# Patient Record
Sex: FEMALE | Race: WHITE | Employment: OTHER | URBAN - METROPOLITAN AREA
[De-identification: names, ages, dates, MRNs, and addresses within clinical notes are randomized per-mention and may not be internally consistent; named-entity substitution may affect disease eponyms.]

---

## 2019-07-02 NOTE — TELEPHONE ENCOUNTER
RECORDS STATUS - ALL OTHER DIAGNOSIS      RECORDS RECEIVED FROM: [facility]    DATE RECEIVED: 07/02/2019   NOTES STATUS DETAILS   OFFICE NOTE from referring provider YES CE   OFFICE NOTE from medical oncologist NA    DISCHARGE SUMMARY from hospital YES CE   DISCHARGE REPORT from the ER YES CE   OPERATIVE REPORT NA    MEDICATION LIST YES CE   CLINICAL TRIAL TREATMENTS TO DATE NA    LABS YES CE   PATHOLOGY REPORTS NA    ANYTHING RELATED TO DIAGNOSIS NA    GENONOMIC TESTING NA    TYPE:     IMAGING (NEED IMAGES & REPORT) PENDING REQUESTED FROM    CT SCANS PENDING    MRI NA    MAMMO NA    ULTRASOUND NA    PET NA      Action LULI   Action Taken REQUEST SENT TO  FOR IMAGING.. CDK 07/02/2019

## 2019-07-02 NOTE — TELEPHONE ENCOUNTER
ONCOLOGY INTAKE: Records Information      APPT INFORMATION: 7/11/19 - [facility]  Referring provider:  Dr Micheline Soler   Referring provider s clinic:  [facility]  Reason for visit/diagnosis:  Left ovarian mass  Has patient been notified of appointment date and time?: Yes - per Ngoc    RECORDS INFORMATION:  Were the records received with the referral (via [employer])? No    Has patient been seen for any external appt for this diagnosis? Yes    If yes, where[facility]     Has patient had any imaging or procedures outside of [place] for this condition? Yes      If Yes, where? [facility] ([facility])     ADDITIONAL INFORMATION:  Scheduled via call from Ngoc at [facility]    Records in Care Everywhere & Referral to be faxed by Ngoc

## 2019-07-11 ENCOUNTER — RESEARCH ENCOUNTER (OUTPATIENT)
Dept: ONCOLOGY | Facility: CLINIC | Age: 74
End: 2019-07-11

## 2019-07-11 ENCOUNTER — ONCOLOGY VISIT (OUTPATIENT)
Dept: ONCOLOGY | Facility: CLINIC | Age: 74
End: 2019-07-11
Attending: OBSTETRICS & GYNECOLOGY
Payer: MEDICARE

## 2019-07-11 ENCOUNTER — PRE VISIT (OUTPATIENT)
Dept: ONCOLOGY | Facility: CLINIC | Age: 74
End: 2019-07-11

## 2019-07-11 VITALS
BODY MASS INDEX: 20.15 KG/M2 | TEMPERATURE: 97.6 F | OXYGEN SATURATION: 99 % | SYSTOLIC BLOOD PRESSURE: 138 MMHG | HEART RATE: 80 BPM | HEIGHT: 58 IN | WEIGHT: 96 LBS | DIASTOLIC BLOOD PRESSURE: 84 MMHG | RESPIRATION RATE: 14 BRPM

## 2019-07-11 DIAGNOSIS — K35.201 ACUTE APPENDICITIS WITH PERFORATION AND GENERALIZED PERITONITIS, WITHOUT GANGRENE, UNSPECIFIED WHETHER ABSCESS PRESENT: Primary | ICD-10-CM

## 2019-07-11 PROBLEM — E06.3 HASHIMOTO'S DISEASE: Status: ACTIVE | Noted: 2017-10-02

## 2019-07-11 PROBLEM — R19.00 PELVIC MASS: Status: ACTIVE | Noted: 2019-07-02

## 2019-07-11 PROBLEM — R33.9 RETENTION OF URINE: Status: ACTIVE | Noted: 2019-07-05

## 2019-07-11 PROBLEM — Z01.818 PREOP EXAMINATION: Status: ACTIVE | Noted: 2019-06-30

## 2019-07-11 PROBLEM — K52.831 COLLAGENOUS COLITIS: Status: ACTIVE | Noted: 2017-08-23

## 2019-07-11 PROBLEM — M79.7 FIBROMYALGIA: Status: ACTIVE | Noted: 2017-10-02

## 2019-07-11 PROBLEM — K35.30 ACUTE APPENDICITIS WITH LOCALIZED PERITONITIS WITHOUT ABSCESS: Status: ACTIVE | Noted: 2019-06-29

## 2019-07-11 PROBLEM — K35.32 PERFORATED APPENDICITIS: Status: ACTIVE | Noted: 2019-07-02

## 2019-07-11 PROBLEM — L80 VITILIGO: Status: ACTIVE | Noted: 2017-10-02

## 2019-07-11 PROBLEM — F51.04 PSYCHOPHYSIOLOGICAL INSOMNIA: Status: ACTIVE | Noted: 2017-10-04

## 2019-07-11 PROCEDURE — G0463 HOSPITAL OUTPT CLINIC VISIT: HCPCS | Mod: ZF

## 2019-07-11 PROCEDURE — 99205 OFFICE O/P NEW HI 60 MIN: CPT | Mod: ZP | Performed by: OBSTETRICS & GYNECOLOGY

## 2019-07-11 RX ORDER — METRONIDAZOLE 500 MG/1
500 TABLET ORAL 3 TIMES DAILY
Qty: 30 TABLET | Refills: 0 | Status: ON HOLD | OUTPATIENT
Start: 2019-07-11 | End: 2019-08-13

## 2019-07-11 RX ORDER — LEVOTHYROXINE SODIUM 50 UG/1
50 TABLET ORAL EVERY MORNING
COMMUNITY
Start: 2017-01-10

## 2019-07-11 RX ORDER — CEFUROXIME AXETIL 250 MG/1
250 TABLET ORAL 2 TIMES DAILY
Qty: 20 TABLET | Refills: 0 | Status: ON HOLD | OUTPATIENT
Start: 2019-07-11 | End: 2019-08-13

## 2019-07-11 RX ORDER — LIOTHYRONINE SODIUM 5 UG/1
5 TABLET ORAL EVERY MORNING
COMMUNITY
Start: 2017-01-10

## 2019-07-11 RX ORDER — LOPERAMIDE HYDROCHLORIDE 1 MG/5ML
1 SOLUTION ORAL
COMMUNITY
End: 2022-01-17

## 2019-07-11 RX ORDER — DULOXETIN HYDROCHLORIDE 20 MG/1
20 CAPSULE, DELAYED RELEASE ORAL 2 TIMES DAILY
Status: ON HOLD | COMMUNITY
End: 2019-08-13

## 2019-07-11 ASSESSMENT — PAIN SCALES - GENERAL: PAINLEVEL: NO PAIN (0)

## 2019-07-11 ASSESSMENT — MIFFLIN-ST. JEOR: SCORE: 807.26

## 2019-07-11 NOTE — NURSING NOTE
"Oncology Rooming Note    July 11, 2019 12:18 PM   Maximino Simeon is a 74 year old female who presents for:    Chief Complaint   Patient presents with     Oncology Clinic Visit     New; Left Ovarian Mass     Initial Vitals: Pulse 80   Temp 97.6  F (36.4  C) (Oral)   Resp 14   Ht 1.461 m (4' 9.5\")   Wt 43.5 kg (96 lb)   SpO2 99%   Breastfeeding? No   BMI 20.41 kg/m   Estimated body mass index is 20.41 kg/m  as calculated from the following:    Height as of this encounter: 1.461 m (4' 9.5\").    Weight as of this encounter: 43.5 kg (96 lb). Body surface area is 1.33 meters squared.  No Pain (0) Comment: Data Unavailable   No LMP recorded. Patient is postmenopausal.  Allergies reviewed: Yes  Medications reviewed: Yes  Blood pressure 138/84  Medications: Medication refills not needed today.  Pharmacy name entered into [employer]: [id number] - [place] [id number]    Clinical concerns: ovarian mass       Rachel Gomez CMA              "

## 2019-07-11 NOTE — LETTER
2019       RE: Maximino Simeon  [address] [facility] [address]     Dear Colleague,    Thank you for referring your patient, Maximino Simeon, to the [facility]. Please see a copy of my visit note below.                            Consult Notes on Referred Patient    Date: 2019       Dr. [facility]  [address]  [place] [address]       RE: Maximino Simeon  : 1945  LORNA: 2019    Dear Dr. [facility] Jennifer *:    I had the pleasure of seeing your patient Maximino Simeon here at the Gynecologic Cancer Clinic at the [facility] on 2019.  As you know she is a very pleasant 74 year old woman with a recent diagnosis of  Pelvic mass.  Given these findings she was subsequently sent to the Gynecologic Cancer Clinic for new patient consultation.       HPI:    Saw patient at [facility] originally during my call week was to follow up with outpatient GYN ONC and general surgery due to appendicits and enlarged ovary.      54  CA 19-9 15  CEA 2.8    2019: CT abd pelvis: IMPRESSION:      1. Dilated, fluid-filled appendix with mild increased enhancement and slight stranding at its tip is worrisome for acute appendicitis.  2. Large complex cystic pelvic mass with soft tissue nodularity with adjacent prominent left adnexal vessels is worrisome for neoplasm of ovarian origin. No mesenteric nodularity, lymph node enlargement, or other findings that are suspicious for metastatic disease in the abdomen or pelvis.    2019: CT abd pelvis REPRODUCTIVE ORGANS with IV and oral contrast: Again noted is a large complex cystic mass in the pelvis measuring approximately 13.3 x 8.1 x 9.7 cm in size which has areas of mural nodularity and soft tissue density.    1. Again noted is a thick-walled dilated appendix compatible with appendicitis. There is a questionable new small area of appendiceal wall discontinuity and  adjacent 1.2 cm fluid pocket on axial image 57 and sagittal image 23. Findings raise question of a small area of appendiceal wall perforation.    2. Large complex pelvic mass again concerning for ovarian neoplasm.    3. Marked bladder distention. Mild prominence of the urinary collecting systems likely secondary to this.    7/2/2019-7/7/2019: General surgery consulted for perforated appendicitis.  Not septic as no fever.Treated with Cefuroxime and metronidazole (PCN allergy). Surgery recommending postponing appendectomy for about 6 weeks or so. Presenting leukocytosis and fever resolved by discharge.  Will finish oral course of ceftin and flagyl as outpatient.     Maximino Simeon is a 74 y.o. female with history of colitis with recent admission for acute appendicitis and incidental pelvic mass who was treated conservatively with antibiotics who returns with worsening pain and CT with evidence of perforation and abscess.     Per general surgery note:  - No indication for surgery at this time  - Recommend admission to medicine service  - Start IV antibiotics  - Will again need to discuss the timing of appendectomy with Gyn/Onc to allow for evaluation of her pelvic mass simultaneously, this will likely have to be in 4-6 weeks following possible appendix perforation  - Surgery will continue to follow  Samuel Eller MD       Sent home on antibiotics will finish Sunday, was told to wait 6 weeks until surgery. No pain now, was having pain with urination. On second admit, had a catheter placed in bladder, now self-catheterization. Had cystoscopy yesterday and was told due to ovarian mass compression on bladder.She developed gross hematuria with a small clot which clogged the catheter. Cystoscopy showed irritation from catheter as expected but no evidence of mass invasion. past 2 months she has dealt with daily multiple episodes of liquid urgent diarrhea with some accidents          Review of Systems:    Systemic            no weight changes; no fever; no chills; no night sweats; no appetite changes  Skin           no rashes, or lesions  Eye           no irritation; no changes in vision  Jarred-Laryngeal           no dysphagia; no hoarseness   Pulmonary    no cough; no shortness of breath  Cardiovascular    no chest pain; no palpitations  Gastrointestinal    no diarrhea; no constipation; no abdominal pain; no changes in bowel  habits; no blood in stool  Genitourinary   no urinary frequency; no urinary urgency; no dysuria; no pain; no abnormal vaginal discharge; no abnormal vaginal bleeding  Breast   no breast discharge; no breast changes; no breast pain  Musculoskeletal    no myalgias; no arthralgias; no back pain  Psychiatric           no depressed mood; no anxiety    Hematologic           no tender lymph nodes; no noticeable swellings or lumps   Endocrine    no hot flashes; no heat/cold intolerance         Neurological   no tremor; no numbness and tingling; no headaches; no difficulty  sleeping      Past Medical History:    Asymptomatic CAD  Collagenous colitis   Major depressive disorder, recurrent episode, moderate (HC)    Anxiety state, unspecified      Past Surgical History:    One ovary removed for endometriosis, still has uterus and other ovary.    Health Maintenance:  Health Maintenance Due   Topic Date Due     DEXA  1945     ADVANCE CARE PLANNING  1945     DEPRESSION ACTION PLAN  1945     PHQ-9  1945     LIPID  02/01/1988     MEDICARE ANNUAL WELLNESS VISIT  02/01/2008     FALL RISK ASSESSMENT  02/01/2008     ZOSTER IMMUNIZATION (2 of 3) 09/21/2015       Last Pap Smear: 10 years ago            Result: normal  She has not had a history of abnormal Pap smears.    Last Mammogram: Due 9/2019             Result: normal         Last Colonoscopy: 2013           Result: microscopic colitis                           Current Medications:     has a current medication list which includes the following  "prescription(s): ascorbic acid, b complex vitamins, calcium citrate-vitamin d, coenzyme q10, hyaluronic acid-vitamin c, levothyroxine, liothyronine, loperamide, lysine, multiple vitamins-minerals, probiotic product, and duloxetine.       Allergies:     Allergies   Allergen Reactions     Liquid Adhesive Rash     Penicillins Rash             Social History:     Social History     Tobacco Use     Smoking status: Never Smoker     Smokeless tobacco: Never Used   Substance Use Topics     Alcohol use: Not Currently     Comment: socially       History   Drug Use Unknown           Family History:     The patient's family history is notable for the following.    Breast cancer sister in her 50's, maternal aunt breast cancer >50 years, no ovarian cancer    Physical Exam:     /84   Pulse 80   Temp 97.6  F (36.4  C) (Oral)   Resp 14   Ht 1.461 m (4' 9.5\")   Wt 43.5 kg (96 lb)   SpO2 99%   Breastfeeding? No   BMI 20.41 kg/m     Body mass index is 20.41 kg/m .    General Appearance: healthy and alert, no distress but patient somewhat forgetful during exam, has to be told multiple times over about dates, did not remember that RN from [facility] called her to set up appt with [facility] GYN ONC and Gen surgery.     HEENT:  no thyromegaly, no palpable nodules or masses        Cardiovascular: regular rate and rhythm, no gallops, rubs or murmurs     Respiratory: lungs clear, no rales, rhonchi or wheezes, normal diaphragmatic excursion    Musculoskeletal: extremities non tender and without edema    Skin: no lesions or rashes     Neurological: normal gait, no gross defects     Psychiatric: appropriate mood and affect                               Hematological: normal cervical, supraclavicular and inguinal lymph nodes     Gastrointestinal:       abdomen soft, mildly tender, non-distended, no organomegaly or masses    Genitourinary: External genitalia and urethral meatus appears normal.  Vagina is smooth without nodularity or " masses seen.  Cervix appears normal and without lesions.  Bimanual exam reveals large pelvic mass filling cul-de-sac immobile, firm.    Assessment:    Maximino Simeon is a 74 year old woman with a new diagnosis of pelvic mass and probable ruptured appendix, was to be seen by GYN ONC and general surgery at [facility] for surgery but was sent here in consultation. On Ceftin and Flagyl to complete on Sunday.      Plan:     1.)    Large pelvic mass, elevated  and appendicitis that now appears to be ruptured on imaging. Discussed findings with Dr. Tang, colorectal. Recommended general surgery and plan for joint case, will need XL, ENRIKE, removal of ovary and appendix, possible cancer staging.     2.) Genetic risk factors were assessed and the patient does not meet the qualifications for a referral.      3.) Labs and/or tests ordered include:  PAC, general surgery consult    4.) Pre-op teaching was completed today.  Risks of surgery were discussed to include: bleeding, transfusion, infection, unintentional injury to surrounding organs/structures.      Thank you for allowing us to participate in the care of your patient.         Sincerely,    Naz Ascencio MD    Department of Ob/Gyn and Women's Health  Division of Gynecologic Oncology  [facility]  [phone]      CC  Patient Care Team:  Cecelia Manzo MD as PCP - General Ascencio, Naz Khan MD as MD (Oncology)  CLINIC, [facility]

## 2019-07-11 NOTE — PROGRESS NOTES
[id number]: Informed Consent Note     The consent form, including purpose, risks and benefits, was reviewed with Maximino Simeon, and all questions were answered before she signed the consent form. The patient understands that the study involves an active treatment phase as well as a post-treatment follow up phase.     Present during the discussion were the patient's daughter and son. A copy of the signed form was provided to the patient. No procedures specific to this study were performed prior to the patient signing the consent form.    Consent Version Date: 07 DEC 2018  Consent obtained by: Raffi He    Date: July 11, 2019  HIPAA authorization signed?: yes  HIPAA authorization version date: 21 NOV 2018    Raffi Grad    Form 503.03.01 (Version 2)     Effective date: 01AUG2018     Next Review Date: 01AUG2020  :  Varsha French  Phone: [phone]  Primary Clinical Research Coordinator:  Susan Lowe  Phone: [phone]  Pager: [phone]

## 2019-07-11 NOTE — PROGRESS NOTES
Consult Notes on Referred Patient    Date: 2019       Dr. [facility]  [address]  [place] [address]       RE: Maximino Simeon  : 1945  LORNA: 2019    Dear Dr. [facility] [place] *:    I had the pleasure of seeing your patient Maximino Simeon here at the Gynecologic Cancer Clinic at the [facility] on 2019.  As you know she is a very pleasant 74 year old woman with a recent diagnosis of  Pelvic mass.  Given these findings she was subsequently sent to the Gynecologic Cancer Clinic for new patient consultation.       HPI:    Saw patient at [facility] originally during my call week was to follow up with outpatient GYN ONC and general surgery due to appendicits and enlarged ovary.      54  CA 19-9 15  CEA 2.8    2019: CT abd pelvis: IMPRESSION:      1. Dilated, fluid-filled appendix with mild increased enhancement and slight stranding at its tip is worrisome for acute appendicitis.  2. Large complex cystic pelvic mass with soft tissue nodularity with adjacent prominent left adnexal vessels is worrisome for neoplasm of ovarian origin. No mesenteric nodularity, lymph node enlargement, or other findings that are suspicious for metastatic disease in the abdomen or pelvis.    2019: CT abd pelvis REPRODUCTIVE ORGANS with IV and oral contrast: Again noted is a large complex cystic mass in the pelvis measuring approximately 13.3 x 8.1 x 9.7 cm in size which has areas of mural nodularity and soft tissue density.    1. Again noted is a thick-walled dilated appendix compatible with appendicitis. There is a questionable new small area of appendiceal wall discontinuity and adjacent 1.2 cm fluid pocket on axial image 57 and sagittal image 23. Findings raise question of a small area of appendiceal wall perforation.    2. Large complex pelvic mass again concerning for ovarian neoplasm.    3. Marked bladder  distention. Mild prominence of the urinary collecting systems likely secondary to this.    7/2/2019-7/7/2019: General surgery consulted for perforated appendicitis.  Not septic as no fever.Treated with Cefuroxime and metronidazole (PCN allergy). Surgery recommending postponing appendectomy for about 6 weeks or so. Presenting leukocytosis and fever resolved by discharge.  Will finish oral course of ceftin and flagyl as outpatient.     Maximino Simeon is a 74 y.o. female with history of colitis with recent admission for acute appendicitis and incidental pelvic mass who was treated conservatively with antibiotics who returns with worsening pain and CT with evidence of perforation and abscess.     Per general surgery note:  - No indication for surgery at this time  - Recommend admission to medicine service  - Start IV antibiotics  - Will again need to discuss the timing of appendectomy with Gyn/Onc to allow for evaluation of her pelvic mass simultaneously, this will likely have to be in 4-6 weeks following possible appendix perforation  - Surgery will continue to follow  Samuel Eller MD       Sent home on antibiotics will finish Sunday, was told to wait 6 weeks until surgery. No pain now, was having pain with urination. On second admit, had a catheter placed in bladder, now self-catheterization. Had cystoscopy yesterday and was told due to ovarian mass compression on bladder.She developed gross hematuria with a small clot which clogged the catheter. Cystoscopy showed irritation from catheter as expected but no evidence of mass invasion. past 2 months she has dealt with daily multiple episodes of liquid urgent diarrhea with some accidents          Review of Systems:    Systemic           no weight changes; no fever; no chills; no night sweats; no appetite changes  Skin           no rashes, or lesions  Eye           no irritation; no changes in vision  Jarred-Laryngeal           no dysphagia; no hoarseness   Pulmonary     no cough; no shortness of breath  Cardiovascular    no chest pain; no palpitations  Gastrointestinal    no diarrhea; no constipation; no abdominal pain; no changes in bowel  habits; no blood in stool  Genitourinary   no urinary frequency; no urinary urgency; no dysuria; no pain; no abnormal vaginal discharge; no abnormal vaginal bleeding  Breast   no breast discharge; no breast changes; no breast pain  Musculoskeletal    no myalgias; no arthralgias; no back pain  Psychiatric           no depressed mood; no anxiety    Hematologic           no tender lymph nodes; no noticeable swellings or lumps   Endocrine    no hot flashes; no heat/cold intolerance         Neurological   no tremor; no numbness and tingling; no headaches; no difficulty  sleeping      Past Medical History:    Asymptomatic CAD  Collagenous colitis   Major depressive disorder, recurrent episode, moderate (HC)    Anxiety state, unspecified      Past Surgical History:    One ovary removed for endometriosis, still has uterus and other ovary.    Health Maintenance:  Health Maintenance Due   Topic Date Due     DEXA  1945     ADVANCE CARE PLANNING  1945     DEPRESSION ACTION PLAN  1945     PHQ-9  1945     LIPID  02/01/1988     MEDICARE ANNUAL WELLNESS VISIT  02/01/2008     FALL RISK ASSESSMENT  02/01/2008     ZOSTER IMMUNIZATION (2 of 3) 09/21/2015       Last Pap Smear: 10 years ago            Result: normal  She has not had a history of abnormal Pap smears.    Last Mammogram: Due 9/2019             Result: normal         Last Colonoscopy: 2013           Result: microscopic colitis                           Current Medications:     has a current medication list which includes the following prescription(s): ascorbic acid, b complex vitamins, calcium citrate-vitamin d, coenzyme q10, hyaluronic acid-vitamin c, levothyroxine, liothyronine, loperamide, lysine, multiple vitamins-minerals, probiotic product, and duloxetine.  "      Allergies:     Allergies   Allergen Reactions     Liquid Adhesive Rash     Penicillins Rash             Social History:     Social History     Tobacco Use     Smoking status: Never Smoker     Smokeless tobacco: Never Used   Substance Use Topics     Alcohol use: Not Currently     Comment: socially       History   Drug Use Unknown           Family History:     The patient's family history is notable for the following.    Breast cancer sister in her 50's, maternal aunt breast cancer >50 years, no ovarian cancer    Physical Exam:     /84   Pulse 80   Temp 97.6  F (36.4  C) (Oral)   Resp 14   Ht 1.461 m (4' 9.5\")   Wt 43.5 kg (96 lb)   SpO2 99%   Breastfeeding? No   BMI 20.41 kg/m    Body mass index is 20.41 kg/m .    General Appearance: healthy and alert, no distress but patient somewhat forgetful during exam, has to be told multiple times over about dates, did not remember that RN from [facility] called her to set up appt with [facility] ON[facility] and Gen surgery.     HEENT:  no thyromegaly, no palpable nodules or masses        Cardiovascular: regular rate and rhythm, no gallops, rubs or murmurs     Respiratory: lungs clear, no rales, rhonchi or wheezes, normal diaphragmatic excursion    Musculoskeletal: extremities non tender and without edema    Skin: no lesions or rashes     Neurological: normal gait, no gross defects     Psychiatric: appropriate mood and affect                               Hematological: normal cervical, supraclavicular and inguinal lymph nodes     Gastrointestinal:       abdomen soft, mildly tender, non-distended, no organomegaly or masses    Genitourinary: External genitalia and urethral meatus appears normal.  Vagina is smooth without nodularity or masses seen.  Cervix appears normal and without lesions.  Bimanual exam reveals large pelvic mass filling cul-de-sac immobile, firm.    Assessment:    Maximino Simeon is a 74 year old woman with a new diagnosis of pelvic mass and " probable ruptured appendix, was to be seen by GYN ONC and general surgery at [facility] for surgery but was sent here in consultation. On Ceftin and Flagyl to complete on Sunday.      Plan:     1.)    Large pelvic mass, elevated  and appendicitis that now appears to be ruptured on imaging. Discussed findings with karla Woody. Recommended general surgery and plan for joint case, will need XL, ENRIKE, removal of ovary and appendix, possible cancer staging.     2.) Genetic risk factors were assessed and the patient does not meet the qualifications for a referral.      3.) Labs and/or tests ordered include:  PAC, general surgery consult    4.) Pre-op teaching was completed today.  Risks of surgery were discussed to include: bleeding, transfusion, infection, unintentional injury to surrounding organs/structures.      Thank you for allowing us to participate in the care of your patient.         Sincerely,    Naz Ascencio MD    Department of Ob/Gyn and Women's Health  Division of Gynecologic Oncology  [facility]  [phone]      CC  Patient Care Team:  Cecelia Manzo MD as PCP - Naz Peterson MD as MD (Oncology)  [facility]

## 2019-07-12 ENCOUNTER — ANESTHESIA EVENT (OUTPATIENT)
Dept: SURGERY | Facility: CLINIC | Age: 74
End: 2019-07-12

## 2019-07-12 ENCOUNTER — OFFICE VISIT (OUTPATIENT)
Dept: SURGERY | Facility: CLINIC | Age: 74
End: 2019-07-12
Payer: MEDICARE

## 2019-07-12 ENCOUNTER — PRE VISIT (OUTPATIENT)
Dept: SURGERY | Facility: CLINIC | Age: 74
End: 2019-07-12

## 2019-07-12 VITALS
BODY MASS INDEX: 19.65 KG/M2 | HEIGHT: 58 IN | TEMPERATURE: 97.6 F | SYSTOLIC BLOOD PRESSURE: 130 MMHG | WEIGHT: 93.6 LBS | RESPIRATION RATE: 15 BRPM | OXYGEN SATURATION: 98 % | HEART RATE: 86 BPM | DIASTOLIC BLOOD PRESSURE: 86 MMHG

## 2019-07-12 DIAGNOSIS — R97.1 ELEVATED CA-125: ICD-10-CM

## 2019-07-12 DIAGNOSIS — K37 APPENDICITIS, UNSPECIFIED APPENDICITIS TYPE: ICD-10-CM

## 2019-07-12 DIAGNOSIS — R19.00 PELVIC MASS: ICD-10-CM

## 2019-07-12 DIAGNOSIS — Z01.818 PREOP EXAMINATION: Primary | ICD-10-CM

## 2019-07-12 ASSESSMENT — PAIN SCALES - GENERAL: PAINLEVEL: NO PAIN (0)

## 2019-07-12 ASSESSMENT — LIFESTYLE VARIABLES: TOBACCO_USE: 0

## 2019-07-12 ASSESSMENT — PATIENT HEALTH QUESTIONNAIRE - PHQ9: SUM OF ALL RESPONSES TO PHQ QUESTIONS 1-9: 8

## 2019-07-12 ASSESSMENT — MIFFLIN-ST. JEOR: SCORE: 804.32

## 2019-07-12 NOTE — TELEPHONE ENCOUNTER
FUTURE VISIT INFORMATION      SURGERY INFORMATION:    No date for surgery yet, scheduled per [place] @Charis Ascencio patient. Appendicitis with perforation and enlarged ovary.    RECORDS REQUESTED FROM:       Primary Care Provider: Cecelia Manzo MD- [facility]    Pertinent Medical History: Coronary Atherosclerosis    Most recent EKG+ Tracin13- [facility]- requested tracing

## 2019-07-12 NOTE — ANESTHESIA PREPROCEDURE EVALUATION
Anesthesia Pre-Procedure Evaluation    Patient: Maximino Simeon   MRN:     [id number] Gender:   female   Age:    74 year old :      1945        Preoperative Diagnosis: * No surgery found *        Past Medical History:   Diagnosis Date     Anxiety      CAD (coronary artery disease)      Hypothyroidism      Insomnia 2017     Mixed hyperlipidemia      Recurrent major depressive disorder (H)       Past Surgical History:   Procedure Laterality Date     OOPHORECTOMY            Anesthesia Evaluation     . Pt has had prior anesthetic. Type: General    History of anesthetic complications   - PONV        ROS/MED HX    ENT/Pulmonary: Comment: Previous cataract surgery - neg pulmonary ROS    (-) tobacco use   Neurologic:  - neg neurologic ROS     Cardiovascular: Comment: Patient reports history of asymptomatic CAD from >10 years ago.    (+) hypertension--CAD, --. : . . . :. . Previous cardiac testing date:results:date: results:ECG reviewed date:19 results:NSR. Possible L atrial enlargement. date: results:         (-) taking anticoagulants/antiplatelets   METS/Exercise Tolerance:  >4 METS   Hematologic:        (-) history of blood clots and History of Transfusion   Musculoskeletal: Comment: fibromyalgia  (+) arthritis,  -       GI/Hepatic: Comment: Hx of colitis. Acid reflux about 1-2 per month.    (+) appendicitis,       Renal/Genitourinary: Comment: Urine retention secondary to pelvic mass. Self catheterization         Endo: Comment: hashimoto    (+) thyroid problem hypothyroidism, .      Psychiatric: Comment: Insomnia      (+) psychiatric history anxiety and depression      Infectious Disease:  - neg infectious disease ROS       Malignancy:   (+)   Current large pelvic mass and elevated - this surgery will include possible cancer staging.        Other:                         PHYSICAL EXAM:   Mental Status/Neuro:    Airway: Facies: Feasible  Mallampati: II  Mouth/Opening: Full  TM  "distance: > 6 cm  Neck ROM: Full   Respiratory: Auscultation: CTAB     Resp. Rate: Normal     Resp. Effort: Normal      CV: Rhythm: Regular  Heart: Normal Sounds  Pulses: Normal   Comments:      Dental:  Dental Comments: Upper right missing 1 tooth                No results found for: WBC, HGB, HCT, PLT, CRP, SED, NA, POTASSIUM, CHLORIDE, CO2, BUN, CR, GLC, LUCRECIA, PHOS, MAG, ALBUMIN, PROTTOTAL, ALT, AST, GGT, ALKPHOS, BILITOTAL, BILIDIRECT, LIPASE, AMYLASE, RADHA, PTT, INR, FIBR, TSH, T4, T3, HCG, HCGS, CKTOTAL, CKMB, TROPN    Preop Vitals  BP Readings from Last 3 Encounters:   07/11/19 138/84   07/13/12 138/72    Pulse Readings from Last 3 Encounters:   07/11/19 80   07/13/12 89      Resp Readings from Last 3 Encounters:   07/11/19 14    SpO2 Readings from Last 3 Encounters:   07/11/19 99%   07/13/12 97%      Temp Readings from Last 1 Encounters:   07/11/19 97.6  F (36.4  C) (Oral)    Ht Readings from Last 1 Encounters:   07/11/19 1.461 m (4' 9.5\")      Wt Readings from Last 1 Encounters:   07/11/19 43.5 kg (96 lb)    Estimated body mass index is 20.41 kg/m  as calculated from the following:    Height as of 7/11/19: 1.461 m (4' 9.5\").    Weight as of 7/11/19: 43.5 kg (96 lb).     LDA:            DEB FV AN PLAN NO PONV RULE         PAC Discussion and Assessment    ASA Classification: 3  Case is suitable for: [place]  Anesthetic techniques and relevant risks discussed: GA  Invasive monitoring and risk discussed:   Types:   Possibility and Risk of blood transfusion discussed:   NPO instructions given:   Additional anesthetic preparation and risks discussed:   Needs early admission to pre-op area:   Other:     PAC Resident/NP Anesthesia Assessment:  Maximino is a 74 year old female not yet scheduled for a possible exploratory laparotomy, total abdominal hysterectomy, removal of ovary, appendectomy, possible cancer staging by Dr. Moncada in treatment of large pelvic mass, elevated  and appendicitis.  " PAC referral for risk assessment and optimization for anesthesia with comorbid conditions of hypothyroidism, colitis, urine retention, anxiety, depression, fibromyalgia, cataracts:    Pre-operative considerations:  1.  Cardiac:  Functional status- METS >4. Up until hospitalization 7/2, she reports she would go to the gym 5 day/week for cardiovascular exercise. She is still able to go up and down the 35 stairs in her home multiple times/day without problem.  She has a history of asymptomatic CAD diagnosed at [place]. She states at that time she was advised to take a blood pressure medication, but she refused because she prefers not to use medications unless completely necessary.  She has been using Coenzyme Q10 instead.  She had never had a cardiac cath and no coronary procedures/interventions were recommended.  She reports BPs at home typically run around 127/66, but since her hospitalization she has been having readings in the 180/90 range at home. In clinic today BP was 169/93, repeat /86. EKG in clinic NSR with possible left atrial enlargement.  Intermediate risk surgery with 0.9% risk of major adverse cardiac event.  2.  Pulm:  Airway feasible.  RAMÓN risk: low  3.  GI:  Risk of PONV score = 3.  If > 2, anti-emetic intervention recommended. Patient reports history of PONV. Scopolamine patch will be ordered for DOS.  Any further PONV prevention measures as per anesthesia DOS.  She reports intermittent acid reflux 1-2x/month.  She has not needed to use medications for this in the past. History of colitis. She was started on Budenoside for colitis, but this was stopped after about 2 weeks when she was hospitalized for appendicitis. Now managing with imodium.   4.  Endo: patient has a diagnosis of hypothyroidism- hashimoto's. Currently stable on levothyroxine and liothyronine.   5. Psych: Anxiety and depression stable on cymbalta and with therapy.  Insomnia - Patient states she has been sleeping better since her  urinary catheter was removed and she is self catheterizing.  6. Renal: urinary retention secondary to pelvic mass. Currently self catheterizing- patient reports she is having no problem with this.  She has a couple questions about bathing now that the permanent catheter has been removed and extra lubrication in the even she runs out before surgery. The family will call her urologist for recommendations.       VTE risk: 0.5% if the pelvic mass proves not to be cancer; 3% risk if mass proves to be cancer.    Patient is optimized and is acceptable candidate for the proposed procedure.  No further diagnostic evaluation is needed.     Patient discussed with Dr. Taveras.     **For further details of assessment, testing, and physical exam please see H and P completed on same date.      Sigrid Valdez PA-C        Mid-Level Provider/Resident:   Date:   Time:     Attending Anesthesiologist Anesthesia Assessment:        Anesthesiologist:   Date:   Time:   Pass/Fail:   Disposition:     PAC Pharmacist Assessment:        Pharmacist:   Date:   Time:        Sigrid Valdez PA-C

## 2019-07-12 NOTE — PATIENT INSTRUCTIONS
Preparing for Your Surgery      Name:  Maximino Simeon   MRN:  [id number]   :  1945   Today's Date:  2019     Arriving for surgery:  Surgery date:  To be determined.  You will receive a phone call from the pre-admissions office with a surgery date, arrival time and location.  The pre-admissions office phone number is: [phone]; they are open Monday through Friday, 8:00 am to 5:30 pm.     What can I eat or drink?  -  You may have solid food or milk products until 8 hours prior to your surgery.  -  You may have water, apple juice or 7up/Sprite until 2 hours prior to your surgery.    Which medicines can I take?  Stop Aspirin, vitamins and supplements one week prior to surgery.  Hold Ibuprofen for 24 hours and/or Naproxen for 48 hours prior to surgery.       - Please do not take these medications the day of surgery:       Bismuth Subsalicylate (Pepto-Bismol)      Loperamide (Imodium)  -  Please take these medications the day of surgery:  Cefuroxime (Ceftin)   Liothyronine (Cytomel)  Duloxetine (Cymbalta)   Metronidazole (Flagyl)  Levothyroxine (Synthroid/Levothroid)  How do I prepare myself?  -  Take two showers: one the night before surgery; and one the morning of surgery.         Use Scrubcare or Hibiclens to wash from neck down.  You may use your own shampoo and conditioner. No other hair products.   -  Do NOT use lotion, powder, deodorant, or antiperspirant the day of your surgery.  -  Do NOT wear any makeup, fingernail polish or jewelry.  -  Do not bring your own medications to the hospital.  -  Bring your ID and insurance card.    -If you are scheduled to go home the Same Day as surgery you must have a responsible adult as a  and to stay with you overnight the first 24 hours after surgery.     Questions or Concerns:  -If you have questions or concerns regarding the day of surgery, please call [phone].     -For questions after surgery please call your surgeons office.     AFTER YOUR  SURGERY  Breathing exercises   Breathing exercises help you recover faster. Take deep breaths and let the air out slowly. This will:     Help you wake up after surgery.    Help prevent complications like pneumonia.  Preventing complications will help you go home sooner.   We may give you a breathing device (incentive spirometer) to encourage you to breathe deeply.   Nausea and vomiting   You may feel sick to your stomach after surgery; if so, let your nurse know.    Pain control:  After surgery, you may have pain. Our goal is to help you manage your pain. Pain medicine will help you feel comfortable enough to do activities that will help you heal.  These activities may include breathing exercises, walking and physical therapy.   To help your health care team treat your pain we will ask: 1) If you have pain  2) where it is located 3) describe your pain in your words  Methods of pain control include medications given by mouth, vein or by nerve block for some surgeries.  Sequential Compression Device (SCD) or Pneumo Boots:  You may need to wear SCD S on your legs or feet. These are wraps connected to a machine that pumps in air and releases it. The repeated pumping helps prevent blood clots from forming.

## 2019-07-12 NOTE — H&P
Pre-Operative H & P     CC:  Preoperative exam to assess for increased cardiopulmonary risk while undergoing surgery and anesthesia.    Date of Encounter: 7/12/2019  Primary Care Physician:  Cecelia Manzo  Associated diagnosis: large pelvic mass, elevated , appendicitis    HPI  Maximino Simeon is a 74 year old female who presents for pre-operative H & P for a possible exploratory laparotomy, total abdominal hysterectomy, removal of ovary, appendectomy, possible cancer staging by Dr. Moncada that is not yet schedule for the evaluation and treatment of large pelvic mass, elevated  and appendicitis.    History of a pelvic mass with associated urinary retention. Subsequently, she developed abdominal pain and was found to have perforated appendicitis and admitted to OSH 7/2/19-7/7/19. She was treated conservatively with antibiotics as she was not septic.  General surgery was consulted and recommended surgery in 4-6 weeks- date to be coordinated with oncology. She was seen yesterday by Dr. Ascencio from gyn/onc who discussed patient with Dr. Tang. Per note, they plan for general surgery joint case with exploratory laparotomy, total abdominal hysterectomy, removal of ovary and appendectomy with possible cancer staging. She will see general surgery Tuesday, 7/16/19.  Additionally, patient has seen urology to manage her urinary retention and self-catheterization.      History is obtained from the patient and chart review    Past Medical History  Past Medical History:   Diagnosis Date     Anxiety 2014     CAD (coronary artery disease) 2011     Hypothyroidism 2011     Insomnia 2017     Mixed hyperlipidemia 2011     Recurrent major depressive disorder (H) 2010       Past Surgical History  Past Surgical History:   Procedure Laterality Date     OOPHORECTOMY  1992       Hx of Blood transfusions/reactions: denies    Hx of abnormal bleeding or anti-platelet use: denies    Menstrual history: No LMP recorded. Patient  is postmenopausal.:      Steroid use in the last year: no chronic use.  Started budesonide for acute colitis flare, but stopped after about 2 weeks when hospitalized for appendicitis.     Personal or FH with difficulty with Anesthesia:  Patient has a history of PONV, but has no family history of anesthesia complications.    Prior to Admission Medications  Current Outpatient Medications   Medication Sig Dispense Refill     B Complex Vitamins (B COMPLEX PO) Take 1 tablet by mouth daily (with lunch)        Bismuth Subsalicylate (PEPTO-BISMOL PO) Take 3 tablets by mouth daily       Calcium Carbonate-Vitamin D (CALCIUM + D PO) Take 6 tablets by mouth daily.       cefuroxime (CEFTIN) 250 MG tablet Take 1 tablet (250 mg) by mouth 2 times daily 20 tablet 0     Coenzyme Q10 (COQ10 PO) Take 200 mg by mouth 2 times daily       DULoxetine (CYMBALTA) 20 MG capsule Take 20 mg by mouth 2 times daily Pt. Last took July 02, 2019       levothyroxine (SYNTHROID/LEVOTHROID) 50 MCG tablet Take 50 mcg by mouth every morning        liothyronine (CYTOMEL) 5 MCG tablet Take 5 mcg by mouth every morning        loperamide (IMODIUM) 1 MG/5ML liquid Take 1 mg by mouth 6 times daily        metroNIDAZOLE (FLAGYL) 500 MG tablet Take 1 tablet (500 mg) by mouth 3 times daily 30 tablet 0     Multiple Vitamins-Minerals (MULTIVITAL PO) Take 1 tablet by mouth daily (with lunch)        Probiotic Product (PROBIOTIC DAILY PO) probiotic, taken at lunch time         Allergies  Allergies   Allergen Reactions     Liquid Adhesive Rash     Penicillins Rash       Social History  Social History     Socioeconomic History     Marital status: Unknown     Spouse name: Not on file     Number of children: Not on file     Years of education: Not on file     Highest education level: Not on file   Occupational History     Not on file   Social Needs     Financial resource strain: Not on file     Food insecurity:     Worry: Not on file     Inability: Not on file      Transportation needs:     Medical: Not on file     Non-medical: Not on file   Tobacco Use     Smoking status: Never Smoker     Smokeless tobacco: Never Used   Substance and Sexual Activity     Alcohol use: Not Currently     Comment: socially     Drug use: Not Currently     Sexual activity: Not Currently   Lifestyle     Physical activity:     Days per week: Not on file     Minutes per session: Not on file     Stress: Not on file   Relationships     Social connections:     Talks on phone: Not on file     Gets together: Not on file     Attends Yarsani service: Not on file     Active member of club or organization: Not on file     Attends meetings of clubs or organizations: Not on file     Relationship status: Not on file     Intimate partner violence:     Fear of current or ex partner: Not on file     Emotionally abused: Not on file     Physically abused: Not on file     Forced sexual activity: Not on file   Other Topics Concern     Not on file   Social History Narrative     Not on file       Family History  Family History   Problem Relation Age of Onset     Heart Disease Father      Cerebrovascular Disease Father      Cerebrovascular Disease Sister      Thyroid Disease Sister      Breast Cancer Sister      ROS/MED HX  The complete review of systems is negative other than noted in the HPI or here.   ENT/Pulmonary: Comment: Previous cataract surgery - neg pulmonary ROS    (-) tobacco use   Neurologic:  - neg neurologic ROS     Cardiovascular: Comment: Patient reports history of asymptomatic CAD from >10 years ago.    (+) hypertension--CAD, --. : . . . :. . Previous cardiac testing date:results:date: results:ECG reviewed date:7/12/19 results:NSR. Possible L atrial enlargement. date: results:         (-) taking anticoagulants/antiplatelets   METS/Exercise Tolerance:  >4 METS   Hematologic:        (-) history of blood clots and History of Transfusion   Musculoskeletal: Comment: fibromyalgia  (+) arthritis,  -      "  GI/Hepatic: Comment: Hx of colitis. Acid reflux about 1-2 per month.    (+) appendicitis,       Renal/Genitourinary: Comment: Urine retention secondary to pelvic mass. Self catheterization         Endo: Comment: hashimoto    (+) thyroid problem hypothyroidism, .      Psychiatric: Comment: Insomnia      (+) psychiatric history anxiety and depression      Infectious Disease:  - neg infectious disease ROS       Malignancy:   (+)   Current large pelvic mass and elevated - this surgery will include possible cancer staging.        Other:                 PHYSICAL EXAM:   Mental Status/Neuro:    Airway: Facies: Feasible  Mallampati: II  Mouth/Opening: Full  TM distance: > 6 cm  Neck ROM: Full   Respiratory: Auscultation: CTAB     Resp. Rate: Normal     Resp. Effort: Normal      CV: Rhythm: Regular  Heart: Normal Sounds  Pulses: Normal   Comments:      Dental:  Dental Comments: Upper right missing 1 tooth                  Temp: 97.6  F (36.4  C) Temp src: Oral BP: (!) 169/93 Pulse: 86   Resp: 15 SpO2: 98 %      BP recheck 130/86   93 lbs 9.6 oz  4' 10\"   Body mass index is 19.56 kg/m .       Physical Exam  Constitutional: Awake, alert, cooperative, no apparent distress, and appears stated age. Accompanied by daughter and son.  Eyes: Pupils equal, round and reactive to light, extra ocular muscles intact, sclera clear, conjunctiva normal.  HENT: Normocephalic, oral pharynx with moist mucus membranes, good dentition. No goiter appreciated.   Respiratory: Clear to auscultation bilaterally, no crackles or wheezing.  Cardiovascular: Regular rate and rhythm, normal S1 and S2, and no murmur noted.   no carotid bruits. No edema. Palpable pulses to radial and DP arteries.   GI: Normal bowel sounds, soft, non-distended, non-tender, no masses palpated, no hepatosplenomegaly  Lymph/Hematologic: No cervical lymphadenopathy and no supraclavicular lymphadenopathy.  Genitourinary:  deferred  Skin: Warm and dry.  No rashes at " anticipated surgical site.   Musculoskeletal: Full ROM of neck. There is no redness, warmth, or swelling of the exposed joints. Gross motor strength is normal.    Neurologic: Awake, alert, oriented to name, place and time. Cranial nerves II-XII are grossly intact. Gait is normal.   Neuropsychiatric: Calm, cooperative. Normal affect.     Labs: (personally reviewed)  Basic Metabolic Panel   Order: [id number]   (suggestion)  Information displayed in this report will not trend or trigger automated decision support.    Ref Range & Units Value   Sodium 136 - 145 mmol/L 139    Potassium 3.5 - 5.1 mmol/L 3.9    Chloride 98 - 109 mmol/L 103    CO2 20 - 29 mmol/L 27    Anion Gap 7 - 16 mmol/L 9    Calcium 8.4 - 10.4 mg/dL 9.5    BUN 7 - 26 mg/dL 17    Creatinine 0.55 - 1.02 mg/dL 0.66    GFR, Estimated >60 mL/min/1.73m2 >60    GFR, Est If African American >60 mL/min/1.73m2 >60    Glucose 70 - 100 mg/dL 92    Comment: The given reference range is for the fasting state. Non-fasting reference range for glucose is 70 - 180 mg/dL.     Contains abnormal data Complete Blood Count-W/Diff   Order: [id number]   (suggestion)  Information displayed in this report will not trend or trigger automated decision support.    Ref Range & Units Value   WBC 3.5 - 10.5 x10(9)/L 7.3    RBC 3.90 - 5.03 x10(12)/L 4.11    Hemoglobin 12.0 - 15.5 g/dL 12.4    HCT 34.9 - 44.5 % 39.5    MCV 80.0 - 100.0 fL 96.1    MCH 27.6 - 33.3 pg 30.2    MCHC 31.5 - 35.2 g/dL 31.4Low     RDW 11.9 - 15.5 % 13.7    Platelets 150 - 450 x10(9)/L 341    Automated NRBC <=0 /100 WBC 0    Neutrophil Absolute 1.7 - 7.0 10(9)/L 4.4    Lymphocyte Absolute 1.0 - 4.8 10(9)/L 2.1    Monocytes Absolute 0.2 - 0.9 10(9)/L 0.5    Eosinophil Absolute 0.0 - 0.5 10(9)/L 0.3    Basophil Absolute 0.0 - 0.3 10(9)/L 0.0    Immature Gran % 0.0 - 0.5 % 0.4            EK19 NSR with possible left atrial enlargement    CT AP W/ IV Contrast 19  1. Again noted is a thick-walled dilated  appendix compatible with appendicitis. There is a questionable new small area of appendiceal wall discontinuity and adjacent 1.2 cm fluid pocket on axial image 57 and sagittal image 23. Findings raise question of a small area of appendiceal wall perforation.  2. Large complex pelvic mass again concerning for ovarian neoplasm.  3. Marked bladder distention. Mild prominence of the urinary collecting systems likely secondary to this.    CT AP W Contrast 6/29/19  1. Dilated, fluid-filled appendix with mild increased enhancement and slight stranding at its tip is worrisome for acute appendicitis.  2. Large complex cystic pelvic mass with soft tissue nodularity with adjacent prominent left adnexal vessels is worrisome for neoplasm of ovarian origin. No mesenteric nodularity, lymph node enlargement, or other findings that are suspicious for metastatic disease in the abdomen or pelvis.    Outside records reviewed from: care everywhere    ASSESSMENT and PLAN  Maximino is a 74 year old female not yet scheduled for a possible exploratory laparotomy, total abdominal hysterectomy, removal of ovary, appendectomy, possible cancer staging by Dr. Moncada in treatment of large pelvic mass, elevated  and appendicitis.  PAC referral for risk assessment and optimization for anesthesia with comorbid conditions of hypothyroidism, colitis, urine retention, anxiety, depression, fibromyalgia, cataracts:    Pre-operative considerations:  1.  Cardiac:  Functional status- METS >4. Up until hospitalization 7/2, she reports she would go to the gym 5 day/week for cardiovascular exercise. She is still able to go up and down the 35 stairs in her home multiple times/day without problem.  She has a history of asymptomatic CAD diagnosed at [place]. She states at that time she was advised to take a blood pressure medication, but she refused because she prefers not to use medications unless completely necessary.  She has been using Coenzyme Q10  instead.  She had never had a cardiac cath and no coronary procedures/interventions were recommended.  She reports BPs at home typically run around 127/66, but since her hospitalization she has been having readings in the 180/90 range at home. In clinic today BP was 169/93, repeat /86. EKG in clinic NSR with possible left atrial enlargement.  Intermediate risk surgery with 0.9% risk of major adverse cardiac event.  2.  Pulm:  Airway feasible.  RAMÓN risk: low  3.  GI:  Risk of PONV score = 3.  If > 2, anti-emetic intervention recommended. Patient reports history of PONV. Scopolamine patch will be ordered for DOS.  Any further PONV prevention measures as per anesthesia DOS.  She reports intermittent acid reflux 1-2x/month.  She has not needed to use medications for this in the past. History of colitis. She was started on Budenoside for colitis, but this was stopped after about 2 weeks when she was hospitalized for appendicitis. Now managing with imodium.   4.  Endo: patient has a diagnosis of hypothyroidism- hashimoto's. Currently stable on levothyroxine and liothyronine.   5. Psych: Anxiety and depression stable on cymbalta and with therapy.  Insomnia - Patient states she has been sleeping better since her urinary catheter was removed and she is self catheterizing.  6. Renal: urinary retention secondary to pelvic mass. Currently self catheterizing- patient reports she is having no problem with this.  She has a couple questions about bathing now that the permanent catheter has been removed and extra lubrication in the even she runs out before surgery. The family will call her urologist for recommendations.       VTE risk: 0.5% if the pelvic mass proves not to be cancer; 3% risk if mass proves to be cancer.    Patient is optimized and is acceptable candidate for the proposed procedure.  No further diagnostic evaluation is needed.     Patient discussed with Dr. Taveras.       Sigrid Valdez PA-C  Preoperative  Assessment Center  [facility]  Clinic and Surgery Center  Phone: [phone]  Fax: [phone]

## 2019-07-15 ENCOUNTER — TELEPHONE (OUTPATIENT)
Dept: SURGERY | Facility: CLINIC | Age: 74
End: 2019-07-15

## 2019-07-15 LAB — INTERPRETATION ECG - MUSE: NORMAL

## 2019-07-15 NOTE — TELEPHONE ENCOUNTER
Pre Visit Call and Assessment    Date of call:  07/15/2019    Phone numbers:  Home number on file [phone] (home)    Reached patient/confirmed appointment:  Yes  Patient care team/Primary provider:  Cecelia Manzo    Referred to:  Dr. Parvez Abreu    Reason for visit:  Appendicitis consult

## 2019-07-16 ENCOUNTER — OFFICE VISIT (OUTPATIENT)
Dept: SURGERY | Facility: CLINIC | Age: 74
End: 2019-07-16
Attending: OBSTETRICS & GYNECOLOGY
Payer: MEDICARE

## 2019-07-16 VITALS
TEMPERATURE: 97.7 F | HEIGHT: 58 IN | DIASTOLIC BLOOD PRESSURE: 82 MMHG | WEIGHT: 90.9 LBS | OXYGEN SATURATION: 97 % | HEART RATE: 96 BPM | BODY MASS INDEX: 19.08 KG/M2 | SYSTOLIC BLOOD PRESSURE: 148 MMHG

## 2019-07-16 DIAGNOSIS — K36 OTHER APPENDICITIS: Primary | ICD-10-CM

## 2019-07-16 ASSESSMENT — ENCOUNTER SYMPTOMS
NERVOUS/ANXIOUS: 1
BLOATING: 0
CHILLS: 0
DIARRHEA: 1
NAUSEA: 0
VOMITING: 0
WEIGHT GAIN: 0
HYPERTENSION: 1
HALLUCINATIONS: 0
EXERCISE INTOLERANCE: 0
PANIC: 0
LEG PAIN: 0
ALTERED TEMPERATURE REGULATION: 0
ABDOMINAL PAIN: 0
INCREASED ENERGY: 0
WEIGHT LOSS: 0
SYNCOPE: 0
LIGHT-HEADEDNESS: 0
FEVER: 0
PALPITATIONS: 0
BLOOD IN STOOL: 0
DECREASED APPETITE: 0
POLYDIPSIA: 0
HEARTBURN: 0
SLEEP DISTURBANCES DUE TO BREATHING: 0
NIGHT SWEATS: 0
FATIGUE: 1
CONSTIPATION: 0
ORTHOPNEA: 0
HYPOTENSION: 0
DEPRESSION: 0
INSOMNIA: 1
DECREASED CONCENTRATION: 1

## 2019-07-16 ASSESSMENT — MIFFLIN-ST. JEOR: SCORE: 792.07

## 2019-07-16 ASSESSMENT — PAIN SCALES - GENERAL: PAINLEVEL: NO PAIN (0)

## 2019-07-16 NOTE — PATIENT INSTRUCTIONS
You met with Dr. Parvez Abreu.      Today's visit instructions:    Surgery scheduling will be in contact with you regarding arranging surgery with Dr. Abreu and Dr. Ascencio.  All teaching/restrictions per Dr. Ascencio's team- no changes.     If you have questions please contact Jovi RN or Jyothi RN during regular clinic hours, Monday through Friday 7:30 AM - 4:00 PM, or you can contact us via [employer] at anytime.       If you have urgent needs after-hours, weekends, or holidays please call the hospital at [phone] and ask to speak with our on-call General Surgery Team.    Appointment [phone], option #1   Nurse Advice (Jovi or Jyothi): [phone]   Surgery Scheduler (Fatoumata): [phone]  Fax: [phone]

## 2019-07-16 NOTE — LETTER
7/16/2019       RE: Maximino Simeon  [id number] [facility] [id number]     Dear Colleague,    Thank you for referring your patient, Maximino Simeon, to the [facility] GENERAL SURGERY at [facility]. Please see a copy of my visit note below.    Patient seen at [facility] 2 weeks ago for acute appendicitis treated conservatively because of large pelvis mass found on CT.  Has since seen Dr Ascencio who is planning surgery. Asked to see re: appendectomy to be done at time of that surgery. Please refer to her note.   Presently patient is home tolerating full diet with some pain, generally improving.  Past Medical History:   Diagnosis Date     Anxiety 2014     CAD (coronary artery disease) 2011     Hypothyroidism 2011     Insomnia 2017     Mixed hyperlipidemia 2011     Recurrent major depressive disorder (H) 2010     Past Surgical History:   Procedure Laterality Date     OOPHORECTOMY  1992     Current Outpatient Medications   Medication     B Complex Vitamins (B COMPLEX PO)     Bismuth Subsalicylate (PEPTO-BISMOL PO)     Calcium Carbonate-Vitamin D (CALCIUM + D PO)     cefuroxime (CEFTIN) 250 MG tablet     Coenzyme Q10 (COQ10 PO)     levothyroxine (SYNTHROID/LEVOTHROID) 50 MCG tablet     liothyronine (CYTOMEL) 5 MCG tablet     loperamide (IMODIUM) 1 MG/5ML liquid     metroNIDAZOLE (FLAGYL) 500 MG tablet     Multiple Vitamins-Minerals (MULTIVITAL PO)     Probiotic Product (PROBIOTIC DAILY PO)     DULoxetine (CYMBALTA) 20 MG capsule     No current facility-administered medications for this visit.      PHYSICAL EXAM  General appearance- appears frail, alert, and in no distress.  Skin- Skin color and turgor decreased  Neck- Neck is supple without obvious adenopathy.  Lungs- Respiratory effort unlabored.  Abdomen - soft mildly tender RLQ without peritoneal signs.    CT findings noted.    Impression: Appendicitis treated conservatively and presently on po antibiotics. Agree  could proceed with open appendectomy at time of exploration. Today discussed the alternatives, risks, goals and potential complications for proposed surgery. Opportunity for questions given and informed consent obtained.  Will defer to Dr Ascencio's care team to schedule. Patient understands that one of my partners may preform surgery if we are not able to coordinate schedules. Patient and daughter comfortable with this plan.  The total time spent with this patient was 30 minutes.  Of this time, greater than 50% was spent counseling and coordinating care.          Again, thank you for allowing me to participate in the care of your patient.      Sincerely,    Parvez Abreu MD

## 2019-07-16 NOTE — PROGRESS NOTES
Patient seen at [facility] 2 weeks ago for acute appendicitis treated conservatively because of large pelvis mass found on CT.  Has since seen Dr Ascencio who is planning surgery. Asked to see re: appendectomy to be done at time of that surgery. Please refer to her note.   Presently patient is home tolerating full diet with some pain, generally improving.  Past Medical History:   Diagnosis Date     Anxiety 2014     CAD (coronary artery disease) 2011     Hypothyroidism 2011     Insomnia 2017     Mixed hyperlipidemia 2011     Recurrent major depressive disorder (H) 2010     Past Surgical History:   Procedure Laterality Date     OOPHORECTOMY  1992     Current Outpatient Medications   Medication     B Complex Vitamins (B COMPLEX PO)     Bismuth Subsalicylate (PEPTO-BISMOL PO)     Calcium Carbonate-Vitamin D (CALCIUM + D PO)     cefuroxime (CEFTIN) 250 MG tablet     Coenzyme Q10 (COQ10 PO)     levothyroxine (SYNTHROID/LEVOTHROID) 50 MCG tablet     liothyronine (CYTOMEL) 5 MCG tablet     loperamide (IMODIUM) 1 MG/5ML liquid     metroNIDAZOLE (FLAGYL) 500 MG tablet     Multiple Vitamins-Minerals (MULTIVITAL PO)     Probiotic Product (PROBIOTIC DAILY PO)     DULoxetine (CYMBALTA) 20 MG capsule     No current facility-administered medications for this visit.      PHYSICAL EXAM  General appearance- appears frail, alert, and in no distress.  Skin- Skin color and turgor decreased  Neck- Neck is supple without obvious adenopathy.  Lungs- Respiratory effort unlabored.  Abdomen - soft mildly tender RLQ without peritoneal signs.    CT findings noted.    Impression: Appendicitis treated conservatively and presently on po antibiotics. Agree could proceed with open appendectomy at time of exploration. Today discussed the alternatives, risks, goals and potential complications for proposed surgery. Opportunity for questions given and informed consent obtained.  Will defer to Dr Ascencio's care team to schedule. Patient understands  that one of my partners may preform surgery if we are not able to coordinate schedules. Patient and daughter comfortable with this plan.  The total time spent with this patient was 30 minutes.  Of this time, greater than 50% was spent counseling and coordinating care.

## 2019-07-17 DIAGNOSIS — R19.00 PELVIC MASS: ICD-10-CM

## 2019-07-17 DIAGNOSIS — K35.201 ACUTE APPENDICITIS WITH PERFORATION AND GENERALIZED PERITONITIS, WITHOUT GANGRENE, UNSPECIFIED WHETHER ABSCESS PRESENT: Primary | ICD-10-CM

## 2019-07-17 RX ORDER — CIPROFLOXACIN 2 MG/ML
400 INJECTION, SOLUTION INTRAVENOUS
Status: CANCELLED | OUTPATIENT
Start: 2019-07-17

## 2019-07-17 RX ORDER — HEPARIN SODIUM 5000 [USP'U]/.5ML
5000 INJECTION, SOLUTION INTRAVENOUS; SUBCUTANEOUS
Status: CANCELLED | OUTPATIENT
Start: 2019-07-17

## 2019-07-19 ENCOUNTER — TELEPHONE (OUTPATIENT)
Dept: ONCOLOGY | Facility: CLINIC | Age: 74
End: 2019-07-19

## 2019-07-22 ENCOUNTER — DOCUMENTATION ONLY (OUTPATIENT)
Dept: ONCOLOGY | Facility: CLINIC | Age: 74
End: 2019-07-22

## 2019-07-22 RX ORDER — SCOLOPAMINE TRANSDERMAL SYSTEM 1 MG/1
1 PATCH, EXTENDED RELEASE TRANSDERMAL ONCE
Status: CANCELLED | OUTPATIENT
Start: 2019-07-22 | End: 2019-07-22

## 2019-07-22 NOTE — PROGRESS NOTES
This patient is scheduled surgery with Dr. Ascencio/Lois on 8/9 at [facility]. Scheduled per inbox.     The RN has provided them with education and a packet regarding their procedure.     They are aware that they will receive a call  ~2 days prior to the scheduled procedure date and will be given an exact arrival/start time.

## 2019-07-22 NOTE — ADDENDUM NOTE
Addendum  created 07/22/19 1705 by Sigrid Valdez PA-C    Order list changed, Order sets accessed, Sign clinical note

## 2019-07-24 NOTE — TELEPHONE ENCOUNTER
7/19  850 and 7/22 817  Pt left message on voice mail   Looking for surgery date  7/22  1229   Spoke with pt explained I needed to wait for MD to put in orders and Dr. Abreu and Dr. Ascencio to talk regarding plan after her appt with Dr. Abreu  Reviewed Dr Abreu's appt explained to I will check with surgery scheduler to see if she has heard from Dr. Abreu's staff and I will get back to her  Per pt she got a call earlier that surgery was being scheduled on 8/9  I will need to verify this with Dr. Ascencio as I don't see anything on the schedule yet    Discussed that pt does not need to see PCP as she has already seen PAC and been cleared,   Discussed surgery, post op limitations and what to expect    Will call pt when plan is solidified

## 2019-08-02 ENCOUNTER — TELEPHONE (OUTPATIENT)
Dept: ONCOLOGY | Facility: CLINIC | Age: 74
End: 2019-08-02

## 2019-08-02 NOTE — TELEPHONE ENCOUNTER
Patient called Dr Ascencio's team with questions and concerns about upcoming surgery.     RN answered all her questions and concerns to the best of her ability.     Patient was very appreciative of the RN time.     Bethany Johnson RN

## 2019-08-06 ENCOUNTER — TELEPHONE (OUTPATIENT)
Dept: ONCOLOGY | Facility: CLINIC | Age: 74
End: 2019-08-06

## 2019-08-06 NOTE — TELEPHONE ENCOUNTER
Spoke with pt   She states she spoke with her GI MD who prescribed the colitis medication  Has cleared understanding of how to use medication and was encouraged to use only if absolutely needed    Pt is confortable with that decision and things are better

## 2019-08-06 NOTE — TELEPHONE ENCOUNTER
Discussed with PAC pt concerns on her colitis medication  PAC could not give definitive answer and said surgeon needed to answer that    MD paged,  Will await MD call to discuss pt concerns and update pt with plan

## 2019-08-06 NOTE — TELEPHONE ENCOUNTER
Pt left message on voice mail  Getting conflicting information on what meds to start and stop prior to surgery

## 2019-08-06 NOTE — TELEPHONE ENCOUNTER
8/5  Spoke with discussed med list and what to take before surgery  Pt wonders where incision will be and if she can sleep on side after surgery   discussed this with pt  Pt has questions on consent and what she signed, encouraged pt to let nursing staff know the AM of surgery so they can get MD to come in and talk to pt  Pt also states she is a very hard IV stick and encouraged pt also to let preadmission know this so they can make adjustments or get help  Pt reports understanding

## 2019-08-06 NOTE — TELEPHONE ENCOUNTER
I received a voicemail from Maximino this morning regarding her colitis. She had an episode last night and she is worried about her medication before the surgery. She is looking for advice.     I spoke to a coworker and was informed that she would need to speak to the MD who prescribed that medication. When I told Maximino, she seemed very stressed.     She stated that she lost 3lbs from the diarrhea last night and so she knows it is not from the pelvic mass but from her colitis. She is worried because the steroid works for her and she cannot take that due to the surgery. She stated that it seemed like Dr. Abreu didn't notice in her chart that she has had colitis since 2013 as if it were of no concern.     She went on to say that this episode last night really made her scared and depressed this morning and sounded teary-voiced. She says that this is very serious to her and she does not feel like talking to her prescribing MD will answer her questions and concerns fully.    I listened and let her know that I will forward this to Girma's team because we do want to address her concerns and make her feel comfortable and safe. I did tell her to call her prescribing MD first, and that our team will follow up later.

## 2019-08-07 RX ORDER — ACETAMINOPHEN 325 MG/1
325-650 TABLET ORAL EVERY 6 HOURS PRN
COMMUNITY
End: 2019-09-13

## 2019-08-08 ENCOUNTER — ANESTHESIA EVENT (OUTPATIENT)
Dept: SURGERY | Facility: CLINIC | Age: 74
DRG: 737 | End: 2019-08-08
Payer: MEDICARE

## 2019-08-09 ENCOUNTER — SURGERY (OUTPATIENT)
Age: 74
End: 2019-08-09
Payer: MEDICARE

## 2019-08-09 ENCOUNTER — HOSPITAL ENCOUNTER (INPATIENT)
Facility: CLINIC | Age: 74
LOS: 5 days | Discharge: HOME HEALTH | DRG: 737 | End: 2019-08-14
Attending: OBSTETRICS & GYNECOLOGY | Admitting: OBSTETRICS & GYNECOLOGY
Payer: MEDICARE

## 2019-08-09 ENCOUNTER — ANESTHESIA (OUTPATIENT)
Dept: SURGERY | Facility: CLINIC | Age: 74
DRG: 737 | End: 2019-08-09
Payer: MEDICARE

## 2019-08-09 ENCOUNTER — RESEARCH ENCOUNTER (OUTPATIENT)
Dept: ONCOLOGY | Facility: CLINIC | Age: 74
End: 2019-08-09

## 2019-08-09 DIAGNOSIS — K35.201 ACUTE APPENDICITIS WITH PERFORATION AND GENERALIZED PERITONITIS, WITHOUT GANGRENE, UNSPECIFIED WHETHER ABSCESS PRESENT: ICD-10-CM

## 2019-08-09 DIAGNOSIS — R19.00 PELVIC MASS: ICD-10-CM

## 2019-08-09 DIAGNOSIS — Z90.710 S/P HYSTERECTOMY: Primary | ICD-10-CM

## 2019-08-09 LAB
ABO + RH BLD: NORMAL
ABO + RH BLD: NORMAL
BLD GP AB SCN SERPL QL: NORMAL
BLOOD BANK CMNT PATIENT-IMP: NORMAL
GLUCOSE BLDC GLUCOMTR-MCNC: 84 MG/DL (ref 70–99)
SPECIMEN EXP DATE BLD: NORMAL

## 2019-08-09 PROCEDURE — 71000014 ZZH RECOVERY PHASE 1 LEVEL 2 FIRST HR: Performed by: OBSTETRICS & GYNECOLOGY

## 2019-08-09 PROCEDURE — 25000128 H RX IP 250 OP 636: Performed by: ANESTHESIOLOGY

## 2019-08-09 PROCEDURE — 27210794 ZZH OR GENERAL SUPPLY STERILE: Performed by: OBSTETRICS & GYNECOLOGY

## 2019-08-09 PROCEDURE — 00000146 ZZHCL STATISTIC GLUCOSE BY METER IP

## 2019-08-09 PROCEDURE — 36000062 ZZH SURGERY LEVEL 4 1ST 30 MIN - UMMC: Performed by: OBSTETRICS & GYNECOLOGY

## 2019-08-09 PROCEDURE — 25800030 ZZH RX IP 258 OP 636: Performed by: NURSE ANESTHETIST, CERTIFIED REGISTERED

## 2019-08-09 PROCEDURE — 0UT10ZZ RESECTION OF LEFT OVARY, OPEN APPROACH: ICD-10-PCS | Performed by: OBSTETRICS & GYNECOLOGY

## 2019-08-09 PROCEDURE — 88305 TISSUE EXAM BY PATHOLOGIST: CPT | Performed by: OBSTETRICS & GYNECOLOGY

## 2019-08-09 PROCEDURE — 86900 BLOOD TYPING SEROLOGIC ABO: CPT | Performed by: NURSE PRACTITIONER

## 2019-08-09 PROCEDURE — 88307 TISSUE EXAM BY PATHOLOGIST: CPT | Performed by: OBSTETRICS & GYNECOLOGY

## 2019-08-09 PROCEDURE — 37000008 ZZH ANESTHESIA TECHNICAL FEE, 1ST 30 MIN: Performed by: OBSTETRICS & GYNECOLOGY

## 2019-08-09 PROCEDURE — 07BD0ZX EXCISION OF AORTIC LYMPHATIC, OPEN APPROACH, DIAGNOSTIC: ICD-10-PCS | Performed by: OBSTETRICS & GYNECOLOGY

## 2019-08-09 PROCEDURE — 0DBW0ZX EXCISION OF PERITONEUM, OPEN APPROACH, DIAGNOSTIC: ICD-10-PCS | Performed by: OBSTETRICS & GYNECOLOGY

## 2019-08-09 PROCEDURE — 88341 IMHCHEM/IMCYTCHM EA ADD ANTB: CPT | Performed by: OBSTETRICS & GYNECOLOGY

## 2019-08-09 PROCEDURE — 25000125 ZZHC RX 250: Performed by: NURSE PRACTITIONER

## 2019-08-09 PROCEDURE — 12000001 ZZH R&B MED SURG/OB UMMC

## 2019-08-09 PROCEDURE — 25000128 H RX IP 250 OP 636: Performed by: OBSTETRICS & GYNECOLOGY

## 2019-08-09 PROCEDURE — 0DBU0ZZ EXCISION OF OMENTUM, OPEN APPROACH: ICD-10-PCS | Performed by: OBSTETRICS & GYNECOLOGY

## 2019-08-09 PROCEDURE — 25000128 H RX IP 250 OP 636: Performed by: STUDENT IN AN ORGANIZED HEALTH CARE EDUCATION/TRAINING PROGRAM

## 2019-08-09 PROCEDURE — 36000064 ZZH SURGERY LEVEL 4 EA 15 ADDTL MIN - UMMC: Performed by: OBSTETRICS & GYNECOLOGY

## 2019-08-09 PROCEDURE — 58951 RESECT OVARIAN MALIGNANCY: CPT | Mod: GC | Performed by: OBSTETRICS & GYNECOLOGY

## 2019-08-09 PROCEDURE — 88112 CYTOPATH CELL ENHANCE TECH: CPT | Performed by: OBSTETRICS & GYNECOLOGY

## 2019-08-09 PROCEDURE — 88160 CYTOPATH SMEAR OTHER SOURCE: CPT | Performed by: OBSTETRICS & GYNECOLOGY

## 2019-08-09 PROCEDURE — 25800030 ZZH RX IP 258 OP 636: Performed by: STUDENT IN AN ORGANIZED HEALTH CARE EDUCATION/TRAINING PROGRAM

## 2019-08-09 PROCEDURE — 86850 RBC ANTIBODY SCREEN: CPT | Performed by: NURSE PRACTITIONER

## 2019-08-09 PROCEDURE — 88342 IMHCHEM/IMCYTCHM 1ST ANTB: CPT | Performed by: OBSTETRICS & GYNECOLOGY

## 2019-08-09 PROCEDURE — 88332 PATH CONSLTJ SURG EA ADD BLK: CPT | Performed by: OBSTETRICS & GYNECOLOGY

## 2019-08-09 PROCEDURE — 07BC0ZX EXCISION OF PELVIS LYMPHATIC, OPEN APPROACH, DIAGNOSTIC: ICD-10-PCS | Performed by: OBSTETRICS & GYNECOLOGY

## 2019-08-09 PROCEDURE — 0UT60ZZ RESECTION OF LEFT FALLOPIAN TUBE, OPEN APPROACH: ICD-10-PCS | Performed by: OBSTETRICS & GYNECOLOGY

## 2019-08-09 PROCEDURE — 88309 TISSUE EXAM BY PATHOLOGIST: CPT | Performed by: OBSTETRICS & GYNECOLOGY

## 2019-08-09 PROCEDURE — 36415 COLL VENOUS BLD VENIPUNCTURE: CPT | Performed by: NURSE PRACTITIONER

## 2019-08-09 PROCEDURE — 00000155 ZZHCL STATISTIC H-CELL BLOCK W/STAIN: Performed by: OBSTETRICS & GYNECOLOGY

## 2019-08-09 PROCEDURE — 25000566 ZZH SEVOFLURANE, EA 15 MIN: Performed by: OBSTETRICS & GYNECOLOGY

## 2019-08-09 PROCEDURE — 25000128 H RX IP 250 OP 636: Performed by: NURSE ANESTHETIST, CERTIFIED REGISTERED

## 2019-08-09 PROCEDURE — 0DTJ0ZZ RESECTION OF APPENDIX, OPEN APPROACH: ICD-10-PCS | Performed by: OBSTETRICS & GYNECOLOGY

## 2019-08-09 PROCEDURE — 71000015 ZZH RECOVERY PHASE 1 LEVEL 2 EA ADDTL HR: Performed by: OBSTETRICS & GYNECOLOGY

## 2019-08-09 PROCEDURE — 25000132 ZZH RX MED GY IP 250 OP 250 PS 637: Mod: GY | Performed by: STUDENT IN AN ORGANIZED HEALTH CARE EDUCATION/TRAINING PROGRAM

## 2019-08-09 PROCEDURE — 88304 TISSUE EXAM BY PATHOLOGIST: CPT | Performed by: OBSTETRICS & GYNECOLOGY

## 2019-08-09 PROCEDURE — 25000125 ZZHC RX 250: Performed by: NURSE ANESTHETIST, CERTIFIED REGISTERED

## 2019-08-09 PROCEDURE — 37000009 ZZH ANESTHESIA TECHNICAL FEE, EACH ADDTL 15 MIN: Performed by: OBSTETRICS & GYNECOLOGY

## 2019-08-09 PROCEDURE — 0UT90ZZ RESECTION OF UTERUS, OPEN APPROACH: ICD-10-PCS | Performed by: OBSTETRICS & GYNECOLOGY

## 2019-08-09 PROCEDURE — 25000131 ZZH RX MED GY IP 250 OP 636 PS 637: Mod: GY | Performed by: STUDENT IN AN ORGANIZED HEALTH CARE EDUCATION/TRAINING PROGRAM

## 2019-08-09 PROCEDURE — 40000171 ZZH STATISTIC PRE-PROCEDURE ASSESSMENT III: Performed by: OBSTETRICS & GYNECOLOGY

## 2019-08-09 PROCEDURE — 86901 BLOOD TYPING SEROLOGIC RH(D): CPT | Performed by: NURSE PRACTITIONER

## 2019-08-09 PROCEDURE — 88331 PATH CONSLTJ SURG 1 BLK 1SPC: CPT | Performed by: OBSTETRICS & GYNECOLOGY

## 2019-08-09 RX ORDER — LIDOCAINE 40 MG/G
CREAM TOPICAL
Status: DISCONTINUED | OUTPATIENT
Start: 2019-08-09 | End: 2019-08-14 | Stop reason: HOSPADM

## 2019-08-09 RX ORDER — SODIUM CHLORIDE, SODIUM LACTATE, POTASSIUM CHLORIDE, CALCIUM CHLORIDE 600; 310; 30; 20 MG/100ML; MG/100ML; MG/100ML; MG/100ML
INJECTION, SOLUTION INTRAVENOUS CONTINUOUS PRN
Status: DISCONTINUED | OUTPATIENT
Start: 2019-08-09 | End: 2019-08-09

## 2019-08-09 RX ORDER — SIMETHICONE 80 MG
80 TABLET,CHEWABLE ORAL 4 TIMES DAILY PRN
Status: DISCONTINUED | OUTPATIENT
Start: 2019-08-09 | End: 2019-08-14 | Stop reason: HOSPADM

## 2019-08-09 RX ORDER — NALOXONE HYDROCHLORIDE 0.4 MG/ML
.1-.4 INJECTION, SOLUTION INTRAMUSCULAR; INTRAVENOUS; SUBCUTANEOUS
Status: DISCONTINUED | OUTPATIENT
Start: 2019-08-09 | End: 2019-08-09 | Stop reason: HOSPADM

## 2019-08-09 RX ORDER — BISACODYL 10 MG
10 SUPPOSITORY, RECTAL RECTAL DAILY
Status: DISCONTINUED | OUTPATIENT
Start: 2019-08-09 | End: 2019-08-14 | Stop reason: HOSPADM

## 2019-08-09 RX ORDER — NALOXONE HYDROCHLORIDE 0.4 MG/ML
.1-.4 INJECTION, SOLUTION INTRAMUSCULAR; INTRAVENOUS; SUBCUTANEOUS
Status: ACTIVE | OUTPATIENT
Start: 2019-08-09 | End: 2019-08-10

## 2019-08-09 RX ORDER — FLUMAZENIL 0.1 MG/ML
0.2 INJECTION, SOLUTION INTRAVENOUS
Status: DISCONTINUED | OUTPATIENT
Start: 2019-08-09 | End: 2019-08-09 | Stop reason: HOSPADM

## 2019-08-09 RX ORDER — AMOXICILLIN 250 MG
1 CAPSULE ORAL 2 TIMES DAILY PRN
Status: DISCONTINUED | OUTPATIENT
Start: 2019-08-09 | End: 2019-08-11

## 2019-08-09 RX ORDER — LEVOTHYROXINE SODIUM 50 UG/1
50 TABLET ORAL EVERY MORNING
Status: DISCONTINUED | OUTPATIENT
Start: 2019-08-10 | End: 2019-08-14 | Stop reason: HOSPADM

## 2019-08-09 RX ORDER — SCOLOPAMINE TRANSDERMAL SYSTEM 1 MG/1
1 PATCH, EXTENDED RELEASE TRANSDERMAL ONCE
Status: COMPLETED | OUTPATIENT
Start: 2019-08-09 | End: 2019-08-09

## 2019-08-09 RX ORDER — ONDANSETRON 2 MG/ML
4 INJECTION INTRAMUSCULAR; INTRAVENOUS EVERY 30 MIN PRN
Status: DISCONTINUED | OUTPATIENT
Start: 2019-08-09 | End: 2019-08-09 | Stop reason: HOSPADM

## 2019-08-09 RX ORDER — FENTANYL CITRATE 50 UG/ML
25-50 INJECTION, SOLUTION INTRAMUSCULAR; INTRAVENOUS
Status: DISCONTINUED | OUTPATIENT
Start: 2019-08-09 | End: 2019-08-09 | Stop reason: HOSPADM

## 2019-08-09 RX ORDER — NALOXONE HYDROCHLORIDE 0.4 MG/ML
.1-.4 INJECTION, SOLUTION INTRAMUSCULAR; INTRAVENOUS; SUBCUTANEOUS
Status: DISCONTINUED | OUTPATIENT
Start: 2019-08-09 | End: 2019-08-09

## 2019-08-09 RX ORDER — PROPOFOL 10 MG/ML
INJECTION, EMULSION INTRAVENOUS PRN
Status: DISCONTINUED | OUTPATIENT
Start: 2019-08-09 | End: 2019-08-09

## 2019-08-09 RX ORDER — ONDANSETRON 4 MG/1
4 TABLET, ORALLY DISINTEGRATING ORAL EVERY 8 HOURS
Status: DISPENSED | OUTPATIENT
Start: 2019-08-09 | End: 2019-08-10

## 2019-08-09 RX ORDER — ONDANSETRON 4 MG/1
4 TABLET, ORALLY DISINTEGRATING ORAL EVERY 30 MIN PRN
Status: DISCONTINUED | OUTPATIENT
Start: 2019-08-09 | End: 2019-08-09 | Stop reason: HOSPADM

## 2019-08-09 RX ORDER — DIPHENHYDRAMINE HYDROCHLORIDE 50 MG/ML
12.5 INJECTION INTRAMUSCULAR; INTRAVENOUS EVERY 6 HOURS PRN
Status: DISCONTINUED | OUTPATIENT
Start: 2019-08-09 | End: 2019-08-14 | Stop reason: HOSPADM

## 2019-08-09 RX ORDER — CIPROFLOXACIN 2 MG/ML
400 INJECTION, SOLUTION INTRAVENOUS
Status: COMPLETED | OUTPATIENT
Start: 2019-08-09 | End: 2019-08-09

## 2019-08-09 RX ORDER — SODIUM CHLORIDE, SODIUM LACTATE, POTASSIUM CHLORIDE, CALCIUM CHLORIDE 600; 310; 30; 20 MG/100ML; MG/100ML; MG/100ML; MG/100ML
INJECTION, SOLUTION INTRAVENOUS CONTINUOUS
Status: DISCONTINUED | OUTPATIENT
Start: 2019-08-09 | End: 2019-08-09 | Stop reason: HOSPADM

## 2019-08-09 RX ORDER — HYDROMORPHONE HYDROCHLORIDE 1 MG/ML
.3-.5 INJECTION, SOLUTION INTRAMUSCULAR; INTRAVENOUS; SUBCUTANEOUS EVERY 5 MIN PRN
Status: DISCONTINUED | OUTPATIENT
Start: 2019-08-09 | End: 2019-08-09 | Stop reason: HOSPADM

## 2019-08-09 RX ORDER — ONDANSETRON 4 MG/1
4 TABLET, ORALLY DISINTEGRATING ORAL EVERY 8 HOURS PRN
Status: DISCONTINUED | OUTPATIENT
Start: 2019-08-10 | End: 2019-08-14 | Stop reason: HOSPADM

## 2019-08-09 RX ORDER — SODIUM CHLORIDE, SODIUM LACTATE, POTASSIUM CHLORIDE, CALCIUM CHLORIDE 600; 310; 30; 20 MG/100ML; MG/100ML; MG/100ML; MG/100ML
INJECTION, SOLUTION INTRAVENOUS CONTINUOUS
Status: DISCONTINUED | OUTPATIENT
Start: 2019-08-09 | End: 2019-08-09

## 2019-08-09 RX ORDER — LIDOCAINE HYDROCHLORIDE 20 MG/ML
INJECTION, SOLUTION INFILTRATION; PERINEURAL PRN
Status: DISCONTINUED | OUTPATIENT
Start: 2019-08-09 | End: 2019-08-09

## 2019-08-09 RX ORDER — HEPARIN SODIUM 5000 [USP'U]/.5ML
5000 INJECTION, SOLUTION INTRAVENOUS; SUBCUTANEOUS
Status: DISCONTINUED | OUTPATIENT
Start: 2019-08-09 | End: 2019-08-09 | Stop reason: HOSPADM

## 2019-08-09 RX ORDER — DEXAMETHASONE SODIUM PHOSPHATE 4 MG/ML
INJECTION, SOLUTION INTRA-ARTICULAR; INTRALESIONAL; INTRAMUSCULAR; INTRAVENOUS; SOFT TISSUE PRN
Status: DISCONTINUED | OUTPATIENT
Start: 2019-08-09 | End: 2019-08-09

## 2019-08-09 RX ORDER — AMOXICILLIN 250 MG
2 CAPSULE ORAL 2 TIMES DAILY PRN
Status: DISCONTINUED | OUTPATIENT
Start: 2019-08-09 | End: 2019-08-11

## 2019-08-09 RX ORDER — NALBUPHINE HYDROCHLORIDE 10 MG/ML
2.5-5 INJECTION, SOLUTION INTRAMUSCULAR; INTRAVENOUS; SUBCUTANEOUS EVERY 6 HOURS PRN
Status: DISCONTINUED | OUTPATIENT
Start: 2019-08-09 | End: 2019-08-12

## 2019-08-09 RX ORDER — NALOXONE HYDROCHLORIDE 0.4 MG/ML
.1-.4 INJECTION, SOLUTION INTRAMUSCULAR; INTRAVENOUS; SUBCUTANEOUS
Status: DISCONTINUED | OUTPATIENT
Start: 2019-08-09 | End: 2019-08-12

## 2019-08-09 RX ORDER — ACETAMINOPHEN 325 MG/1
650 TABLET ORAL EVERY 6 HOURS
Status: DISCONTINUED | OUTPATIENT
Start: 2019-08-09 | End: 2019-08-14 | Stop reason: HOSPADM

## 2019-08-09 RX ORDER — DIPHENHYDRAMINE HCL 12.5MG/5ML
12.5 LIQUID (ML) ORAL EVERY 6 HOURS PRN
Status: DISCONTINUED | OUTPATIENT
Start: 2019-08-09 | End: 2019-08-14 | Stop reason: HOSPADM

## 2019-08-09 RX ORDER — OXYCODONE HYDROCHLORIDE 5 MG/1
5-10 TABLET ORAL EVERY 4 HOURS PRN
Status: DISCONTINUED | OUTPATIENT
Start: 2019-08-09 | End: 2019-08-10

## 2019-08-09 RX ORDER — FENTANYL CITRATE 50 UG/ML
INJECTION, SOLUTION INTRAMUSCULAR; INTRAVENOUS PRN
Status: DISCONTINUED | OUTPATIENT
Start: 2019-08-09 | End: 2019-08-09

## 2019-08-09 RX ORDER — LIOTHYRONINE SODIUM 5 UG/1
5 TABLET ORAL EVERY MORNING
Status: DISCONTINUED | OUTPATIENT
Start: 2019-08-10 | End: 2019-08-14 | Stop reason: HOSPADM

## 2019-08-09 RX ADMIN — ROCURONIUM BROMIDE 40 MG: 10 INJECTION INTRAVENOUS at 07:49

## 2019-08-09 RX ADMIN — PHENYLEPHRINE HYDROCHLORIDE 100 MCG: 10 INJECTION INTRAVENOUS at 08:04

## 2019-08-09 RX ADMIN — FENTANYL CITRATE 100 MCG: 50 INJECTION, SOLUTION INTRAMUSCULAR; INTRAVENOUS at 07:49

## 2019-08-09 RX ADMIN — ACETAMINOPHEN 650 MG: 325 TABLET, FILM COATED ORAL at 15:14

## 2019-08-09 RX ADMIN — FENTANYL CITRATE 25 MCG: 50 INJECTION, SOLUTION INTRAMUSCULAR; INTRAVENOUS at 11:52

## 2019-08-09 RX ADMIN — FENTANYL CITRATE 25 MCG: 50 INJECTION INTRAMUSCULAR; INTRAVENOUS at 07:09

## 2019-08-09 RX ADMIN — FENTANYL CITRATE 50 MCG: 50 INJECTION, SOLUTION INTRAMUSCULAR; INTRAVENOUS at 09:11

## 2019-08-09 RX ADMIN — FENTANYL CITRATE 50 MCG: 50 INJECTION, SOLUTION INTRAMUSCULAR; INTRAVENOUS at 09:35

## 2019-08-09 RX ADMIN — HYDROMORPHONE HYDROCHLORIDE 0.5 MG: 1 INJECTION, SOLUTION INTRAMUSCULAR; INTRAVENOUS; SUBCUTANEOUS at 13:03

## 2019-08-09 RX ADMIN — MAGNESIUM HYDROXIDE 15 ML: 400 SUSPENSION ORAL at 19:54

## 2019-08-09 RX ADMIN — ROCURONIUM BROMIDE 10 MG: 10 INJECTION INTRAVENOUS at 08:13

## 2019-08-09 RX ADMIN — PHENYLEPHRINE HYDROCHLORIDE 200 MCG: 10 INJECTION INTRAVENOUS at 08:16

## 2019-08-09 RX ADMIN — FENTANYL CITRATE 25 MCG: 50 INJECTION INTRAMUSCULAR; INTRAVENOUS at 07:22

## 2019-08-09 RX ADMIN — ROCURONIUM BROMIDE 10 MG: 10 INJECTION INTRAVENOUS at 10:20

## 2019-08-09 RX ADMIN — FENTANYL CITRATE 50 MCG: 50 INJECTION, SOLUTION INTRAMUSCULAR; INTRAVENOUS at 08:29

## 2019-08-09 RX ADMIN — PHENYLEPHRINE HYDROCHLORIDE 200 MCG: 10 INJECTION INTRAVENOUS at 07:52

## 2019-08-09 RX ADMIN — METRONIDAZOLE 500 MG: 500 INJECTION, SOLUTION INTRAVENOUS at 07:45

## 2019-08-09 RX ADMIN — ONDANSETRON 4 MG: 2 INJECTION INTRAMUSCULAR; INTRAVENOUS at 13:29

## 2019-08-09 RX ADMIN — FENTANYL CITRATE 25 MCG: 50 INJECTION INTRAMUSCULAR; INTRAVENOUS at 12:23

## 2019-08-09 RX ADMIN — ACETAMINOPHEN 650 MG: 325 TABLET, FILM COATED ORAL at 19:54

## 2019-08-09 RX ADMIN — HYDROMORPHONE HYDROCHLORIDE 0.2 MG: 1 INJECTION, SOLUTION INTRAMUSCULAR; INTRAVENOUS; SUBCUTANEOUS at 12:55

## 2019-08-09 RX ADMIN — PHENYLEPHRINE HYDROCHLORIDE 200 MCG: 10 INJECTION INTRAVENOUS at 08:00

## 2019-08-09 RX ADMIN — FENTANYL CITRATE 25 MCG: 50 INJECTION, SOLUTION INTRAMUSCULAR; INTRAVENOUS at 11:50

## 2019-08-09 RX ADMIN — SUGAMMADEX 100 MG: 100 INJECTION, SOLUTION INTRAVENOUS at 11:48

## 2019-08-09 RX ADMIN — OXYCODONE HYDROCHLORIDE 10 MG: 5 TABLET ORAL at 15:14

## 2019-08-09 RX ADMIN — ONDANSETRON 4 MG: 4 TABLET, ORALLY DISINTEGRATING ORAL at 22:03

## 2019-08-09 RX ADMIN — ONDANSETRON 4 MG: 4 TABLET, ORALLY DISINTEGRATING ORAL at 15:15

## 2019-08-09 RX ADMIN — DEXAMETHASONE SODIUM PHOSPHATE 4 MG: 4 INJECTION, SOLUTION INTRA-ARTICULAR; INTRALESIONAL; INTRAMUSCULAR; INTRAVENOUS; SOFT TISSUE at 08:17

## 2019-08-09 RX ADMIN — BUPIVACAINE HYDROCHLORIDE 6 ML/HR: 7.5 INJECTION, SOLUTION EPIDURAL; RETROBULBAR at 08:55

## 2019-08-09 RX ADMIN — SODIUM CHLORIDE, POTASSIUM CHLORIDE, SODIUM LACTATE AND CALCIUM CHLORIDE: 600; 310; 30; 20 INJECTION, SOLUTION INTRAVENOUS at 07:36

## 2019-08-09 RX ADMIN — CIPROFLOXACIN 400 MG: 2 INJECTION INTRAVENOUS at 05:50

## 2019-08-09 RX ADMIN — HYDROMORPHONE HYDROCHLORIDE 0.3 MG: 1 INJECTION, SOLUTION INTRAMUSCULAR; INTRAVENOUS; SUBCUTANEOUS at 12:31

## 2019-08-09 RX ADMIN — OXYCODONE HYDROCHLORIDE 5 MG: 5 TABLET ORAL at 20:05

## 2019-08-09 RX ADMIN — PHENYLEPHRINE HYDROCHLORIDE 0.5 MCG/KG/MIN: 10 INJECTION INTRAVENOUS at 08:26

## 2019-08-09 RX ADMIN — LIDOCAINE HYDROCHLORIDE 100 MG: 20 INJECTION, SOLUTION INFILTRATION; PERINEURAL at 07:49

## 2019-08-09 RX ADMIN — PHENYLEPHRINE HYDROCHLORIDE 200 MCG: 10 INJECTION INTRAVENOUS at 07:55

## 2019-08-09 RX ADMIN — ROCURONIUM BROMIDE 10 MG: 10 INJECTION INTRAVENOUS at 09:39

## 2019-08-09 RX ADMIN — SCOPALAMINE 1 PATCH: 1 PATCH, EXTENDED RELEASE TRANSDERMAL at 05:51

## 2019-08-09 RX ADMIN — ROCURONIUM BROMIDE 10 MG: 10 INJECTION INTRAVENOUS at 11:17

## 2019-08-09 RX ADMIN — FENTANYL CITRATE 25 MCG: 50 INJECTION INTRAMUSCULAR; INTRAVENOUS at 12:27

## 2019-08-09 RX ADMIN — PROPOFOL 120 MG: 10 INJECTION, EMULSION INTRAVENOUS at 07:49

## 2019-08-09 ASSESSMENT — ACTIVITIES OF DAILY LIVING (ADL)
TRANSFERRING: 0-->INDEPENDENT
ADLS_ACUITY_SCORE: 19
PRIOR_FUNCTIONAL_LEVEL_COMMENT: INDEPENDENT
BATHING: 0-->INDEPENDENT
RETIRED_EATING: 0-->INDEPENDENT
BATHING: 0-->INDEPENDENT
SWALLOWING: 0-->SWALLOWS FOODS/LIQUIDS WITHOUT DIFFICULTY
AMBULATION: 0-->INDEPENDENT
DRESS: 0-->INDEPENDENT
TOILETING: 0-->INDEPENDENT
DRESS: 0-->INDEPENDENT
RETIRED_EATING: 0-->INDEPENDENT
RETIRED_COMMUNICATION: 0-->UNDERSTANDS/COMMUNICATES WITHOUT DIFFICULTY
ADLS_ACUITY_SCORE: 19
PRIOR_FUNCTIONAL_LEVEL_COMMENT: INDEPENDENT
TOILETING: 0-->INDEPENDENT
TRANSFERRING: 0-->INDEPENDENT
FALL_HISTORY_WITHIN_LAST_SIX_MONTHS: NO
AMBULATION: 0-->INDEPENDENT
SWALLOWING: 0-->SWALLOWS FOODS/LIQUIDS WITHOUT DIFFICULTY
FALL_HISTORY_WITHIN_LAST_SIX_MONTHS: NO
RETIRED_COMMUNICATION: 0-->UNDERSTANDS/COMMUNICATES WITHOUT DIFFICULTY
COGNITION: 0 - NO COGNITION ISSUES REPORTED

## 2019-08-09 ASSESSMENT — LIFESTYLE VARIABLES: TOBACCO_USE: 0

## 2019-08-09 ASSESSMENT — MIFFLIN-ST. JEOR: SCORE: 789.62

## 2019-08-09 ASSESSMENT — PAIN DESCRIPTION - DESCRIPTORS
DESCRIPTORS: ACHING;SORE
DESCRIPTORS: TENDER
DESCRIPTORS: ACHING;SHARP;TENDER

## 2019-08-09 NOTE — BRIEF OP NOTE
[facility], [place]    Brief Operative Note    Pre-operative diagnosis: Pelvic Mass, Appendicitis  Post-operative diagnosis Clear cell ovarian carcinoma  Procedure: Procedure(s):  Exploratory Laparotomy, Total Abdominal Hysterectomy, Left Salpingo-Oopherectomy, Bilateral Pelvic and Para-Aortic Lymph Node Dissection, Peritoneal Biopsy, Diaphraghm Pap Smears.  Open Appendectomy, omentectomy.  Surgeon: Surgeon(s) and Role:  Panel 1:     * Naz Ascencio MD - Primary     * Lidya Polanco MD - Resident - Assisting       Akiko Pierce MS4 - Assisting  Anesthesia: Combined General with Block   Estimated blood loss: 200 ml  IVF: 1600 ml  UOP: 250 ml  Drains: Crews catheter  Specimens: Left ovary and fallopain tube, uterus and cervix, appendix, left pelvic lymph nodes, left paraaortic lymph nodes, right pelvic lymph nodes, right paraaortic lymph nodes, left paracolic gutter peritoneum, right paracolic gutter peritoneum, left pelvic peritoneal biopsy, right pelvic peritoneum, bladder peritoneum, cul de sac peritoneum, omentum, right diaphragm pap, left diaphragm pap  Findings: Normal external genitalia, abdominal survey notable for smooth liver edge and diaphragm, left ovarian mass filling posterior cul de sac with filmy adhesions to posterior uterus and cul de sac. Appendix with adjacent lesion, contained, possible mucocele vs metastasis. Normal appearing uterus and cervix. Surgically absent right ovary and tube. Filmy adhesions of bowel to sidewall. Filmy omental adhesions. Bowel and omentum without nodularity.   Complications: None  Implants:  * No implants in log *       Naz Ascencio MD    Department of Ob/Gyn and Women's Health  Division of Gynecologic Oncology  [facility]  [phone]    I was scrubbed and present for the entire procedure.

## 2019-08-09 NOTE — ANESTHESIA CARE TRANSFER NOTE
Patient: Maximino Simeon    Procedure(s):  Exploratory Laparotomy, Total Abdominal Hysterectomy, Left Salpingo-Oopherectomy, Bilateral Pelvic and Para-Aortic Lymph Node Dissection, Peritoneal Biopsy, Diaphraghm Pap Smears.  Open Appendectomy    Diagnosis: Pelvic Mass, Appendicitis  Diagnosis Additional Information: No value filed.    Anesthesia Type:   General, Epidural     Note:  Airway :Face Mask  Patient transferred to:PACU  Comments: Pt transferred to PACU, VSS breathing spontaneously. Report given to PACU RNHandoff Report: Identifed the Patient, Identified the Reponsible Provider, Reviewed the pertinent medical history, Discussed the surgical course, Reviewed Intra-OP anesthesia mangement and issues during anesthesia, Set expectations for post-procedure period and Allowed opportunity for questions and acknowledgement of understanding      Vitals: (Last set prior to Anesthesia Care Transfer)    CRNA VITALS  8/9/2019 1139 - 8/9/2019 1215      8/9/2019             Resp Rate (observed):  12                Electronically Signed By: Annabelle Owen  August 9, 2019  12:15 PM

## 2019-08-09 NOTE — PROGRESS NOTES
Discussed case again with patient. She is uncomfortable with studies we had proposed to her so will not sign consent for those. Understands risk for need for possible bowel resection, possible colostomy and cancer staging. Consent signed. Dr. Abreu seeing patient prior to going to the OR.    Naz Ascencio MD    Department of Ob/Gyn and Women's Health  Division of Gynecologic Oncology  [facility]  [phone]

## 2019-08-09 NOTE — ANESTHESIA POSTPROCEDURE EVALUATION
Anesthesia POST Procedure Evaluation    Patient: Maximino Simeon   MRN:     [id number] Gender:   female   Age:    74 year old :      1945        Preoperative Diagnosis: Pelvic Mass, Appendicitis   Procedure(s):  Exploratory Laparotomy, Total Abdominal Hysterectomy, Left Salpingo-Oopherectomy, Bilateral Pelvic and Para-Aortic Lymph Node Dissection, Peritoneal Biopsy, Diaphraghm Pap Smears.  Open Appendectomy   Postop Comments: No value filed.       Anesthesia Type:  Not documented  General, Epidural    Reportable Event: NO     PAIN: Uncomplicated   Sign Out status: Comfortable, Well controlled pain     PONV: No PONV   Sign Out status:  No Nausea or Vomiting     Neuro/Psych: Uneventful perioperative course   Sign Out Status: Preoperative baseline; Age appropriate mentation     Airway/Resp.: Uneventful perioperative course   Sign Out Status: Non labored breathing, age appropriate RR; Resp. Status within EXPECTED Parameters     CV: Uneventful perioperative course   Sign Out status: Appropriate BP and perfusion indices; Appropriate HR/Rhythm     Disposition:   Sign Out in:  PACU  Disposition:  Floor  Recovery Course: Uneventful  Follow-Up: Not required           Last Anesthesia Record Vitals:  CRNA VITALS  2019 1139 - 2019 1239      2019             Resp Rate (observed):  12          Last PACU Vitals:  Vitals Value Taken Time   /88 2019  1:50 PM   Temp 36.2  C (97.1  F) 2019  1:45 PM   Pulse 93 2019  1:50 PM   Resp 16 2019  1:45 PM   SpO2 98 % 2019  1:45 PM   Temp src     NIBP     Pulse     SpO2     Resp     Temp     Ht Rate     Temp 2     Vitals shown include unvalidated device data.      Electronically Signed By: Ailyn Owens MD, 2019, 2:01 PM

## 2019-08-09 NOTE — OR NURSING
"Patient reports pain to mid abd, described as pressure and \"gas pain\" no change in sensation with dermatone check.  Regional pain team at bedside. Epidural bolused by Dr. Moss   "

## 2019-08-09 NOTE — OR NURSING
Pre-op epidural block complete with test dose.  No complications.  Pt placed back in bed with call light within reach.

## 2019-08-09 NOTE — ANESTHESIA PROCEDURE NOTES
Epidural Procedure Note    Staff:     Anesthesiologist:  Ra Reyes MD    Resident/CRNA:  Dante Alvarenga MD    Procedure performed by resident/CRNA in the presence of a teaching physician    Location: Pre-op     Procedure start time:  8/9/2019 7:05 AM     Procedure end time:  8/9/2019 7:28 AM   Pre-procedure checklist:   patient identified, IV checked, site marked, risks and benefits discussed, informed consent, monitors and equipment checked, pre-op evaluation, at physician/surgeon's request and post-op pain management      Correct Patient: Yes      Correct Position: Yes      Correct Site: Yes      Correct Procedure: Yes      Correct Laterality:  Yes    Site Marked:  Yes  Procedure:     Procedure:  Epidural catheter    ASA:  2    Diagnosis:  Post op pain    Position:  Sitting    Sterile Prep: chloraprep, mask, sterile gloves and patient draped      Insertion site:  T9-10    Approach:  Midline    Needle gauge (G):  17    Needle Length (in):  3.5    Block Needle Type:  Touhy    Injection Technique:  LORT saline    RADHIKA at (cm):  4    Attempts:  2    Redirects:  3    Catheter gauge (G):  19    Catheter threaded easily: Yes      Threaded to cm at skin:  9    Paresthesias:  No    Aspiration negative for Heme or CSF: Yes       Local anesthetic:  Lidocaine 1.5% w/ 1:200,000 epinephrine    Test dose time:  07:27    Test dose negative for signs of intravascular, subdural or intrathecal injection: Yes

## 2019-08-09 NOTE — PLAN OF CARE
Pt arrived to unit 7C from PACU at 1345. AVSS, on capno. A&Ox4. PIV SL. Pain moderately managed with tylenol and PRN oxycodone. Tolerating CL diet. Midline incision with primapore, small amount serosanguinous drainage, unchanged. Crews intact, good UOP. Pt due to dangle. Continue POC.

## 2019-08-09 NOTE — PROGRESS NOTES
Gynecologic Oncology Postoperative Check Note  8/9/2019    S: Patient is doing well, just feels tired. She is in some pain but she just received pain medications. Discussed with patient her new diagnosis of clear cell ovarian adenocarcinoma per frozen section.     O:  Vitals:    08/09/19 1325 08/09/19 1330 08/09/19 1345 08/09/19 1412   BP: 111/54 110/55 118/61 126/59   BP Location:    Right arm   Pulse:  89     Resp:  12 16 9   Temp:   97.1  F (36.2  C)    TempSrc:   Oral    SpO2:  98% 98% 95%   Weight:       Height:           Gen: alert and oriented, resting in bed  Cardio: rrr, nl s1 and s2, no m/r/g  Resp: lungs CTAB, no wheezes or crackles  Abdomen: soft, appropriately tender to palpation  Incision: bandage clean, dry, and in place  Extremities: BLEs non-tender with SCDs in place    I/O last 3 completed shifts:  In: 2060 [P.O.:60; I.V.:2000]  Out: 510 [Urine:310; Blood:200]    A: 74 year old POD#0 s/p XL ENRIKE USO and appendectomy by general surgery for pelvic mass and recent ruptured appendicitis. Doing well postoperatively. No signs of active bleeding.    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  FEN: CLD, LR at 100 ml/hr.  Pain: Epidural, Tylenol. Oxycodone prn.  Heme: 12.4>> AM Hgb  CV:  CAD, no meds  Pulm: NI  GI: Collagenous colitis, imodium held  : Crews, remove POD#1  ID: NI, s/p perioperative cipro/flagyl. Recently treated w/abx x 6 weeks for ruptured appendicitis.   Endo: Hypothyroidism, Levothyroxine, Liothyronine  Psych/Neuro/MSK/Other: MDD, no meds. Delirium precatuons.  PPX: SCDs, Lovenox 30mg BID (due to low BMI) POD#1 at 2 pm after epidural removal  Dispo: Inpatient until meeting postoperative goals  Drains/Lines: Crews catheter, PIV, epidural  Consults: general surgery    Troy Vergara MD  OB/GYN PGY-1  08/09/19 3:46 PM

## 2019-08-09 NOTE — OR NURSING
B/P: 81/50 (60), T: 97.1, P: 79, R: 12, Sa02 99% 2L NC    Pt with lower BP after epidural bolus. HOB lowered, IVF wide open, Discussed with Dr. Owens ([facility]) who came to bedside and admin Phenylephrine. Will continue to monitor.       Addendum 1330: patient doing well. BP's improved and maintained. Pt remains drowsy but reports improved pain level

## 2019-08-09 NOTE — ANESTHESIA PREPROCEDURE EVALUATION
Anesthesia Pre-Procedure Evaluation    Patient: Maximino Simeon   MRN:     [id number] Gender:   female   Age:    74 year old :      1945        Preoperative Diagnosis: Pelvic Mass, Appendicitis   Procedure(s):  Open Laparotomy, Total Abdominal Hysterectomy, Unilateral Salpingo-Oopherectomy, Possible Lymph Node Dissection  Open Appendectomy     Past Medical History:   Diagnosis Date     Anxiety      CAD (coronary artery disease)      Colitis      Fibromyalgia      Gastroesophageal reflux disease      Hypothyroidism      Insomnia      Mixed hyperlipidemia      PONV (postoperative nausea and vomiting)      Recurrent major depressive disorder (H) 2010     Urinary retention       Past Surgical History:   Procedure Laterality Date     BREAST SURGERY      biopsy     GYN SURGERY       OOPHORECTOMY            Anesthesia Evaluation     . Pt has had prior anesthetic. Type: General    History of anesthetic complications   - PONV        ROS/MED HX    ENT/Pulmonary: Comment: Previous cataract surgery - neg pulmonary ROS    (-) tobacco use   Neurologic:  - neg neurologic ROS     Cardiovascular: Comment: Patient reports history of asymptomatic CAD from >10 years ago.    (+) hypertension--CAD, --. : . . . :. . Previous cardiac testing date:results:date: results:ECG reviewed date:19 results:NSR. Possible L atrial enlargement. date: results:         (-) taking anticoagulants/antiplatelets   METS/Exercise Tolerance:  >4 METS   Hematologic:        (-) history of blood clots and History of Transfusion   Musculoskeletal: Comment: fibromyalgia  (+) arthritis,  -       GI/Hepatic: Comment: Hx of colitis. Acid reflux about 1-2 per month.    (+) appendicitis,       Renal/Genitourinary: Comment: Urine retention secondary to pelvic mass. Self catheterization         Endo: Comment: hashimoto    (+) thyroid problem hypothyroidism, .      Psychiatric: Comment: Insomnia      (+) psychiatric history anxiety and  "depression      Infectious Disease:  - neg infectious disease ROS       Malignancy:   (+)   Current large pelvic mass and elevated - this surgery will include possible cancer staging.        Other:                         PHYSICAL EXAM:   Mental Status/Neuro: A/A/O   Airway: Facies: Feasible  Mallampati: II  Mouth/Opening: Full  TM distance: > 6 cm  Neck ROM: Full   Respiratory: Auscultation: CTAB      CV: Rhythm: Regular   Comments:                      LABS:  CBC: No results found for: WBC, HGB, HCT, PLT  BMP: No results found for: NA, POTASSIUM, CHLORIDE, CO2, BUN, CR, GLC  COAGS: No results found for: PTT, INR, FIBR  POC:   Lab Results   Component Value Date    BGM 84 08/09/2019     OTHER: No results found for: PH, LACT, A1C, LUCRECIA, PHOS, MAG, ALBUMIN, PROTTOTAL, ALT, AST, GGT, ALKPHOS, BILITOTAL, BILIDIRECT, LIPASE, AMYLASE, RADHA, TSH, T4, T3, CRP, SED     Preop Vitals    BP Readings from Last 3 Encounters:   08/09/19 133/79   07/16/19 148/82   07/12/19 130/86    Pulse Readings from Last 3 Encounters:   08/09/19 85   07/16/19 96   07/12/19 86      Resp Readings from Last 3 Encounters:   08/09/19 29   07/12/19 15   07/11/19 14    SpO2 Readings from Last 3 Encounters:   08/09/19 99%   07/16/19 97%   07/12/19 98%      Temp Readings from Last 1 Encounters:   08/09/19 36.4  C (97.6  F) (Oral)    Ht Readings from Last 1 Encounters:   08/09/19 1.473 m (4' 9.99\")      Wt Readings from Last 1 Encounters:   08/09/19 41 kg (90 lb 6.2 oz)    Estimated body mass index is 18.9 kg/m  as calculated from the following:    Height as of this encounter: 1.473 m (4' 9.99\").    Weight as of this encounter: 41 kg (90 lb 6.2 oz).     LDA:  Peripheral IV 08/09/19 Left Lower forearm (Active)   Site Assessment WDL 8/9/2019  6:03 AM   Line Status Saline locked 8/9/2019  6:03 AM   Phlebitis Scale 0-->no symptoms 8/9/2019  6:03 AM   Infiltration Scale 0 8/9/2019  6:03 AM   Extravasation? No 8/9/2019  6:03 AM   Number of days: 0    "     Assessment:   ASA SCORE: 2    H&P: History and physical reviewed and following examination; no interval change.   Smoking Status:  Non-Smoker/Unknown   NPO Status: NPO Appropriate     Plan:   Anes. Type:  General; Epidural     Epidural Details:  Catheter   Pre-Medication: None   Induction:  IV (Standard)   Airway: ETT; Oral   Access/Monitoring: PIV   Maintenance: Balanced     Postop Plan:   Postop Pain: Opioids  Postop Sedation/Airway: Not planned  Disposition: Inpatient/Admit     PONV Management:   Adult Risk Factors: Female, H/o PONV or Motion Sickness, Non-Smoker, Postop Opioids   Prevention: Ondansetron, Dexamethasone     CONSENT: Direct conversation   Plan and risks discussed with: Patient   Blood Products: Consent Deferred (Minimal Blood Loss)                   Ailyn Owens MD

## 2019-08-09 NOTE — DISCHARGE SUMMARY
Gynecologic Oncology Discharge Summary    Maximino Simeon  [id number]    Admit Date: 8/9/2019  Discharge Date: 8/14/19  Admitting Provider: Naz Ascencio MD  Discharge Provider: Dr. 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  - Hx Ruptured appendicitis  - Hypothyroidism  - Major depressive disorder  - Collagenous Colitis  - Hypertension    Patient Active Problem List   Diagnosis     Vitiligo     Retention of urine     Psychophysiological insomnia     Preop examination     Perforated appendicitis     Mixed hyperlipidemia     Major depressive disorder, recurrent episode, moderate (H)     Hypothyroidism     Healthcare maintenance     Hashimoto's disease     Fibromyalgia     Coronary atherosclerosis     Collagenous colitis     Anxiety state     Pelvic mass     Acute appendicitis with localized peritonitis without abscess       Procedures: Exploratory laparotomy, total abdominal hysterectomy, L salpingo-oophorectomy, appendectomy, bilateral pelvic and para-aortic LN dissection, peritoneal biopsys, diaphragm pap smears, and pelvic washings.   Epidural placement and removal.     Prior to Admission Medications:  Medications Prior to Admission   Medication Sig Dispense Refill Last Dose     acetaminophen (TYLENOL) 325 MG tablet Take 325-650 mg by mouth every 6 hours as needed for mild pain   Past Week at Unknown time     B Complex Vitamins (B COMPLEX PO) Take 1 tablet by mouth daily (with lunch)    Past Week at Unknown time     Calcium Carbonate-Vitamin D (CALCIUM + D PO) Take 6 tablets by mouth daily.   Past Week at Unknown time     cefuroxime (CEFTIN) 250 MG tablet Take 1 tablet (250 mg) by mouth 2 times daily 20 tablet 0 Past Month at Unknown time     Coenzyme Q10 (COQ10 PO) Take 200 mg by mouth 2 times daily   Past Week at Unknown time      levothyroxine (SYNTHROID/LEVOTHROID) 50 MCG tablet Take 50 mcg by mouth every morning    8/9/2019 at 0230     liothyronine (CYTOMEL) 5 MCG tablet Take 5 mcg by mouth every morning    8/9/2019 at 0230     loperamide (IMODIUM) 1 MG/5ML liquid Take 1 mg by mouth 6 times daily    Past Month at Unknown time     metroNIDAZOLE (FLAGYL) 500 MG tablet Take 1 tablet (500 mg) by mouth 3 times daily 30 tablet 0 Past Month at Unknown time     Multiple Vitamins-Minerals (MULTIVITAL PO) Take 1 tablet by mouth daily (with lunch)    Past Week at Unknown time     Probiotic Product (PROBIOTIC DAILY PO) 1 tablet daily at lunchtime by mouth   Past Week at Unknown time     DULoxetine (CYMBALTA) 20 MG capsule Take 20 mg by mouth 2 times daily Pt. Last took July 02, 2019   More than a month at Unknown time       Discharge Medications:     Review of your medicines      START taking      Dose / Directions   amLODIPine 5 MG tablet  Commonly known as:  NORVASC      Dose:  5 mg  Take 1 tablet (5 mg) by mouth daily  Quantity:  30 tablet  Refills:  0     enoxaparin 30 MG/0.3ML syringe  Commonly known as:  LOVENOX      Dose:  30 mg  Inject 0.3 mLs (30 mg) Subcutaneous every 24 hours  Quantity:  24 Syringe  Refills:  0     oxyCODONE 5 MG tablet  Commonly known as:  ROXICODONE      Dose:  5 mg  Take 1 tablet (5 mg) by mouth every 6 hours as needed for moderate to severe pain  Quantity:  12 tablet  Refills:  0        CONTINUE these medicines which may have CHANGED, or have new prescriptions. If we are uncertain of the size of tablets/capsules you have at home, strength may be listed as something that might have changed.      Dose / Directions   DULoxetine 20 MG capsule  Commonly known as:  CYMBALTA  This may have changed:      when to take this    additional instructions      Dose:  20 mg  Take 1 capsule (20 mg) by mouth daily Follow up with your PCP for refills  Quantity:  30 capsule  Refills:  0        CONTINUE these medicines which have NOT  CHANGED      Dose / Directions   acetaminophen 325 MG tablet  Commonly known as:  TYLENOL      Dose:  325-650 mg  Take 325-650 mg by mouth every 6 hours as needed for mild pain  Refills:  0     B COMPLEX PO      Dose:  1 tablet  Take 1 tablet by mouth daily (with lunch)  Refills:  0     CALCIUM + D PO      Dose:  6 tablet  Take 6 tablets by mouth daily.  Refills:  0     COQ10 PO      Dose:  200 mg  Take 200 mg by mouth 2 times daily  Refills:  0     CYTOMEL 5 MCG tablet  Generic drug:  liothyronine      Dose:  5 mcg  Take 5 mcg by mouth every morning  Refills:  0     levothyroxine 50 MCG tablet  Commonly known as:  SYNTHROID/LEVOTHROID      Dose:  50 mcg  Take 50 mcg by mouth every morning  Refills:  0     loperamide 1 MG/5ML liquid  Commonly known as:  IMODIUM      Dose:  1 mg  Take 1 mg by mouth 6 times daily  Refills:  0     MULTIVITAL PO      Dose:  1 tablet  Take 1 tablet by mouth daily (with lunch)  Refills:  0     PROBIOTIC DAILY PO      1 tablet daily at lunchtime by mouth  Refills:  0        STOP taking    cefuroxime 250 MG tablet  Commonly known as:  CEFTIN        metroNIDAZOLE 500 MG tablet  Commonly known as:  FLAGYL              Where to get your medicines      These medications were sent to [facility] [place], [place] - [address]  [address] [address]    Phone:  [phone]     amLODIPine 5 MG tablet    DULoxetine 20 MG capsule    enoxaparin 30 MG/0.3ML syringe     Some of these will need a paper prescription and others can be bought over the counter. Ask your nurse if you have questions.    Bring a paper prescription for each of these medications    oxyCODONE 5 MG tablet         Consultations:  [facility], PT,     Brief History of Illness:  Maximino is a 74 year old female who initially presented to the ED on 6/29/2019 with abdominal pain. A CT scan performed showed a thick-walled dilated appendix compatible with  appendicitis will possible perforation, as well as a large complex cystic pelvic mass with soft tissue nodularity concerning for ovarian neoplasm.  54, CA 19-9 15, and CEA 2.8. She presented for further surgical diagnosis and management.     Hospital Course:  Dz:   - Preoperative diagnosis was pelvic mass, and appendicitis.  Frozen section at the time of the surgery showed high grade clear cell ovarian adenocaricnoma.  Final pathology is pending at the time of discharge.  She will follow-up postoperatively for a care plan.  FEN:   - She was maintained on IVF until POD#1, when her diet was slowly advanced.  By discharge, she was tolerating a regular diet without nausea and vomiting and able to maintain her hydration without IVF supplementation. She had hypophosphatemia, and her phosphorous was replaced.  Pain:   - Her pain was initially controlled with an epidural and oral pain medications. On POD#3, her epidural was removed. Her pain was then well controlled on PO medications only, and she was discharged home with these medications.  CV:   - She has a history of hyperlipidemia and coronary artery disease. She had newly diagnosed hypertension in the post-operative period up to the 180-200s/100s. She was initially started on Metoprolol with only minimal improvement in control. She was switched to Amlodipine 2.5mg on POD#4 and this was increased to 5mg on POD#5 with improvement in blood pressure control. She was discharged home with this medication and will follow up with her PCP for ongoing management of her hypertension.   PULM:   - She has no history of pulmonary issues.  She was initially given O2 supplementation in to maintain her O2 sats in the immediate postop period and was transitioned off of this without difficulty.  By discharge, her O2 sats were greater than 94% on RA.  She was encouraged to use her bedside IS while in house.  She had no acute pulmonary issues while in house.  HEME:   - Her  preoperative Hgb was 12.4.  Her hgb was stable at 10.6 at the time of discharge.  She had no other acute heme issues while in house.  GI:   - She was made NPO prior to the procedure.  On POD#0, her diet was advanced to clear liquids and then advanced slowly to regular diet.  At the time of discharge, she was tolerating a regular diet without nausea and vomiting.  She will be discharged with a bowel regimen to prevent constipation in the postoperative period. She has a history of GERD and colitis. She had no acute GI issues while in house.  :    - Has a history of urinary retention requiring catheterization related to her pelvic mass and was recently taught how to self straight cath. A franco catheter was placed at the time of the surgery.  Once ambulating unassisted, the franco catheter was removed.  Prior to discharge, the patient was voiding spontaneously without difficulty.  She had no acute  issues while in house.  ID:   - The patient was AF during her hospitalization.  She received standard preoperative antibiotics without incident.    ENDO:   - She has a history of hypothyroidism on levothyroxine and liothyronine. These medications were continued in house.  PSYCH/NEURO:   - She has a history of depression, anxiety, and insomnia, was last on duloxetine in July 2019. This was restarted while inpatient due to acute worsening in depression symptoms after cancer diagnosis.   PPX:    - She was given SCDs, IS, PPI and lovenox during her hospital course.  She tolerated these prophylactic interventions without incident. Lovenox will be continued for 28 days. She received teaching for this prior to discharge.       Discharge Instructions and Follow up:  Ms. Maximino Simeon was discharged from the hospital with follow up for post-op check with Dr. Ascencio on 8/22/19 at 0900 and her PCP on 8/21 at 1400.    Discharge Diet: Regular   Discharge Activity: Discharge activity: No lifting >15 pounds for 6 weeks  No intercourse  and nothing in the vagina for 8 weeks. Continue walking and moving around frequently, increase activity as tolerated. No strenuous exercise for 6 weeks. No driving for 2 weeks or until you can slam on the brakes with minimal discomfort. No driving while on narcotic pain medication.  Continue stool softeners while taking narcotic pain medications.  Discharge Follow up: Dr. Ascecnio 8/22/19 and with PCP on 8/21    Discharge Disposition:  Discharged to home    Discharge Staff: Gordo Polanco MD  PGY-3 Gyn Onc

## 2019-08-10 LAB
ANION GAP SERPL CALCULATED.3IONS-SCNC: 7 MMOL/L (ref 3–14)
BASOPHILS # BLD AUTO: 0 10E9/L (ref 0–0.2)
BASOPHILS NFR BLD AUTO: 0 %
BUN SERPL-MCNC: 8 MG/DL (ref 7–30)
CALCIUM SERPL-MCNC: 7.8 MG/DL (ref 8.5–10.1)
CHLORIDE SERPL-SCNC: 103 MMOL/L (ref 94–109)
CO2 SERPL-SCNC: 25 MMOL/L (ref 20–32)
CREAT SERPL-MCNC: 0.45 MG/DL (ref 0.52–1.04)
DIFFERENTIAL METHOD BLD: ABNORMAL
EOSINOPHIL # BLD AUTO: 0 10E9/L (ref 0–0.7)
EOSINOPHIL NFR BLD AUTO: 0 %
ERYTHROCYTE [DISTWIDTH] IN BLOOD BY AUTOMATED COUNT: 13.7 % (ref 10–15)
GFR SERPL CREATININE-BSD FRML MDRD: >90 ML/MIN/{1.73_M2}
GLUCOSE BLDC GLUCOMTR-MCNC: 129 MG/DL (ref 70–99)
GLUCOSE SERPL-MCNC: 122 MG/DL (ref 70–99)
HCT VFR BLD AUTO: 35.3 % (ref 35–47)
HGB BLD-MCNC: 11.4 G/DL (ref 11.7–15.7)
LYMPHOCYTES # BLD AUTO: 0.8 10E9/L (ref 0.8–5.3)
LYMPHOCYTES NFR BLD AUTO: 8.5 %
MAGNESIUM SERPL-MCNC: 1.9 MG/DL (ref 1.6–2.3)
MCH RBC QN AUTO: 30.6 PG (ref 26.5–33)
MCHC RBC AUTO-ENTMCNC: 32.3 G/DL (ref 31.5–36.5)
MCV RBC AUTO: 95 FL (ref 78–100)
MONOCYTES # BLD AUTO: 0.4 10E9/L (ref 0–1.3)
MONOCYTES NFR BLD AUTO: 4.3 %
NEUTROPHILS # BLD AUTO: 8 10E9/L (ref 1.6–8.3)
NEUTROPHILS NFR BLD AUTO: 87.2 %
PHOSPHATE SERPL-MCNC: 2.1 MG/DL (ref 2.5–4.5)
PLATELET # BLD AUTO: 256 10E9/L (ref 150–450)
PLATELET # BLD EST: ABNORMAL 10*3/UL
POTASSIUM SERPL-SCNC: 3.6 MMOL/L (ref 3.4–5.3)
RBC # BLD AUTO: 3.72 10E12/L (ref 3.8–5.2)
RBC MORPH BLD: NORMAL
SODIUM SERPL-SCNC: 135 MMOL/L (ref 133–144)
TROPONIN I SERPL-MCNC: 0.02 UG/L (ref 0–0.04)
WBC # BLD AUTO: 9.2 10E9/L (ref 4–11)

## 2019-08-10 PROCEDURE — 25800030 ZZH RX IP 258 OP 636: Performed by: STUDENT IN AN ORGANIZED HEALTH CARE EDUCATION/TRAINING PROGRAM

## 2019-08-10 PROCEDURE — 80048 BASIC METABOLIC PNL TOTAL CA: CPT | Performed by: OBSTETRICS & GYNECOLOGY

## 2019-08-10 PROCEDURE — 93005 ELECTROCARDIOGRAM TRACING: CPT

## 2019-08-10 PROCEDURE — 25000128 H RX IP 250 OP 636: Performed by: STUDENT IN AN ORGANIZED HEALTH CARE EDUCATION/TRAINING PROGRAM

## 2019-08-10 PROCEDURE — 93010 ELECTROCARDIOGRAM REPORT: CPT | Mod: 76 | Performed by: INTERNAL MEDICINE

## 2019-08-10 PROCEDURE — 12000001 ZZH R&B MED SURG/OB UMMC

## 2019-08-10 PROCEDURE — 83735 ASSAY OF MAGNESIUM: CPT | Performed by: OBSTETRICS & GYNECOLOGY

## 2019-08-10 PROCEDURE — 36415 COLL VENOUS BLD VENIPUNCTURE: CPT | Performed by: STUDENT IN AN ORGANIZED HEALTH CARE EDUCATION/TRAINING PROGRAM

## 2019-08-10 PROCEDURE — 40000141 ZZH STATISTIC PERIPHERAL IV START W/O US GUIDANCE

## 2019-08-10 PROCEDURE — 00000146 ZZHCL STATISTIC GLUCOSE BY METER IP

## 2019-08-10 PROCEDURE — 25000132 ZZH RX MED GY IP 250 OP 250 PS 637: Mod: GY | Performed by: STUDENT IN AN ORGANIZED HEALTH CARE EDUCATION/TRAINING PROGRAM

## 2019-08-10 PROCEDURE — 36415 COLL VENOUS BLD VENIPUNCTURE: CPT | Performed by: OBSTETRICS & GYNECOLOGY

## 2019-08-10 PROCEDURE — 84484 ASSAY OF TROPONIN QUANT: CPT | Performed by: STUDENT IN AN ORGANIZED HEALTH CARE EDUCATION/TRAINING PROGRAM

## 2019-08-10 PROCEDURE — 84100 ASSAY OF PHOSPHORUS: CPT | Performed by: OBSTETRICS & GYNECOLOGY

## 2019-08-10 PROCEDURE — 25000125 ZZHC RX 250: Performed by: STUDENT IN AN ORGANIZED HEALTH CARE EDUCATION/TRAINING PROGRAM

## 2019-08-10 PROCEDURE — 99024 POSTOP FOLLOW-UP VISIT: CPT | Performed by: OBSTETRICS & GYNECOLOGY

## 2019-08-10 PROCEDURE — 85025 COMPLETE CBC W/AUTO DIFF WBC: CPT | Performed by: OBSTETRICS & GYNECOLOGY

## 2019-08-10 RX ORDER — OXYCODONE HYDROCHLORIDE 5 MG/1
5-15 TABLET ORAL EVERY 4 HOURS PRN
Status: DISCONTINUED | OUTPATIENT
Start: 2019-08-10 | End: 2019-08-11

## 2019-08-10 RX ORDER — METOPROLOL SUCCINATE 25 MG/1
25 TABLET, EXTENDED RELEASE ORAL DAILY
Status: DISCONTINUED | OUTPATIENT
Start: 2019-08-11 | End: 2019-08-13

## 2019-08-10 RX ORDER — HYDRALAZINE HYDROCHLORIDE 20 MG/ML
10 INJECTION INTRAMUSCULAR; INTRAVENOUS EVERY 6 HOURS PRN
Status: DISCONTINUED | OUTPATIENT
Start: 2019-08-10 | End: 2019-08-14 | Stop reason: HOSPADM

## 2019-08-10 RX ADMIN — OXYCODONE HYDROCHLORIDE 10 MG: 5 TABLET ORAL at 05:55

## 2019-08-10 RX ADMIN — ACETAMINOPHEN 650 MG: 325 TABLET, FILM COATED ORAL at 14:18

## 2019-08-10 RX ADMIN — OXYCODONE HYDROCHLORIDE 5 MG: 5 TABLET ORAL at 02:45

## 2019-08-10 RX ADMIN — OXYCODONE HYDROCHLORIDE 5 MG: 5 TABLET ORAL at 23:03

## 2019-08-10 RX ADMIN — POTASSIUM PHOSPHATE, MONOBASIC AND POTASSIUM PHOSPHATE, DIBASIC 15 MMOL: 224; 236 INJECTION, SOLUTION INTRAVENOUS at 08:17

## 2019-08-10 RX ADMIN — ENOXAPARIN SODIUM 30 MG: 30 INJECTION SUBCUTANEOUS at 14:17

## 2019-08-10 RX ADMIN — ACETAMINOPHEN 650 MG: 325 TABLET, FILM COATED ORAL at 08:17

## 2019-08-10 RX ADMIN — ACETAMINOPHEN 650 MG: 325 TABLET, FILM COATED ORAL at 20:02

## 2019-08-10 RX ADMIN — LIOTHYRONINE SODIUM 5 MCG: 5 TABLET ORAL at 08:17

## 2019-08-10 RX ADMIN — LEVOTHYROXINE SODIUM 50 MCG: 50 TABLET ORAL at 08:17

## 2019-08-10 RX ADMIN — HYDRALAZINE HYDROCHLORIDE 10 MG: 20 INJECTION INTRAMUSCULAR; INTRAVENOUS at 21:31

## 2019-08-10 RX ADMIN — OXYCODONE HYDROCHLORIDE 10 MG: 5 TABLET ORAL at 10:31

## 2019-08-10 RX ADMIN — OXYCODONE HYDROCHLORIDE 10 MG: 5 TABLET ORAL at 20:02

## 2019-08-10 RX ADMIN — ACETAMINOPHEN 650 MG: 325 TABLET, FILM COATED ORAL at 02:45

## 2019-08-10 RX ADMIN — SIMETHICONE CHEW TAB 80 MG 80 MG: 80 TABLET ORAL at 21:46

## 2019-08-10 RX ADMIN — BUPIVACAINE HYDROCHLORIDE: 7.5 INJECTION, SOLUTION EPIDURAL; RETROBULBAR at 20:11

## 2019-08-10 ASSESSMENT — MIFFLIN-ST. JEOR: SCORE: 796.48

## 2019-08-10 ASSESSMENT — ACTIVITIES OF DAILY LIVING (ADL)
ADLS_ACUITY_SCORE: 15
ADLS_ACUITY_SCORE: 14
ADLS_ACUITY_SCORE: 14
ADLS_ACUITY_SCORE: 15
ADLS_ACUITY_SCORE: 15
ADLS_ACUITY_SCORE: 14

## 2019-08-10 ASSESSMENT — PAIN DESCRIPTION - DESCRIPTORS
DESCRIPTORS: ACHING;CONSTANT
DESCRIPTORS: CONSTANT;SHARP
DESCRIPTORS: SORE;ACHING

## 2019-08-10 NOTE — PROGRESS NOTES
[facility] PA[facility] SERVICE CONTINUOUS NERVE INFUSION NOTE    74 year old female, POD#1 s/p exploratory laparotomy + ENRIKE,  USO and appendectomy for pelvic mass now found to be clear cell ovarian adenocarcinoma. On 08/09/2019 T9-10 epidural catheter placed for postoperative pain control, with continuous infusion Bupivacaine 0.125% at 6ml/h.     SUBJECTIVE:  Interval History: Pt reports moderate pain control via continuous epidural catheter infusion.  Denies any weakness, paresthesias, circumoral numbness, metallic taste or tinnitus.  Pt is not ambulating with assistance yet.  Patient is currently not reporting any nausea or vomiting. Patient is tolerating oral intake or clear liquids. Denies BM or bowel gas.      Clinically Aligned Pain Assessment (CAPA):   Comfort (How is your pain?): Tolerable with discomfort  Change in Pain (Since your last medication/intervention?): About the same  Pain Control (How are your pain treatments working?):  Partially effective pain control  Functioning (Are you able to do activities to get better?) : Can do most things, but pain gets in the way of some   Sleep (Does your pain management allow you to sleep or rest?): Awake with occasional pain    Numerical Rating Scale: 4/10 at rest and 6/10 with movement.        Anticoagulation:  Lovenox ordered by primary team, to be started today at 1400hrs      OBJECTIVE:    Diagnostic:  Lab Results   Component Value Date    WBC 9.2 08/10/2019     Lab Results   Component Value Date    RBC 3.72 08/10/2019     Lab Results   Component Value Date    HGB 11.4 08/10/2019     Lab Results   Component Value Date    HCT 35.3 08/10/2019     Lab Results   Component Value Date     08/10/2019         Vitals:    Temp:  [35.2  C (95.3  F)-36.3  C (97.3  F)] 36.2  C (97.2  F)  Pulse:  [79-96] 96  Heart Rate:  [] 108  Resp:  [9-19] 18  BP: ()/(50-94) 158/81  SpO2:  [92 %-100 %] 93 %    Exam:    Strength 5/5 and symmetric grossly in bilateral  LE   T9-10 epidural catheter with dressing c/d/i, no tenderness, erythema, heme, edema    ASSESSMENT/PLAN:    Maximino Simeon is a 74 year old female POD #1 s/p HYSTERECTOMY, TOTAL ABDOMINAL, WITH BILATERAL SALPINGO-OOPHORECTOMY, WITH LYMPHADENECTOMY, APPENDECTOMY, OPEN and placement of T9-10 epidural catheter for analgesia.  Pt is receiving moderate analgesia with Bupivacaine 0.125%, that was started at 6mL/hour due to concerns for hypotension. Overnight patient has been hypertensive with SBP well into upper 140's to 150's.  Pt has not started ambulating.  Denies any weakness or paresthesias. Adequate sensory block.  No evidence of adverse side effects associated with local anesthetic.     - Increased epidural  infusion of Bupivacaine 0.125% to 8mL/hour  - will continue to follow and adjust as needed  - Agree qith scheduled acetaminophen, and prn oxycodone  - discussed plan with attending anesthesiologist    Tessy Wetzel MD  [facility]  8/10/2019 12:18 PM

## 2019-08-10 NOTE — PROGRESS NOTES
Gynecologic Oncology Progress Note    Maximino Simeon  MRN: [id number]  : 1945    POD#1 s/p ENRIKE USO and appendectomy by general surgery for pelvic mass and recent ruptured appendicitis, high grade ovarian adenocarcinoma, favor clear cell on frozen.     24 hour events  - Procedure as above       S: Patient is feeling ok. Pain fairly well controlled on current meds. No nausea or vomiting. Tolerating PO intake of CLD. Would like to advance diet as she is hungry. No flatus or BM. Has not ambulated. Crews in place.  No chest pain or dyspnea.     O:  Vitals:    19 1830 19 1938 19 2229 08/10/19 0611   BP: (!) 157/73 (!) 165/94 (!) 149/81 (!) 158/81   BP Location: Right arm Right arm Left arm Right arm   Pulse:    96   Resp: 16 16 18 18   Temp:  97.3  F (36.3  C) 95.3  F (35.2  C) 97.2  F (36.2  C)   TempSrc:  Oral Oral Oral   SpO2: 95% 95% 94% 93%   Weight:       Height:           Gen: alert and oriented, resting in bed  Cardio: rrr, nl s1 and s2, no m/r/g  Resp: lungs CTAB, no wheezes or crackles  Abdomen: soft, appropriately tender to palpation  Incision: bandage clean, dry, and in place  Extremities: BLEs non-tender with SCDs in place    I/O last 3 completed shifts:  In: 2380 [P.O.:380; I.V.:]  Out: 1335 [Urine:1135; Blood:200]     I/O (last 24h // since midnight)  I: PO: 260mL//NR; IV: 2200 mL//NR  O: UOP: 560//575; Stool: 0//0    Labs  Results for orders placed or performed during the hospital encounter of 19 (from the past 24 hour(s))   Basic metabolic panel   Result Value Ref Range    Sodium 135 133 - 144 mmol/L    Potassium 3.6 3.4 - 5.3 mmol/L    Chloride 103 94 - 109 mmol/L    Carbon Dioxide 25 20 - 32 mmol/L    Anion Gap 7 3 - 14 mmol/L    Glucose 122 (H) 70 - 99 mg/dL    Urea Nitrogen 8 7 - 30 mg/dL    Creatinine 0.45 (L) 0.52 - 1.04 mg/dL    GFR Estimate >90 >60 mL/min/[1.73_m2]    GFR Estimate If Black >90 >60 mL/min/[1.73_m2]    Calcium 7.8 (L) 8.5 - 10.1 mg/dL   CBC with  platelets differential   Result Value Ref Range    WBC 9.2 4.0 - 11.0 10e9/L    RBC Count 3.72 (L) 3.8 - 5.2 10e12/L    Hemoglobin 11.4 (L) 11.7 - 15.7 g/dL    Hematocrit 35.3 35.0 - 47.0 %    MCV 95 78 - 100 fl    MCH 30.6 26.5 - 33.0 pg    MCHC 32.3 31.5 - 36.5 g/dL    RDW 13.7 10.0 - 15.0 %    Platelet Count 256 150 - 450 10e9/L    Diff Method Manual Differential     % Neutrophils 87.2 %    % Lymphocytes 8.5 %    % Monocytes 4.3 %    % Eosinophils 0.0 %    % Basophils 0.0 %    Absolute Neutrophil 8.0 1.6 - 8.3 10e9/L    Absolute Lymphocytes 0.8 0.8 - 5.3 10e9/L    Absolute Monocytes 0.4 0.0 - 1.3 10e9/L    Absolute Eosinophils 0.0 0.0 - 0.7 10e9/L    Absolute Basophils 0.0 0.0 - 0.2 10e9/L    RBC Morphology Normal     Platelet Estimate Confirming automated cell count    Magnesium   Result Value Ref Range    Magnesium 1.9 1.6 - 2.3 mg/dL   Phosphorus   Result Value Ref Range    Phosphorus 2.1 (L) 2.5 - 4.5 mg/dL   Glucose by meter   Result Value Ref Range    Glucose 129 (H) 70 - 99 mg/dL       A: 74 year old POD#1 s/p XL ENRIKE USO and appendectomy by general surgery for pelvic mass and recent ruptured appendicitis. Doing well postoperatively. No signs of active bleeding.    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  FEN: CLD, LR at 100 ml/hr. ADAT today. Hypophos, ERP.   Pain: Epidural, Tylenol. Oxycodone prn.  Heme: 12.4>> 11.4, asymptomatic   CV:  CAD, no meds. Mildly hypertensive in postop period. Obtain EKG and consider initiation of antihypertensive oral maintenance medication if persistent.   Pulm: NI  GI: Collagenous colitis, imodium held  : Crews, remove POD#1  ID: NI, s/p perioperative cipro/flagyl. Recently treated w/abx x 6 weeks for ruptured appendicitis.   Endo: Hypothyroidism, Levothyroxine, Liothyronine  Psych/Neuro/MSK/Other: MDD, no meds. Delirium precautions   PPX: SCDs, Lovenox 30mg BID (due to low BMI) POD#1 at 2 pm after epidural removal  Dispo:  Inpatient until meeting postoperative goals  Drains/Lines: Franco catheter, PIV, epidural  Consults: general surgery    Rae Gee MD  OB/GYN PGY-2  08/10/19 6:36 AM     Naz Ascencio MD    Department of Ob/Gyn and Women's Health  Division of Gynecologic Oncology  [facility]  [phone]    I saw and evaluated the patient with the resident.  I edited and reviewed the above note.   POD #1, HTN overnight-no hx of meds at home. Tolerating clears, + BM, no flatus yet. Discontinue franco and IVF when tolerating po, VSS.  Plan metoprolol due to persistently elevated BP.  Lab Results   Component Value Date    WBC 9.2 08/10/2019     Lab Results   Component Value Date    RBC 3.72 08/10/2019     Lab Results   Component Value Date    HGB 11.4 08/10/2019     Lab Results   Component Value Date    HCT 35.3 08/10/2019     Lab Results   Component Value Date    MCV 95 08/10/2019     Lab Results   Component Value Date    MCH 30.6 08/10/2019     Lab Results   Component Value Date    MCHC 32.3 08/10/2019     Lab Results   Component Value Date    RDW 13.7 08/10/2019     Lab Results   Component Value Date     08/10/2019     Last Comprehensive Metabolic Panel:  Sodium   Date Value Ref Range Status   08/10/2019 135 133 - 144 mmol/L Final     Potassium   Date Value Ref Range Status   08/10/2019 3.6 3.4 - 5.3 mmol/L Final     Chloride   Date Value Ref Range Status   08/10/2019 103 94 - 109 mmol/L Final     Carbon Dioxide   Date Value Ref Range Status   08/10/2019 25 20 - 32 mmol/L Final     Anion Gap   Date Value Ref Range Status   08/10/2019 7 3 - 14 mmol/L Final     Glucose   Date Value Ref Range Status   08/10/2019 122 (H) 70 - 99 mg/dL Final     Urea Nitrogen   Date Value Ref Range Status   08/10/2019 8 7 - 30 mg/dL Final     Creatinine   Date Value Ref Range Status   08/10/2019 0.45 (L) 0.52 - 1.04 mg/dL Final     GFR Estimate   Date Value Ref Range Status   08/10/2019 >90 >60  mL/min/[1.73_m2] Final     Comment:     Non  GFR Calc  Starting 12/18/2018, serum creatinine based estimated GFR (eGFR) will be   calculated using the Chronic Kidney Disease Epidemiology Collaboration   (CKD-EPI) equation.       Calcium   Date Value Ref Range Status   08/10/2019 7.8 (L) 8.5 - 10.1 mg/dL Final

## 2019-08-10 NOTE — PLAN OF CARE
Assumed care of patient from [id number]. POD#1 s/p, XL, ENRIKE, USO and appendectomy. AVSS on room air. CAPNO WNL. Scheduled tylenol and PO oxycodone for pain. Epidural at 6ml/hr, site CDI. Tolerating clear liquid diet, denies nausea. - Gas, No BM overnight. Crews with adequate urine output. PIV SL. Midline incision with primapore. Patient up with assist x1. Patient dangled and stood at side of bed overnight.

## 2019-08-11 LAB
ERYTHROCYTE [DISTWIDTH] IN BLOOD BY AUTOMATED COUNT: 13.5 % (ref 10–15)
HCT VFR BLD AUTO: 33.7 % (ref 35–47)
HGB BLD-MCNC: 10.6 G/DL (ref 11.7–15.7)
MCH RBC QN AUTO: 30 PG (ref 26.5–33)
MCHC RBC AUTO-ENTMCNC: 31.5 G/DL (ref 31.5–36.5)
MCV RBC AUTO: 96 FL (ref 78–100)
PHOSPHATE SERPL-MCNC: 1.7 MG/DL (ref 2.5–4.5)
PHOSPHATE SERPL-MCNC: 2.3 MG/DL (ref 2.5–4.5)
PLATELET # BLD AUTO: 272 10E9/L (ref 150–450)
RBC # BLD AUTO: 3.53 10E12/L (ref 3.8–5.2)
WBC # BLD AUTO: 9.5 10E9/L (ref 4–11)

## 2019-08-11 PROCEDURE — 25000128 H RX IP 250 OP 636: Performed by: STUDENT IN AN ORGANIZED HEALTH CARE EDUCATION/TRAINING PROGRAM

## 2019-08-11 PROCEDURE — 25000132 ZZH RX MED GY IP 250 OP 250 PS 637: Mod: GY | Performed by: STUDENT IN AN ORGANIZED HEALTH CARE EDUCATION/TRAINING PROGRAM

## 2019-08-11 PROCEDURE — 40000141 ZZH STATISTIC PERIPHERAL IV START W/O US GUIDANCE

## 2019-08-11 PROCEDURE — 25000125 ZZHC RX 250: Performed by: STUDENT IN AN ORGANIZED HEALTH CARE EDUCATION/TRAINING PROGRAM

## 2019-08-11 PROCEDURE — 36415 COLL VENOUS BLD VENIPUNCTURE: CPT | Performed by: OBSTETRICS & GYNECOLOGY

## 2019-08-11 PROCEDURE — 85027 COMPLETE CBC AUTOMATED: CPT | Performed by: OBSTETRICS & GYNECOLOGY

## 2019-08-11 PROCEDURE — 12000001 ZZH R&B MED SURG/OB UMMC

## 2019-08-11 PROCEDURE — 25800030 ZZH RX IP 258 OP 636: Performed by: STUDENT IN AN ORGANIZED HEALTH CARE EDUCATION/TRAINING PROGRAM

## 2019-08-11 PROCEDURE — 84100 ASSAY OF PHOSPHORUS: CPT | Performed by: OBSTETRICS & GYNECOLOGY

## 2019-08-11 RX ORDER — IBUPROFEN 600 MG/1
600 TABLET, FILM COATED ORAL EVERY 6 HOURS
Status: DISCONTINUED | OUTPATIENT
Start: 2019-08-11 | End: 2019-08-12

## 2019-08-11 RX ORDER — OXYCODONE HYDROCHLORIDE 5 MG/1
5-10 TABLET ORAL EVERY 4 HOURS PRN
Status: DISCONTINUED | OUTPATIENT
Start: 2019-08-11 | End: 2019-08-11

## 2019-08-11 RX ORDER — AMOXICILLIN 250 MG
2 CAPSULE ORAL 2 TIMES DAILY
Status: DISCONTINUED | OUTPATIENT
Start: 2019-08-12 | End: 2019-08-13

## 2019-08-11 RX ORDER — FAMOTIDINE 10 MG
10 TABLET ORAL 2 TIMES DAILY
Status: DISCONTINUED | OUTPATIENT
Start: 2019-08-11 | End: 2019-08-14 | Stop reason: HOSPADM

## 2019-08-11 RX ORDER — POLYETHYLENE GLYCOL 3350 17 G/17G
17 POWDER, FOR SOLUTION ORAL DAILY
Status: DISCONTINUED | OUTPATIENT
Start: 2019-08-12 | End: 2019-08-13

## 2019-08-11 RX ORDER — OXYCODONE HYDROCHLORIDE 5 MG/1
5-10 TABLET ORAL
Status: DISCONTINUED | OUTPATIENT
Start: 2019-08-11 | End: 2019-08-14 | Stop reason: HOSPADM

## 2019-08-11 RX ORDER — AMOXICILLIN 250 MG
1 CAPSULE ORAL 2 TIMES DAILY
Status: DISCONTINUED | OUTPATIENT
Start: 2019-08-12 | End: 2019-08-12

## 2019-08-11 RX ADMIN — OXYCODONE HYDROCHLORIDE 5 MG: 5 TABLET ORAL at 14:31

## 2019-08-11 RX ADMIN — IBUPROFEN 600 MG: 600 TABLET ORAL at 21:36

## 2019-08-11 RX ADMIN — METOPROLOL SUCCINATE 25 MG: 25 TABLET, EXTENDED RELEASE ORAL at 08:28

## 2019-08-11 RX ADMIN — SENNOSIDES AND DOCUSATE SODIUM 2 TABLET: 8.6; 5 TABLET ORAL at 15:02

## 2019-08-11 RX ADMIN — OXYCODONE HYDROCHLORIDE 10 MG: 5 TABLET ORAL at 02:34

## 2019-08-11 RX ADMIN — ACETAMINOPHEN 650 MG: 325 TABLET, FILM COATED ORAL at 08:28

## 2019-08-11 RX ADMIN — FAMOTIDINE 10 MG: 10 TABLET, FILM COATED ORAL at 09:02

## 2019-08-11 RX ADMIN — POTASSIUM PHOSPHATE, MONOBASIC AND POTASSIUM PHOSPHATE, DIBASIC 20 MMOL: 224; 236 INJECTION, SOLUTION INTRAVENOUS at 10:18

## 2019-08-11 RX ADMIN — LIOTHYRONINE SODIUM 5 MCG: 5 TABLET ORAL at 08:28

## 2019-08-11 RX ADMIN — OXYCODONE HYDROCHLORIDE 5 MG: 5 TABLET ORAL at 15:30

## 2019-08-11 RX ADMIN — OXYCODONE HYDROCHLORIDE 10 MG: 5 TABLET ORAL at 18:45

## 2019-08-11 RX ADMIN — BUPIVACAINE HYDROCHLORIDE: 7.5 INJECTION, SOLUTION EPIDURAL; RETROBULBAR at 21:48

## 2019-08-11 RX ADMIN — LEVOTHYROXINE SODIUM 50 MCG: 50 TABLET ORAL at 08:27

## 2019-08-11 RX ADMIN — ENOXAPARIN SODIUM 30 MG: 30 INJECTION SUBCUTANEOUS at 20:21

## 2019-08-11 RX ADMIN — FAMOTIDINE 10 MG: 10 TABLET, FILM COATED ORAL at 20:19

## 2019-08-11 RX ADMIN — ACETAMINOPHEN 650 MG: 325 TABLET, FILM COATED ORAL at 02:33

## 2019-08-11 RX ADMIN — ACETAMINOPHEN 650 MG: 325 TABLET, FILM COATED ORAL at 20:19

## 2019-08-11 RX ADMIN — OXYCODONE HYDROCHLORIDE 5 MG: 5 TABLET ORAL at 08:39

## 2019-08-11 RX ADMIN — ACETAMINOPHEN 650 MG: 325 TABLET, FILM COATED ORAL at 14:31

## 2019-08-11 RX ADMIN — POTASSIUM PHOSPHATE, MONOBASIC AND POTASSIUM PHOSPHATE, DIBASIC 15 MMOL: 224; 236 INJECTION, SOLUTION INTRAVENOUS at 21:56

## 2019-08-11 ASSESSMENT — ACTIVITIES OF DAILY LIVING (ADL)
ADLS_ACUITY_SCORE: 13
ADLS_ACUITY_SCORE: 14
ADLS_ACUITY_SCORE: 13
ADLS_ACUITY_SCORE: 14

## 2019-08-11 ASSESSMENT — PAIN DESCRIPTION - DESCRIPTORS
DESCRIPTORS: SORE
DESCRIPTORS: ACHING
DESCRIPTORS: ACHING
DESCRIPTORS: ACHING;SORE
DESCRIPTORS: ACHING;SORE

## 2019-08-11 NOTE — PROGRESS NOTES
REGIONAL ANESTHESIA PAIN SERVICE NOTE    Interval History: Pt reports adequate pain control via epidural infusion.  Denies any weakness, paresthesias, circumoral numbness, metallic taste or tinnitus.  Pt is ambulating with assistance.  Patient is currently without nausea or vomiting. Patient is tolerating a regular diet.      Clinically Aligned Pain Assessment (CAPA):   Comfort (How is your pain?): Comfortably manageable  Change in Pain (Since your last medication/intervention?): Getting better  Pain Control (How are your pain treatments working?):  Partially effective pain control  Functioning (Are you able to do activities to get better?) : Can do most things, but pain gets in the way of some   Sleep (Does your pain management allow you to sleep or rest?): Awake with occasional pain    Numerical Rating Scale: 2/10 at rest and 3/10 with movement.      Anticoagulation:  lovenox 30mg Qday      OBJECTIVE:    Diagnostic:  Lab Results   Component Value Date    WBC 9.5 08/11/2019     Lab Results   Component Value Date    RBC 3.53 08/11/2019     Lab Results   Component Value Date    HGB 10.6 08/11/2019     Lab Results   Component Value Date    HCT 33.7 08/11/2019     Lab Results   Component Value Date     08/11/2019       Vitals:    Temp:  [36.6  C (97.8  F)-37.4  C (99.3  F)] 36.8  C (98.3  F)  Pulse:  [] 94  Heart Rate:  [] 95  Resp:  [15-20] 15  BP: (119-196)/() 122/70  SpO2:  [95 %-98 %] 95 %    Exam:   Strength 5/5 and symmetric grossly in bilateral UE and LE   Catheter site with dressing c/d/i, no tenderness, erythema, heme, edema      ASSESSMENT/PLAN:    Maximino Simeon is a 74 year old female s/p HYSTERECTOMY, TOTAL ABDOMINAL, WITH BILATERAL SALPINGO-OOPHORECTOMY, WITH LYMPHADENECTOMY  APPENDECTOMY, OPEN and placement of T9-10 epidural catheter for analgesia.  Pt is receiving adequate analgesia with bupivacaine 0.125% infusing at 8mL/hour.  Pt is ambulating without difficulty.  No weakness  or paresthesias, adequate sensory block.  No evidence of adverse side effects associated with local anesthetic.     - Will stop epidural infusion now  - Will evaluate pt's pain this evening; if pain is controlled off of infusion, will pull catheter  - Will continue to follow and adjust as needed  - Discussed plan with attending anesthesiologist    Ginger Escobedo MD  Regional Anesthesia Pain Service  8/11/2019 3:18 PM    24 hour Job Code Pager.  For in-house use only.     [place]:  * * [phone]  [place]: * * [phone]  Peds: * * [phone]  Enter call-back number and #

## 2019-08-11 NOTE — PROGRESS NOTES
REGIONAL ANESTHESIA PAIN SERVICE (RAPS) EVALUATION:  - Time: 15:30.  Called to evaluate patient for worsening pain.    - Evaluation: Patient reports pain intensity with current therapy 9/10 at rest and NA with activity  General: healthy, alert and mild distress  Catheter system integrity: intact  Skin: dressing clean, dry and intact .  Current vitals: /92, P 85    - Assessment/Plan    PAIN: poorly controlled     INTERVENTION: restarted epidural infusion (bupivacaine 0.125% at 8mL/hr)     Recommend increasing oxycodone dose to 10-15mg Q3hrs prn for better pain control and to facilitate weaning epidural catheter tomorrow. Patient can be evaluated to receive local anesthetic bolus Q 12 hr PRN pain not controlled with continuous infusion.  Bedside nurse must page RAPS to request bolus.    Ginger Escobedo MD      RAPS Contact Info (24 hour job code pager is the last 4 digits) For in-house use only:  [employer] phone: Dial * * * [phone], wait for prompt, then enter [phone].  Text: Use [employer] on the Intranet <Paging/Directory> tab and enter Jobcode ID.   If no call back at any time, contact the hospital  and ask for RAPS attending or backup

## 2019-08-11 NOTE — PLAN OF CARE
AVSS, no hypertension. A&Ox4, sometimes forgetful. Lower abdominal/incisional pain managed with tylenol and 5mg oxycodone PRN. Denies nausea, tolerating regular diet. Epidural stopped at 1230 by pain team, catheter may come out this afternoon, hold lovenox. Pt voiding adequately, some post-voiding dribbling/incontinence reported by pt. No BM, no flatus. Pt up with Aof1, keep reinforcing abdominal precautions with pt. Phos currently running in PIV. Continue POC.

## 2019-08-11 NOTE — PLAN OF CARE
"BP (!) 145/64 (BP Location: Right arm)   Pulse 115   Temp 98.9  F (37.2  C) (Oral)   Resp 18   Ht 1.473 m (4' 9.99\")   Wt 41.7 kg (91 lb 14.4 oz)   SpO2 96%   BMI 19.21 kg/m      VSS with tachycardia. Up with assist of 1. Regular diet, decreased appetite. Denies nausea. Pain noted in lower abdomen and headache. Tylenol and oxycodone for pain management. No bowel movement or flatus. Patient reports history of urinary retention but appears to be voiding adequately. Resting comfortably between cares. Continue with POC.  "

## 2019-08-11 NOTE — PLAN OF CARE
Hypertensive and tachycardic -110s, see previous note. A&Ox4, forgetful at times. Up with 1 assist. Pt c/o back and abdominal pain, gave tylenol, PRN oxycodone and PRN simethicone, epidural infusing at 8mL/hr. Denies chest pain/tightness. LS clear, denies SOB, on room air. Hypoactive BS, unable to pass flatus. No BM during shift. Midline abdominal incision, covered with primapore marked with dried drainage- no change. L PIV SL. Will continue to monitor and follow POC.

## 2019-08-11 NOTE — PLAN OF CARE
"Responsible for patient [id number]. Hypertensive (below hydralazine parameters), OVSS on RA. A+OX4 though forgetful and occasional impulsivity, bed alarm placed for safety. Pain initially not well controlled after epidural turned off this afternoon, anesthesia MD came to assess and restarted bupivacaine at 8mL/hr. Oxycodone given as well with some pain relief. Denies nausea, regular diet with no appetite this evening. Epidural c/d/I. ML incision staples c/d/I, given abdominal binder. Was up in chair at the beginning of shift but since declining activity due to pain and \"feeling really tired\". Not passing gas, no BM. Voiding adequate amounts. Up with assist of 1. Phos replacement finished, recheck scheduled for 1815.     Continue POC.   "

## 2019-08-11 NOTE — PROVIDER NOTIFICATION
Time: 2015  Notified:Gyn Onc cross cover  Reason: /99, re-check 190/104. Pt having increased pain, just gave oxycodone and tylenol.   MD ordered: Re-check BP in 15 min.  Re-check BP was 196/98 - notified provider. MD ordered EKG, one time troponin level, PRN IV hydralazine 10 mg, and scheduled metoprolol 25 mg (starting AM 8/11).      Addendum: EKG sinus tachycardia. IV hydralazine given. Re-check /74. Pt still having back/abdomen pain. Notified MD. MD ordered oxycodone 5-15 mg. Troponin negative.

## 2019-08-11 NOTE — PROGRESS NOTES
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     24 hour events  - Hypertensive to the 190s systolic, given IV hydral x1. EKG sinus tachycardia, patient asymptomatic. Start on metoprolol today.   - Crews removed, voiding spontaneously  - Started on Lovenox POD#1     S: Patient is feeling ok. States she was able to sleep after increasing the pain medication. Still having quite a bit of incisional discomfort when getting in and out of bed. No nausea or vomiting. Did not eat a lot yesterday because \"nothing appeals to me.\" No flatus or BM. Voiding spontaneously. Ambulating without dizziness. No chest pain or dyspnea.     O:  Vitals:    08/10/19 2200 08/10/19 2300 19 0000 19 0427   BP: (!) 152/74 (!) 155/80 (!) 145/64 119/68   BP Location: Right arm Right arm Right arm Right arm   Pulse:  126 115    Resp:   18   Temp:  99.3  F (37.4  C) 98.9  F (37.2  C) 97.8  F (36.6  C)   TempSrc:  Oral Oral Oral   SpO2:  96%  96%   Weight:       Height:           Gen: alert and oriented, resting in bed  Cardio: rrr, nl s1 and s2, no m/r/g  Resp: lungs CTAB, no wheezes or crackles  Abdomen: soft, appropriately tender to palpation, mildly distended and tympanic  Incision: bandage clean, dry, and in place  Extremities: BLEs non-tender with SCDs in place      Intake/Output Summary (Last 24 hours) at 2019 0513  Last data filed at 2019 0239  Gross per 24 hour   Intake 315 ml   Output 1775 ml   Net -1460 ml       Labs  Results for orders placed or performed during the hospital encounter of 19 (from the past 24 hour(s))   Glucose by meter   Result Value Ref Range    Glucose 129 (H) 70 - 99 mg/dL   EKG 12-lead, complete   Result Value Ref Range    Interpretation ECG Click View Image link to view " waveform and result    EKG 12-lead, tracing only   Result Value Ref Range    Interpretation ECG Click View Image link to view waveform and result    Troponin I   Result Value Ref Range    Troponin I ES 0.022 0.000 - 0.045 ug/L     EKG sinus tachycardia, non-specific t-wave abnormality. Reviewed with ED physician, no concern for acute ischemic event.     A: 74 year old POD#2 s/p XL ENRIKE USO and appendectomy by general surgery for pelvic mass and recent ruptured appendicitis. Doing well postoperatively. No signs of active bleeding.    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  FEN: Regular diet. Hypophos, ERP.   Pain: Epidural, Tylenol. Oxycodone prn.  Heme: 12.4>> 11.4, asymptomatic   CV:  CAD, no meds. Hypertensive in postop period. EKG showed no acute ischemic changes, patient asymptomatic. IV hydral for BP >180/120. Will start PO metoprolol today.    Pulm: NI  GI: Collagenous colitis, imodium held  : Voiding spontaneously   ID: NI, s/p perioperative cipro/flagyl. Recently treated w/abx x 6 weeks for ruptured appendicitis.   Endo: Hypothyroidism, Levothyroxine, Liothyronine  Psych/Neuro/MSK/Other: MDD, no meds. Delirium precautions   PPX: SCDs, Lovenox 30mg BID (due to low BMI)  Dispo: Inpatient until meeting postoperative goals  Drains/Lines: PIV, epidural  Consults: general surgery, regional pain    Madison Mehta MD  Obstetrics and Gynecology, PGY2  8/11/2019 5:12 AM

## 2019-08-12 LAB
COPATH REPORT: NORMAL
INTERPRETATION ECG - MUSE: NORMAL
INTERPRETATION ECG - MUSE: NORMAL
LACTATE BLD-SCNC: 1.5 MMOL/L (ref 0.7–2)
PHOSPHATE SERPL-MCNC: 2.3 MG/DL (ref 2.5–4.5)

## 2019-08-12 PROCEDURE — 25000132 ZZH RX MED GY IP 250 OP 250 PS 637: Mod: GY | Performed by: STUDENT IN AN ORGANIZED HEALTH CARE EDUCATION/TRAINING PROGRAM

## 2019-08-12 PROCEDURE — 83605 ASSAY OF LACTIC ACID: CPT | Performed by: OBSTETRICS & GYNECOLOGY

## 2019-08-12 PROCEDURE — 99024 POSTOP FOLLOW-UP VISIT: CPT | Performed by: OBSTETRICS & GYNECOLOGY

## 2019-08-12 PROCEDURE — 36415 COLL VENOUS BLD VENIPUNCTURE: CPT | Performed by: OBSTETRICS & GYNECOLOGY

## 2019-08-12 PROCEDURE — 25800030 ZZH RX IP 258 OP 636: Performed by: STUDENT IN AN ORGANIZED HEALTH CARE EDUCATION/TRAINING PROGRAM

## 2019-08-12 PROCEDURE — 25000132 ZZH RX MED GY IP 250 OP 250 PS 637: Mod: GY | Performed by: NURSE PRACTITIONER

## 2019-08-12 PROCEDURE — 12000001 ZZH R&B MED SURG/OB UMMC

## 2019-08-12 PROCEDURE — 84100 ASSAY OF PHOSPHORUS: CPT | Performed by: OBSTETRICS & GYNECOLOGY

## 2019-08-12 PROCEDURE — 40000141 ZZH STATISTIC PERIPHERAL IV START W/O US GUIDANCE

## 2019-08-12 PROCEDURE — 25000128 H RX IP 250 OP 636: Performed by: STUDENT IN AN ORGANIZED HEALTH CARE EDUCATION/TRAINING PROGRAM

## 2019-08-12 PROCEDURE — 25000125 ZZHC RX 250: Performed by: STUDENT IN AN ORGANIZED HEALTH CARE EDUCATION/TRAINING PROGRAM

## 2019-08-12 RX ORDER — IBUPROFEN 600 MG/1
600 TABLET, FILM COATED ORAL EVERY 6 HOURS PRN
Status: DISCONTINUED | OUTPATIENT
Start: 2019-08-12 | End: 2019-08-14 | Stop reason: HOSPADM

## 2019-08-12 RX ORDER — CALCIUM CARBONATE 500 MG/1
500 TABLET, CHEWABLE ORAL 3 TIMES DAILY PRN
Status: DISCONTINUED | OUTPATIENT
Start: 2019-08-12 | End: 2019-08-14 | Stop reason: HOSPADM

## 2019-08-12 RX ADMIN — OXYCODONE HYDROCHLORIDE 10 MG: 5 TABLET ORAL at 17:57

## 2019-08-12 RX ADMIN — IBUPROFEN 600 MG: 600 TABLET ORAL at 09:18

## 2019-08-12 RX ADMIN — CALCIUM CARBONATE (ANTACID) CHEW TAB 500 MG 500 MG: 500 CHEW TAB at 20:10

## 2019-08-12 RX ADMIN — FAMOTIDINE 10 MG: 10 TABLET, FILM COATED ORAL at 07:41

## 2019-08-12 RX ADMIN — METOPROLOL SUCCINATE 25 MG: 25 TABLET, EXTENDED RELEASE ORAL at 07:41

## 2019-08-12 RX ADMIN — ACETAMINOPHEN 650 MG: 325 TABLET, FILM COATED ORAL at 03:26

## 2019-08-12 RX ADMIN — ACETAMINOPHEN 650 MG: 325 TABLET, FILM COATED ORAL at 07:41

## 2019-08-12 RX ADMIN — SENNOSIDES AND DOCUSATE SODIUM 2 TABLET: 8.6; 5 TABLET ORAL at 07:41

## 2019-08-12 RX ADMIN — FAMOTIDINE 10 MG: 10 TABLET, FILM COATED ORAL at 20:10

## 2019-08-12 RX ADMIN — ACETAMINOPHEN 650 MG: 325 TABLET, FILM COATED ORAL at 20:10

## 2019-08-12 RX ADMIN — LEVOTHYROXINE SODIUM 50 MCG: 50 TABLET ORAL at 07:40

## 2019-08-12 RX ADMIN — OXYCODONE HYDROCHLORIDE 5 MG: 5 TABLET ORAL at 09:47

## 2019-08-12 RX ADMIN — IBUPROFEN 600 MG: 600 TABLET ORAL at 16:52

## 2019-08-12 RX ADMIN — LIOTHYRONINE SODIUM 5 MCG: 5 TABLET ORAL at 07:41

## 2019-08-12 RX ADMIN — IBUPROFEN 600 MG: 600 TABLET ORAL at 03:26

## 2019-08-12 RX ADMIN — ENOXAPARIN SODIUM 30 MG: 30 INJECTION SUBCUTANEOUS at 20:10

## 2019-08-12 RX ADMIN — OXYCODONE HYDROCHLORIDE 10 MG: 5 TABLET ORAL at 13:27

## 2019-08-12 RX ADMIN — ACETAMINOPHEN 650 MG: 325 TABLET, FILM COATED ORAL at 15:07

## 2019-08-12 RX ADMIN — OXYCODONE HYDROCHLORIDE 10 MG: 5 TABLET ORAL at 22:18

## 2019-08-12 RX ADMIN — POLYETHYLENE GLYCOL 3350 17 G: 17 POWDER, FOR SOLUTION ORAL at 07:41

## 2019-08-12 RX ADMIN — POTASSIUM PHOSPHATE, MONOBASIC AND POTASSIUM PHOSPHATE, DIBASIC 15 MMOL: 224; 236 INJECTION, SOLUTION INTRAVENOUS at 14:18

## 2019-08-12 RX ADMIN — SENNOSIDES AND DOCUSATE SODIUM 2 TABLET: 8.6; 5 TABLET ORAL at 20:10

## 2019-08-12 ASSESSMENT — ACTIVITIES OF DAILY LIVING (ADL)
ADLS_ACUITY_SCORE: 14

## 2019-08-12 ASSESSMENT — PAIN DESCRIPTION - DESCRIPTORS
DESCRIPTORS: ACHING;SORE
DESCRIPTORS: SORE;SHARP

## 2019-08-12 ASSESSMENT — MIFFLIN-ST. JEOR: SCORE: 804.19

## 2019-08-12 NOTE — PLAN OF CARE
"/58 (BP Location: Left arm)   Pulse 94   Temp 97.1  F (36.2  C) (Oral)   Resp 16   Ht 1.473 m (4' 9.99\")   Wt 41.7 kg (91 lb 14.4 oz)   SpO2 94%   BMI 19.21 kg/m      VSS on room air. A&Ox4 with forgetfulness but able to reorient. Up with assist of 1. Regular diet, decreased appetite. Denies nausea. Pain noted in lower abdomen and headache. Tylenol and ibuprofen for pain management. No bowel movement or flatus. Patient reports history of urinary retention but appears to be voiding adequately. Resting comfortably between cares. Continue with POC.        "

## 2019-08-12 NOTE — PLAN OF CARE
ASSUMED CARE FROM 1900-2300H    Up with SBA of 1, ambulated in the hallway with a staff. OVSS, afebrile. BP improves now to 116/62. Good appetite. Pain managed with prn oxycodone, epidural at 8 ml/hr and scheduled tylenol. Voiding adequately. Bed alarm on for safety. Pt is a bit impulsive in getting out of bed, high risk for fall and accidentally pulling off epidural. Alert, oriented x 4 but a bit off tonight. Forgetful and confused. When asking about her pain at about 2125H, she said that her pain is manageable. Then she thought that I was asking her to walk, where she just walk few minutes ago. She then thought that I am giving her medication even though I explained to her that her oxycodone is prn. Phos recheck at 1848H 2.3. Will replaced per protocol, recheck in am. PLAN: Continue with the care plan, pain management, mental status, VS and lytes per protocol.

## 2019-08-12 NOTE — PROGRESS NOTES
REGIONAL ANESTHESIA PAIN SERVICE EPIDURAL NOTE  Maximino Simeon is a 74 year old female POD #3 s/p HYSTERECTOMY, TOTAL ABDOMINAL, WITH BILATERAL SALPINGO-OOPHORECTOMY, WITH LYMPHADENECTOMY  APPENDECTOMY, OPEN and placement of T9-10 epidural catheter for pain management.      SUBJECTIVE  Interval History: Overnight events: confused at times. Patient reports adequate pain control with epidural infusion and current analgesic medications (see below).  Denies weakness, paresthesias, circumoral numbness, metallic taste or tinnitus.  Patient ambulating with assistance.  Currently tolerating a regular diet, denies nausea or vomiting.     Clinically Aligned Pain Assessment (CAPA):  Comfort (How is your pain?): Tolerable with discomfort  Change in Pain (Since your last medication/intervention?): Getting better since epidural was restarted  Pain Control (How are your pain treatments working?):  Partially effective pain control  Functioning (Are you able to do activities to get better?) : Can do most things, but pain gets in the way of some   Sleep (Does your pain management allow you to sleep or rest?): Awake with occasional pain     Pain Intensity using Numerical Rating Scale (NRS):    4/10 with epidural back on and 8/10 with epidural off      Antithrombotic/Thrombolytic Therapy ordered:  lovenox 30mg subcutaneous q 24hrs with last dose 2021 8/11/19    Analgesic Medications:  Medications related to Pain Management (From now, onward)    Start     Dose/Rate Route Frequency Ordered Stop    08/12/19 0800  senna-docusate (SENOKOT-S/PERICOLACE) 8.6-50 MG per tablet 1 tablet      1 tablet Oral 2 TIMES DAILY 08/11/19 2118 08/12/19 0800  senna-docusate (SENOKOT-S/PERICOLACE) 8.6-50 MG per tablet 2 tablet      2 tablet Oral 2 TIMES DAILY 08/11/19 2118 08/12/19 0800  polyethylene glycol (MIRALAX/GLYCOLAX) Packet 17 g      17 g Oral DAILY 08/11/19 2118 08/11/19 2130  ibuprofen (ADVIL/MOTRIN) tablet 600 mg      600 mg Oral  "EVERY 6 HOURS 08/11/19 2116      08/11/19 1615  oxyCODONE (ROXICODONE) tablet 5-10 mg      5-10 mg Oral EVERY 3 HOURS PRN 08/11/19 1610      08/11/19 1600  bupivacaine (MARCAINE) 0.125 % in sodium chloride 0.9 % 250 mL EPIDURAL Infusion      8 mL/hr  EPIDURAL CONTINUOUS 08/11/19 1559      08/09/19 1430  acetaminophen (TYLENOL) tablet 650 mg      650 mg Oral EVERY 6 HOURS 08/09/19 1426      08/09/19 1430  bisacodyl (DULCOLAX) Suppository 10 mg      10 mg Rectal DAILY 08/09/19 1426      08/09/19 1426  lidocaine 1 % 0.1-1 mL      0.1-1 mL Other EVERY 1 HOUR PRN 08/09/19 1426      08/09/19 1426  diphenhydrAMINE (BENADRYL) solution 12.5 mg      12.5 mg Oral EVERY 6 HOURS PRN 08/09/19 1426      08/09/19 1426  diphenhydrAMINE (BENADRYL) injection 12.5 mg      12.5 mg Intravenous EVERY 6 HOURS PRN 08/09/19 1426      08/09/19 1426  lidocaine (LMX4) cream       Topical EVERY 1 HOUR PRN 08/09/19 1426      08/09/19 1426  simethicone (MYLICON) chewable tablet 80 mg      80 mg Oral 4 TIMES DAILY PRN 08/09/19 1426      08/09/19 0743  nalbuphine (NUBAIN) injection 2.5-5 mg      2.5-5 mg Intravenous EVERY 6 HOURS PRN 08/09/19 0743             OBJECTIVE  Lab Results:   Recent Labs   Lab Test 08/11/19  0741   WBC 9.5   RBC 3.53*   HGB 10.6*   HCT 33.7*   MCV 96   MCH 30.0   MCHC 31.5   RDW 13.5          No results found for: INR    Vitals:    Temp:  [96.1  F (35.6  C)-98  F (36.7  C)] 97.4  F (36.3  C)  Pulse:  [94] 94  Heart Rate:  [78-92] 88  Resp:  [16-20] 16  BP: (105-181)/(58-93) 162/80  SpO2:  [94 %-97 %] 96 %  BP (!) 162/80 (BP Location: Right arm)   Pulse 94   Temp 97.4  F (36.3  C) (Oral)   Resp 16   Ht 1.473 m (4' 9.99\")   Wt 41.7 kg (91 lb 14.4 oz)   SpO2 96%   BMI 19.21 kg/m         Exam:   GEN: alert and no distress sitting in the chair  NEURO/MSK: Strength B/L LE 5/5  and overall symmetric  SKIN: Epidural catheter site with dressing c/d/i, no tenderness, erythema, heme, edema     ASSESSMENT/PLAN:    Patient " is receiving adequate analgesia with current multimodal therapy including T9-10 epidural catheter infusion of Bupivacaine 0.125% at 8mL/hr.  Pt is ambulating with assistance. No evidence of adverse side effects related to local anesthetic.       - 0930 decreased epidural infusion Bupivacaine 0.125% to 4mL/hour, POD #3 - RN giving oxycodone 5mg 0947 - will reassess at 1300  - 1327 RN gave 10mg oxycodone  - 1410 discontinue epidural catheter - pain 3-4 on a 0/10 VAS - catheter discontinue dark tip intact without complication  - antithrombotic/thrombolytic therapy: ok to continue lovenox as ordered. Please contact RAPS (#[phone]) prior to any medication changes  - will sign off/call for questions or concerns    - discussed plan with attending anesthesiologist    LEX Pandey CNP  Regional Anesthesia Pain Service  8/12/2019 6:52 AM    RAPS Contact Info (24 hour job code pager is the last 4 digits) For in-house use only:   [employer] phone: [place] [phone], [email or web address] [phone], [facility] [phone], then enter call-back number.    Text: Use [employer] on the Intranet <Paging/Directory> tab and enter Jobcode ID.   If no call back at any time, contact the hospital  and ask for RAPS attending or backup

## 2019-08-12 NOTE — PROGRESS NOTES
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     24 hour events  - Started on metoprolol XL for hypertension  - Epidural turned off; difficulty with pain control and turned back on       S: Patient feeling ok. Having some pain, but it is improved with pain meds. Worse when getting out of bed. Eating and drinking some, though nothing seems appealing. She denies nausea/vomiting. She has been out of bed and ambulating more. Denies dizziness. Voiding spontaneously. No flatus or BM. Denies headache, fevers/chills, chest pain, shortness of breath.       O:  Vitals:    19 2025 19 2337 19 0629 19 0645   BP: 116/62 105/58 (!) 181/93 (!) 162/80   BP Location: Right arm Left arm Left arm Right arm   Pulse:       Resp: 18 16 16    Temp: 96.1  F (35.6  C) 97.1  F (36.2  C) 97.4  F (36.3  C)    TempSrc: Axillary Oral Oral    SpO2: 97% 94% 96%    Weight:       Height:         Gen: alert and oriented, resting in bed  Cardio: rrr, nl s1 and s2, no m/r/g  Resp: lungs CTAB, no wheezes or crackles  Abdomen: soft, appropriately tender to palpation, mildly distended, hypoactive bowel sounds  Incision: VML w/staples, c/d/i  Extremities: BLEs non-tender      Intake/Output  24 hr: IN: 750 ml  ml PO // OUT: 3085 ml UOP  Since midnight: IN NR ml IVF NR ml PO // OUT: 600 ml UOP    A: 74 year old POD#3 s/p XL ENRIKE USO and appendectomy by general surgery for pelvic mass and recent ruptured appendicitis. Doing well postoperatively.     1. Post-operative state  Pain: Scheduled tylenol and ibuprofen, oxycodone prn. Epidural turned off POD#2 but turned back on due to poor pain control. Will try again today.   Diet: Regular diet, tolerating but appetite low.  GI: Awaiting return of bowel function. Scheduled  senna-docusate, miralax ordered. Offered suppository, patient would like to wait on this for now.  Prn antiemetics.  : s/p Crews. Voiding spontaneously   Perioperative prophylaxis: IS, SCDs, Lovenox 30mg daily (dosing based on BMI)    2. Pelvic mass, high grade ovarian carcinoma  -Favor clear cell on frozen section. No evidence of metastatic disease at time of surgery  -Final pathology pending    3. Acute blood loss anemia  - Expected from surgery with EBL 200cc. Asymptomatic, vital signs not concerning for bleeding. UOP robust.    4. Hypertension  -Diagnosed inpatient, started on Toprol XL 25mg daily 8/11 with improvement, BP elevated again this morning, but she has not received Metoprolol yet this morning.   -IV hydralazine ordered prn for BP >180/120, given once on POD#1  -EKG obtained post-op with sinus tachycardia and nonspecific t-wave abnormality. Reviewed with ED physician and no concern for acute ischemic event    5. Hypophosphatemia, improving  -Repletion per protocol    6. Collagenous Colitis  -Home Imodium held    7. Ruptured Appendix  -Now s/p appendectomy. Treated medically with antibiotics for 6 weeks prior to surgery    7. Hypothyroidism  -Continue home Levothyroxine and Liothyronine    8. Depression  -Mood stable, not on medications    9. Coronary Artery Disease  -Not on medications    Lidya Polanco MD  PGY-3 Gyn Onc    I saw and evaluated the patient. I agree with the findings and the plan of care as documented in Dr. Polanco 's note.     Ayana Caro MD  Gynecologic Oncology  Pager [phone]

## 2019-08-12 NOTE — PLAN OF CARE
Cared for pt from [id number]. SBP lowest 81, not within notifying parameters, asymptomatic, AOVSS. BP recheck SBP 100s. LA 1.5. Pain managed with tylenol, ibuprofen and oxycodone. Pt given oxycodone for pain management prior to epidural titration and removal. States pain is well managed. Denies nausea, tolerating regular diet, poor appetite. Midline incision CDI with staples. Up with SBA. Pt impulsive and a little unsteady, bed/chair alarm on at all times. Continue POC.    Addendum: At 1830 pt c/o increased lower back pain, no pain meds available, GynOnc paged and is seeing pt.

## 2019-08-13 ENCOUNTER — APPOINTMENT (OUTPATIENT)
Dept: PHYSICAL THERAPY | Facility: CLINIC | Age: 74
DRG: 737 | End: 2019-08-13
Attending: NURSE PRACTITIONER
Payer: MEDICARE

## 2019-08-13 LAB — PHOSPHATE SERPL-MCNC: 4.2 MG/DL (ref 2.5–4.5)

## 2019-08-13 PROCEDURE — 25000132 ZZH RX MED GY IP 250 OP 250 PS 637: Mod: GY | Performed by: STUDENT IN AN ORGANIZED HEALTH CARE EDUCATION/TRAINING PROGRAM

## 2019-08-13 PROCEDURE — 99024 POSTOP FOLLOW-UP VISIT: CPT | Performed by: OBSTETRICS & GYNECOLOGY

## 2019-08-13 PROCEDURE — 36415 COLL VENOUS BLD VENIPUNCTURE: CPT | Performed by: OBSTETRICS & GYNECOLOGY

## 2019-08-13 PROCEDURE — 12000001 ZZH R&B MED SURG/OB UMMC

## 2019-08-13 PROCEDURE — 25000128 H RX IP 250 OP 636: Performed by: STUDENT IN AN ORGANIZED HEALTH CARE EDUCATION/TRAINING PROGRAM

## 2019-08-13 PROCEDURE — 97161 PT EVAL LOW COMPLEX 20 MIN: CPT | Mod: GP

## 2019-08-13 PROCEDURE — 25000132 ZZH RX MED GY IP 250 OP 250 PS 637: Mod: GY | Performed by: NURSE PRACTITIONER

## 2019-08-13 PROCEDURE — 97116 GAIT TRAINING THERAPY: CPT | Mod: GP

## 2019-08-13 PROCEDURE — 97530 THERAPEUTIC ACTIVITIES: CPT | Mod: GP

## 2019-08-13 PROCEDURE — 84100 ASSAY OF PHOSPHORUS: CPT | Performed by: OBSTETRICS & GYNECOLOGY

## 2019-08-13 RX ORDER — NALOXONE HYDROCHLORIDE 0.4 MG/ML
.1-.4 INJECTION, SOLUTION INTRAMUSCULAR; INTRAVENOUS; SUBCUTANEOUS
Status: ACTIVE | OUTPATIENT
Start: 2019-08-13 | End: 2019-08-14

## 2019-08-13 RX ORDER — DULOXETIN HYDROCHLORIDE 20 MG/1
20 CAPSULE, DELAYED RELEASE ORAL DAILY
Qty: 30 CAPSULE | Refills: 0 | Status: SHIPPED | OUTPATIENT
Start: 2019-08-13 | End: 2022-01-17

## 2019-08-13 RX ORDER — AMLODIPINE BESYLATE 5 MG/1
5 TABLET ORAL DAILY
Qty: 30 TABLET | Refills: 0 | Status: SHIPPED | OUTPATIENT
Start: 2019-08-14 | End: 2019-09-13

## 2019-08-13 RX ORDER — DULOXETIN HYDROCHLORIDE 20 MG/1
20 CAPSULE, DELAYED RELEASE ORAL DAILY
Status: DISCONTINUED | OUTPATIENT
Start: 2019-08-13 | End: 2019-08-14 | Stop reason: HOSPADM

## 2019-08-13 RX ORDER — AMLODIPINE BESYLATE 2.5 MG/1
2.5 TABLET ORAL DAILY
Status: DISCONTINUED | OUTPATIENT
Start: 2019-08-13 | End: 2019-08-13

## 2019-08-13 RX ORDER — OXYCODONE HYDROCHLORIDE 5 MG/1
5 TABLET ORAL EVERY 6 HOURS PRN
Qty: 12 TABLET | Refills: 0 | Status: ON HOLD | OUTPATIENT
Start: 2019-08-13 | End: 2019-08-30

## 2019-08-13 RX ORDER — AMLODIPINE BESYLATE 5 MG/1
5 TABLET ORAL DAILY
Status: DISCONTINUED | OUTPATIENT
Start: 2019-08-14 | End: 2019-08-14 | Stop reason: HOSPADM

## 2019-08-13 RX ORDER — AMLODIPINE BESYLATE 2.5 MG/1
5 TABLET ORAL DAILY
Qty: 30 TABLET | Refills: 0 | Status: SHIPPED | OUTPATIENT
Start: 2019-08-14 | End: 2019-08-13

## 2019-08-13 RX ORDER — AMLODIPINE BESYLATE 2.5 MG/1
2.5 TABLET ORAL DAILY
Qty: 30 TABLET | Refills: 0 | Status: SHIPPED | OUTPATIENT
Start: 2019-08-14 | End: 2019-08-13

## 2019-08-13 RX ORDER — AMLODIPINE BESYLATE 2.5 MG/1
2.5 TABLET ORAL ONCE
Status: COMPLETED | OUTPATIENT
Start: 2019-08-13 | End: 2019-08-13

## 2019-08-13 RX ADMIN — IBUPROFEN 600 MG: 600 TABLET ORAL at 16:41

## 2019-08-13 RX ADMIN — OXYCODONE HYDROCHLORIDE 10 MG: 5 TABLET ORAL at 02:36

## 2019-08-13 RX ADMIN — CALCIUM CARBONATE (ANTACID) CHEW TAB 500 MG 500 MG: 500 CHEW TAB at 21:58

## 2019-08-13 RX ADMIN — LEVOTHYROXINE SODIUM 50 MCG: 50 TABLET ORAL at 08:21

## 2019-08-13 RX ADMIN — FAMOTIDINE 10 MG: 10 TABLET, FILM COATED ORAL at 19:11

## 2019-08-13 RX ADMIN — POLYETHYLENE GLYCOL 3350 17 G: 17 POWDER, FOR SOLUTION ORAL at 08:22

## 2019-08-13 RX ADMIN — ACETAMINOPHEN 650 MG: 325 TABLET, FILM COATED ORAL at 08:21

## 2019-08-13 RX ADMIN — ACETAMINOPHEN 650 MG: 325 TABLET, FILM COATED ORAL at 21:53

## 2019-08-13 RX ADMIN — ACETAMINOPHEN 650 MG: 325 TABLET, FILM COATED ORAL at 02:36

## 2019-08-13 RX ADMIN — FAMOTIDINE 10 MG: 10 TABLET, FILM COATED ORAL at 08:22

## 2019-08-13 RX ADMIN — LIOTHYRONINE SODIUM 5 MCG: 5 TABLET ORAL at 08:22

## 2019-08-13 RX ADMIN — DULOXETINE HYDROCHLORIDE 20 MG: 20 CAPSULE, DELAYED RELEASE ORAL at 11:17

## 2019-08-13 RX ADMIN — AMLODIPINE BESYLATE 2.5 MG: 2.5 TABLET ORAL at 08:30

## 2019-08-13 RX ADMIN — AMLODIPINE BESYLATE 2.5 MG: 2.5 TABLET ORAL at 19:10

## 2019-08-13 RX ADMIN — SIMETHICONE CHEW TAB 80 MG 80 MG: 80 TABLET ORAL at 16:41

## 2019-08-13 RX ADMIN — ACETAMINOPHEN 650 MG: 325 TABLET, FILM COATED ORAL at 15:16

## 2019-08-13 RX ADMIN — CALCIUM CARBONATE (ANTACID) CHEW TAB 500 MG 500 MG: 500 CHEW TAB at 04:39

## 2019-08-13 RX ADMIN — ENOXAPARIN SODIUM 30 MG: 30 INJECTION SUBCUTANEOUS at 19:10

## 2019-08-13 RX ADMIN — SENNOSIDES AND DOCUSATE SODIUM 2 TABLET: 8.6; 5 TABLET ORAL at 08:22

## 2019-08-13 RX ADMIN — OXYCODONE HYDROCHLORIDE 10 MG: 5 TABLET ORAL at 18:46

## 2019-08-13 ASSESSMENT — ACTIVITIES OF DAILY LIVING (ADL)
ADLS_ACUITY_SCORE: 14
ADLS_ACUITY_SCORE: 13
ADLS_ACUITY_SCORE: 14
ADLS_ACUITY_SCORE: 13

## 2019-08-13 ASSESSMENT — PAIN DESCRIPTION - DESCRIPTORS
DESCRIPTORS: ACHING;SORE

## 2019-08-13 NOTE — PROGRESS NOTES
Care Coordinator - Discharge Planning    Admission Date/Time:  8/9/2019  Attending MD:  Naz Ascencio MD     Data  Date of initial CC assessment:  Patient has been followed since admission for surgery by the [facility] Team.  Chart reviewed, discussed with interdisciplinary team.   Patient was admitted for:   1. S/P hysterectomy    2. Acute appendicitis with perforation and generalized peritonitis, without gangrene, unspecified whether abscess present    3. Pelvic mass         Assessment     Request from MD team the afternoon of discharge for home care services per request of the MD team for patient who will discharge today with her son Pardeep Simeon who will be her caregiver in home setting:    Please fax discharge orders to [facility]     Ph:  [phone]     Fax: [phone]     Skilled home care RN for initial home safety evaluation and 1-3 times a week to evaluate medication management, nutrition and hydration evaluation, endurance evaluation, and general status evaluation after discharge from the acute care hospital setting.     Skilled home care RN to assist with education reinforcement with home lovenox injections as prescribed.     Skilled home care PT to evaluate and treat in home setting.     Skilled home care Medical Social Worker to assist with community resources and assistance with transportation to and from doctors appointments.    Coordination of Care and Referrals: Provided patient/family with options for home care agency of choice in home area.  The [facility] met with patient at bedside to discuss home care plans.    Plan  Anticipated Discharge Date:  Today per MD team.  Anticipated Discharge Plan:  As above and per MD orders.    CTS Handoff completed: (Clinic letter)  To be sent.    Sulma Murray, ALIZA.S.N., R.N., P.H.N.        Pager

## 2019-08-13 NOTE — PROGRESS NOTES
"Brief Gyn/Onc Progress Note    In to see patient to discuss discharge planning with her and her son. She reports feeling very depressed with her diagnosis and everything that she will have to go through. She thinks she will be happier in her own environment but is nervous to discharge. She is still having some lower abdominal pain. I answered her questions to the best of my ability regarding diagnosis, prognosis, and treatment plan, though I let her know that a lot of this will be determined by her final pathology, and Dr. Ascencio will go over this with her at her post-op visit next week. We restarted her Cymbalta this morning. She had not been taking it due to an \"interaction,\" but she is very open to taking it now. She had been seeing a therapist regularly, but the therapist moved, and she has not yet found another one. She declined  services. I talked to her about the importance of close PCP f/u for blood pressure and depression. Appt scheduled for her next week. Reviewed incisional cares, post-op instructions, staple removal, etc. After discussing, patient and son prefer to wait until tomorrow for discharge. Will plan early morning discharge. Home health and home PT has been set up.    Discussed with Dr. Mays, Gyn Onc Fellow    Lidya Polanco MD  PGY-3 Gyn Onc  "

## 2019-08-13 NOTE — PLAN OF CARE
[id number]:  Vital signs:  Temp: 98.2  F (36.8  C) Temp src: Oral BP: (!) 164/88 Pulse: 83 Heart Rate: 88 Resp: 15 SpO2: 97 % O2 Device: None (Room air)   Epidural was removed this afternoon. Now on oxy q3hrs and Tylenol. Aqua K pad on lower back for pts lower back pain. Oxy is helping back pain but is not getting rid of the discomfort. Abdominal binder on. 100cc light yellow urine out. Oral fluids encouraged. Assist x 1 to bathroom. Continue to monitor.     Problem: Adult Inpatient Plan of Care  Goal: Plan of Care Review  8/13/2019 0034 by Jovi Whiteside, RN  Outcome: No Change     Problem: Pain (Surgery Nonspecified)  Goal: Acceptable Pain Control  8/13/2019 0034 by Jovi Whiteside, RN  Outcome: Declining

## 2019-08-13 NOTE — PLAN OF CARE
A&OX4. VSS ex max BP of 176/88, recheck was 137/67. Patient  Can be forgetful at time. Pain controlled with oxy and schedule tylenol. Patient passing gas and had a smear BM. On regular diet, denies N/V. Midline incision clean dry and intact. Voiding spontaneously. SBA. Continue with plan of care.

## 2019-08-13 NOTE — PROGRESS NOTES
[facility]  Met with pt to discuss plans for HC.  Pt to be discharged home 8/14  and has agreed to have [facility] follow with services of SN/PT/SW. Patient care support center processing referral.  Pt verbalized understanding that initial visit is scheduled for  1 to 2 day after discharge.   Pt has 24 hour phone number for [facility] for any questions or concerns.    Thank you  Zeenat Flores RN, BSN  [facility]  [facility]  [phone]

## 2019-08-13 NOTE — PROGRESS NOTES
"SPIRITUAL HEALTH SERVICES  SPIRITUAL ASSESSMENT Progress Note  [facility]C     REFERRAL SOURCE: Staff request    I shared a reflective conversation with Maximino \"Brittni\" Simeon which incorporated elements of her illness narrative as well as her spiritual history. Brittni shared how \"overwhelmed\" and \"sad\" she feels. She has \"no idea how to deal with this news.\"  Brittni has one good friend who would help support her but that friend is recovering from back surgery. Brittni's son is here at the hospital supporting her. Her daughter does not live locally.     Brittni has gotten through many other difficult things in her life by \"keeping busy\" and \"pushing through.\" Brittni considers herself to be a spiritual person but not a Holiness person. Brittni does better when she has a plan, which makes things even harder because \"right now there is no plan.\" I offered validation of emotions as well as words of peace and reflective listening.    PLAN: Brittni plans to be discharged so no other follow up is expected.    Bethany Garcia  Oncology   Pager [phone]    [facility] remains available 24/7 for emergent requests/referrals, either by having the switchboard page the on-call  or by entering an ASAP/STAT consult in Epic (this will also page the on-call ).        "

## 2019-08-13 NOTE — PROGRESS NOTES
"   08/13/19 1053   Quick Adds   Type of Visit Initial PT Evaluation   Living Environment   Lives With alone   Living Arrangements house   Home Accessibility stairs within home   Number of Stairs, Within Home, Primary other (see comments)  (total of 35 (most at 1 time though is 10))   Stair Railings, Within Home, Primary railing on left side (ascending)   Transportation Anticipated car, drives self   Living Environment Comment Pt lives in multi-story home with ~35 stairs total inside per pt report as she states \"there are stairs to get everywhere\" though most she would have to do at one time is 10 stairs. Pt's son plans to stay with her for ~1 week following discharge.   Self-Care   Usual Activity Tolerance excellent   Current Activity Tolerance moderate   Regular Exercise Yes   Activity/Exercise Type strength training;other (see comments)  (exercise clases, dancing)   Exercise Amount/Frequency 3-5 times/wk   Equipment Currently Used at Home none   Activity/Exercise/Self-Care Comment Pt very active at baseline, goes to the gym 4x/week and participates in group classes for strengthening, yoga, and dancing. Pt reports progressive decline in energy levels since June though has still been very active.   Functional Level Prior   Ambulation 0-->independent   Transferring 0-->independent   Toileting 0-->independent   Bathing 0-->independent   Communication 0-->understands/communicates without difficulty   Swallowing 0-->swallows foods/liquids without difficulty   Cognition 0 - no cognition issues reported   Fall history within last six months no   Which of the above functional risks had a recent onset or change? none   Prior Functional Level Comment Prior independent with functional mobility and ADLs   General Information   Onset of Illness/Injury or Date of Surgery - Date 08/09/19   Referring Physician Ame Olsen APRN CNP   Patient/Family Goals Statement To feel better and regain strength   Pertinent History of " Current Problem (include personal factors and/or comorbidities that impact the POC) Per chart, 74 year old POD#4 s/p XL ENRIKE USO and appendectomy by general surgery for pelvic mass and recent ruptured appendicitis. Doing well postoperatively.    Precautions/Limitations abdominal precautions   General Info Comments Activity orders: ambulate with assist   Cognitive Status Examination   Orientation orientation to person, place and time   Level of Consciousness alert   Follows Commands and Answers Questions 100% of the time   Personal Safety and Judgment intact   Memory intact   Pain Assessment   Patient Currently in Pain Yes, see Vital Sign flowsheet   Integumentary/Edema   Integumentary/Edema no deficits were identifed   Posture    Posture Forward head position   Range of Motion (ROM)   ROM Comment B LE ROM WFL   Strength   Strength Comments B LE strength not formally assessed though pt demonstrates at least 3/5 B LE strength per functional status and ability to sit<>stand independently   Bed Mobility   Bed Mobility Comments Not assessed, pt up in chair at beginning and end of session   Transfer Skills   Transfer Comments Independent sit<>stand   Gait   Gait Comments Independent, decreased gait speed and occasional hand hold support 2/2 pain   Balance   Balance Comments Good seated and standing balance   Sensory Examination   Sensory Perception no deficits were identified   General Therapy Interventions   Planned Therapy Interventions balance training;bed mobility training;gait training;strengthening;home program guidelines;progressive activity/exercise   Clinical Impression   Criteria for Skilled Therapeutic Intervention yes, treatment indicated   PT Diagnosis Impaired functional mobility   Influenced by the following impairments Pain, abdominal precautions, decreased activity tolerance, fatigue   Functional limitations due to impairments Pt requires assist for safe mobility within precautions   Clinical Presentation  "Stable/Uncomplicated   Clinical Presentation Rationale PMH and clinical judgment   Clinical Decision Making (Complexity) Low complexity   Therapy Frequency 4x/week   Predicted Duration of Therapy Intervention (days/wks) 1 week   Anticipated Equipment Needs at Discharge   (Likely none)   Anticipated Discharge Disposition Home with Outpatient Therapy;Home   Risk & Benefits of therapy have been explained Yes   Patient, Family & other staff in agreement with plan of care Yes   [facility] TM \"6 Clicks\"   2016, Trustees of [facility], under license to [employer].  All rights reserved.   6 Clicks Short Forms Basic Mobility Inpatient Short Form   [facility] AM-PAC  \"6 Clicks\" V.2 Basic Mobility Inpatient Short Form   1. Turning from your back to your side while in a flat bed without using bedrails? 4 - None   2. Moving from lying on your back to sitting on the side of a flat bed without using bedrails? 3 - A Little   3. Moving to and from a bed to a chair (including a wheelchair)? 4 - None   4. Standing up from a chair using your arms (e.g., wheelchair, or bedside chair)? 4 - None   5. To walk in hospital room? 4 - None   6. Climbing 3-5 steps with a railing? 4 - None   Basic Mobility Raw Score (Score out of 24.Lower scores equate to lower levels of function) 23   Total Evaluation Time   Total Evaluation Time (Minutes) 10     "

## 2019-08-13 NOTE — PROVIDER NOTIFICATION
Notified GYN/ONC around 0330 regarding patient BP of 176/88    Spoke with: Overnight Gyn/Onc resident     Orders: Recheck BP in half an hour.     Comments: BP rechecked at 0400 was 137/67. Patient comfortably relaxing. Continue with plan of care.

## 2019-08-13 NOTE — PLAN OF CARE
Discharge Planner PT  7C  Patient plan for discharge: Home with son, open to continued PT  Current status: PT evaluation complete, treatment initiated. Educated pt on abdominal precautions, provided handout. Pt overall independent with transfers and gait, though feeling more fatigued and deconditioned than baseline. Pt ascends/descends 12 stairs Shahbaz with 1 hand rail. Pt intermittently requires hand hold assist during gait 2/2 pain, though overall steady with no LOB throughout.  Barriers to return to prior living situation: Medical status, abdominal precautions, decreased activity tolerance  Recommendations for discharge: Home with assist + OP PT  Rationale for recommendations: Pt has support from son who will be staying with pt for ~1 week upon discharge. Pt would benefit from OP PT to progress strength and endurance.        Entered by: Lori Mares 08/13/2019 10:42 AM

## 2019-08-13 NOTE — PROGRESS NOTES
Brief GYN Oncology Note:    In to see patient for poor pain control. Epidural was removed this afternoon. Currently complaining of low back pain and pain in abdomen. Rates it as 8/10 in severity. No nausea or emesis. Appetite low. Did have small stool output this afternoon.    Patient Vitals for the past 12 hrs:   BP Temp Temp src Pulse Resp SpO2 Weight   08/12/19 1227 102/57 98.5  F (36.9  C) Oral 83 18 97 % --   08/12/19 1153 104/57 96.9  F (36.1  C) Oral 82 16 96 % --   Gen: Alert, sitting up in chair, no distress  CV: Well perfused  Resp: Unlabored breathing on room air  Abd: Soft, mildly distended, appropriately tender to palpation, distractable  Back: Tenderness in low back at level of sacrum, improved with massage  Incision: Well approximated with staples, no drainage    A/P: 74 year old POD#3 s/p XL ENRIKE USO and appendectomy by general surgery for pelvic mass and recent ruptured appendicitis. Doing well postoperatively, working on pain control after epidural removed today.    -Continue tylenol, ibuprofen, oxycodone 5-10mg q3hr prn. Patient due for next oxycodone in 1 hour, will give early if needed  -Will get abdominal binder now  -Ice packs/heat for back pain, consistent with MSK pain    Bethany Fan MD  Ob/Gyn PGY-2  August 12, 2019 7:50 PM

## 2019-08-13 NOTE — PROGRESS NOTES
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     24 hour events  - Metoprolol discontinued, amlodipine initiated, hypertension still persistent  - Cymbalta restarted for symptoms of depression    S: Patient reports feeling tired. Pain is improved. Had a few episodes of diarrhea overnight. Food is still making her stomach turn. She is drinking water without nausea or vomiting, about the same amount as usual. She is ambulating without dizziness. Reports mood is \"the same\" as yesterday. She is worried about getting to appointments and \"just doesn't have any energy.\" She is voiding spontaneously. Denies headaches, chest pain, shortness of breath.       O:  Vitals:    19 2036 19 2211 19 0439 19 0510   BP: (!) 167/85 (!) 157/81 (!) 172/93 (!) 160/84   BP Location: Left arm Left arm Left arm Left arm   Pulse:       Resp:  16 18    Temp:  97.6  F (36.4  C) 98.5  F (36.9  C)    TempSrc:  Oral Oral    SpO2:  96% 96%    Weight:       Height:         Gen: alert and oriented, resting in bed  Cardio: rrr, nl s1 and s2, no m/r/g  Resp: lungs CTAB, no wheezes or crackles  Abdomen: soft, appropriately tender to palpation, non-distended  Incision: VML w/staples, c/d/i  Extremities: BLEs non-tender    24 hr: IN: 240 ml PO // OUT: 1100 ml +2x unmeasured UOP  Since midnight: IN NR // OUT: 2x unmeasured UOP    A: 74 year old POD#5 s/p XL ENRIKE LSO, bilateral pelvic and paraarotic lymph node dissection, omentectomy, appendectomy, peritoneal biopsies. Impruving    1. Post-operative state  Pain: Scheduled tylenol and ibuprofen, oxycodone prn. Pain improving and now controlled with PO medications only.    Diet: Regular diet, tolerating but appetite low.   GI: +flatus and BM, now with diarrhea c/w her colitis sx. " Bowel regimen discontinued.   : s/p Crews. Voiding spontaneously   Perioperative prophylaxis: IS, SCDs, Lovenox 30mg daily (dosing based on BMI) s/p Lovenox teaching for son (patient herself has not yet done it.)    2. Pelvic mass, high grade ovarian carcinoma  - Favor clear cell on frozen section. No evidence of metastatic disease at time of surgery  - Final pathology pending    3. Hypertension  -Diagnosed inpatient, s/p 2 days metoprolol with minimal improvement. Now D#2 of amlodipine with some improvement   -IV hydralazine ordered prn for BP >180/120  -EKG obtained post-op with sinus tachycardia and nonspecific t-wave abnormality. Reviewed with ED physician and no concern for acute ischemic event    4. Hypophosphatemia, improving  -Repletion per protocol    5. Collagenous Colitis  -Home Imodium held    6. Ruptured Appendix  -Now s/p appendectomy. Treated medically with antibiotics for 6 weeks prior to surgery    7. Hypothyroidism  -Continue home Levothyroxine and Liothyronine    8. Depression  -symptoms of depression yesterday, attributed to new diagnosis, Cymbalta restarted at low dose, outpatient follow-up scheduled with PCP.    9. Coronary Artery Disease  -Not on medications    Dispo: Discharge to home today.    Lidya Polanco MD  PGY-3 Gyn Onc    I saw and evaluated the patient. I agree with the findings and the plan of care as documented in Dr. Polanco 's note.     Ayana Caro MD  Gynecologic Oncology  Pager [phone]

## 2019-08-13 NOTE — PLAN OF CARE
Afebrile,blood pressure elevated,pt was started on Norvasc po,pt very sad and teary ,said she feels overwhelmed and depressed,pt said she takes antidepressant  medication at home,provider was notified,Cymbalta  20 mg po  daily was prescribed was ordered,pt was started on medication.Appetite poor,denied nausea,po intake encouraged.Incision dry and intact.Up with standby assist,ambulated in [place].Plan to discharge home tomorrow.Continue per plan of care.

## 2019-08-14 VITALS
BODY MASS INDEX: 19.65 KG/M2 | SYSTOLIC BLOOD PRESSURE: 144 MMHG | TEMPERATURE: 98.7 F | HEIGHT: 58 IN | WEIGHT: 93.6 LBS | RESPIRATION RATE: 16 BRPM | HEART RATE: 83 BPM | DIASTOLIC BLOOD PRESSURE: 78 MMHG | OXYGEN SATURATION: 95 %

## 2019-08-14 LAB
ANION GAP SERPL CALCULATED.3IONS-SCNC: 10 MMOL/L (ref 3–14)
BUN SERPL-MCNC: 11 MG/DL (ref 7–30)
CALCIUM SERPL-MCNC: 9.4 MG/DL (ref 8.5–10.1)
CHLORIDE SERPL-SCNC: 98 MMOL/L (ref 94–109)
CO2 SERPL-SCNC: 23 MMOL/L (ref 20–32)
CREAT SERPL-MCNC: 0.55 MG/DL (ref 0.52–1.04)
GFR SERPL CREATININE-BSD FRML MDRD: >90 ML/MIN/{1.73_M2}
GLUCOSE SERPL-MCNC: 85 MG/DL (ref 70–99)
POTASSIUM SERPL-SCNC: 3.4 MMOL/L (ref 3.4–5.3)
SODIUM SERPL-SCNC: 131 MMOL/L (ref 133–144)

## 2019-08-14 PROCEDURE — 36415 COLL VENOUS BLD VENIPUNCTURE: CPT | Performed by: STUDENT IN AN ORGANIZED HEALTH CARE EDUCATION/TRAINING PROGRAM

## 2019-08-14 PROCEDURE — 25000132 ZZH RX MED GY IP 250 OP 250 PS 637: Mod: GY | Performed by: STUDENT IN AN ORGANIZED HEALTH CARE EDUCATION/TRAINING PROGRAM

## 2019-08-14 PROCEDURE — 80048 BASIC METABOLIC PNL TOTAL CA: CPT | Performed by: STUDENT IN AN ORGANIZED HEALTH CARE EDUCATION/TRAINING PROGRAM

## 2019-08-14 RX ADMIN — LEVOTHYROXINE SODIUM 50 MCG: 50 TABLET ORAL at 07:46

## 2019-08-14 RX ADMIN — OXYCODONE HYDROCHLORIDE 10 MG: 5 TABLET ORAL at 08:58

## 2019-08-14 RX ADMIN — AMLODIPINE BESYLATE 5 MG: 5 TABLET ORAL at 07:46

## 2019-08-14 RX ADMIN — DULOXETINE HYDROCHLORIDE 20 MG: 20 CAPSULE, DELAYED RELEASE ORAL at 07:46

## 2019-08-14 RX ADMIN — ACETAMINOPHEN 650 MG: 325 TABLET, FILM COATED ORAL at 05:20

## 2019-08-14 RX ADMIN — FAMOTIDINE 10 MG: 10 TABLET, FILM COATED ORAL at 07:46

## 2019-08-14 RX ADMIN — OXYCODONE HYDROCHLORIDE 10 MG: 5 TABLET ORAL at 05:20

## 2019-08-14 RX ADMIN — LIOTHYRONINE SODIUM 5 MCG: 5 TABLET ORAL at 07:46

## 2019-08-14 ASSESSMENT — ACTIVITIES OF DAILY LIVING (ADL)
ADLS_ACUITY_SCORE: 13

## 2019-08-14 ASSESSMENT — PAIN DESCRIPTION - DESCRIPTORS: DESCRIPTORS: ACHING

## 2019-08-14 NOTE — PLAN OF CARE
DISCHARGE     D: Orders written for discharge to home with  Home Care service    A/I: Alert and oriented. Son at the bedside and would like to be part of the care. Discharge instruction given to pt and son. Verbalized understanding. Copy of AVS given to pt after pt signed the AVS. PIV removed without any incident. Prn oxycodone given for pain. Home Care liason also stop by and talked to pt and son.    PLAN: Discharge to home with  home care service wheeled.

## 2019-08-14 NOTE — PLAN OF CARE
Physical Therapy Discharge Summary    Reason for therapy discharge:    Discharged to home with home therapy.    Progress towards therapy goal(s). See goals on Care Plan in [facility] electronic health record for goal details.  Goals partially met.  Barriers to achieving goals:   discharge from facility.    Therapy recommendation(s):    Continued therapy is recommended.  Rationale/Recommendations:  HHPT to progress strength, endurance, balance, and safety and independence with functional mobility.

## 2019-08-14 NOTE — PLAN OF CARE
"BP (!) 157/81 (BP Location: Left arm)   Pulse 83   Temp 97.6  F (36.4  C) (Oral)   Resp 16   Ht 1.473 m (4' 9.99\")   Wt 42.5 kg (93 lb 9.6 oz)   SpO2 96%   BMI 19.57 kg/m      VSS on RA ex BP high, not within parameters for PRN hydralazine. Pain controlled with Scheduled Tylenol, PRN oxy, ibuprofen, and simethicone. Up with SBA. Voids spont with adequate output. Tolerating small amounts of reg diet with no N/V. +BM x3 this shift. +flatus. Abd incision JAKE c/d/intact. PIV SL. Lovenox education done with pt and son. RN went over packet, demonstrated proper technique, then son gave 2000 Lovenox injection without difficulty. Plan is to discharge home in the care of her son tomorrow morning. Pt would like to not be disturbed tonight. Pt resting comfortably between cares. Continue POC.   "

## 2019-08-14 NOTE — PROGRESS NOTES
BRIEF SOCIAL WORK NOTE:     Social work consulted for adjustment to illness counseling at the end of the business day yesterday and she is scheduled to discharge this AM. Per rounds this AM, Pt had questions re: transportation and supportive services at home. Per RNCC, Pt will be opened to [facility] and a  is ordered to follow up as well. Inpatient SW was able to provide Pt with Oncology resource listing with highlighted information on [facility] transportation resource and [facility] resource. [facility] Liaison to meet with Pt again prior to discharge as well. SW consulted acknowledged and completed at this time.     CLIFFORD Barry, NATE  7C Surgical/Oncology Unit   Phone: [phone]  Pager: [phone]

## 2019-08-14 NOTE — PLAN OF CARE
A&OX4. VSS on room air ex Max BP of 172/93. MD aware.Recheck was 160/84, not within paramaters for PRN hydralazine. Pain controlled with scheduled tylenol, PRN oxy. Abdominal incision clean, dry and intact. Patient can be forgetful at time. On regular diet Passing gas. Patient had x loose bowel Movement overnight. Voiding spontaneoulsy. SBA. Continue with plan of care.

## 2019-08-15 ENCOUNTER — TELEPHONE (OUTPATIENT)
Dept: ONCOLOGY | Facility: CLINIC | Age: 74
End: 2019-08-15

## 2019-08-15 LAB — COPATH REPORT: NORMAL

## 2019-08-15 NOTE — TELEPHONE ENCOUNTER
Via IB from Viviane Gallardo, Call to PT to schedule genetics appt and she has opted to decline at this time.

## 2019-08-19 ENCOUNTER — TELEPHONE (OUTPATIENT)
Dept: ONCOLOGY | Facility: CLINIC | Age: 74
End: 2019-08-19

## 2019-08-19 ENCOUNTER — PATIENT OUTREACH (OUTPATIENT)
Dept: ONCOLOGY | Facility: CLINIC | Age: 74
End: 2019-08-19

## 2019-08-19 NOTE — PROGRESS NOTES
Post Operative Phone Call  Surgery date: 8/9/19  Post op visit:  8/22/19  Discharge date  8/14/19  Description of surgery  Exploratory laparotomy, total abdominal hysterectomy, Left salpingo-oophorectomy,   appendectomy, bilateral pelvic and para-aortic LN dissection, peritoneal biopsys, diaphragm pap smears, and pelvic washings.       8/16  Left message on voice mail for pt to call clinic back

## 2019-08-19 NOTE — TELEPHONE ENCOUNTER
[facility] LEE Swain calling on behalf of pt to request refill of Oxy IR 5 mg and Senna, requested paper scripts to pt's home address as pt did not know which pharmacy she was going to use yet, [facility] no longer her pharmacy. Stated pt was taking 1 tab 3 times a day, maximum pain 8/10. Also taking acetaminophen 3 times daily. Stated she saw pt on Saturday so unsure how many pills pt has left. Pt has follow-up with Dr. Ascencio on 8/22.

## 2019-08-20 ENCOUNTER — TELEPHONE (OUTPATIENT)
Dept: ONCOLOGY | Facility: CLINIC | Age: 74
End: 2019-08-20

## 2019-08-20 ENCOUNTER — CONSENT FORM (OUTPATIENT)
Dept: ONCOLOGY | Facility: CLINIC | Age: 74
End: 2019-08-20

## 2019-08-21 NOTE — OP NOTE
Procedure Date: 08/09/2019      IDENTIFICATION:  The patient is a 74-year-old  female who had I originally met while on call at [facility], at which point she was found to have a pelvic mass that was enlarged to 13 x 8 x 9.7 cm, it was complex in nature in addition she had what appeared to be a thick walled dilated appendix consistent with appendicitis.  Her CA-125 was elevated at 56.  At that point, she was to followup with Gynecological Oncology at [facility] in combination with General Surgery for an appendectomy as general surgery recommended antibiotics for the appendicitis prior to proceeding to the OR.  Unfortunately, following this, while she was on antibiotics, the patient was thought to have a ruptured appendix and the surgery was thus delayed.  She ultimately presented to see me on 07/11/2019 at the [facility] requesting surgery.  Based on the large pelvic mass, elevated CA-125 and what appeared to be a ruptured appendix, I had recommended that she see General Surgery in the event that a joint case was needed.  This was performed and she was placed on continuous antibiotics until the time of the surgery.  Based on findings, it was recommended the patient undergo exploratory laparotomy, total abdominal hysterectomy, removal of her ovary and the appendix and possible cancer staging.  The risks of the procedure including risk of infection, bleeding, damage to surrounding organs were explained in detail and informed consent was obtained.  She was ultimately taken to the operating room on 08/09/2019.      PREOPERATIVE DIAGNOSES:  Complex pelvic mass, elevated CA-125 and appendicitis.      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      SURGEON:  Naz Ascencio MD      ASSISTANTS:  Lidya Polanco MD, resident and Akiko Pierce MS4      ANESTHESIA:  General endotracheal with TAP block.      ESTIMATED BLOOD LOSS:  200 mL.      IV FLUIDS:  1600.      URINE OUTPUT:  250 mL at the end of the procedure.      DRAINS:  Crews catheter.      SPECIMENS:  Left ovary and fallopian tube, uterus and cervix, appendix, left pelvic lymph nodes, left periaortic lymph nodes, right pelvic lymph nodes, periaortic lymph nodes, left pericolic peritoneum and multiple peritoneal biopsies, omentum.      FINDINGS:  Normal external genitalia, abdominal survey notable for smooth liver edge and diaphragms bilaterally.  The left ovarian mass was filling the posterior cul-de-sac upon entry with filmy adhesions between the posterior uterus and the cul-de-sac and the left ovary.  The appendix was adherent into the cul-de-sac with an adjacent lesion that appeared probable mucocele versus metastatic disease.  Normal-appearing uterus and cervix.  Surgically absent right ovary and fallopian tube.  There were multiple filmy adhesions of small bowel to the pelvic sidewalls.  Filmy and omental adhesions to the peritoneal sidewalls.  The bowel and omentum were without any nodularity.      PROCEDURE IN DETAIL:  The patient was taken to the operating room with IV fluids running, where she was placed in the supine position and administered general endotracheal anesthesia without difficulty.  A timeout was called.  She had pneumoboots in place and had received preoperative antibiotics and Heparin 5000 units subcu.  Following her being prepped and draped and the timeout, a vertical midline incision was made from the pubic bone to approximately 3 cm above the umbilicus.  The underlying tissue was taken down with the Bovie cautery until the fascia was reached.  The fascia was incised and extended both superiorly and inferiorly.  At that point, the rectus muscles  were .  The peritoneum identified and entered sharply.  The incision was then again extended both superiorly and inferiorly with good visualization of the bowel and the bladder.  A Bookwalter retractor was placed.  A thorough survey of the upper abdomen and pelvis was performed with findings as noted above.  The bowel was packed away with moist laparotomy sponges.  We started with removal of the left ovary.  There was thin adhesions around this; these were lysed and the left ovary and tube were able to be lifted out of the cul-de-sac.  The retroperitoneum was entered following lysing of the left round ligament.  The ureter was identified and found to be vermiculating on the medial leaf of the broad ligament.  The left IP vessels were skeletonized, doubly clamped and divided.  The pedicles were free tied with 2 sutures of 2-0 Vicryl.  The left ovary was then able to be removed from the left peritoneal sidewall and sent for frozen section.  This returned as a clear cell carcinoma of the ovary.  Following this, we proceeded to the right side of the abdomen.  The round ligament was identified, divided and the retroperitoneum entered.  The ureter again was found on the right medial leaf of the broad ligament.  The remaining IP vessels on the right were skeletonized and divided.  The right tube and ovary were absent.  We then proceeded with skeletonization of the uterine arteries on both sides.  The vesicouterine peritoneal reflection was taken down in the usual fashion ensuring that the bladder was well out of harm's way.  The uterine arteries were then doubly clamped, divided and sutured with 2-0 Vicryl sutures.  We proceeded with taking down the cardinal and the uterosacral ligaments in a similar fashion until we were able to come below the cervix with 2 curved parametrial clamps meeting at the midline.  The uterus and cervix were then amputated from the top of the vagina.  This was handed off for pathology.   The vaginal cuff was then closed with multiple figure-of-eight sutures.  Hemostasis was assured.  At this point, we then had to dissect the appendix off of the cul-de-sac, as it was densely adherent to this area.  This was done with sharp dissection.  We then proceeded with the appendectomy in the standard fashion using a Kocher clamp at its base.  The base of the appendix following taking the appendiceal artery was clamped and ligated with 2 sutures of 2-0 silk and then divided.  The base of the appendix was burned with the Bovie cautery.  The appendix stump was then inverted within the cecum using 3-0 Monocryl in a pursestring suture.  The appendix was handed off for pathology.  We then proceeded with the staving portion of the procedure.  The lymph nodes in the pelvis were then removed in a standard fashion using Bovie cautery and Metzenbaum scissors from within the following boundaries:  The deep circumflex iliac vein distally, the mid aspect of the commons cranially, laterally the boundary was the mid aspect of the psoas muscle and medial the ureter.  The base of our dissection was the obturator nerve, which was identified within the obturator spaces on both sides.  All lymph node tissue was removed and sent separately to Pathology.  We then performed the periaortic portion of the procedure with the following boundaries:  From the mid aspect of the commons to approximately 2 cm above the bifurcation of the aorta at the level of the inferior mesenteric artery.  All lymph nodes on the left side were removed lateral to the ureter and on the right side, all lymph nodes superior to the inferior vena cava were removed using Hemoclips as well as the Bovie.  Hemostasis was assured.  We then proceeded with the omentectomy.  We performed an infracolic omentectomy using free ties and creating pedicles.  Following this, we performed multiple biopsies along the pericolic gutters and the pelvic sidewalls as well as the  cul-de-sac and the bladder peritoneum.  Pap smear of the diaphragms were performed.  The abdomen and pelvis was then irrigated with multiple liters of normal saline.  Once hemostasis was assured, the entire bowel was run from the level of the cecum to the ligament of Treitz and there was no evidence of any tumor noted.  All laparotomy sponges were removed from the abdomen and pelvis.  The fascia was closed with 0 looped PDS from each apex of the incision to meet at the midline.  The subcutaneous tissue was irrigated and made hemostatic and the wound was closed with staples.  The patient tolerated the procedure without difficulty and was taken to PACU in stable condition following extubation.         MD Naz CALDERÓN MD    Department of Ob/Gyn and Women's Health  Division of Gynecologic Oncology  [facility]  [phone]    I was scrubbed and present for the entire procedure.       D: 2019   T: 2019   MT: [place]      Name:     ERNESTO BATISTA   MRN:      [id number]        Account:        [id number]   :      1945           Procedure Date: 2019      Document: [id number]

## 2019-08-22 ENCOUNTER — PATIENT OUTREACH (OUTPATIENT)
Dept: ONCOLOGY | Facility: CLINIC | Age: 74
End: 2019-08-22

## 2019-08-22 ENCOUNTER — OFFICE VISIT (OUTPATIENT)
Dept: ONCOLOGY | Facility: CLINIC | Age: 74
End: 2019-08-22
Attending: OBSTETRICS & GYNECOLOGY
Payer: MEDICARE

## 2019-08-22 VITALS
OXYGEN SATURATION: 99 % | HEART RATE: 90 BPM | BODY MASS INDEX: 18.26 KG/M2 | DIASTOLIC BLOOD PRESSURE: 85 MMHG | RESPIRATION RATE: 16 BRPM | TEMPERATURE: 97.8 F | WEIGHT: 87 LBS | SYSTOLIC BLOOD PRESSURE: 144 MMHG | HEIGHT: 58 IN

## 2019-08-22 DIAGNOSIS — R64 CACHEXIA (H): Primary | ICD-10-CM

## 2019-08-22 DIAGNOSIS — C56.2 OVARIAN CANCER, LEFT (H): ICD-10-CM

## 2019-08-22 DIAGNOSIS — C56.9 OVARIAN CANCER, UNSPECIFIED LATERALITY (H): Primary | ICD-10-CM

## 2019-08-22 PROBLEM — M81.0 OSTEOPOROSIS: Status: ACTIVE | Noted: 2019-03-13

## 2019-08-22 PROCEDURE — 99214 OFFICE O/P EST MOD 30 MIN: CPT | Mod: 24 | Performed by: OBSTETRICS & GYNECOLOGY

## 2019-08-22 PROCEDURE — G0463 HOSPITAL OUTPT CLINIC VISIT: HCPCS | Mod: ZF

## 2019-08-22 RX ORDER — DRONABINOL 2.5 MG/1
2.5 CAPSULE ORAL
Qty: 60 CAPSULE | Refills: 0 | Status: SHIPPED | OUTPATIENT
Start: 2019-08-22 | End: 2020-01-23

## 2019-08-22 ASSESSMENT — MIFFLIN-ST. JEOR: SCORE: 774.38

## 2019-08-22 ASSESSMENT — PAIN SCALES - GENERAL: PAINLEVEL: MODERATE PAIN (4)

## 2019-08-22 NOTE — PROGRESS NOTES
Consult Notes on Referred Patient    Date: 19           Dr. [facility]  [address] [place] [address]       RE: Maximino Simeon  : 1945  LORNA: 19      Dear Dr. [facility] Jennifer *:    I had the pleasure of seeing your patient Maximino Simeon here at the Gynecologic Cancer Clinic at the [facility] on 19.  As you know she is a very pleasant 74 year old woman with a recent diagnosis of  Pelvic mass.  Now s/p surgical staging procedure.    HPI:    Saw patient at [facility] originally during my call week was to follow up with outpatient GYN ONC and general surgery due to appendicits and enlarged ovary.      54  CA 19-9 15  CEA 2.8    2019: CT abd pelvis: IMPRESSION:      1. Dilated, fluid-filled appendix with mild increased enhancement and slight stranding at its tip is worrisome for acute appendicitis.  2. Large complex cystic pelvic mass with soft tissue nodularity with adjacent prominent left adnexal vessels is worrisome for neoplasm of ovarian origin. No mesenteric nodularity, lymph node enlargement, or other findings that are suspicious for metastatic disease in the abdomen or pelvis.    2019: CT abd pelvis REPRODUCTIVE ORGANS with IV and oral contrast: Again noted is a large complex cystic mass in the pelvis measuring approximately 13.3 x 8.1 x 9.7 cm in size which has areas of mural nodularity and soft tissue density.    1. Again noted is a thick-walled dilated appendix compatible with appendicitis. There is a questionable new small area of appendiceal wall discontinuity and adjacent 1.2 cm fluid pocket on axial image 57 and sagittal image 23. Findings raise question of a small area of appendiceal wall perforation.    2. Large complex pelvic mass again concerning for ovarian neoplasm.    3. Marked bladder distention. Mild prominence of the urinary collecting systems likely secondary to  this.    7/2/2019-7/7/2019: General surgery consulted for perforated appendicitis.  Not septic as no fever.Treated with Cefuroxime and metronidazole (PCN allergy). Surgery recommending postponing appendectomy for about 6 weeks or so. Presenting leukocytosis and fever resolved by discharge.  Will finish oral course of ceftin and flagyl as outpatient.     Maximino Simeon is a 74 y.o. female with history of colitis with recent admission for acute appendicitis and incidental pelvic mass who was treated conservatively with antibiotics who returns with worsening pain and CT with evidence of perforation and abscess.     Per general surgery note:  - No indication for surgery at this time  - Recommend admission to medicine service  - Start IV antibiotics  - Will again need to discuss the timing of appendectomy with Gyn/Onc to allow for evaluation of her pelvic mass simultaneously, this will likely have to be in 4-6 weeks following possible appendix perforation  - Surgery will continue to follow  Samuel Eller MD       SPECIMEN(S):   A: Ovary and fallopian tube, left   B: Uterus, cervix   C: Appendix   D: Lymph nodes, left pelvic   E: Lymph nodes, left para aortic   F: Lymph nodes, right pelvic   G: Lymph nodes, right para aortic   H: Right nick colic gutter   I: Left nick colic gutter   J: Left pelvic peritoneum   K: Right pelvic peritoneum   L: Bladder peritoneum   M: Omentum   N: Cul de sac     FINAL DIAGNOSIS:   A. 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 UTERUS, CERVIX:   - Inactive/atrophic endometrium   - Leiomyomas with hyaline degeneration   - Cervix with no significant histopathologic abnormality     C. APPENDIX:   - Low grade appendiceal mucinous neoplasm (LAMN)     D. LYMPH NODES, LEFT PELVIC:   - Eight reactive lymph nodes,  negative for malignancy (0/8)     E. LYMPH NODES, LEFT PARA AORTIC:   - Four reactive lymph nodes, negative for malignancy (0/4)     F. LYMPH NODES, RIGHT PELVIC:   - Nine reactive lymph nodes, negative for malignancy (0/9)     G. LYMPH NODES, RIGHT PARA AORTIC:   - Four reactive lymph nodes, negative for malignancy (0/4)     H. RIGHT PEDRO COLIC GUTTER:   -Fibroadipose tissue, negative for malignancy.     I. LEFT PEDRO COLIC GUTTER:   -Fibroadipose tissue, negative for malignancy.     J. LEFT PELVIC PERITONEUM:   -Fibroadipose tissue, negative for malignancy.     K. RIGHT PELVIC PERITONEUM:   -Fibroadipose tissue, negative for malignancy.     L. BLADDER PERITONEUM:   -Fibrous tissue, negative for malignancy.     M. OMENTUM:   - Fibroadipose tissue, negative for malignancy.   - One reactive lymph node, negative for malignancy (0/1)     N. CUL DE SAC:   - Hemorrhagic fibrous tissue, negative for malignancy.     Pelvic Washing:   - Negative for malignancy   Specimen Adequacy: Satisfactory for evaluation.     PATHOLOGIC STAGE CLASSIFICATION (PTNM, AJCC 8TH EDITION)     Primary Tumor (pT):         - pT1a     Regional Lymph Nodes (pN):         - pN0     FIGO STAGE     FIGO Stage:         - IA     ADDITIONAL FINDINGS     Additional Pathologic Findings:         appendix with low grade appendiceal mucinous neoplasm (LAMN)  Proximal Margin:         - Uninvolved by invasive carcinoma       Status of Mucinous Neoplasm at Proximal Margin:           - Uninvolved by appendiceal mucinous neoplasm     Mesenteric Margin:         - Uninvolved by invasive carcinoma       Today: + anxiety, forcing herself to eat. Doesn't know what to eat. Fatigue, eats organic oats, yogurt is what usually eats. Has history of colitis, having BM's is off steroids prior to surgery. Gets constipated if not taking metamucil takes once a day.     Review of Systems:    Systemic           no weight changes; no fever; no chills; no night sweats; no appetite  changes  Skin           no rashes, or lesions  Eye           no irritation; no changes in vision  Jarred-Laryngeal           no dysphagia; no hoarseness   Pulmonary    no cough; no shortness of breath  Cardiovascular    no chest pain; no palpitations  Gastrointestinal    no diarrhea; no constipation; no abdominal pain; no changes in bowel  habits; no blood in stool  Genitourinary   no urinary frequency; no urinary urgency; no dysuria; no pain; no abnormal vaginal discharge; no abnormal vaginal bleeding  Breast   no breast discharge; no breast changes; no breast pain  Musculoskeletal    no myalgias; no arthralgias; no back pain  Psychiatric           no depressed mood; no anxiety    Hematologic           no tender lymph nodes; no noticeable swellings or lumps   Endocrine    no hot flashes; no heat/cold intolerance         Neurological   no tremor; no numbness and tingling; no headaches; no difficulty  sleeping      Past Medical History:    Asymptomatic CAD  Collagenous colitis   Major depressive disorder, recurrent episode, moderate (HC)    Anxiety state, unspecified      Past Surgical History:    One ovary removed for endometriosis, still has uterus and other ovary.    Health Maintenance:  Health Maintenance Due   Topic Date Due     DEXA  1945     ADVANCE CARE PLANNING  1945     DEPRESSION ACTION PLAN  1945     LIPID  02/01/1988     MEDICARE ANNUAL WELLNESS VISIT  02/01/2008     ZOSTER IMMUNIZATION (2 of 3) 09/21/2015       Last Pap Smear: 10 years ago            Result: normal  She has not had a history of abnormal Pap smears.    Last Mammogram: Due 9/2019             Result: normal         Last Colonoscopy: 2013           Result: microscopic colitis                           Current Medications:     has a current medication list which includes the following prescription(s): acetaminophen, amlodipine, b complex vitamins, calcium citrate-vitamin d, coenzyme q10, duloxetine, enoxaparin, levothyroxine,  "liothyronine, loperamide, multiple vitamins-minerals, oxycodone, and probiotic product.       Allergies:     Allergies   Allergen Reactions     Adhesive Tape      bandaids     Liquid Adhesive Rash     Penicillins Rash             Social History:     Social History     Tobacco Use     Smoking status: Never Smoker     Smokeless tobacco: Never Used   Substance Use Topics     Alcohol use: Yes     Comment: socially       History   Drug Use Unknown           Family History:     The patient's family history is notable for the following.    Breast cancer sister in her 50's, maternal aunt breast cancer >50 years, no ovarian cancer    Physical Exam:     BP (!) 144/85   Pulse 90   Temp 97.8  F (36.6  C) (Oral)   Resp 16   Ht 1.473 m (4' 10\")   Wt 39.5 kg (87 lb)   LMP  (LMP Unknown)   SpO2 99%   Breastfeeding? No   BMI 18.18 kg/m    Body mass index is 18.18 kg/m .    General Appearance: healthy and alert, no distress but patient somewhat forgetful during exam, has to be told multiple times over about dates, did not remember that RN from [facility] called her to set up appt with [facility] GYN ONC and Gen surgery.     HEENT:  no thyromegaly, no palpable nodules or masses        Cardiovascular: regular rate and rhythm, no gallops, rubs or murmurs     Respiratory: lungs clear, no rales, rhonchi or wheezes, normal diaphragmatic excursion    Musculoskeletal: extremities non tender and without edema    Skin: no lesions or rashes     Neurological: normal gait, no gross defects     Psychiatric: appropriate mood and affect                               Hematological: normal cervical, supraclavicular and inguinal lymph nodes     Gastrointestinal:       abdomen soft, mildly tender, non-distended, no organomegaly or masses, staples present, intact, slight erythema near staple line.    Genitourinary: Derferred.    Assessment:    Maximino Simeon is a 74 year old woman with a new diagnosis of Stage IA clear cell carcinoma of the " ovary.      Plan:     1.)    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 Recommend chemotherapy x 6 cycles taxol/carbo. Due to high grade nature of this disease. Wants chemo at [facility]. Will set up with med oncology. Palliative care referral.     2.) Genetic risk factors were assessed and the patient does meet the qualifications for a referral.  High risk referral for GC.    3.) Labs and/or tests ordered include:  Port placement    4.) Pain: on tylenol twice daily can increase to tid and ibuprofen q 6 hours 600 mg.     5.) Appetite: will try Marinol 2.5 mg twice daily for appetite stimulation due to weight loss and no appetite.         Thank you for allowing us to participate in the care of your patient.         Sincerely,    Naz Ascencio MD    Department of Ob/Gyn and Women's Health  Division of Gynecologic Oncology  [facility]  [phone]    Of a 25 minute appointment, more than 50% was spent in counseling the patient.    CC  Patient Care Team:  Cecelia Manzo MD as PCP - General  Naz Ascencio MD as MD (Oncology)  Parvez Abreu MD as MD (Surgery)  Micheline Soler MD as MD (Emergency Medicine)  [facility] ([facility]  CL[facility]IC, [facility]

## 2019-08-22 NOTE — PROGRESS NOTES
ealth GYN-Oncology Teaching Note    Relevant Diagnosis:  Ovarian cancer     Teaching Topic:  Chemotherapy Taxol/Carboplatin   Implanted Port  She is wanting her chemotherapy and port  to be done at [facility].  Contacted new pt scheduling and med-onc does not  see ovarian cancer patients at [facility].   Checking in with supervisor to see if NP is willing to see her every 3 weeks to manage the chemotherapy.       Person(s) involved in teaching:  Patient and her son Pardeep who lives in [place].    Motivation Level:   Asks Questions:   Yes  Eager to Learn:  Yes  Cooperative:  Yes  Receptive (willing/able to accept information):  Yes  Comments:  Will need lost of reinforcement of information because of memory issues.        Patient and Family demonstrates understanding of the following:  Reason for the appointment, diagnosis and treatment plan:  Yes  Knowledge of proper use of medications and conditions for which they are ordered (with special attention to potential side effects or drug interactions):  Yes  Which situations necessitate calling provider and whom to contact:  Yes      Teaching Concerns:  Yes Neuropathy needs to be assessed closely because she is a .     Instructional Materials Used/Given:  Implanted Port Booklet  Chemotherapy Packet and Cards  Disease Packet\  Cancer Information Handbook, Eating Hints Chemo and You, TLC booklet.      Time spent teaching with patient:  60 minutes.

## 2019-08-22 NOTE — NURSING NOTE
Referral to palliative care and genetic counseling  Take ibuprofen and tylenol for pain  Start chemotherapy treatments at [facility]      Staples removed from abdomen, incision site looks healthy, no signs of infection  Steri strips applied  Pt had no complaints

## 2019-08-22 NOTE — LETTER
2019       RE: Maximino Simeon  [id number] [facility] [id number]     Dear Colleague,    Thank you for referring your patient, Maximino Simeon, to the [facility]. Please see a copy of my visit note below.                            Consult Notes on Referred Patient    Date: 19           Dr. [facility]  [address]  [place] [address]       RE: Maximino Simeon  : 1945  LORNA: 19      Dear Dr. [facility] *:    I had the pleasure of seeing your patient Maximino Simeon here at the Gynecologic Cancer Clinic at the [facility] on 19.  As you know she is a very pleasant 74 year old woman with a recent diagnosis of  Pelvic mass.  Now s/p surgical staging procedure.    HPI:    Saw patient at [facility] originally during my call week was to follow up with outpatient GYN ONC and general surgery due to appendicits and enlarged ovary.      54  CA 19-9 15  CEA 2.8    2019: CT abd pelvis: IMPRESSION:      1. Dilated, fluid-filled appendix with mild increased enhancement and slight stranding at its tip is worrisome for acute appendicitis.  2. Large complex cystic pelvic mass with soft tissue nodularity with adjacent prominent left adnexal vessels is worrisome for neoplasm of ovarian origin. No mesenteric nodularity, lymph node enlargement, or other findings that are suspicious for metastatic disease in the abdomen or pelvis.    2019: CT abd pelvis REPRODUCTIVE ORGANS with IV and oral contrast: Again noted is a large complex cystic mass in the pelvis measuring approximately 13.3 x 8.1 x 9.7 cm in size which has areas of mural nodularity and soft tissue density.    1. Again noted is a thick-walled dilated appendix compatible with appendicitis. There is a questionable new small area of appendiceal wall discontinuity and adjacent 1.2 cm fluid pocket on axial image 57 and sagittal image 23.  Findings raise question of a small area of appendiceal wall perforation.    2. Large complex pelvic mass again concerning for ovarian neoplasm.    3. Marked bladder distention. Mild prominence of the urinary collecting systems likely secondary to this.    7/2/2019-7/7/2019: General surgery consulted for perforated appendicitis.  Not septic as no fever.Treated with Cefuroxime and metronidazole (PCN allergy). Surgery recommending postponing appendectomy for about 6 weeks or so. Presenting leukocytosis and fever resolved by discharge.  Will finish oral course of ceftin and flagyl as outpatient.     Maximino Simeon is a 74 y.o. female with history of colitis with recent admission for acute appendicitis and incidental pelvic mass who was treated conservatively with antibiotics who returns with worsening pain and CT with evidence of perforation and abscess.     Per general surgery note:  - No indication for surgery at this time  - Recommend admission to medicine service  - Start IV antibiotics  - Will again need to discuss the timing of appendectomy with Gyn/Onc to allow for evaluation of her pelvic mass simultaneously, this will likely have to be in 4-6 weeks following possible appendix perforation  - Surgery will continue to follow  Samuel Eller MD       SPECIMEN(S):   A: Ovary and fallopian tube, left   B: Uterus, cervix   C: Appendix   D: Lymph nodes, left pelvic   E: Lymph nodes, left para aortic   F: Lymph nodes, right pelvic   G: Lymph nodes, right para aortic   H: Right nick colic gutter   I: Left nick colic gutter   J: Left pelvic peritoneum   K: Right pelvic peritoneum   L: Bladder peritoneum   M: Omentum   N: Cul de sac     FINAL DIAGNOSIS:   A. 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 UTERUS, CERVIX:   - Inactive/atrophic  endometrium   - Leiomyomas with hyaline degeneration   - Cervix with no significant histopathologic abnormality     C. APPENDIX:   - Low grade appendiceal mucinous neoplasm (LAMN)     D. LYMPH NODES, LEFT PELVIC:   - Eight reactive lymph nodes, negative for malignancy (0/8)     E. LYMPH NODES, LEFT PARA AORTIC:   - Four reactive lymph nodes, negative for malignancy (0/4)     F. LYMPH NODES, RIGHT PELVIC:   - Nine reactive lymph nodes, negative for malignancy (0/9)     G. LYMPH NODES, RIGHT PARA AORTIC:   - Four reactive lymph nodes, negative for malignancy (0/4)     H. RIGHT PEDRO COLIC GUTTER:   -Fibroadipose tissue, negative for malignancy.     I. LEFT PEDRO COLIC GUTTER:   -Fibroadipose tissue, negative for malignancy.     J. LEFT PELVIC PERITONEUM:   -Fibroadipose tissue, negative for malignancy.     K. RIGHT PELVIC PERITONEUM:   -Fibroadipose tissue, negative for malignancy.     L. BLADDER PERITONEUM:   -Fibrous tissue, negative for malignancy.     M. OMENTUM:   - Fibroadipose tissue, negative for malignancy.   - One reactive lymph node, negative for malignancy (0/1)     N. CUL DE SAC:   - Hemorrhagic fibrous tissue, negative for malignancy.     Pelvic Washing:   - Negative for malignancy   Specimen Adequacy: Satisfactory for evaluation.     PATHOLOGIC STAGE CLASSIFICATION (PTNM, AJCC 8TH EDITION)     Primary Tumor (pT):         - pT1a     Regional Lymph Nodes (pN):         - pN0     FIGO STAGE     FIGO Stage:         - IA     ADDITIONAL FINDINGS     Additional Pathologic Findings:         appendix with low grade appendiceal mucinous neoplasm (LAMN)  Proximal Margin:         - Uninvolved by invasive carcinoma       Status of Mucinous Neoplasm at Proximal Margin:           - Uninvolved by appendiceal mucinous neoplasm     Mesenteric Margin:         - Uninvolved by invasive carcinoma       Today: + anxiety, forcing herself to eat. Doesn't know what to eat. Fatigue, eats organic oats, yogurt is what usually eats.  Has history of colitis, having BM's is off steroids prior to surgery. Gets constipated if not taking metamucil takes once a day.     Review of Systems:    Systemic           no weight changes; no fever; no chills; no night sweats; no appetite changes  Skin           no rashes, or lesions  Eye           no irritation; no changes in vision  Jarred-Laryngeal           no dysphagia; no hoarseness   Pulmonary    no cough; no shortness of breath  Cardiovascular    no chest pain; no palpitations  Gastrointestinal    no diarrhea; no constipation; no abdominal pain; no changes in bowel  habits; no blood in stool  Genitourinary   no urinary frequency; no urinary urgency; no dysuria; no pain; no abnormal vaginal discharge; no abnormal vaginal bleeding  Breast   no breast discharge; no breast changes; no breast pain  Musculoskeletal    no myalgias; no arthralgias; no back pain  Psychiatric           no depressed mood; no anxiety    Hematologic           no tender lymph nodes; no noticeable swellings or lumps   Endocrine    no hot flashes; no heat/cold intolerance         Neurological   no tremor; no numbness and tingling; no headaches; no difficulty  sleeping      Past Medical History:    Asymptomatic CAD  Collagenous colitis   Major depressive disorder, recurrent episode, moderate (HC)    Anxiety state, unspecified      Past Surgical History:    One ovary removed for endometriosis, still has uterus and other ovary.    Health Maintenance:  Health Maintenance Due   Topic Date Due     DEXA  1945     ADVANCE CARE PLANNING  1945     DEPRESSION ACTION PLAN  1945     LIPID  02/01/1988     MEDICARE ANNUAL WELLNESS VISIT  02/01/2008     ZOSTER IMMUNIZATION (2 of 3) 09/21/2015       Last Pap Smear: 10 years ago            Result: normal  She has not had a history of abnormal Pap smears.    Last Mammogram: Due 9/2019             Result: normal         Last Colonoscopy: 2013           Result: microscopic colitis              "              Current Medications:     has a current medication list which includes the following prescription(s): acetaminophen, amlodipine, b complex vitamins, calcium citrate-vitamin d, coenzyme q10, duloxetine, enoxaparin, levothyroxine, liothyronine, loperamide, multiple vitamins-minerals, oxycodone, and probiotic product.       Allergies:     Allergies   Allergen Reactions     Adhesive Tape      bandaids     Liquid Adhesive Rash     Penicillins Rash             Social History:     Social History     Tobacco Use     Smoking status: Never Smoker     Smokeless tobacco: Never Used   Substance Use Topics     Alcohol use: Yes     Comment: socially       History   Drug Use Unknown           Family History:     The patient's family history is notable for the following.    Breast cancer sister in her 50's, maternal aunt breast cancer >50 years, no ovarian cancer    Physical Exam:     BP (!) 144/85   Pulse 90   Temp 97.8  F (36.6  C) (Oral)   Resp 16   Ht 1.473 m (4' 10\")   Wt 39.5 kg (87 lb)   LMP  (LMP Unknown)   SpO2 99%   Breastfeeding? No   BMI 18.18 kg/m     Body mass index is 18.18 kg/m .    General Appearance: healthy and alert, no distress but patient somewhat forgetful during exam, has to be told multiple times over about dates, did not remember that RN from [facility] called her to set up appt with [facility] ON[facility] and Gen surgery.     HEENT:  no thyromegaly, no palpable nodules or masses        Cardiovascular: regular rate and rhythm, no gallops, rubs or murmurs     Respiratory: lungs clear, no rales, rhonchi or wheezes, normal diaphragmatic excursion    Musculoskeletal: extremities non tender and without edema    Skin: no lesions or rashes     Neurological: normal gait, no gross defects     Psychiatric: appropriate mood and affect                               Hematological: normal cervical, supraclavicular and inguinal lymph nodes     Gastrointestinal:       abdomen soft, mildly tender, " non-distended, no organomegaly or masses, staples present, intact, slight erythema near staple line.    Genitourinary: Derferred.    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      Plan:     1.)    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 Recommend chemotherapy x 6 cycles taxol/carbo. Due to high grade nature of this disease. Wants chemo at [facility]. Will set up with med oncology. Palliative care referral.     2.) Genetic risk factors were assessed and the patient does meet the qualifications for a referral.  High risk referral for GC.    3.) Labs and/or tests ordered include:  Port placement    4.) Pain: on tylenol twice daily can increase to tid and ibuprofen q 6 hours 600 mg.     5.) Appetite: will try Marinol 2.5 mg twice daily for appetite stimulation due to weight loss and no appetite.         Thank you for allowing us to participate in the care of your patient.         Sincerely,    Naz Ascencio MD    Department of Ob/Gyn and Women's Health  Division of Gynecologic Oncology  [facility]  [phone]    Of a 25 minute appointment, more than 50% was spent in counseling the patient.    CC  Patient Care Team:  Cecelia Manzo MD as PCP - Naz Peterson MD as MD (Oncology)  Parvez Abreu MD as MD (Surgery)  Micheline Soler MD as MD (Emergency Medicine)  [facility] ([facility]  CLINIC, [facility]

## 2019-08-22 NOTE — PROGRESS NOTES
Care Coordinator Note  Left 2 messages for at home and cell regarding port a cath.  Port 730 check in on 8/26 with 9 Am placemnt.  NPO after 5 AM.  Hold Lovenox the day before the placement resume after placement  Arrive at the [facility] information desk.  Will try again tomorrow.         Krysten KATZ RN, OCN  Care Coordinator   Gynecologic Cancer   Office [phone]

## 2019-08-22 NOTE — NURSING NOTE
"Oncology Rooming Note    August 22, 2019 9:16 AM   Maximino Simeon is a 74 year old female who presents for:    Chief Complaint   Patient presents with     Oncology Clinic Visit     Return  Pelvic Mass; Post op     Initial Vitals: BP (!) 144/85   Pulse 90   Temp 97.8  F (36.6  C) (Oral)   Resp 16   Ht 1.473 m (4' 10\")   Wt 39.5 kg (87 lb)   LMP  (LMP Unknown)   SpO2 99%   Breastfeeding? No   BMI 18.18 kg/m   Estimated body mass index is 18.18 kg/m  as calculated from the following:    Height as of this encounter: 1.473 m (4' 10\").    Weight as of this encounter: 39.5 kg (87 lb). Body surface area is 1.27 meters squared.  Moderate Pain (4) Comment: lowback and pelvic pain   No LMP recorded (lmp unknown). Patient has had a hysterectomy.  Allergies reviewed: Yes  Medications reviewed: Yes    Medications: MEDICATION REFILLS NEEDED TODAY. Provider was notified.  Pharmacy name entered into [facility]:    [id number] [address] [facility] [place], [place] - [address]    Clinical concerns: Refill on oxycodone; low back and pelvic pain       Rachel Gomez CMA              "

## 2019-08-22 NOTE — PATIENT INSTRUCTIONS
Start chemotherapy Taxol Carboplatin once every 3 weeks for 6 cycles then follow up with Dr Ascencio at the end of 6.  Will have a port and chemo at [facility].   Referral to palliative care and genetic counseling.  Take ibuprofen 600 mg every 6 hours alternate with tylenol 500 mg 2 tabs three times per day  Take marinol daily, continue with small frequent meals      Call your Care Coordinator with chills and/or temperature greater then or equal to 100.4, uncontrolled nausea and vomiting, diarrhea, constipation, dizziness, light headedness, shortness of breath, chest pain. unexplained bruising, bleeding that does not stop with 10 minutes of pressure or any new/concerning symptoms and questions or concerns.  Monday- Friday    If after hours, weekends or holidays call [phone] or  the [facility] at [phone] and ask for (GYN/ONC) resident on call.    Your  Care Coordinator is  Krysten KATZ RN, OCN  Gynecologic Cancer   Office [phone]    Patient Education     Paclitaxel Solution for injection  What is this medicine?  PACLITAXEL (HERBERT li TAX el) is a chemotherapy drug. It targets fast dividing cells, like cancer cells, and causes these cells to die. This medicine is used to treat ovarian cancer, breast cancer, and other cancers.  This medicine may be used for other purposes; ask your health care provider or pharmacist if you have questions.  What should I tell my health care provider before I take this medicine?  They need to know if you have any of these conditions:    blood disorders    irregular heartbeat    infection (especially a virus infection such as chickenpox, cold sores, or herpes)    liver disease    previous or ongoing radiation therapy    an unusual or allergic reaction to paclitaxel, alcohol, polyoxyethylated castor oil, other chemotherapy agents, other medicines, foods, dyes, or preservatives    pregnant or trying to get pregnant    breast-feeding  How should I use this medicine?  This  drug is given as an infusion into a vein. It is administered in a hospital or clinic by a specially trained health care professional.  Talk to your pediatrician regarding the use of this medicine in children. Special care may be needed.  Overdosage: If you think you have taken too much of this medicine contact a poison control center or emergency room at once.  NOTE: This medicine is only for you. Do not share this medicine with others.  What if I miss a dose?  It is important not to miss your dose. Call your doctor or health care professional if you are unable to keep an appointment.  What may interact with this medicine?  Do not take this medicine with any of the following medications:    disulfiram    metronidazole  This medicine may also interact with the following medications:    cyclosporine    diazepam    ketoconazole    medicines to increase blood counts like filgrastim, pegfilgrastim, sargramostim    other chemotherapy drugs like cisplatin, doxorubicin, epirubicin, etoposide, teniposide, vincristine    quinidine    testosterone    vaccines    verapamil  Talk to your doctor or health care professional before taking any of these medicines:    acetaminophen    aspirin    ibuprofen    ketoprofen    naproxen  This list may not describe all possible interactions. Give your health care provider a list of all the medicines, herbs, non-prescription drugs, or dietary supplements you use. Also tell them if you smoke, drink alcohol, or use illegal drugs. Some items may interact with your medicine.  What should I watch for while using this medicine?  Your condition will be monitored carefully while you are receiving this medicine. You will need important blood work done while you are taking this medicine.  This drug may make you feel generally unwell. This is not uncommon, as chemotherapy can affect healthy cells as well as cancer cells. Report any side effects. Continue your course of treatment even though you feel ill  unless your doctor tells you to stop.  In some cases, you may be given additional medicines to help with side effects. Follow all directions for their use.  Call your doctor or health care professional for advice if you get a fever, chills or sore throat, or other symptoms of a cold or flu. Do not treat yourself. This drug decreases your body's ability to fight infections. Try to avoid being around people who are sick.  This medicine may increase your risk to bruise or bleed. Call your doctor or health care professional if you notice any unusual bleeding.  Be careful brushing and flossing your teeth or using a toothpick because you may get an infection or bleed more easily. If you have any dental work done, tell your dentist you are receiving this medicine.  Avoid taking products that contain aspirin, acetaminophen, ibuprofen, naproxen, or ketoprofen unless instructed by your doctor. These medicines may hide a fever.  Do not become pregnant while taking this medicine. Women should inform their doctor if they wish to become pregnant or think they might be pregnant. There is a potential for serious side effects to an unborn child. Talk to your health care professional or pharmacist for more information. Do not breast-feed an infant while taking this medicine.  Men are advised not to father a child while receiving this medicine.  What side effects may I notice from receiving this medicine?  Side effects that you should report to your doctor or health care professional as soon as possible:    allergic reactions like skin rash, itching or hives, swelling of the face, lips, or tongue    low blood counts - This drug may decrease the number of white blood cells, red blood cells and platelets. You may be at increased risk for infections and bleeding.    signs of infection - fever or chills, cough, sore throat, pain or difficulty passing urine    signs of decreased platelets or bleeding - bruising, pinpoint red spots on the  skin, black, tarry stools, nosebleeds    signs of decreased red blood cells - unusually weak or tired, fainting spells, lightheadedness    breathing problems    chest pain    high or low blood pressure    mouth sores    nausea and vomiting    pain, swelling, redness or irritation at the injection site    pain, tingling, numbness in the hands or feet    slow or irregular heartbeat    swelling of the ankle, feet, hands  Side effects that usually do not require medical attention (report to your doctor or health care professional if they continue or are bothersome):    bone pain    complete hair loss including hair on your head, underarms, pubic hair, eyebrows, and eyelashes    changes in the color of fingernails    diarrhea    loosening of the fingernails    loss of appetite    muscle or joint pain    red flush to skin    sweating  This list may not describe all possible side effects. Call your doctor for medical advice about side effects. You may report side effects to FDA at 1-809-FDA-8448.  Where should I keep my medicine?  This drug is given in a hospital or clinic and will not be stored at home.  NOTE:This sheet is a summary. It may not cover all possible information. If you have questions about this medicine, talk to your doctor, pharmacist, or health care provider. Copyright  2016 Gold Standard           Patient Education     Carboplatin Solution for injection  What is this medicine?  CARBOPLATIN (EZRA [place] tin) is a chemotherapy drug. It targets fast dividing cells, like cancer cells, and causes these cells to die. This medicine is used to treat ovarian cancer and many other cancers.  This medicine may be used for other purposes; ask your health care provider or pharmacist if you have questions.  What should I tell my health care provider before I take this medicine?  They need to know if you have any of these conditions:    blood disorders    hearing problems    kidney disease    recent or ongoing radiation  therapy    an unusual or allergic reaction to carboplatin, cisplatin, other chemotherapy, other medicines, foods, dyes, or preservatives    pregnant or trying to get pregnant    breast-feeding  How should I use this medicine?  This drug is usually given as an infusion into a vein. It is administered in a hospital or clinic by a specially trained health care professional.  Talk to your pediatrician regarding the use of this medicine in children. Special care may be needed.  Overdosage: If you think you have taken too much of this medicine contact a poison control center or emergency room at once.  NOTE: This medicine is only for you. Do not share this medicine with others.  What if I miss a dose?  It is important not to miss a dose. Call your doctor or health care professional if you are unable to keep an appointment.  What may interact with this medicine?    medicines for seizures    medicines to increase blood counts like filgrastim, pegfilgrastim, sargramostim    some antibiotics like amikacin, gentamicin, neomycin, streptomycin, tobramycin    vaccines  Talk to your doctor or health care professional before taking any of these medicines:    acetaminophen    aspirin    ibuprofen    ketoprofen    naproxen  This list may not describe all possible interactions. Give your health care provider a list of all the medicines, herbs, non-prescription drugs, or dietary supplements you use. Also tell them if you smoke, drink alcohol, or use illegal drugs. Some items may interact with your medicine.  What should I watch for while using this medicine?  Your condition will be monitored carefully while you are receiving this medicine. You will need important blood work done while you are taking this medicine.  This drug may make you feel generally unwell. This is not uncommon, as chemotherapy can affect healthy cells as well as cancer cells. Report any side effects. Continue your course of treatment even though you feel ill unless  your doctor tells you to stop.  In some cases, you may be given additional medicines to help with side effects. Follow all directions for their use.  Call your doctor or health care professional for advice if you get a fever, chills or sore throat, or other symptoms of a cold or flu. Do not treat yourself. This drug decreases your body's ability to fight infections. Try to avoid being around people who are sick.  This medicine may increase your risk to bruise or bleed. Call your doctor or health care professional if you notice any unusual bleeding.  Be careful brushing and flossing your teeth or using a toothpick because you may get an infection or bleed more easily. If you have any dental work done, tell your dentist you are receiving this medicine.  Avoid taking products that contain aspirin, acetaminophen, ibuprofen, naproxen, or ketoprofen unless instructed by your doctor. These medicines may hide a fever.  Do not become pregnant while taking this medicine. Women should inform their doctor if they wish to become pregnant or think they might be pregnant. There is a potential for serious side effects to an unborn child. Talk to your health care professional or pharmacist for more information. Do not breast-feed an infant while taking this medicine.  What side effects may I notice from receiving this medicine?  Side effects that you should report to your doctor or health care professional as soon as possible:    allergic reactions like skin rash, itching or hives, swelling of the face, lips, or tongue    signs of infection - fever or chills, cough, sore throat, pain or difficulty passing urine    signs of decreased platelets or bleeding - bruising, pinpoint red spots on the skin, black, tarry stools, nosebleeds    signs of decreased red blood cells - unusually weak or tired, fainting spells, lightheadedness    breathing problems    changes in hearing    changes in vision    chest pain    high blood pressure    low  blood counts - This drug may decrease the number of white blood cells, red blood cells and platelets. You may be at increased risk for infections and bleeding.    nausea and vomiting    pain, swelling, redness or irritation at the injection site    pain, tingling, numbness in the hands or feet    problems with balance, talking, walking    trouble passing urine or change in the amount of urine  Side effects that usually do not require medical attention (report to your doctor or health care professional if they continue or are bothersome):    hair loss    loss of appetite    metallic taste in the mouth or changes in taste  This list may not describe all possible side effects. Call your doctor for medical advice about side effects. You may report side effects to FDA at 1-297-FDA-1476.  Where should I keep my medicine?  This drug is given in a hospital or clinic and will not be stored at home.  NOTE:This sheet is a summary. It may not cover all possible information. If you have questions about this medicine, talk to your doctor, pharmacist, or health care provider. Copyright  2016 Gold Standard

## 2019-08-23 ENCOUNTER — PATIENT OUTREACH (OUTPATIENT)
Dept: ONCOLOGY | Facility: CLINIC | Age: 74
End: 2019-08-23

## 2019-08-23 DIAGNOSIS — C56.9 OVARIAN CANCER, UNSPECIFIED LATERALITY (H): ICD-10-CM

## 2019-08-23 NOTE — PROGRESS NOTES
"Palliative Care Outpatient Clinic Consultation Note    Patient:  Maximino Simeon    Chief Complaint:   Maximino Simeon 74 year old female who is presenting to the palliative medicine clinic today accompanied by Mary at the request of Dr. Ascencio for a palliative care consultation secondary to stage IA ovarian cancer.   The patient's primary care provider is:  Cecelia Manzo.     History of Present Illness:  This patient's cancer history was reviewed as per the chart.  In brief, Maximino Simeon initially presented to [facility] for abdominal pain in June of this year.  A CT scan was done which showed appendicitis and a large complex cystic pelvic mass concerning for ovarian cancer.  She was admitted to [facility] a few weeks ago for tumor debulking.  She was ultimately found to have Stage IA left sided ovarian cancer, and has a plan to start 6 cycles of carboplatin/Taxol.       Patient's Disease Understanding: She knows that she will be having six cycles of chemo, and that her cancer was \"cleaned out.\" She doesn't know when her treatments will start.     Coping:  She has had increased anxiety over the past few months. She has been to a therapist through [facility], who has now moved, has a plan through her primary to see a therapist at that clinic. Has Cymbalta that she has taken in the past, just restarted this about a week ago. Having difficulty sleeping due to high anxiety. Low appetite. Cymbalta most recently managed by some telehealth RN's (?) who work with patients with fibromyalgia.     Social History  Living Situation: [facility] with many steps, lives alone. .   Children: Two children.   Support System: Many friends from the gym, son. Also has home care.   Occupation: Teaches piano.   Hobbies: Exercises at [employer], loves to dance, jazz enthusiast.   Spiritual Background: Personal spirituality.   Social History     Tobacco Use     Smoking status: Never Smoker     Smokeless tobacco: Never Used " "  Substance Use Topics     Alcohol use: Yes     Comment: socially     Drug use: Not Currently       Advance Care Planning:  Advance Directive:    Not on file but completed.       REVIEW OF SYSTEMS:   ROS: 10 point ROS neg other than the symptoms noted above in the HPI and here:  Palliative Symptom Review (0=no symptom/no concern, 1=mild, 2=moderate, 3=severe):      Pain: 0      Fatigue: 2 -       Nausea: 0      Constipation: 0      Diarrhea: 1 - chronic diarrhea from colitis, had two formed stools today.  Averages three stools per day in a typical day. Typically has been on budesonide, this was held during her stay for ruptured appendix. She has not reconnected with her GI team since she was taken off this medication.       Depressive Symptoms: 2      Anxiety: 2      Drowsiness: 1      Poor Appetite: 2 - has marinol at home that she hasn't started yet.       Shortness of Breath: 0      Insomnia: 2           Physical Exam:   Vitals were reviewed  /72   Pulse 99   Temp 97.6  F (36.4  C) (Oral)   Ht 1.473 m (4' 10\")   Wt 40.1 kg (88 lb 6.4 oz)   LMP  (LMP Unknown)   SpO2 100%   BMI 18.48 kg/m    General: Alert, comfortable appearing female in no acute distress.   Eyes: Pupils equal, sclera clear.   ENT: MMM. No ulcerations or plaques.   Cardiac: Borderline tachycardic, regular rhythm, no murmurs.    Resp: CTAB, unlabored on room air.   Abd: Soft, non-distended, non-tender to palpation, active bowel sounds.   Ext: Warm and well perfused.  No pedal edema.   Neuro: No facial asymmetry.  Spontaneous movements grossly non-focal.  Normal gait.   Psych: Alert, appropriately interactive, flat affect, normal sensorium.         Data Reviewed:  LABS:   Lab Results   Component Value Date    WBC 9.5 08/11/2019    HGB 10.6 (L) 08/11/2019    HCT 33.7 (L) 08/11/2019     08/11/2019     (L) 08/14/2019    POTASSIUM 3.4 08/14/2019    CHLORIDE 98 08/14/2019    CO2 23 08/14/2019    BUN 11 08/14/2019    CR 0.55 " "08/14/2019    GLC 85 08/14/2019    STEVENI 0.022 08/10/2019     IMAGING:   Outside CT A/P 6/29/19  \"IMPRESSION:      1. Again noted is a thick-walled dilated appendix compatible with appendicitis. There is a questionable new small area of appendiceal wall discontinuity and adjacent 1.2 cm fluid pocket on axial image 57 and sagittal image 23. Findings raise question of a small area of appendiceal wall perforation.    2. Large complex pelvic mass again concerning for ovarian neoplasm.    3. Marked bladder distention. Mild prominence of the urinary collecting systems likely secondary to this.\"    [place]  - Use of controlled substances consistent with history.     Impressions:    Maximino is a 74 year old woman with newly diagnosed Stage IA ovarian cancer, who will start cancer treatments soon.  She has uncontrolled depression and anxiety (no SI), and would benefit from a Cymbalta escalation.  This was just restarted last week, so it would be reasonable to wait a week before escalating.  Brittni is not sure who she wants to have manage this medication.  We will call her next week to see what she has decided.     Recommendations & Counseling:  -Please bring healthcare directive into clinic for scanning.    -Let us know if you would like us to manage your Cymbalta.  We will call next week.   -Pt encouraged to call her GI team to discuss timing of budesonide restart.     RTC PRN for a follow up, unless she decides to have me manage her Cymbalta while she navigates her cancer treatments.     Thank you for involving me in the care of this patient.     Afshan Mason MD / Palliative Medicine / Pager [phone] / After-Hours Answering Service [phone] / Main Palliative Clinic - [facility] [phone] / [facility] Inpatient Team Consult Pager [phone] (answered 8am-430pm M-F)    Additional History Reviewed:   Allergies   Allergen Reactions     Adhesive Tape      bandaids     Liquid Adhesive Rash     " Penicillins Rash     Current Outpatient Medications   Medication Sig Dispense Refill     acetaminophen (TYLENOL) 325 MG tablet Take 325-650 mg by mouth every 6 hours as needed for mild pain       amLODIPine (NORVASC) 5 MG tablet Take 1 tablet (5 mg) by mouth daily 30 tablet 0     dronabinol (MARINOL) 2.5 MG capsule Take 1 capsule (2.5 mg) by mouth 2 times daily (before meals) 60 capsule 0     DULoxetine (CYMBALTA) 20 MG capsule Take 1 capsule (20 mg) by mouth daily Follow up with your PCP for refills 30 capsule 0     enoxaparin (LOVENOX) 30 MG/0.3ML syringe Inject 0.3 mLs (30 mg) Subcutaneous every 24 hours 24 Syringe 0     levothyroxine (SYNTHROID/LEVOTHROID) 50 MCG tablet Take 50 mcg by mouth every morning        liothyronine (CYTOMEL) 5 MCG tablet Take 5 mcg by mouth every morning        loperamide (IMODIUM) 1 MG/5ML liquid Take 1 mg by mouth 6 times daily        Multiple Vitamins-Minerals (MULTIVITAL PO) Take 1 tablet by mouth daily (with lunch)        psyllium (METAMUCIL/KONSYL) Packet Take 1 packet by mouth daily       B Complex Vitamins (B COMPLEX PO) Take 1 tablet by mouth daily (with lunch)        Calcium Carbonate-Vitamin D (CALCIUM + D PO) Take 6 tablets by mouth daily.       Coenzyme Q10 (COQ10 PO) Take 200 mg by mouth 2 times daily       oxyCODONE (ROXICODONE) 5 MG tablet Take 1 tablet (5 mg) by mouth every 6 hours as needed for moderate to severe pain (Patient not taking: Reported on 8/27/2019) 12 tablet 0     Probiotic Product (PROBIOTIC DAILY PO) 1 tablet daily at lunchtime by mouth       Past Medical History:   Diagnosis Date     Anxiety 2014     CAD (coronary artery disease) 2011     Colitis      Fibromyalgia      Gastroesophageal reflux disease      Hypothyroidism 2011     Insomnia 2017     Mixed hyperlipidemia 2011     PONV (postoperative nausea and vomiting)      Recurrent major depressive disorder (H) 2010     Urinary retention      Past Surgical History:   Procedure Laterality Date      APPENDECTOMY OPEN N/A 8/9/2019    Procedure: Open Appendectomy;  Surgeon: Parvez Abreu MD;  Location: [facility] OR     BREAST SURGERY      biopsy     GYN SURGERY       HYSTERECTOMY TOTAL ABDOMINAL, BILATERAL SALPINGO-OOPHORECTOMY, NODE DISSECTION, COMBINED Bilateral 8/9/2019    Procedure: Exploratory Laparotomy, Total Abdominal Hysterectomy, Left Salpingo-Oopherectomy, Bilateral Pelvic and Para-Aortic Lymph Node Dissection, Peritoneal Biopsy, Diaphraghm Pap Smears.;  Surgeon: Naz Ascencio MD;  Location: [facility] OR     OOPHORECTOMY  1992     Family History   Problem Relation Age of Onset     Heart Disease Father      Cerebrovascular Disease Father      Cerebrovascular Disease Sister      Thyroid Disease Sister      Breast Cancer Sister        Face to face time: 58 minutes, with >50% of time devoted to patient counseling.

## 2019-08-23 NOTE — PROGRESS NOTES
Care Coordinator Note  Reviewed with patient and her son Pardeep.   Appt with Palliative care on Tuesday.  Port a cath placement on 8/30,  NPO from 4 AM on.   Hold lovenox day before, reviewed scrub   directions etc... Given number to [phone]    Patient and son verbalized back understanding of the above information discussed.   Krysten KATZ RN, OCN  Care Coordinator   Gynecologic Cancer   Office [phone]

## 2019-08-24 ENCOUNTER — TELEPHONE (OUTPATIENT)
Dept: ONCOLOGY | Facility: CLINIC | Age: 74
End: 2019-08-24

## 2019-08-24 NOTE — TELEPHONE ENCOUNTER
Please do not close this encounter until this has been addressed.  (prior auth approved/denied, prescriber refusal to complete prior auth or medication changed/discontinued)    Prior Authorization needed on: Dronabinol 2.5mg Caps  Drug NDC: [address]-59     Insurance: [employer] B[employer]  Member ID: [id number]   Insurance phone #: [phone]    Pharmacy NPI: [id number]  Pharmacy Phone #: [phone]  Pharmacy Fax #: [phone]    Please let us know if the PA gets approved or denied or if medication is changed    **Patient did pay cash for a week supply on 08/24/19 just to have medication**

## 2019-08-27 ENCOUNTER — ONCOLOGY VISIT (OUTPATIENT)
Dept: ONCOLOGY | Facility: CLINIC | Age: 74
End: 2019-08-27
Attending: GENETIC COUNSELOR, MS
Payer: MEDICARE

## 2019-08-27 VITALS
DIASTOLIC BLOOD PRESSURE: 72 MMHG | BODY MASS INDEX: 18.56 KG/M2 | WEIGHT: 88.4 LBS | OXYGEN SATURATION: 100 % | SYSTOLIC BLOOD PRESSURE: 124 MMHG | HEIGHT: 58 IN | TEMPERATURE: 97.6 F | HEART RATE: 99 BPM

## 2019-08-27 DIAGNOSIS — Z51.5 ENCOUNTER FOR PALLIATIVE CARE: ICD-10-CM

## 2019-08-27 DIAGNOSIS — C56.2 OVARIAN CANCER, LEFT (H): Primary | ICD-10-CM

## 2019-08-27 DIAGNOSIS — F33.1 MAJOR DEPRESSIVE DISORDER, RECURRENT EPISODE, MODERATE (H): ICD-10-CM

## 2019-08-27 PROCEDURE — 99204 OFFICE O/P NEW MOD 45 MIN: CPT | Performed by: INTERNAL MEDICINE

## 2019-08-27 PROCEDURE — G0463 HOSPITAL OUTPT CLINIC VISIT: HCPCS

## 2019-08-27 ASSESSMENT — PAIN SCALES - GENERAL: PAINLEVEL: MILD PAIN (3)

## 2019-08-27 ASSESSMENT — MIFFLIN-ST. JEOR: SCORE: 780.73

## 2019-08-27 NOTE — LETTER
"    8/27/2019         RE: Maximino Simeon  [id number] [facility] [id number]        Dear Colleague,    Thank you for referring your patient, Maximino Simeon, to the [facility]. Please see a copy of my visit note below.    Palliative Care Outpatient Clinic Consultation Note    Patient:  Maximino Simeon    Chief Complaint:   Maximino Simeon 74 year old female who is presenting to the palliative medicine clinic today accompanied by Mary at the request of Dr. Ascencio for a palliative care consultation secondary to stage IA ovarian cancer.   The patient's primary care provider is:  Cecelia Manzo.     History of Present Illness:  This patient's cancer history was reviewed as per the chart.  In brief, Maximino Simeon initially presented to [facility] for abdominal pain in June of this year.  A CT scan was done which showed appendicitis and a large complex cystic pelvic mass concerning for ovarian cancer.  She was admitted to [facility] a few weeks ago for tumor debulking.  She was ultimately found to have Stage IA left sided ovarian cancer, and has a plan to start 6 cycles of carboplatin/Taxol.       Patient's Disease Understanding: She knows that she will be having six cycles of chemo, and that her cancer was \"cleaned out.\" She doesn't know when her treatments will start.     Coping:  She has had increased anxiety over the past few months. She has been to a therapist through [facility], who has now moved, has a plan through her primary to see a therapist at that clinic. Has Cymbalta that she has taken in the past, just restarted this about a week ago. Having difficulty sleeping due to high anxiety. Low appetite. Cymbalta most recently managed by some telehealth RN's (?) who work with patients with fibromyalgia.     Social History  Living Situation: [facility] with many steps, lives alone. .   Children: Two children.   Support System: Many friends from the gym, son. Also has home care. " "  Occupation: Teaches piano.   Hobbies: Exercises at [employer], loves to dance, jazz enthusiast.   Spiritual Background: Personal spirituality.   Social History     Tobacco Use     Smoking status: Never Smoker     Smokeless tobacco: Never Used   Substance Use Topics     Alcohol use: Yes     Comment: socially     Drug use: Not Currently       Advance Care Planning:  Advance Directive:    Not on file but completed.       REVIEW OF SYSTEMS:   ROS: 10 point ROS neg other than the symptoms noted above in the HPI and here:  Palliative Symptom Review (0=no symptom/no concern, 1=mild, 2=moderate, 3=severe):      Pain: 0      Fatigue: 2 -       Nausea: 0      Constipation: 0      Diarrhea: 1 - chronic diarrhea from colitis, had two formed stools today.  Averages three stools per day in a typical day. Typically has been on budesonide, this was held during her stay for ruptured appendix. She has not reconnected with her GI team since she was taken off this medication.       Depressive Symptoms: 2      Anxiety: 2      Drowsiness: 1      Poor Appetite: 2 - has marinol at home that she hasn't started yet.       Shortness of Breath: 0      Insomnia: 2           Physical Exam:   Vitals were reviewed  /72   Pulse 99   Temp 97.6  F (36.4  C) (Oral)   Ht 1.473 m (4' 10\")   Wt 40.1 kg (88 lb 6.4 oz)   LMP  (LMP Unknown)   SpO2 100%   BMI 18.48 kg/m     General: Alert, comfortable appearing female in no acute distress.   Eyes: Pupils equal, sclera clear.   ENT: MMM. No ulcerations or plaques.   Cardiac: Borderline tachycardic, regular rhythm, no murmurs.    Resp: CTAB, unlabored on room air.   Abd: Soft, non-distended, non-tender to palpation, active bowel sounds.   Ext: Warm and well perfused.  No pedal edema.   Neuro: No facial asymmetry.  Spontaneous movements grossly non-focal.  Normal gait.   Psych: Alert, appropriately interactive, flat affect, normal sensorium.         Data Reviewed:  LABS:   Lab Results " "  Component Value Date    WBC 9.5 08/11/2019    HGB 10.6 (L) 08/11/2019    HCT 33.7 (L) 08/11/2019     08/11/2019     (L) 08/14/2019    POTASSIUM 3.4 08/14/2019    CHLORIDE 98 08/14/2019    CO2 23 08/14/2019    BUN 11 08/14/2019    CR 0.55 08/14/2019    GLC 85 08/14/2019    TROPI 0.022 08/10/2019     IMAGING:   Outside CT A/P 6/29/19  \"IMPRESSION:      1. Again noted is a thick-walled dilated appendix compatible with appendicitis. There is a questionable new small area of appendiceal wall discontinuity and adjacent 1.2 cm fluid pocket on axial image 57 and sagittal image 23. Findings raise question of a small area of appendiceal wall perforation.    2. Large complex pelvic mass again concerning for ovarian neoplasm.    3. Marked bladder distention. Mild prominence of the urinary collecting systems likely secondary to this.\"    [place]  - Use of controlled substances consistent with history.     Impressions:    Maximino is a 74 year old woman with newly diagnosed Stage IA ovarian cancer, who will start cancer treatments soon.  She has uncontrolled depression and anxiety (no SI), and would benefit from a Cymbalta escalation.  This was just restarted last week, so it would be reasonable to wait a week before escalating.  Brittni is not sure who she wants to have manage this medication.  We will call her next week to see what she has decided.     Recommendations & Counseling:  -Please bring healthcare directive into clinic for scanning.    -Let us know if you would like us to manage your Cymbalta.  We will call next week.   -Pt encouraged to call her GI team to discuss timing of budesonide restart.     RTC PRN for a follow up, unless she decides to have me manage her Cymbalta while she navigates her cancer treatments.     Thank you for involving me in the care of this patient.     Afshan Mason MD / Palliative Medicine / Pager [phone] / After-Hours Answering Service [phone] / Main Palliative " Clinic - [facility] [phone] / [facility] Inpatient Team Consult Pager [phone] (answered 8am-430pm M-F)    Additional History Reviewed:   Allergies   Allergen Reactions     Adhesive Tape      bandaids     Liquid Adhesive Rash     Penicillins Rash     Current Outpatient Medications   Medication Sig Dispense Refill     acetaminophen (TYLENOL) 325 MG tablet Take 325-650 mg by mouth every 6 hours as needed for mild pain       amLODIPine (NORVASC) 5 MG tablet Take 1 tablet (5 mg) by mouth daily 30 tablet 0     dronabinol (MARINOL) 2.5 MG capsule Take 1 capsule (2.5 mg) by mouth 2 times daily (before meals) 60 capsule 0     DULoxetine (CYMBALTA) 20 MG capsule Take 1 capsule (20 mg) by mouth daily Follow up with your PCP for refills 30 capsule 0     enoxaparin (LOVENOX) 30 MG/0.3ML syringe Inject 0.3 mLs (30 mg) Subcutaneous every 24 hours 24 Syringe 0     levothyroxine (SYNTHROID/LEVOTHROID) 50 MCG tablet Take 50 mcg by mouth every morning        liothyronine (CYTOMEL) 5 MCG tablet Take 5 mcg by mouth every morning        loperamide (IMODIUM) 1 MG/5ML liquid Take 1 mg by mouth 6 times daily        Multiple Vitamins-Minerals (MULTIVITAL PO) Take 1 tablet by mouth daily (with lunch)        psyllium (METAMUCIL/KONSYL) Packet Take 1 packet by mouth daily       B Complex Vitamins (B COMPLEX PO) Take 1 tablet by mouth daily (with lunch)        Calcium Carbonate-Vitamin D (CALCIUM + D PO) Take 6 tablets by mouth daily.       Coenzyme Q10 (COQ10 PO) Take 200 mg by mouth 2 times daily       oxyCODONE (ROXICODONE) 5 MG tablet Take 1 tablet (5 mg) by mouth every 6 hours as needed for moderate to severe pain (Patient not taking: Reported on 8/27/2019) 12 tablet 0     Probiotic Product (PROBIOTIC DAILY PO) 1 tablet daily at lunchtime by mouth       Past Medical History:   Diagnosis Date     Anxiety 2014     CAD (coronary artery disease) 2011     Colitis      Fibromyalgia      Gastroesophageal reflux disease       Hypothyroidism 2011     Insomnia 2017     Mixed hyperlipidemia 2011     PONV (postoperative nausea and vomiting)      Recurrent major depressive disorder (H) 2010     Urinary retention      Past Surgical History:   Procedure Laterality Date     APPENDECTOMY OPEN N/A 8/9/2019    Procedure: Open Appendectomy;  Surgeon: Parvez Abreu MD;  Location: [facility] OR     BREAST SURGERY      biopsy     GYN SURGERY       HYSTERECTOMY TOTAL ABDOMINAL, BILATERAL SALPINGO-OOPHORECTOMY, NODE DISSECTION, COMBINED Bilateral 8/9/2019    Procedure: Exploratory Laparotomy, Total Abdominal Hysterectomy, Left Salpingo-Oopherectomy, Bilateral Pelvic and Para-Aortic Lymph Node Dissection, Peritoneal Biopsy, Diaphraghm Pap Smears.;  Surgeon: Naz Ascencio MD;  Location: [facility] OR     OOPHORECTOMY  1992     Family History   Problem Relation Age of Onset     Heart Disease Father      Cerebrovascular Disease Father      Cerebrovascular Disease Sister      Thyroid Disease Sister      Breast Cancer Sister        Face to face time: 58 minutes, with >50% of time devoted to patient counseling.    Again, thank you for allowing me to participate in the care of your patient.        Sincerely,        Afshan Mason MD

## 2019-08-27 NOTE — PATIENT INSTRUCTIONS
Thank you for coming into the Palliative Care Clinic today.     1. We will call you next week to see how you want to manage your Cymbalta moving forward.   2. Please bring a copy of your healthcare directive into clinic for scanning into the chart.  This can be done at any appointment with a [facility] provider.     Return to clinic as needed for a follow up.     You can reach the Palliative Care Team during business hours at the following number: [phone] (Palliative Clinic Nurse Line).     To reach the Palliative Care Provider on-call after-hours or on holidays and weekends, call: [phone].  Please note that we are not able to provide pain medication refills on evenings or weekends.     ===================================================================

## 2019-08-28 ENCOUNTER — TELEPHONE (OUTPATIENT)
Dept: NUTRITION | Facility: CLINIC | Age: 74
End: 2019-08-28

## 2019-08-28 DIAGNOSIS — C56.9 OVARIAN CANCER, UNSPECIFIED LATERALITY (H): ICD-10-CM

## 2019-08-28 RX ORDER — ALBUTEROL SULFATE 90 UG/1
1-2 AEROSOL, METERED RESPIRATORY (INHALATION)
Status: CANCELLED
Start: 2019-09-13

## 2019-08-28 RX ORDER — EPINEPHRINE 1 MG/ML
0.3 INJECTION, SOLUTION INTRAMUSCULAR; SUBCUTANEOUS EVERY 5 MIN PRN
Status: CANCELLED | OUTPATIENT
Start: 2019-09-13

## 2019-08-28 RX ORDER — EPINEPHRINE 0.3 MG/.3ML
0.3 INJECTION SUBCUTANEOUS EVERY 5 MIN PRN
Status: CANCELLED | OUTPATIENT
Start: 2019-09-13

## 2019-08-28 RX ORDER — DIPHENHYDRAMINE HYDROCHLORIDE 50 MG/ML
50 INJECTION INTRAMUSCULAR; INTRAVENOUS
Status: CANCELLED
Start: 2019-09-13

## 2019-08-28 RX ORDER — ALBUTEROL SULFATE 0.83 MG/ML
2.5 SOLUTION RESPIRATORY (INHALATION)
Status: CANCELLED | OUTPATIENT
Start: 2019-09-13

## 2019-08-28 RX ORDER — LORAZEPAM 2 MG/ML
1 INJECTION INTRAMUSCULAR EVERY 6 HOURS PRN
Status: CANCELLED
Start: 2019-09-13

## 2019-08-28 RX ORDER — PALONOSETRON 0.05 MG/ML
0.25 INJECTION, SOLUTION INTRAVENOUS ONCE
Status: CANCELLED
Start: 2019-09-13

## 2019-08-28 RX ORDER — METHYLPREDNISOLONE SODIUM SUCCINATE 125 MG/2ML
125 INJECTION, POWDER, LYOPHILIZED, FOR SOLUTION INTRAMUSCULAR; INTRAVENOUS
Status: CANCELLED
Start: 2019-09-13

## 2019-08-28 RX ORDER — NALOXONE HYDROCHLORIDE 0.4 MG/ML
.1-.4 INJECTION, SOLUTION INTRAMUSCULAR; INTRAVENOUS; SUBCUTANEOUS
Status: CANCELLED | OUTPATIENT
Start: 2019-09-13

## 2019-08-28 RX ORDER — SODIUM CHLORIDE 9 MG/ML
1000 INJECTION, SOLUTION INTRAVENOUS CONTINUOUS PRN
Status: CANCELLED
Start: 2019-09-13

## 2019-08-28 RX ORDER — DIPHENHYDRAMINE HCL 25 MG
50 CAPSULE ORAL ONCE
Status: CANCELLED
Start: 2019-09-13

## 2019-08-28 RX ORDER — MEPERIDINE HYDROCHLORIDE 25 MG/ML
25 INJECTION INTRAMUSCULAR; INTRAVENOUS; SUBCUTANEOUS EVERY 30 MIN PRN
Status: CANCELLED | OUTPATIENT
Start: 2019-09-13

## 2019-08-28 NOTE — TELEPHONE ENCOUNTER
Spoke with [employer] and cover my meds  PA submitted and was approved  Approval #  [id number]    Pharmacy notified of the above

## 2019-08-28 NOTE — TELEPHONE ENCOUNTER
Nutrition Services:    Called Brittni today per oncology distress screening - concern with her ability to eat (10) and concern with her weight (10).   Wt Readings from Last 10 Encounters:   08/27/19 40.1 kg (88 lb 6.4 oz)   08/22/19 39.5 kg (87 lb)   08/12/19 42.5 kg (93 lb 9.6 oz)   07/16/19 41.2 kg (90 lb 14.4 oz)   07/12/19 42.5 kg (93 lb 9.6 oz)   07/11/19 43.5 kg (96 lb)   07/13/12 52.2 kg (115 lb 1.6 oz)     Noted ~8 lb wt loss x past 6 weeks (10%).     RD spoke with Brittni.  She reports that her primary barrier to eating is lack of appetite.   She notes she was Rx Marinol but has not started taking it.  She only received partial Rx as insurance does not cover this.  Medicare may need a PA for coverage.  She tells me that the pharmacist has requested more documentation from MD for potential coverage.    She plans to start taking Marinol today.    She has been drinking Ensure and taking protein collegan.  She is focused on eating smaller, more frequent meals.    She expresses her concern with further lack of appetite after she starts chemo, thus wanting to establish a routine with her Marinol appetite stimulant.    She tells me that she also has a lot of fatigue and lack of desire to prepare meals.  She notes that her son does help out a lot but is not there every day to help.      Interventions:  Encouraged Brittni to start taking Marinol routinely (daily) as directed.  [employer] will send message to Dr. Ascencio regarding coverage.   Encouraged small frequent meals, aiming for at least 1700kcal, 70g protein/day.     Discussed [facility] meal service.  Brittni is very interested in this.  RD filled out provider portion of referral form mailed form to Kali to fill out remaining portions.  Advised her to send form to address indicated to apply for OA meal service.     Connie Noriega RDN, [facility]  [phone]

## 2019-08-30 ENCOUNTER — APPOINTMENT (OUTPATIENT)
Dept: INTERVENTIONAL RADIOLOGY/VASCULAR | Facility: CLINIC | Age: 74
End: 2019-08-30
Attending: OBSTETRICS & GYNECOLOGY
Payer: MEDICARE

## 2019-08-30 ENCOUNTER — HOSPITAL ENCOUNTER (OUTPATIENT)
Facility: CLINIC | Age: 74
Discharge: HOME | End: 2019-08-30
Attending: RADIOLOGY | Admitting: RADIOLOGY
Payer: MEDICARE

## 2019-08-30 VITALS
SYSTOLIC BLOOD PRESSURE: 122 MMHG | RESPIRATION RATE: 16 BRPM | HEART RATE: 78 BPM | OXYGEN SATURATION: 97 % | TEMPERATURE: 98 F | DIASTOLIC BLOOD PRESSURE: 67 MMHG

## 2019-08-30 DIAGNOSIS — C56.9 OVARIAN CANCER, UNSPECIFIED LATERALITY (H): ICD-10-CM

## 2019-08-30 LAB — INR PPP: 0.85 (ref 0.86–1.14)

## 2019-08-30 PROCEDURE — 25000128 H RX IP 250 OP 636

## 2019-08-30 PROCEDURE — C1769 GUIDE WIRE: HCPCS

## 2019-08-30 PROCEDURE — 25000125 ZZHC RX 250

## 2019-08-30 PROCEDURE — 85610 PROTHROMBIN TIME: CPT | Performed by: RADIOLOGY

## 2019-08-30 PROCEDURE — 36415 COLL VENOUS BLD VENIPUNCTURE: CPT

## 2019-08-30 PROCEDURE — C1788 PORT, INDWELLING, IMP: HCPCS

## 2019-08-30 PROCEDURE — 25000128 H RX IP 250 OP 636: Performed by: RADIOLOGY

## 2019-08-30 PROCEDURE — 40000854 ZZH STATISTIC SIMPLE TUBE INSERTION/CHARGE, PORT, CATH, FISTULOGRAM

## 2019-08-30 PROCEDURE — 25800030 ZZH RX IP 258 OP 636: Performed by: RADIOLOGY

## 2019-08-30 PROCEDURE — 27211193 ZZ H WOUND GLUE CR1

## 2019-08-30 PROCEDURE — 25000125 ZZHC RX 250: Performed by: RADIOLOGY

## 2019-08-30 PROCEDURE — 27210886 ZZH ACCESSORY CR5

## 2019-08-30 PROCEDURE — 36561 INSERT TUNNELED CV CATH: CPT | Mod: LT

## 2019-08-30 RX ORDER — FLUMAZENIL 0.1 MG/ML
0.2 INJECTION, SOLUTION INTRAVENOUS
Status: DISCONTINUED | OUTPATIENT
Start: 2019-08-30 | End: 2019-08-30 | Stop reason: HOSPADM

## 2019-08-30 RX ORDER — FENTANYL CITRATE 50 UG/ML
25-50 INJECTION, SOLUTION INTRAMUSCULAR; INTRAVENOUS EVERY 5 MIN PRN
Status: DISCONTINUED | OUTPATIENT
Start: 2019-08-30 | End: 2019-08-30 | Stop reason: HOSPADM

## 2019-08-30 RX ORDER — CLINDAMYCIN PHOSPHATE 900 MG/50ML
900 INJECTION, SOLUTION INTRAVENOUS
Status: COMPLETED | OUTPATIENT
Start: 2019-08-30 | End: 2019-08-30

## 2019-08-30 RX ORDER — FENTANYL CITRATE 50 UG/ML
INJECTION, SOLUTION INTRAMUSCULAR; INTRAVENOUS
Status: COMPLETED
Start: 2019-08-30 | End: 2019-08-30

## 2019-08-30 RX ORDER — NICOTINE POLACRILEX 4 MG
15-30 LOZENGE BUCCAL
Status: DISCONTINUED | OUTPATIENT
Start: 2019-08-30 | End: 2019-08-30 | Stop reason: HOSPADM

## 2019-08-30 RX ORDER — DEXTROSE MONOHYDRATE 25 G/50ML
25-50 INJECTION, SOLUTION INTRAVENOUS
Status: DISCONTINUED | OUTPATIENT
Start: 2019-08-30 | End: 2019-08-30 | Stop reason: HOSPADM

## 2019-08-30 RX ORDER — LIDOCAINE 40 MG/G
CREAM TOPICAL
Status: DISCONTINUED | OUTPATIENT
Start: 2019-08-30 | End: 2019-08-30 | Stop reason: HOSPADM

## 2019-08-30 RX ORDER — LIDOCAINE HYDROCHLORIDE 10 MG/ML
INJECTION, SOLUTION INFILTRATION; PERINEURAL
Status: COMPLETED
Start: 2019-08-30 | End: 2019-08-30

## 2019-08-30 RX ORDER — NALOXONE HYDROCHLORIDE 0.4 MG/ML
.1-.4 INJECTION, SOLUTION INTRAMUSCULAR; INTRAVENOUS; SUBCUTANEOUS
Status: DISCONTINUED | OUTPATIENT
Start: 2019-08-30 | End: 2019-08-30 | Stop reason: HOSPADM

## 2019-08-30 RX ORDER — HEPARIN SODIUM (PORCINE) LOCK FLUSH IV SOLN 100 UNIT/ML 100 UNIT/ML
SOLUTION INTRAVENOUS
Status: COMPLETED
Start: 2019-08-30 | End: 2019-08-30

## 2019-08-30 RX ORDER — IBUPROFEN 200 MG
400 TABLET ORAL EVERY 6 HOURS PRN
COMMUNITY
End: 2020-01-23

## 2019-08-30 RX ADMIN — LIDOCAINE HYDROCHLORIDE 20 ML: 10 INJECTION, SOLUTION INFILTRATION; PERINEURAL at 08:30

## 2019-08-30 RX ADMIN — MIDAZOLAM HYDROCHLORIDE 0.5 MG: 1 INJECTION, SOLUTION INTRAMUSCULAR; INTRAVENOUS at 08:18

## 2019-08-30 RX ADMIN — FENTANYL CITRATE 25 MCG: 50 INJECTION INTRAMUSCULAR; INTRAVENOUS at 08:03

## 2019-08-30 RX ADMIN — CLINDAMYCIN PHOSPHATE 900 MG: 900 INJECTION, SOLUTION INTRAVENOUS at 07:58

## 2019-08-30 RX ADMIN — MIDAZOLAM HYDROCHLORIDE 0.5 MG: 1 INJECTION, SOLUTION INTRAMUSCULAR; INTRAVENOUS at 08:02

## 2019-08-30 RX ADMIN — HEPARIN SODIUM (PORCINE) LOCK FLUSH IV SOLN 100 UNIT/ML 500 UNITS: 100 SOLUTION at 08:31

## 2019-08-30 RX ADMIN — HEPARIN SODIUM 10000 UNITS: 10000 INJECTION INTRAVENOUS; SUBCUTANEOUS at 08:07

## 2019-08-30 RX ADMIN — FENTANYL CITRATE 25 MCG: 50 INJECTION INTRAMUSCULAR; INTRAVENOUS at 08:18

## 2019-08-30 NOTE — PROCEDURES
[facility]    Procedure: Port and catheter placement  Date/Time: 8/30/2019 8:41 AM  Performed by: Alton Weaver MD  Authorized by: Alton Weaver MD     UNIVERSAL PROTOCOL   Site Marked: Yes  Prior Images Obtained and Reviewed:  Yes  Required items: Required blood products, implants, devices and special equipment available    Patient identity confirmed:  Verbally with patient  Patient was reevaluated immediately before administering moderate or deep sedation or anesthesia  Confirmation Checklist:  Patient's identity using two indicators, relevant allergies, procedure was appropriate and matched the consent or emergent situation and correct equipment/implants were available  Time out: Immediately prior to the procedure a time out was called    Preparation: Patient was prepped and draped in usual sterile fashion       ANESTHESIA    Local Anesthetic: Lidocaine 1% without epinephrine      SEDATION    Patient Sedated: Yes    Sedation Type:  Moderate (conscious) sedation  Vital signs: Vital signs monitored during sedation    See dictated procedure note for full details.  Findings: Right internal jugular vein access, placement of power port and catheter with tip at RA/SVC junction.  Heparinized and ready for immediate use.  No complications.    Specimens: none    Complications: None    Condition: Stable    PROCEDURE   Patient Tolerance:  Patient tolerated the procedure well with no immediate complications    Time of Sedation in Minutes by Physician:  25

## 2019-08-30 NOTE — PRE-PROCEDURE
GENERAL PRE-PROCEDURE:   Procedure:  Port and catheter placement    Verbal consent obtained?: Yes    Written consent obtained?: Yes    Risks and benefits: Risks, benefits and alternatives were discussed    Consent given by:  Patient  Patient states understanding of procedure being performed: Yes    Patient's understanding of procedure matches consent: Yes    Procedure consent matches procedure scheduled: Yes    Expected level of sedation:  Moderate  Appropriately NPO:  Yes  ASA Class:  Class 3- Severe systemic disease, definite functional limitations  Mallampati  :  Grade 3- soft palate visible, posterior pharyngeal wall not visible  Lungs:  Lungs clear with good breath sounds bilaterally  Heart:  Normal heart sounds and rate  History & Physical reviewed:  History and physical reviewed and no updates needed  Statement of review:  I have reviewed the lab findings, diagnostic data, medications, and the plan for sedation

## 2019-08-30 NOTE — DISCHARGE INSTRUCTIONS
Port Insertion Discharge Instructions     After you go home:      Have an adult stay with you for the first 6 hours    You may resume your normal diet       For 24 hours - due to the sedation you received:    Relax and take it easy    Do NOT make any important or legal decisions    Do NOT drive or operate machines at home or at work    Do NOT drink alcohol    Care of Puncture Sites:      Keep the dressings on your sites clean & dry for 3 days. Change it only if it gets wet or dirty.    You may shower after the dressing comes off in 3 days    Do not remove the small white strips of tape, if present. Allow them to fall off on their own.     You may cover the wound with a bandaid after the dressing is removed if needed for comfort      Activity       Avoid heavy lifting (greater than 10 pounds) or the overuse of your shoulder for 3 days    Bleeding:      If you start bleeding from the incision sites in your chest or neck - or have swelling in your neck, sit down and press on the site for 5-10 minutes.     If bleeding has not stopped after 10 minutes, call your provider.        Call 911 right away if you have heavy bleeding or bleeding that does not stop.      Medicines:      You may resume all medications    Resume your Warfarin/Coumadin at your regular dose today. Follow up with your provider to have your INR rechecked    Resume your Platelet Inhibitors and Aspirin tomorrow at your regular dose    For minor pain, you may take Acetaminophen (Tylenol) or Ibuprofen (Advil)    Call the provider who ordered this procedure if:      You have swelling in your neck or over your port site    The incision area is red, swollen, hot or tender    You have chills or a fever greater than 101 F (38 C)    Any questions or concerns    Call  911 or go to the Emergency Room if you have:      Severe chest pain or trouble breathing    Bleeding that you cannot control    Additional Information:      Your port may be accessed right away.      You will need to have your port flushed every 30 days or after each use.      If you have questions call:          [facility] Radiology Dept @ [phone]      The provider who performed your procedure was __Dr. Weaver___.

## 2019-08-30 NOTE — PROGRESS NOTES
Care Suites Discharge Nursing Note    Education/questions answered: yes  Patient DC location: door number 1  Accompanied by: daniel his son  CS discharge time: 0947

## 2019-08-30 NOTE — IR NOTE
Interventional Radiology Intra-procedural Nursing Note    Patient Name: Maximino Simeon  Medical Record Number: [id number]  Today's Date: August 30, 2019    Start Time: 0811  End of procedure time: 0837  Procedure: port placement  Report given to: Laura trevino RN  Time pt departs:  0845  : n/a    Other Notes:     Versed 1mg IV  Fentanyl 50mcg IV  Clindamycin 900mg IV    Patient tolerated procedure well. VSS. Patient alert, respirations regular and unlabored, no c/o pain at this time.   Procedure access site right internal jugular  is c/d/i, port insertion site right chest is c/d/i, dermabond and steri-strips.  Patient transferred back to care suites in stable condition accompanied by myself.    Christine Stephens RN on 8/30/2019 at 8:39 AM

## 2019-08-30 NOTE — PROGRESS NOTES
Care Suites Admission Nursing Note    Reason for admission: port placement  CS arrival time: 0615  Accompanied by: son  Name/phone of DC : pat/ [phone]  Medications held: yes  Consent signed: pending  Abnormal assessment/labs: pending  If abnormal, provider notified: pending  Education/questions answered: yes  Plan: port place

## 2019-09-05 NOTE — PROGRESS NOTES
[id number]: Withdrawal of Consent     Maximino Simeon has chosen to withdraw from the study. Specifically, the patient would like to:  Withdrawal from protocol-directed interventions and protocol-directed tests/procedures, e.g. subject refuses further treatment with the study drug as well as refuses protocol-directed lab draws, physical exams, QOLs, CT scans, etc., but allows continued contact with PI and  staff for collection of primary and secondary study objectives.  Susan Lowe    Form 508.00.01 (Version 1)     Effective date: 01JUL2018     Next Review Date: 01JUL2020

## 2019-09-09 NOTE — TELEPHONE ENCOUNTER
Roseanne  left message on voice mail  Update on pt  Oxycodone refill  appetitie poor-consider something to increase appetite  Denies nausea and vomiting  Vitals are stable

## 2019-09-09 NOTE — TELEPHONE ENCOUNTER
8/20 505 pm  Left message on voice mail for Roseanne [facility] to call clinic back    8/20  508 left message on pt voice mail for pt to call clinic back

## 2019-09-10 ENCOUNTER — DOCUMENTATION ONLY (OUTPATIENT)
Dept: CARE COORDINATION | Facility: CLINIC | Age: 74
End: 2019-09-10

## 2019-09-10 NOTE — PROGRESS NOTES
[facility] now requests orders and shares plan of care/discharge summaries for some patients through [facility].  Please REPLY TO THIS MESSAGE OR ROUTE BACK TO THE AUTHOR in order to give authorization for orders when needed.  This is considered a verbal order, you will still receive a faxed copy of orders for signature.  Thank you for your assistance in improving collaboration for our patients.    ORDER  Requesting to continue skilled nursing visits for medication education 1 visit per week for 3 weeks with 2 prns.     Thank you    Morena Covington RN  [phone]  [email or web address]

## 2019-09-13 ENCOUNTER — ONCOLOGY VISIT (OUTPATIENT)
Dept: ONCOLOGY | Facility: CLINIC | Age: 74
End: 2019-09-13
Attending: OBSTETRICS & GYNECOLOGY
Payer: MEDICARE

## 2019-09-13 ENCOUNTER — INFUSION THERAPY VISIT (OUTPATIENT)
Dept: INFUSION THERAPY | Facility: CLINIC | Age: 74
End: 2019-09-13
Attending: OBSTETRICS & GYNECOLOGY
Payer: MEDICARE

## 2019-09-13 ENCOUNTER — HOSPITAL ENCOUNTER (OUTPATIENT)
Facility: CLINIC | Age: 74
Setting detail: SPECIMEN
Discharge: HOME | End: 2019-09-13
Attending: OBSTETRICS & GYNECOLOGY | Admitting: OBSTETRICS & GYNECOLOGY
Payer: MEDICARE

## 2019-09-13 VITALS
RESPIRATION RATE: 18 BRPM | TEMPERATURE: 97.7 F | DIASTOLIC BLOOD PRESSURE: 77 MMHG | HEART RATE: 98 BPM | WEIGHT: 86.4 LBS | BODY MASS INDEX: 18.06 KG/M2 | SYSTOLIC BLOOD PRESSURE: 115 MMHG | OXYGEN SATURATION: 98 %

## 2019-09-13 VITALS
RESPIRATION RATE: 18 BRPM | DIASTOLIC BLOOD PRESSURE: 77 MMHG | HEART RATE: 98 BPM | SYSTOLIC BLOOD PRESSURE: 115 MMHG | OXYGEN SATURATION: 98 %

## 2019-09-13 DIAGNOSIS — C56.9 OVARIAN CANCER, UNSPECIFIED LATERALITY (H): Primary | ICD-10-CM

## 2019-09-13 DIAGNOSIS — Z90.710 S/P HYSTERECTOMY: ICD-10-CM

## 2019-09-13 LAB
ALBUMIN SERPL-MCNC: 3.6 G/DL (ref 3.4–5)
ALP SERPL-CCNC: 152 U/L (ref 40–150)
ALT SERPL W P-5'-P-CCNC: 272 U/L (ref 0–50)
ANION GAP SERPL CALCULATED.3IONS-SCNC: 4 MMOL/L (ref 3–14)
AST SERPL W P-5'-P-CCNC: 157 U/L (ref 0–45)
BASOPHILS # BLD AUTO: 0 10E9/L (ref 0–0.2)
BASOPHILS NFR BLD AUTO: 0.6 %
BILIRUB SERPL-MCNC: 0.5 MG/DL (ref 0.2–1.3)
BUN SERPL-MCNC: 10 MG/DL (ref 7–30)
CALCIUM SERPL-MCNC: 9.6 MG/DL (ref 8.5–10.1)
CHLORIDE SERPL-SCNC: 108 MMOL/L (ref 94–109)
CO2 SERPL-SCNC: 28 MMOL/L (ref 20–32)
CREAT SERPL-MCNC: 0.44 MG/DL (ref 0.52–1.04)
DIFFERENTIAL METHOD BLD: NORMAL
EOSINOPHIL # BLD AUTO: 0.3 10E9/L (ref 0–0.7)
EOSINOPHIL NFR BLD AUTO: 5.8 %
ERYTHROCYTE [DISTWIDTH] IN BLOOD BY AUTOMATED COUNT: 14.3 % (ref 10–15)
GFR SERPL CREATININE-BSD FRML MDRD: >90 ML/MIN/{1.73_M2}
GLUCOSE SERPL-MCNC: 90 MG/DL (ref 70–99)
HCT VFR BLD AUTO: 38.6 % (ref 35–47)
HGB BLD-MCNC: 12.4 G/DL (ref 11.7–15.7)
IMM GRANULOCYTES # BLD: 0 10E9/L (ref 0–0.4)
IMM GRANULOCYTES NFR BLD: 0 %
LYMPHOCYTES # BLD AUTO: 0.8 10E9/L (ref 0.8–5.3)
LYMPHOCYTES NFR BLD AUTO: 17.4 %
MAGNESIUM SERPL-MCNC: 2.1 MG/DL (ref 1.6–2.3)
MCH RBC QN AUTO: 30.4 PG (ref 26.5–33)
MCHC RBC AUTO-ENTMCNC: 32.1 G/DL (ref 31.5–36.5)
MCV RBC AUTO: 95 FL (ref 78–100)
MONOCYTES # BLD AUTO: 0.3 10E9/L (ref 0–1.3)
MONOCYTES NFR BLD AUTO: 7.1 %
NEUTROPHILS # BLD AUTO: 3.2 10E9/L (ref 1.6–8.3)
NEUTROPHILS NFR BLD AUTO: 69.1 %
NRBC # BLD AUTO: 0 10*3/UL
NRBC BLD AUTO-RTO: 0 /100
PLATELET # BLD AUTO: 287 10E9/L (ref 150–450)
POTASSIUM SERPL-SCNC: 3.8 MMOL/L (ref 3.4–5.3)
PROT SERPL-MCNC: 7.3 G/DL (ref 6.8–8.8)
RBC # BLD AUTO: 4.08 10E12/L (ref 3.8–5.2)
SODIUM SERPL-SCNC: 140 MMOL/L (ref 133–144)
WBC # BLD AUTO: 4.7 10E9/L (ref 4–11)

## 2019-09-13 PROCEDURE — 99214 OFFICE O/P EST MOD 30 MIN: CPT | Performed by: NURSE PRACTITIONER

## 2019-09-13 PROCEDURE — 96415 CHEMO IV INFUSION ADDL HR: CPT

## 2019-09-13 PROCEDURE — 85025 COMPLETE CBC W/AUTO DIFF WBC: CPT | Performed by: OBSTETRICS & GYNECOLOGY

## 2019-09-13 PROCEDURE — 96375 TX/PRO/DX INJ NEW DRUG ADDON: CPT

## 2019-09-13 PROCEDURE — 25000128 H RX IP 250 OP 636: Performed by: NURSE PRACTITIONER

## 2019-09-13 PROCEDURE — G0463 HOSPITAL OUTPT CLINIC VISIT: HCPCS | Mod: 25

## 2019-09-13 PROCEDURE — 25000128 H RX IP 250 OP 636: Performed by: OBSTETRICS & GYNECOLOGY

## 2019-09-13 PROCEDURE — 25000125 ZZHC RX 250: Performed by: OBSTETRICS & GYNECOLOGY

## 2019-09-13 PROCEDURE — 25800030 ZZH RX IP 258 OP 636: Performed by: OBSTETRICS & GYNECOLOGY

## 2019-09-13 PROCEDURE — 96417 CHEMO IV INFUS EACH ADDL SEQ: CPT

## 2019-09-13 PROCEDURE — 96367 TX/PROPH/DG ADDL SEQ IV INF: CPT

## 2019-09-13 PROCEDURE — 80053 COMPREHEN METABOLIC PANEL: CPT | Performed by: OBSTETRICS & GYNECOLOGY

## 2019-09-13 PROCEDURE — 83735 ASSAY OF MAGNESIUM: CPT | Performed by: OBSTETRICS & GYNECOLOGY

## 2019-09-13 PROCEDURE — 96413 CHEMO IV INFUSION 1 HR: CPT

## 2019-09-13 RX ORDER — PALONOSETRON 0.05 MG/ML
0.25 INJECTION, SOLUTION INTRAVENOUS ONCE
Status: COMPLETED | OUTPATIENT
Start: 2019-09-13 | End: 2019-09-13

## 2019-09-13 RX ORDER — PROCHLORPERAZINE MALEATE 10 MG
10 TABLET ORAL EVERY 6 HOURS PRN
Qty: 30 TABLET | Refills: 5 | Status: SHIPPED | OUTPATIENT
Start: 2019-09-13 | End: 2020-01-24

## 2019-09-13 RX ORDER — AMLODIPINE BESYLATE 5 MG/1
5 TABLET ORAL DAILY
Qty: 30 TABLET | Refills: 0 | Status: SHIPPED | OUTPATIENT
Start: 2019-09-13 | End: 2020-08-20

## 2019-09-13 RX ORDER — HEPARIN SODIUM (PORCINE) LOCK FLUSH IV SOLN 100 UNIT/ML 100 UNIT/ML
5 SOLUTION INTRAVENOUS EVERY 8 HOURS
Status: DISCONTINUED | OUTPATIENT
Start: 2019-09-13 | End: 2019-09-13 | Stop reason: HOSPADM

## 2019-09-13 RX ADMIN — PACLITAXEL 222 MG: 6 INJECTION, SOLUTION INTRAVENOUS at 12:36

## 2019-09-13 RX ADMIN — SODIUM CHLORIDE 1000 ML: 9 INJECTION, SOLUTION INTRAVENOUS at 10:51

## 2019-09-13 RX ADMIN — FAMOTIDINE 40 MG: 10 INJECTION, SOLUTION INTRAVENOUS at 11:59

## 2019-09-13 RX ADMIN — HEPARIN SODIUM (PORCINE) LOCK FLUSH IV SOLN 100 UNIT/ML 5 ML: 100 SOLUTION at 16:47

## 2019-09-13 RX ADMIN — DIPHENHYDRAMINE HYDROCHLORIDE 50 MG: 50 INJECTION INTRAMUSCULAR; INTRAVENOUS at 11:35

## 2019-09-13 RX ADMIN — PALONOSETRON 0.25 MG: 0.25 INJECTION, SOLUTION INTRAVENOUS at 11:12

## 2019-09-13 RX ADMIN — DEXAMETHASONE SODIUM PHOSPHATE 20 MG: 10 INJECTION, SOLUTION INTRAMUSCULAR; INTRAVENOUS at 11:15

## 2019-09-13 RX ADMIN — CARBOPLATIN 550 MG: 10 INJECTION, SOLUTION INTRAVENOUS at 15:57

## 2019-09-13 ASSESSMENT — PAIN SCALES - GENERAL: PAINLEVEL: NO PAIN (0)

## 2019-09-13 NOTE — PROGRESS NOTES
Infusion Nursing Note:  Maximino Simeon presents today for C1D1 Taxol/ Carboplatin.    Patient seen by provider today: Yes: Shashi Smith NP    Note: First time getting chemotherapy today. Oriented to infusion center. Chemotherapy teaching, side effects, and schedule reviewed with patient. General chemotherapy side effects reviewed with patient including fatigue, immunosuppression, alopecia, nausea/vomiting, constipation, and diarrhea. Individual drug common side effects reviewed previosuly with patient by Krysten Patel RN. Pt instructed to call care coordinator, triage (or MD on call if after hours/weekends) with chills/temp >=100.5, questions/concerns. Pt stated understanding of plan.     Taxol dose titrated as follows:  50 ml/hr x 5 minutes  100 ml/hr x 5 minutes  196 ml/hr for remainder of the infusion    Patient tolerated infusion without issues today.    Intravenous Access:  Implanted Port.      Treatment Conditions:  Lab Results   Component Value Date    HGB 12.4 09/13/2019     Lab Results   Component Value Date    WBC 4.7 09/13/2019      Lab Results   Component Value Date    ANEU 3.2 09/13/2019     Lab Results   Component Value Date     09/13/2019      Lab Results   Component Value Date     09/13/2019                   Lab Results   Component Value Date    POTASSIUM 3.8 09/13/2019           Lab Results   Component Value Date    MAG 2.1 09/13/2019            Lab Results   Component Value Date    CR 0.44 09/13/2019                   Lab Results   Component Value Date    LUCRECIA 9.6 09/13/2019                Lab Results   Component Value Date    BILITOTAL 0.5 09/13/2019           Lab Results   Component Value Date    ALBUMIN 3.6 09/13/2019                    Lab Results   Component Value Date     09/13/2019           Lab Results   Component Value Date     09/13/2019       Results reviewed, labs MET treatment parameters, ok to proceed with treatment.      Post Infusion Assessment:  Patient  tolerated infusion without incident.  Blood return noted pre and post infusion.  Site patent and intact, free from redness, edema or discomfort.  No evidence of extravasations.  Access discontinued per protocol.    Discharge Plan:   Patient declined prescription refills.  Discharge instructions reviewed with: Patient and Family.  Patient and/or family verbalized understanding of discharge instructions and all questions answered.  Copy of AVS reviewed with patient and/or family.  Patient will return 9/18/19 for next appointment.  Patient discharged in stable condition accompanied by: son.  Departure Mode: Ambulatory.    Afshan Schrader, RN, RN

## 2019-09-13 NOTE — PROGRESS NOTES
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  S/P exploratory laparotomy, total abdominal hysterectomy, removal of her ovary and the appendix  08/09  Patient of    Due to high grade nature of the disease Dr. Ascencio recommended chemotherapy Taxol carbo x6 cycles    Interval History:  Overall patient reports feeling well.  She has been taking Tylenol extra strength 4 tablets before sleep.  She thought Tylenol might help her with sleep.  Patient denies nausea vomiting abdominal pain.  Denies fever chills sweats denies cough shortness of breath denies chest pain.  She has also glass of wine every other day.  Energy and appetite are good    Review of Systems:  14 point ROS of systems including Constitutional, Eyes, Respiratory, Cardiovascular, Gastroenterology, Genitourinary, Integumentary, Muscularskeletal, Psychiatric were all negative except for pertinent positives noted in my HPI.      Current Outpatient Medications   Medication Sig Dispense Refill     amLODIPine (NORVASC) 5 MG tablet Take 1 tablet (5 mg) by mouth daily 30 tablet 0     acetaminophen (TYLENOL) 325 MG tablet Take 325-650 mg by mouth every 6 hours as needed for mild pain       B Complex Vitamins (B COMPLEX PO) Take 1 tablet by mouth daily (with lunch)        Calcium Carbonate-Vitamin D (CALCIUM + D PO) Take 6 tablets by mouth daily.       Coenzyme Q10 (COQ10 PO) Take 200 mg by mouth 2 times daily       dronabinol (MARINOL) 2.5 MG capsule Take 1 capsule (2.5 mg) by mouth 2 times daily (before meals) 60 capsule 0     DULoxetine (CYMBALTA) 20 MG capsule Take 1 capsule (20 mg) by mouth daily Follow up with your PCP for refills 30 capsule 0     enoxaparin (LOVENOX) 30 MG/0.3ML syringe Inject 0.3 mLs (30 mg) Subcutaneous every 24 hours 24 Syringe 0     ibuprofen (ADVIL/MOTRIN) 200 MG tablet Take 400 mg by mouth every 6 hours as  needed for mild pain       levothyroxine (SYNTHROID/LEVOTHROID) 50 MCG tablet Take 50 mcg by mouth every morning        liothyronine (CYTOMEL) 5 MCG tablet Take 5 mcg by mouth every morning        loperamide (IMODIUM) 1 MG/5ML liquid Take 1 mg by mouth 6 times daily        Multiple Vitamins-Minerals (MULTIVITAL PO) Take 1 tablet by mouth daily (with lunch)        Probiotic Product (PROBIOTIC DAILY PO) 1 tablet daily at lunchtime by mouth       prochlorperazine (COMPAZINE) 10 MG tablet Take 1 tablet (10 mg) by mouth every 6 hours as needed (nausea/vomiting) 30 tablet 5     psyllium (METAMUCIL/KONSYL) Packet Take 1 packet by mouth daily         Physical Examination:  General: The patient is a pleasant female in no acute distress.  /77   Pulse 98   Temp 97.7  F (36.5  C) (Oral)   Resp 18   Wt 39.2 kg (86 lb 6.4 oz)   LMP  (LMP Unknown)   SpO2 98%   BMI 18.06 kg/m    HEENT: EOMI, PERRL. Sclerae are anicteric. Oral mucosa is pink and moist with no lesions or thrush.   Lymph: Neck is supple with no lymphadenopathy in the cervical or supraclavicular areas.   Heart: Regular rate and rhythm.   Lungs: Clear to auscultation bilaterally.   GI: Bowel sounds present, soft, nontender with no palpable hepatosplenomegaly or masses.   Extremities: No lower extremity edema noted bilaterally.   Skin: No rashes, petechiae, or bruising noted on exposed skin.    Laboratory Data:  Results for orders placed or performed in visit on 09/13/19 (from the past 24 hour(s))   CBC with platelets differential   Result Value Ref Range    WBC 4.7 4.0 - 11.0 10e9/L    RBC Count 4.08 3.8 - 5.2 10e12/L    Hemoglobin 12.4 11.7 - 15.7 g/dL    Hematocrit 38.6 35.0 - 47.0 %    MCV 95 78 - 100 fl    MCH 30.4 26.5 - 33.0 pg    MCHC 32.1 31.5 - 36.5 g/dL    RDW 14.3 10.0 - 15.0 %    Platelet Count 287 150 - 450 10e9/L    Diff Method Automated Method     % Neutrophils 69.1 %    % Lymphocytes 17.4 %    % Monocytes 7.1 %    % Eosinophils 5.8 %    %  Basophils 0.6 %    % Immature Granulocytes 0.0 %    Nucleated RBCs 0 0 /100    Absolute Neutrophil 3.2 1.6 - 8.3 10e9/L    Absolute Lymphocytes 0.8 0.8 - 5.3 10e9/L    Absolute Monocytes 0.3 0.0 - 1.3 10e9/L    Absolute Eosinophils 0.3 0.0 - 0.7 10e9/L    Absolute Basophils 0.0 0.0 - 0.2 10e9/L    Abs Immature Granulocytes 0.0 0 - 0.4 10e9/L    Absolute Nucleated RBC 0.0    Comprehensive metabolic panel   Result Value Ref Range    Sodium 140 133 - 144 mmol/L    Potassium 3.8 3.4 - 5.3 mmol/L    Chloride 108 94 - 109 mmol/L    Carbon Dioxide 28 20 - 32 mmol/L    Anion Gap 4 3 - 14 mmol/L    Glucose 90 70 - 99 mg/dL    Urea Nitrogen 10 7 - 30 mg/dL    Creatinine 0.44 (L) 0.52 - 1.04 mg/dL    GFR Estimate >90 >60 mL/min/[1.73_m2]    GFR Estimate If Black >90 >60 mL/min/[1.73_m2]    Calcium 9.6 8.5 - 10.1 mg/dL    Bilirubin Total 0.5 0.2 - 1.3 mg/dL    Albumin 3.6 3.4 - 5.0 g/dL    Protein Total 7.3 6.8 - 8.8 g/dL    Alkaline Phosphatase 152 (H) 40 - 150 U/L     (H) 0 - 50 U/L     (H) 0 - 45 U/L   Magnesium   Result Value Ref Range    Magnesium 2.1 1.6 - 2.3 mg/dL         Assessment and Plan:    This is a 74-year-old  female with      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  S/P exploratory laparotomy, total abdominal hysterectomy, removal of her ovary and the appendix  08/09  Patient of    Due to high grade nature of the disease Dr. Ascencio recommended chemotherapy Taxol carbo x 6 cycles  -Chemo education done  -Discussed in detail the side effects of chemotherapy.  Patient verbalized understanding and is agreeable to proceed with treatment  Labs reviewed okay to proceed with chemotherapy today  Schedule for labs and follow-up with me next week      Elevated LFTs  Normal bilirubin.  Elevated transaminates  -Told patient to stop taking Tylenol  Also she is advised not to drink alcohol  (Taxol- Transaminases <10 times ULN and bilirubin level ?1.25 times ULN: 175 mg/m2)  -Recheck liver  enzymes next week. -If elevated will order liver  ultrasound        Patient is advised to go to the ER or call 911 in the event of fever chills sweats cough shortness of breath chest pain.  Nausea vomiting diarrhea abdominal pain bleeding or any changes in health condition-patient and son verbalized understanding      LEX Salgado CNP  Hem/Onc   [facility]     Chart documentation with Dragon Voice recognition Software. Although reviewed after completion, some words and grammatical errors may remain.

## 2019-09-13 NOTE — PROGRESS NOTES
"Oncology Rooming Note    September 13, 2019 9:55 AM   Maximino Simeon is a 74 year old female who presents for:    Chief Complaint   Patient presents with     Oncology Clinic Visit     Initial Vitals: /77   Pulse 98   Temp 97.7  F (36.5  C) (Oral)   Resp 18   Wt 39.2 kg (86 lb 6.4 oz)   LMP  (LMP Unknown)   SpO2 98%   BMI 18.06 kg/m   Estimated body mass index is 18.06 kg/m  as calculated from the following:    Height as of 8/27/19: 1.473 m (4' 10\").    Weight as of this encounter: 39.2 kg (86 lb 6.4 oz). Body surface area is 1.27 meters squared.  No Pain (0) Comment: Data Unavailable   No LMP recorded (lmp unknown). Patient has had a hysterectomy.  Allergies reviewed: Yes  Medications reviewed: Yes    Medications: Medication refills not needed today.  Pharmacy name entered into [employer]:    [id number] [address] PHARMACY [facility] [address] PHARMACY [place], [place] - [address] [address]    Clinical concerns:    NP was notified.      Liza Traore CMA            "

## 2019-09-13 NOTE — LETTER
"    9/13/2019         RE: Maximino Simeon  [address] [facility] [address]        Dear Colleague,    Thank you for referring your patient, Maximino Simeon, to the [facility]. Please see a copy of my visit note below.    Oncology Rooming Note    September 13, 2019 9:55 AM   Maximino Simeon is a 74 year old female who presents for:    Chief Complaint   Patient presents with     Oncology Clinic Visit     Initial Vitals: /77   Pulse 98   Temp 97.7  F (36.5  C) (Oral)   Resp 18   Wt 39.2 kg (86 lb 6.4 oz)   LMP  (LMP Unknown)   SpO2 98%   BMI 18.06 kg/m    Estimated body mass index is 18.06 kg/m  as calculated from the following:    Height as of 8/27/19: 1.473 m (4' 10\").    Weight as of this encounter: 39.2 kg (86 lb 6.4 oz). Body surface area is 1.27 meters squared.  No Pain (0) Comment: Data Unavailable   No LMP recorded (lmp unknown). Patient has had a hysterectomy.  Allergies reviewed: Yes  Medications reviewed: Yes    Medications: Medication refills not needed today.  Pharmacy name entered into [employer]:    [facility] #[id number] [place], [place] - [address] [place]ARM[place]Y [place], [place] - [address] PHARMACY [place], [place] - [address] [address]    Clinical concerns:    NP was notified.      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  S/P exploratory laparotomy, total abdominal hysterectomy, removal of her ovary and the appendix  08/09  Patient of    Due to high grade nature of the disease Dr. Ascencio recommended chemotherapy Taxol carbo x6 cycles    Interval History:  Overall patient reports feeling well.  She has been taking Tylenol extra strength 4 tablets before sleep.  She thought Tylenol might help her with sleep.  Patient denies nausea vomiting " abdominal pain.  Denies fever chills sweats denies cough shortness of breath denies chest pain.  She has also glass of wine every other day.  Energy and appetite are good    Review of Systems:  14 point ROS of systems including Constitutional, Eyes, Respiratory, Cardiovascular, Gastroenterology, Genitourinary, Integumentary, Muscularskeletal, Psychiatric were all negative except for pertinent positives noted in my HPI.      Current Outpatient Medications   Medication Sig Dispense Refill     amLODIPine (NORVASC) 5 MG tablet Take 1 tablet (5 mg) by mouth daily 30 tablet 0     acetaminophen (TYLENOL) 325 MG tablet Take 325-650 mg by mouth every 6 hours as needed for mild pain       B Complex Vitamins (B COMPLEX PO) Take 1 tablet by mouth daily (with lunch)        Calcium Carbonate-Vitamin D (CALCIUM + D PO) Take 6 tablets by mouth daily.       Coenzyme Q10 (COQ10 PO) Take 200 mg by mouth 2 times daily       dronabinol (MARINOL) 2.5 MG capsule Take 1 capsule (2.5 mg) by mouth 2 times daily (before meals) 60 capsule 0     DULoxetine (CYMBALTA) 20 MG capsule Take 1 capsule (20 mg) by mouth daily Follow up with your PCP for refills 30 capsule 0     enoxaparin (LOVENOX) 30 MG/0.3ML syringe Inject 0.3 mLs (30 mg) Subcutaneous every 24 hours 24 Syringe 0     ibuprofen (ADVIL/MOTRIN) 200 MG tablet Take 400 mg by mouth every 6 hours as needed for mild pain       levothyroxine (SYNTHROID/LEVOTHROID) 50 MCG tablet Take 50 mcg by mouth every morning        liothyronine (CYTOMEL) 5 MCG tablet Take 5 mcg by mouth every morning        loperamide (IMODIUM) 1 MG/5ML liquid Take 1 mg by mouth 6 times daily        Multiple Vitamins-Minerals (MULTIVITAL PO) Take 1 tablet by mouth daily (with lunch)        Probiotic Product (PROBIOTIC DAILY PO) 1 tablet daily at lunchtime by mouth       prochlorperazine (COMPAZINE) 10 MG tablet Take 1 tablet (10 mg) by mouth every 6 hours as needed (nausea/vomiting) 30 tablet 5     psyllium  (METAMUCIL/KONSYL) Packet Take 1 packet by mouth daily         Physical Examination:  General: The patient is a pleasant female in no acute distress.  /77   Pulse 98   Temp 97.7  F (36.5  C) (Oral)   Resp 18   Wt 39.2 kg (86 lb 6.4 oz)   LMP  (LMP Unknown)   SpO2 98%   BMI 18.06 kg/m     HEENT: EOMI, PERRL. Sclerae are anicteric. Oral mucosa is pink and moist with no lesions or thrush.   Lymph: Neck is supple with no lymphadenopathy in the cervical or supraclavicular areas.   Heart: Regular rate and rhythm.   Lungs: Clear to auscultation bilaterally.   GI: Bowel sounds present, soft, nontender with no palpable hepatosplenomegaly or masses.   Extremities: No lower extremity edema noted bilaterally.   Skin: No rashes, petechiae, or bruising noted on exposed skin.    Laboratory Data:  Results for orders placed or performed in visit on 09/13/19 (from the past 24 hour(s))   CBC with platelets differential   Result Value Ref Range    WBC 4.7 4.0 - 11.0 10e9/L    RBC Count 4.08 3.8 - 5.2 10e12/L    Hemoglobin 12.4 11.7 - 15.7 g/dL    Hematocrit 38.6 35.0 - 47.0 %    MCV 95 78 - 100 fl    MCH 30.4 26.5 - 33.0 pg    MCHC 32.1 31.5 - 36.5 g/dL    RDW 14.3 10.0 - 15.0 %    Platelet Count 287 150 - 450 10e9/L    Diff Method Automated Method     % Neutrophils 69.1 %    % Lymphocytes 17.4 %    % Monocytes 7.1 %    % Eosinophils 5.8 %    % Basophils 0.6 %    % Immature Granulocytes 0.0 %    Nucleated RBCs 0 0 /100    Absolute Neutrophil 3.2 1.6 - 8.3 10e9/L    Absolute Lymphocytes 0.8 0.8 - 5.3 10e9/L    Absolute Monocytes 0.3 0.0 - 1.3 10e9/L    Absolute Eosinophils 0.3 0.0 - 0.7 10e9/L    Absolute Basophils 0.0 0.0 - 0.2 10e9/L    Abs Immature Granulocytes 0.0 0 - 0.4 10e9/L    Absolute Nucleated RBC 0.0    Comprehensive metabolic panel   Result Value Ref Range    Sodium 140 133 - 144 mmol/L    Potassium 3.8 3.4 - 5.3 mmol/L    Chloride 108 94 - 109 mmol/L    Carbon Dioxide 28 20 - 32 mmol/L    Anion Gap 4 3 - 14  mmol/L    Glucose 90 70 - 99 mg/dL    Urea Nitrogen 10 7 - 30 mg/dL    Creatinine 0.44 (L) 0.52 - 1.04 mg/dL    GFR Estimate >90 >60 mL/min/[1.73_m2]    GFR Estimate If Black >90 >60 mL/min/[1.73_m2]    Calcium 9.6 8.5 - 10.1 mg/dL    Bilirubin Total 0.5 0.2 - 1.3 mg/dL    Albumin 3.6 3.4 - 5.0 g/dL    Protein Total 7.3 6.8 - 8.8 g/dL    Alkaline Phosphatase 152 (H) 40 - 150 U/L     (H) 0 - 50 U/L     (H) 0 - 45 U/L   Magnesium   Result Value Ref Range    Magnesium 2.1 1.6 - 2.3 mg/dL         Assessment and Plan:    This is a 74-year-old  female with      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  S/P exploratory laparotomy, total abdominal hysterectomy, removal of her ovary and the appendix  08/09  Patient of    Due to high grade nature of the disease Dr. Ascencio recommended chemotherapy Taxol carbo x 6 cycles  -Chemo education done  -Discussed in detail the side effects of chemotherapy.  Patient verbalized understanding and is agreeable to proceed with treatment  Labs reviewed okay to proceed with chemotherapy today  Schedule for labs and follow-up with me next week      Elevated LFTs  Normal bilirubin.  Elevated transaminates  -Told patient to stop taking Tylenol  Also she is advised not to drink alcohol  (Taxol- Transaminases <10 times ULN and bilirubin level ?1.25 times ULN: 175 mg/m2)  -Recheck liver enzymes next week. -If elevated will order liver  ultrasound        Patient is advised to go to the ER or call 911 in the event of fever chills sweats cough shortness of breath chest pain.  Nausea vomiting diarrhea abdominal pain bleeding or any changes in health condition-patient and son verbalized understanding      LEX Salgado CNP  Hem/Onc   [facility]     Chart documentation with Dragon Voice recognition Software. Although reviewed after completion, some words and grammatical errors may remain.          Again, thank you for allowing me to participate in  the care of your patient.        Sincerely,        LEX Salgado CNP

## 2019-09-18 ENCOUNTER — HOSPITAL ENCOUNTER (OUTPATIENT)
Facility: CLINIC | Age: 74
Setting detail: SPECIMEN
Discharge: HOME | End: 2019-09-18
Attending: NURSE PRACTITIONER | Admitting: NURSE PRACTITIONER
Payer: MEDICARE

## 2019-09-18 ENCOUNTER — ONCOLOGY VISIT (OUTPATIENT)
Dept: ONCOLOGY | Facility: CLINIC | Age: 74
End: 2019-09-18
Attending: NURSE PRACTITIONER
Payer: MEDICARE

## 2019-09-18 ENCOUNTER — INFUSION THERAPY VISIT (OUTPATIENT)
Dept: INFUSION THERAPY | Facility: CLINIC | Age: 74
End: 2019-09-18
Attending: NURSE PRACTITIONER
Payer: MEDICARE

## 2019-09-18 VITALS
BODY MASS INDEX: 18.14 KG/M2 | RESPIRATION RATE: 16 BRPM | OXYGEN SATURATION: 98 % | WEIGHT: 86.42 LBS | TEMPERATURE: 97.7 F | HEIGHT: 58 IN | DIASTOLIC BLOOD PRESSURE: 86 MMHG | HEART RATE: 121 BPM | SYSTOLIC BLOOD PRESSURE: 139 MMHG

## 2019-09-18 VITALS
HEART RATE: 121 BPM | RESPIRATION RATE: 16 BRPM | DIASTOLIC BLOOD PRESSURE: 86 MMHG | SYSTOLIC BLOOD PRESSURE: 139 MMHG | TEMPERATURE: 97.7 F

## 2019-09-18 DIAGNOSIS — Z95.828 PORT-A-CATH IN PLACE: ICD-10-CM

## 2019-09-18 DIAGNOSIS — C56.9 OVARIAN CANCER, UNSPECIFIED LATERALITY (H): Primary | ICD-10-CM

## 2019-09-18 LAB
ALBUMIN SERPL-MCNC: 4.1 G/DL (ref 3.4–5)
ALP SERPL-CCNC: 94 U/L (ref 40–150)
ALT SERPL W P-5'-P-CCNC: 90 U/L (ref 0–50)
ANION GAP SERPL CALCULATED.3IONS-SCNC: 5 MMOL/L (ref 3–14)
AST SERPL W P-5'-P-CCNC: 27 U/L (ref 0–45)
BASOPHILS # BLD AUTO: 0 10E9/L (ref 0–0.2)
BASOPHILS NFR BLD AUTO: 0.4 %
BILIRUB SERPL-MCNC: 0.7 MG/DL (ref 0.2–1.3)
BUN SERPL-MCNC: 16 MG/DL (ref 7–30)
CALCIUM SERPL-MCNC: 9.7 MG/DL (ref 8.5–10.1)
CHLORIDE SERPL-SCNC: 100 MMOL/L (ref 94–109)
CO2 SERPL-SCNC: 29 MMOL/L (ref 20–32)
CREAT SERPL-MCNC: 0.54 MG/DL (ref 0.52–1.04)
DIFFERENTIAL METHOD BLD: NORMAL
EOSINOPHIL # BLD AUTO: 0.1 10E9/L (ref 0–0.7)
EOSINOPHIL NFR BLD AUTO: 2.9 %
ERYTHROCYTE [DISTWIDTH] IN BLOOD BY AUTOMATED COUNT: 13.8 % (ref 10–15)
GFR SERPL CREATININE-BSD FRML MDRD: >90 ML/MIN/{1.73_M2}
GLUCOSE SERPL-MCNC: 146 MG/DL (ref 70–99)
HCT VFR BLD AUTO: 40.5 % (ref 35–47)
HGB BLD-MCNC: 13.1 G/DL (ref 11.7–15.7)
IMM GRANULOCYTES # BLD: 0 10E9/L (ref 0–0.4)
IMM GRANULOCYTES NFR BLD: 0.2 %
LYMPHOCYTES # BLD AUTO: 1.3 10E9/L (ref 0.8–5.3)
LYMPHOCYTES NFR BLD AUTO: 27.9 %
MCH RBC QN AUTO: 30.7 PG (ref 26.5–33)
MCHC RBC AUTO-ENTMCNC: 32.3 G/DL (ref 31.5–36.5)
MCV RBC AUTO: 95 FL (ref 78–100)
MONOCYTES # BLD AUTO: 0.1 10E9/L (ref 0–1.3)
MONOCYTES NFR BLD AUTO: 1.1 %
NEUTROPHILS # BLD AUTO: 3.1 10E9/L (ref 1.6–8.3)
NEUTROPHILS NFR BLD AUTO: 67.5 %
NRBC # BLD AUTO: 0 10*3/UL
NRBC BLD AUTO-RTO: 0 /100
PLATELET # BLD AUTO: 303 10E9/L (ref 150–450)
POTASSIUM SERPL-SCNC: 3.9 MMOL/L (ref 3.4–5.3)
PROT SERPL-MCNC: 7.9 G/DL (ref 6.8–8.8)
RBC # BLD AUTO: 4.27 10E12/L (ref 3.8–5.2)
SODIUM SERPL-SCNC: 134 MMOL/L (ref 133–144)
WBC # BLD AUTO: 4.6 10E9/L (ref 4–11)

## 2019-09-18 PROCEDURE — 80053 COMPREHEN METABOLIC PANEL: CPT | Performed by: NURSE PRACTITIONER

## 2019-09-18 PROCEDURE — 25000128 H RX IP 250 OP 636: Performed by: NURSE PRACTITIONER

## 2019-09-18 PROCEDURE — 96360 HYDRATION IV INFUSION INIT: CPT

## 2019-09-18 PROCEDURE — 85025 COMPLETE CBC W/AUTO DIFF WBC: CPT | Performed by: NURSE PRACTITIONER

## 2019-09-18 PROCEDURE — 99024 POSTOP FOLLOW-UP VISIT: CPT | Performed by: NURSE PRACTITIONER

## 2019-09-18 PROCEDURE — G0463 HOSPITAL OUTPT CLINIC VISIT: HCPCS | Mod: 25

## 2019-09-18 PROCEDURE — 25000128 H RX IP 250 OP 636: Performed by: OBSTETRICS & GYNECOLOGY

## 2019-09-18 RX ORDER — HEPARIN SODIUM (PORCINE) LOCK FLUSH IV SOLN 100 UNIT/ML 100 UNIT/ML
5 SOLUTION INTRAVENOUS
Status: DISCONTINUED | OUTPATIENT
Start: 2019-09-18 | End: 2019-09-18 | Stop reason: HOSPADM

## 2019-09-18 RX ORDER — HEPARIN SODIUM (PORCINE) LOCK FLUSH IV SOLN 100 UNIT/ML 100 UNIT/ML
5 SOLUTION INTRAVENOUS
Status: CANCELLED | OUTPATIENT
Start: 2019-09-18

## 2019-09-18 RX ADMIN — HEPARIN SODIUM (PORCINE) LOCK FLUSH IV SOLN 100 UNIT/ML 5 ML: 100 SOLUTION at 14:57

## 2019-09-18 RX ADMIN — SODIUM CHLORIDE 1000 ML: 9 INJECTION, SOLUTION INTRAVENOUS at 13:25

## 2019-09-18 ASSESSMENT — PAIN SCALES - GENERAL
PAINLEVEL: NO PAIN (0)
PAINLEVEL: NO PAIN (0)

## 2019-09-18 ASSESSMENT — MIFFLIN-ST. JEOR: SCORE: 771.75

## 2019-09-18 NOTE — LETTER
"    9/18/2019         RE: Maximino Simeon  [address] [facility] [address]        Dear Colleague,    Thank you for referring your patient, Maximino Simeon, to the [facility]. Please see a copy of my visit note below.    Oncology Rooming Note    September 18, 2019 2:25 PM   Maximino Simeon is a 74 year old female who presents for:    Chief Complaint   Patient presents with     Oncology Clinic Visit     Initial Vitals: /86   Pulse 121   Temp 97.7  F (36.5  C) (Oral)   Resp 16   Ht 1.473 m (4' 10\")   Wt 39.2 kg (86 lb 6.7 oz)   LMP  (LMP Unknown)   SpO2 98%   BMI 18.06 kg/m    Estimated body mass index is 18.06 kg/m  as calculated from the following:    Height as of this encounter: 1.473 m (4' 10\").    Weight as of this encounter: 39.2 kg (86 lb 6.7 oz). Body surface area is 1.27 meters squared.  No Pain (0) Comment: Data Unavailable   No LMP recorded (lmp unknown). Patient has had a hysterectomy.  Allergies reviewed: Yes  Medications reviewed: Yes    Medications: Medication refills not needed today.  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  S/P exploratory laparotomy, total abdominal hysterectomy, removal of her ovary and the appendix  08/09  Patient of    Due to high grade nature of the disease Dr. Ascencio recommended chemotherapy Taxol carbo x6 cycles  Chemotherapy started on 09 13    Interval History:  Patient reports she has been tolerating for cycle of chemotherapy well she denies fever chills sweats denies cough no shortness of breath denies " chest pain.  Denies nausea vomiting diarrhea denies abdominal pain she denies bleeding energy and appetite are good no new concerns today    Review of Systems:  14 point ROS of systems including Constitutional, Eyes, Respiratory, Cardiovascular, Gastroenterology, Genitourinary, Integumentary, Muscularskeletal, Psychiatric were all negative except for pertinent positives noted in my HPI.      Current Outpatient Medications   Medication Sig Dispense Refill     amLODIPine (NORVASC) 5 MG tablet Take 1 tablet (5 mg) by mouth daily 30 tablet 0     B Complex Vitamins (B COMPLEX PO) Take 1 tablet by mouth daily (with lunch)        Calcium Carbonate-Vitamin D (CALCIUM + D PO) Take 6 tablets by mouth daily.       Coenzyme Q10 (COQ10 PO) Take 200 mg by mouth 2 times daily       dronabinol (MARINOL) 2.5 MG capsule Take 1 capsule (2.5 mg) by mouth 2 times daily (before meals) 60 capsule 0     DULoxetine (CYMBALTA) 20 MG capsule Take 1 capsule (20 mg) by mouth daily Follow up with your PCP for refills 30 capsule 0     enoxaparin (LOVENOX) 30 MG/0.3ML syringe Inject 0.3 mLs (30 mg) Subcutaneous every 24 hours 24 Syringe 0     ibuprofen (ADVIL/MOTRIN) 200 MG tablet Take 400 mg by mouth every 6 hours as needed for mild pain       levothyroxine (SYNTHROID/LEVOTHROID) 50 MCG tablet Take 50 mcg by mouth every morning        liothyronine (CYTOMEL) 5 MCG tablet Take 5 mcg by mouth every morning        loperamide (IMODIUM) 1 MG/5ML liquid Take 1 mg by mouth 6 times daily        Multiple Vitamins-Minerals (MULTIVITAL PO) Take 1 tablet by mouth daily (with lunch)        Probiotic Product (PROBIOTIC DAILY PO) 1 tablet daily at lunchtime by mouth       prochlorperazine (COMPAZINE) 10 MG tablet Take 1 tablet (10 mg) by mouth every 6 hours as needed (nausea/vomiting) 30 tablet 5     psyllium (METAMUCIL/KONSYL) Packet Take 1 packet by mouth daily         Physical Examination:  General: The patient is a pleasant female in no acute distress.  BP  "139/86   Pulse 121   Temp 97.7  F (36.5  C) (Oral)   Resp 16   Ht 1.473 m (4' 10\")   Wt 39.2 kg (86 lb 6.7 oz)   LMP  (LMP Unknown)   SpO2 98%   BMI 18.06 kg/m     HEENT: EOMI, PERRL. Sclerae are anicteric. Oral mucosa is pink and moist with no lesions or thrush.   Lymph: Neck is supple with no lymphadenopathy in the cervical or supraclavicular areas.   Heart: Regular rate and rhythm.   Lungs: Clear to auscultation bilaterally.   GI: Bowel sounds present, soft, nontender with no palpable hepatosplenomegaly or masses.   Extremities: No lower extremity edema noted bilaterally.   Skin: No rashes, petechiae, or bruising noted on exposed skin.    Laboratory Data:  Results for orders placed or performed in visit on 09/18/19 (from the past 24 hour(s))   Comprehensive metabolic panel   Result Value Ref Range    Sodium 134 133 - 144 mmol/L    Potassium 3.9 3.4 - 5.3 mmol/L    Chloride 100 94 - 109 mmol/L    Carbon Dioxide 29 20 - 32 mmol/L    Anion Gap 5 3 - 14 mmol/L    Glucose 146 (H) 70 - 99 mg/dL    Urea Nitrogen 16 7 - 30 mg/dL    Creatinine 0.54 0.52 - 1.04 mg/dL    GFR Estimate >90 >60 mL/min/[1.73_m2]    GFR Estimate If Black >90 >60 mL/min/[1.73_m2]    Calcium 9.7 8.5 - 10.1 mg/dL    Bilirubin Total 0.7 0.2 - 1.3 mg/dL    Albumin 4.1 3.4 - 5.0 g/dL    Protein Total 7.9 6.8 - 8.8 g/dL    Alkaline Phosphatase 94 40 - 150 U/L    ALT 90 (H) 0 - 50 U/L    AST 27 0 - 45 U/L   CBC with platelets differential   Result Value Ref Range    WBC 4.6 4.0 - 11.0 10e9/L    RBC Count 4.27 3.8 - 5.2 10e12/L    Hemoglobin 13.1 11.7 - 15.7 g/dL    Hematocrit 40.5 35.0 - 47.0 %    MCV 95 78 - 100 fl    MCH 30.7 26.5 - 33.0 pg    MCHC 32.3 31.5 - 36.5 g/dL    RDW 13.8 10.0 - 15.0 %    Platelet Count 303 150 - 450 10e9/L    Diff Method Automated Method     % Neutrophils 67.5 %    % Lymphocytes 27.9 %    % Monocytes 1.1 %    % Eosinophils 2.9 %    % Basophils 0.4 %    % Immature Granulocytes 0.2 %    Nucleated RBCs 0 0 /100    " Absolute Neutrophil 3.1 1.6 - 8.3 10e9/L    Absolute Lymphocytes 1.3 0.8 - 5.3 10e9/L    Absolute Monocytes 0.1 0.0 - 1.3 10e9/L    Absolute Eosinophils 0.1 0.0 - 0.7 10e9/L    Absolute Basophils 0.0 0.0 - 0.2 10e9/L    Abs Immature Granulocytes 0.0 0 - 0.4 10e9/L    Absolute Nucleated RBC 0.0          Assessment and Plan:    This is a 74-year-old  female with      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  S/P exploratory laparotomy, total abdominal hysterectomy, removal of her ovary and the appendix  08/09  Patient of    Due to high grade nature of the disease Dr. Ascencio recommended chemotherapy Taxol carbo x 6 cycles-started on 09/13/2019  Patient reports she has tolerated chemotherapy well  -labs  reviewed stable counts  Patient has follow-up appointment with me with next cycle of chemo          Patient is advised to go to the ER or call 911 in the event of fever chills sweats cough shortness of breath chest pain.  Nausea vomiting diarrhea abdominal pain bleeding or any changes in health condition-patient and son verbalized understanding      LEX Salgado CNP  Hem/Onc   [facility]     Chart documentation with Dragon Voice recognition Software. Although reviewed after completion, some words and grammatical errors may remain.          Again, thank you for allowing me to participate in the care of your patient.        Sincerely,        LEX Salgado CNP

## 2019-09-18 NOTE — PROGRESS NOTES
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  S/P exploratory laparotomy, total abdominal hysterectomy, removal of her ovary and the appendix  08/09  Patient of    Due to high grade nature of the disease Dr. Ascencio recommended chemotherapy Taxol carbo x6 cycles  Chemotherapy started on 09 13    Interval History:  Patient reports she has been tolerating for cycle of chemotherapy well she denies fever chills sweats denies cough no shortness of breath denies chest pain.  Denies nausea vomiting diarrhea denies abdominal pain she denies bleeding energy and appetite are good no new concerns today    Review of Systems:  14 point ROS of systems including Constitutional, Eyes, Respiratory, Cardiovascular, Gastroenterology, Genitourinary, Integumentary, Muscularskeletal, Psychiatric were all negative except for pertinent positives noted in my HPI.      Current Outpatient Medications   Medication Sig Dispense Refill     amLODIPine (NORVASC) 5 MG tablet Take 1 tablet (5 mg) by mouth daily 30 tablet 0     B Complex Vitamins (B COMPLEX PO) Take 1 tablet by mouth daily (with lunch)        Calcium Carbonate-Vitamin D (CALCIUM + D PO) Take 6 tablets by mouth daily.       Coenzyme Q10 (COQ10 PO) Take 200 mg by mouth 2 times daily       dronabinol (MARINOL) 2.5 MG capsule Take 1 capsule (2.5 mg) by mouth 2 times daily (before meals) 60 capsule 0     DULoxetine (CYMBALTA) 20 MG capsule Take 1 capsule (20 mg) by mouth daily Follow up with your PCP for refills 30 capsule 0     enoxaparin (LOVENOX) 30 MG/0.3ML syringe Inject 0.3 mLs (30 mg) Subcutaneous every 24 hours 24 Syringe 0     ibuprofen (ADVIL/MOTRIN) 200 MG tablet Take 400 mg by mouth every 6 hours as needed for mild pain       levothyroxine (SYNTHROID/LEVOTHROID) 50 MCG tablet Take 50 mcg by mouth every morning        liothyronine (CYTOMEL) 5 MCG  "tablet Take 5 mcg by mouth every morning        loperamide (IMODIUM) 1 MG/5ML liquid Take 1 mg by mouth 6 times daily        Multiple Vitamins-Minerals (MULTIVITAL PO) Take 1 tablet by mouth daily (with lunch)        Probiotic Product (PROBIOTIC DAILY PO) 1 tablet daily at lunchtime by mouth       prochlorperazine (COMPAZINE) 10 MG tablet Take 1 tablet (10 mg) by mouth every 6 hours as needed (nausea/vomiting) 30 tablet 5     psyllium (METAMUCIL/KONSYL) Packet Take 1 packet by mouth daily         Physical Examination:  General: The patient is a pleasant female in no acute distress.  /86   Pulse 121   Temp 97.7  F (36.5  C) (Oral)   Resp 16   Ht 1.473 m (4' 10\")   Wt 39.2 kg (86 lb 6.7 oz)   LMP  (LMP Unknown)   SpO2 98%   BMI 18.06 kg/m    HEENT: EOMI, PERRL. Sclerae are anicteric. Oral mucosa is pink and moist with no lesions or thrush.   Lymph: Neck is supple with no lymphadenopathy in the cervical or supraclavicular areas.   Heart: Regular rate and rhythm.   Lungs: Clear to auscultation bilaterally.   GI: Bowel sounds present, soft, nontender with no palpable hepatosplenomegaly or masses.   Extremities: No lower extremity edema noted bilaterally.   Skin: No rashes, petechiae, or bruising noted on exposed skin.    Laboratory Data:  Results for orders placed or performed in visit on 09/18/19 (from the past 24 hour(s))   Comprehensive metabolic panel   Result Value Ref Range    Sodium 134 133 - 144 mmol/L    Potassium 3.9 3.4 - 5.3 mmol/L    Chloride 100 94 - 109 mmol/L    Carbon Dioxide 29 20 - 32 mmol/L    Anion Gap 5 3 - 14 mmol/L    Glucose 146 (H) 70 - 99 mg/dL    Urea Nitrogen 16 7 - 30 mg/dL    Creatinine 0.54 0.52 - 1.04 mg/dL    GFR Estimate >90 >60 mL/min/[1.73_m2]    GFR Estimate If Black >90 >60 mL/min/[1.73_m2]    Calcium 9.7 8.5 - 10.1 mg/dL    Bilirubin Total 0.7 0.2 - 1.3 mg/dL    Albumin 4.1 3.4 - 5.0 g/dL    Protein Total 7.9 6.8 - 8.8 g/dL    Alkaline Phosphatase 94 40 - 150 U/L    " ALT 90 (H) 0 - 50 U/L    AST 27 0 - 45 U/L   CBC with platelets differential   Result Value Ref Range    WBC 4.6 4.0 - 11.0 10e9/L    RBC Count 4.27 3.8 - 5.2 10e12/L    Hemoglobin 13.1 11.7 - 15.7 g/dL    Hematocrit 40.5 35.0 - 47.0 %    MCV 95 78 - 100 fl    MCH 30.7 26.5 - 33.0 pg    MCHC 32.3 31.5 - 36.5 g/dL    RDW 13.8 10.0 - 15.0 %    Platelet Count 303 150 - 450 10e9/L    Diff Method Automated Method     % Neutrophils 67.5 %    % Lymphocytes 27.9 %    % Monocytes 1.1 %    % Eosinophils 2.9 %    % Basophils 0.4 %    % Immature Granulocytes 0.2 %    Nucleated RBCs 0 0 /100    Absolute Neutrophil 3.1 1.6 - 8.3 10e9/L    Absolute Lymphocytes 1.3 0.8 - 5.3 10e9/L    Absolute Monocytes 0.1 0.0 - 1.3 10e9/L    Absolute Eosinophils 0.1 0.0 - 0.7 10e9/L    Absolute Basophils 0.0 0.0 - 0.2 10e9/L    Abs Immature Granulocytes 0.0 0 - 0.4 10e9/L    Absolute Nucleated RBC 0.0          Assessment and Plan:    This is a 74-year-old  female with      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  S/P exploratory laparotomy, total abdominal hysterectomy, removal of her ovary and the appendix  08/09  Patient of    Due to high grade nature of the disease Dr. Ascencio recommended chemotherapy Taxol carbo x 6 cycles-started on 09/13/2019  Patient reports she has tolerated chemotherapy well  -labs  reviewed stable counts  Patient has follow-up appointment with me with next cycle of chemo          Patient is advised to go to the ER or call 911 in the event of fever chills sweats cough shortness of breath chest pain.  Nausea vomiting diarrhea abdominal pain bleeding or any changes in health condition-patient and son verbalized understanding      LEX Salgado CNP  Hem/Onc   [facility]     Chart documentation with Dragon Voice recognition Software. Although reviewed after completion, some words and grammatical errors may remain.

## 2019-09-18 NOTE — PROGRESS NOTES
"Oncology Rooming Note    September 18, 2019 2:25 PM   Maximino Simeon is a 74 year old female who presents for:    Chief Complaint   Patient presents with     Oncology Clinic Visit     Initial Vitals: /86   Pulse 121   Temp 97.7  F (36.5  C) (Oral)   Resp 16   Ht 1.473 m (4' 10\")   Wt 39.2 kg (86 lb 6.7 oz)   LMP  (LMP Unknown)   SpO2 98%   BMI 18.06 kg/m   Estimated body mass index is 18.06 kg/m  as calculated from the following:    Height as of this encounter: 1.473 m (4' 10\").    Weight as of this encounter: 39.2 kg (86 lb 6.7 oz). Body surface area is 1.27 meters squared.  No Pain (0) Comment: Data Unavailable   No LMP recorded (lmp unknown). Patient has had a hysterectomy.  Allergies reviewed: Yes  Medications reviewed: Yes    Medications: Medication refills not needed today.  Pharmacy name entered into [employer]:    [id number] [address] [facility] [address] PHARMACY [place], [place] - [address] [address]    Clinical concerns: no      Soraya Beltrán [facility]            "

## 2019-09-18 NOTE — PROGRESS NOTES
Infusion Nursing Note:  Maximino Simeon presents today for IVF.    Patient seen by provider today: Yes: MARCELA murcia   present during visit today: Not Applicable.    Note: N/A.    Intravenous Access:  Implanted Port.    Treatment Conditions:  Lab Results   Component Value Date    HGB 13.1 09/18/2019     Lab Results   Component Value Date    WBC 4.6 09/18/2019      Lab Results   Component Value Date    ANEU 3.1 09/18/2019     Lab Results   Component Value Date     09/18/2019      Lab Results   Component Value Date     09/18/2019                   Lab Results   Component Value Date    POTASSIUM 3.9 09/18/2019           Lab Results   Component Value Date    MAG 2.1 09/13/2019            Lab Results   Component Value Date    CR 0.54 09/18/2019                   Lab Results   Component Value Date    LUCRECIA 9.7 09/18/2019                Lab Results   Component Value Date    BILITOTAL 0.7 09/18/2019           Lab Results   Component Value Date    ALBUMIN 4.1 09/18/2019                    Lab Results   Component Value Date    ALT 90 09/18/2019           Lab Results   Component Value Date    AST 27 09/18/2019           Post Infusion Assessment:  Patient tolerated infusion without incident.  Site patent and intact, free from redness, edema or discomfort.  No evidence of extravasations.  Access discontinued per protocol.       Discharge Plan:   Patient and/or family verbalized understanding of discharge instructions and all questions answered.  AVS to patient via [employer].  Patient will return 10/4 for next appointment.   Patient discharged in stable condition accompanied by: sister and son.  Departure Mode: Ambulatory.    Florecita Granger, RN, RN

## 2019-09-20 ENCOUNTER — PATIENT OUTREACH (OUTPATIENT)
Dept: ONCOLOGY | Facility: CLINIC | Age: 74
End: 2019-09-20

## 2019-09-20 NOTE — PROGRESS NOTES
"Care Coordinator Note  \"The chemotherapy went well last week. I had no nausea or bone pain. I went to exercise class yesterday.\"  Tolerated chemotherapy well       Patient verbalized back understanding of the above information discussed.   Krysten KATZ RN, OCN  Care Coordinator   Gynecologic Cancer   Office [phone]        "

## 2019-10-04 ENCOUNTER — ONCOLOGY VISIT (OUTPATIENT)
Dept: ONCOLOGY | Facility: CLINIC | Age: 74
End: 2019-10-04
Attending: OBSTETRICS & GYNECOLOGY
Payer: MEDICARE

## 2019-10-04 ENCOUNTER — INFUSION THERAPY VISIT (OUTPATIENT)
Dept: INFUSION THERAPY | Facility: CLINIC | Age: 74
End: 2019-10-04
Attending: OBSTETRICS & GYNECOLOGY
Payer: MEDICARE

## 2019-10-04 ENCOUNTER — HOSPITAL ENCOUNTER (OUTPATIENT)
Facility: CLINIC | Age: 74
Setting detail: SPECIMEN
Discharge: HOME | End: 2019-10-04
Attending: NURSE PRACTITIONER | Admitting: NURSE PRACTITIONER
Payer: MEDICARE

## 2019-10-04 ENCOUNTER — ALLIED HEALTH/NURSE VISIT (OUTPATIENT)
Dept: ONCOLOGY | Facility: CLINIC | Age: 74
End: 2019-10-04

## 2019-10-04 VITALS
WEIGHT: 88.2 LBS | OXYGEN SATURATION: 98 % | SYSTOLIC BLOOD PRESSURE: 132 MMHG | BODY MASS INDEX: 18.52 KG/M2 | HEART RATE: 98 BPM | TEMPERATURE: 97.7 F | HEIGHT: 58 IN | DIASTOLIC BLOOD PRESSURE: 84 MMHG | RESPIRATION RATE: 16 BRPM

## 2019-10-04 VITALS
DIASTOLIC BLOOD PRESSURE: 84 MMHG | SYSTOLIC BLOOD PRESSURE: 132 MMHG | HEART RATE: 98 BPM | TEMPERATURE: 97.7 F | RESPIRATION RATE: 16 BRPM

## 2019-10-04 DIAGNOSIS — C56.9 OVARIAN CANCER, UNSPECIFIED LATERALITY (H): ICD-10-CM

## 2019-10-04 DIAGNOSIS — C56.9 OVARIAN CANCER, UNSPECIFIED LATERALITY (H): Primary | ICD-10-CM

## 2019-10-04 DIAGNOSIS — Z71.9 COUNSELING, UNSPECIFIED: Primary | ICD-10-CM

## 2019-10-04 LAB
ALBUMIN SERPL-MCNC: 3.9 G/DL (ref 3.4–5)
ALP SERPL-CCNC: 63 U/L (ref 40–150)
ALT SERPL W P-5'-P-CCNC: 32 U/L (ref 0–50)
ANION GAP SERPL CALCULATED.3IONS-SCNC: 2 MMOL/L (ref 3–14)
AST SERPL W P-5'-P-CCNC: 20 U/L (ref 0–45)
BASOPHILS # BLD AUTO: 0.1 10E9/L (ref 0–0.2)
BASOPHILS NFR BLD AUTO: 1.5 %
BILIRUB SERPL-MCNC: 0.2 MG/DL (ref 0.2–1.3)
BUN SERPL-MCNC: 12 MG/DL (ref 7–30)
CALCIUM SERPL-MCNC: 9.2 MG/DL (ref 8.5–10.1)
CHLORIDE SERPL-SCNC: 106 MMOL/L (ref 94–109)
CO2 SERPL-SCNC: 30 MMOL/L (ref 20–32)
CREAT SERPL-MCNC: 0.56 MG/DL (ref 0.52–1.04)
DIFFERENTIAL METHOD BLD: ABNORMAL
EOSINOPHIL # BLD AUTO: 0 10E9/L (ref 0–0.7)
EOSINOPHIL NFR BLD AUTO: 0.8 %
ERYTHROCYTE [DISTWIDTH] IN BLOOD BY AUTOMATED COUNT: 14 % (ref 10–15)
GFR SERPL CREATININE-BSD FRML MDRD: >90 ML/MIN/{1.73_M2}
GLUCOSE SERPL-MCNC: 82 MG/DL (ref 70–99)
HCT VFR BLD AUTO: 39.7 % (ref 35–47)
HGB BLD-MCNC: 13 G/DL (ref 11.7–15.7)
IMM GRANULOCYTES # BLD: 0 10E9/L (ref 0–0.4)
IMM GRANULOCYTES NFR BLD: 0.3 %
LYMPHOCYTES # BLD AUTO: 1.2 10E9/L (ref 0.8–5.3)
LYMPHOCYTES NFR BLD AUTO: 31.2 %
MAGNESIUM SERPL-MCNC: 2 MG/DL (ref 1.6–2.3)
MCH RBC QN AUTO: 30.7 PG (ref 26.5–33)
MCHC RBC AUTO-ENTMCNC: 32.7 G/DL (ref 31.5–36.5)
MCV RBC AUTO: 94 FL (ref 78–100)
MONOCYTES # BLD AUTO: 0.4 10E9/L (ref 0–1.3)
MONOCYTES NFR BLD AUTO: 10.5 %
NEUTROPHILS # BLD AUTO: 2.2 10E9/L (ref 1.6–8.3)
NEUTROPHILS NFR BLD AUTO: 55.7 %
NRBC # BLD AUTO: 0 10*3/UL
NRBC BLD AUTO-RTO: 0 /100
PLATELET # BLD AUTO: 164 10E9/L (ref 150–450)
POTASSIUM SERPL-SCNC: 4 MMOL/L (ref 3.4–5.3)
PROT SERPL-MCNC: 7.7 G/DL (ref 6.8–8.8)
RBC # BLD AUTO: 4.23 10E12/L (ref 3.8–5.2)
SODIUM SERPL-SCNC: 138 MMOL/L (ref 133–144)
WBC # BLD AUTO: 3.9 10E9/L (ref 4–11)

## 2019-10-04 PROCEDURE — 96367 TX/PROPH/DG ADDL SEQ IV INF: CPT

## 2019-10-04 PROCEDURE — 96417 CHEMO IV INFUS EACH ADDL SEQ: CPT

## 2019-10-04 PROCEDURE — 90662 IIV NO PRSV INCREASED AG IM: CPT | Performed by: NURSE PRACTITIONER

## 2019-10-04 PROCEDURE — 25000128 H RX IP 250 OP 636: Performed by: NURSE PRACTITIONER

## 2019-10-04 PROCEDURE — 85025 COMPLETE CBC W/AUTO DIFF WBC: CPT | Performed by: NURSE PRACTITIONER

## 2019-10-04 PROCEDURE — G0008 ADMIN INFLUENZA VIRUS VAC: HCPCS

## 2019-10-04 PROCEDURE — 99024 POSTOP FOLLOW-UP VISIT: CPT | Performed by: NURSE PRACTITIONER

## 2019-10-04 PROCEDURE — 96375 TX/PRO/DX INJ NEW DRUG ADDON: CPT

## 2019-10-04 PROCEDURE — 25800030 ZZH RX IP 258 OP 636: Performed by: NURSE PRACTITIONER

## 2019-10-04 PROCEDURE — G0463 HOSPITAL OUTPT CLINIC VISIT: HCPCS | Mod: 25

## 2019-10-04 PROCEDURE — 96415 CHEMO IV INFUSION ADDL HR: CPT

## 2019-10-04 PROCEDURE — 80053 COMPREHEN METABOLIC PANEL: CPT | Performed by: NURSE PRACTITIONER

## 2019-10-04 PROCEDURE — 25000132 ZZH RX MED GY IP 250 OP 250 PS 637: Mod: GY | Performed by: NURSE PRACTITIONER

## 2019-10-04 PROCEDURE — 25000125 ZZHC RX 250: Performed by: NURSE PRACTITIONER

## 2019-10-04 PROCEDURE — 25000128 H RX IP 250 OP 636: Performed by: OBSTETRICS & GYNECOLOGY

## 2019-10-04 PROCEDURE — 96413 CHEMO IV INFUSION 1 HR: CPT

## 2019-10-04 PROCEDURE — 83735 ASSAY OF MAGNESIUM: CPT | Performed by: NURSE PRACTITIONER

## 2019-10-04 RX ORDER — ALBUTEROL SULFATE 90 UG/1
1-2 AEROSOL, METERED RESPIRATORY (INHALATION)
Status: CANCELLED
Start: 2019-10-04

## 2019-10-04 RX ORDER — ALBUTEROL SULFATE 0.83 MG/ML
2.5 SOLUTION RESPIRATORY (INHALATION)
Status: CANCELLED | OUTPATIENT
Start: 2019-10-04

## 2019-10-04 RX ORDER — SODIUM CHLORIDE 9 MG/ML
1000 INJECTION, SOLUTION INTRAVENOUS CONTINUOUS PRN
Status: CANCELLED
Start: 2019-10-04

## 2019-10-04 RX ORDER — DIPHENHYDRAMINE HCL 25 MG
50 CAPSULE ORAL ONCE
Status: COMPLETED | OUTPATIENT
Start: 2019-10-04 | End: 2019-10-04

## 2019-10-04 RX ORDER — EPINEPHRINE 1 MG/ML
0.3 INJECTION, SOLUTION INTRAMUSCULAR; SUBCUTANEOUS EVERY 5 MIN PRN
Status: CANCELLED | OUTPATIENT
Start: 2019-10-04

## 2019-10-04 RX ORDER — DIPHENHYDRAMINE HYDROCHLORIDE 50 MG/ML
50 INJECTION INTRAMUSCULAR; INTRAVENOUS
Status: CANCELLED
Start: 2019-10-04

## 2019-10-04 RX ORDER — METHYLPREDNISOLONE SODIUM SUCCINATE 125 MG/2ML
125 INJECTION, POWDER, LYOPHILIZED, FOR SOLUTION INTRAMUSCULAR; INTRAVENOUS
Status: CANCELLED
Start: 2019-10-04

## 2019-10-04 RX ORDER — LORAZEPAM 2 MG/ML
1 INJECTION INTRAMUSCULAR EVERY 6 HOURS PRN
Status: CANCELLED
Start: 2019-10-04

## 2019-10-04 RX ORDER — PALONOSETRON 0.05 MG/ML
0.25 INJECTION, SOLUTION INTRAVENOUS ONCE
Status: CANCELLED
Start: 2019-10-04

## 2019-10-04 RX ORDER — NALOXONE HYDROCHLORIDE 0.4 MG/ML
.1-.4 INJECTION, SOLUTION INTRAMUSCULAR; INTRAVENOUS; SUBCUTANEOUS
Status: CANCELLED | OUTPATIENT
Start: 2019-10-04

## 2019-10-04 RX ORDER — MEPERIDINE HYDROCHLORIDE 25 MG/ML
25 INJECTION INTRAMUSCULAR; INTRAVENOUS; SUBCUTANEOUS EVERY 30 MIN PRN
Status: CANCELLED | OUTPATIENT
Start: 2019-10-04

## 2019-10-04 RX ORDER — PALONOSETRON 0.05 MG/ML
0.25 INJECTION, SOLUTION INTRAVENOUS ONCE
Status: COMPLETED | OUTPATIENT
Start: 2019-10-04 | End: 2019-10-04

## 2019-10-04 RX ORDER — DIPHENHYDRAMINE HCL 25 MG
50 CAPSULE ORAL ONCE
Status: CANCELLED
Start: 2019-10-04

## 2019-10-04 RX ORDER — EPINEPHRINE 0.3 MG/.3ML
0.3 INJECTION SUBCUTANEOUS EVERY 5 MIN PRN
Status: CANCELLED | OUTPATIENT
Start: 2019-10-04

## 2019-10-04 RX ORDER — HEPARIN SODIUM (PORCINE) LOCK FLUSH IV SOLN 100 UNIT/ML 100 UNIT/ML
5 SOLUTION INTRAVENOUS ONCE
Status: COMPLETED | OUTPATIENT
Start: 2019-10-04 | End: 2019-10-04

## 2019-10-04 RX ADMIN — FAMOTIDINE 40 MG: 10 INJECTION INTRAVENOUS at 11:14

## 2019-10-04 RX ADMIN — DEXAMETHASONE SODIUM PHOSPHATE 20 MG: 10 INJECTION, SOLUTION INTRAMUSCULAR; INTRAVENOUS at 10:54

## 2019-10-04 RX ADMIN — INFLUENZA A VIRUS A/MICHIGAN/45/2015 X-275 (H1N1) ANTIGEN (FORMALDEHYDE INACTIVATED), INFLUENZA A VIRUS A/SINGAPORE/INFIMH-16-0019/2016 IVR-186 (H3N2) ANTIGEN (FORMALDEHYDE INACTIVATED), AND INFLUENZA B VIRUS B/MARYLAND/15/2016 BX-69A (A B/COLORADO/6/2017-LIKE VIRUS) ANTIGEN (FORMALDEHYDE INACTIVATED) 0.5 ML: 60; 60; 60 INJECTION, SUSPENSION INTRAMUSCULAR at 10:50

## 2019-10-04 RX ADMIN — PACLITAXEL 222 MG: 6 INJECTION, SOLUTION INTRAVENOUS at 11:40

## 2019-10-04 RX ADMIN — CARBOPLATIN 450 MG: 10 INJECTION, SOLUTION INTRAVENOUS at 15:03

## 2019-10-04 RX ADMIN — SODIUM CHLORIDE 250 ML: 9 INJECTION, SOLUTION INTRAVENOUS at 10:53

## 2019-10-04 RX ADMIN — DIPHENHYDRAMINE HYDROCHLORIDE 50 MG: 25 CAPSULE ORAL at 10:43

## 2019-10-04 RX ADMIN — HEPARIN SODIUM (PORCINE) LOCK FLUSH IV SOLN 100 UNIT/ML 5 ML: 100 SOLUTION at 15:50

## 2019-10-04 RX ADMIN — PALONOSETRON 0.25 MG: 0.05 INJECTION, SOLUTION INTRAVENOUS at 10:54

## 2019-10-04 ASSESSMENT — PAIN SCALES - GENERAL: PAINLEVEL: NO PAIN (0)

## 2019-10-04 ASSESSMENT — MIFFLIN-ST. JEOR: SCORE: 779.82

## 2019-10-04 NOTE — PROGRESS NOTES
ONCOLOGY SOCIAL WORK  INITIAL PSYCHOSOCIAL ASSESSMENT    Assessment completed of living situation, support system, financial status, functional status, coping, stressors, need for resources and social work intervention provided as needed.    Date of Assessment: 10/4/2019    Present at assessment: Brittni comes to clinic today for C2D1 Taxol/ Carboplatin, this clinician met with Brittni with the goal of introducing psychosocial services and support.     Diagnosis: clear cell carcinoma of the left ovary    Date of Diagnosis: 8/9/2019    Physician: Naz Ascencio MD    Decision Making: Self    Health Care Directive:  Not on file, did not discuss with Brittni today    Relationship Status of Patient:     Family/Support System: Children: Pardeep ([place], [place]), Love ([place]). Best friend: Mary. Close friend: Wilfredo.     Caregiver/Home Services: Currently has RN from [facility] visiting in home.     Transportation: Private Car, drives self when close to home. Transportation support from close friends.    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     Financial Concerns: None reported at present time    Mental Health: Brittni reports history of anxiety, she reports that she has been connected with therapeutic support in the past. Working to establish therapist with [facility] and recently connected with therapist who she saw for 7 years who relocated to [place].     Legal Concerns/Involvement: None reported    Recreation/Leisure Interests: attending [employer] music shows, working out at [employer]    Current Coping Strategies: approachable, responsive, interactive and seeks support    Assessment and Recommendations for Team:   Brittni presents as an engaging and thoughtful woman who appears to be adjusting as well as can be expected to new cancer diagnosis. Brittni  acknowledges that from a physical standpoint she has been coping well. Brittni acknowledges longstanding history of anxiety which has been understandably more elevated since time of diagnosis, especially when alone at home in the evenings. Brittni endorses that she feels that increased social connection would be helpful for her in coping with anxiety. Discussed options for social connection with cancer community through [email or web address]. Brittni reports today that with physical changes it has become somewhat more difficult for her to engage with house cleaning, and that she would be receptive to resources that can assist with this. Brittni acknowledged that changes in endurance and strength have been difficult, and she was receptive to learning more about physical therapy support at today's visit. Resources offered for enhanced community support, Brittni receptive to ongoing social work intervention for continued emotional support.      Resources Discussed:   [facility]- HOME program (for cleaning support)  [employer]aning [employer]  Cancer Re[facility] Services  [email or web address]    Follow-Up Planned:  1) This clinician will plan for psychosocial check-in and emotional support at  on 10/25/19. Brittni knows to reach out to this clinician prior in the case of distress or concern.   2) This clinician will continue to collaborate with multidisciplinary care team surrounding ongoing care needs.     Please page in the case of psychosocial distress or need.     CLIFFORD Vora, LICSW  Phone: [phone]  Pager: [phone]    [facility]: M, Thu  *every other Tue, 8am-4:30pm  [facility]: W, F, *every other Tue, 8am-4:30pm

## 2019-10-04 NOTE — PROGRESS NOTES
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  S/P exploratory laparotomy, total abdominal hysterectomy, removal of her ovary and the appendix  08/09  Patient of    Due to high grade nature of the disease Dr. Ascencio recommended chemotherapy Taxol carbo x6 cycles  Chemotherapy started on 09 13    Interval History:  Tolerated first cycle of chemotherapy without any issues or problems.  She denies fever chills sweats denies cough no shortness of breath denies chest pain.  Denies nausea vomiting diarrhea denies abdominal pain denies neuropathy.  She reports she is very active her appetite is good.  No new concerns today    Review of Systems:  14 point ROS of systems including Constitutional, Eyes, Respiratory, Cardiovascular, Gastroenterology, Genitourinary, Integumentary, Muscularskeletal, Psychiatric were all negative except for pertinent positives noted in my HPI.      Current Outpatient Medications   Medication Sig Dispense Refill     amLODIPine (NORVASC) 5 MG tablet Take 1 tablet (5 mg) by mouth daily 30 tablet 0     B Complex Vitamins (B COMPLEX PO) Take 1 tablet by mouth daily (with lunch)        Calcium Carbonate-Vitamin D (CALCIUM + D PO) Take 6 tablets by mouth daily.       Coenzyme Q10 (COQ10 PO) Take 200 mg by mouth 2 times daily       dronabinol (MARINOL) 2.5 MG capsule Take 1 capsule (2.5 mg) by mouth 2 times daily (before meals) 60 capsule 0     DULoxetine (CYMBALTA) 20 MG capsule Take 1 capsule (20 mg) by mouth daily Follow up with your PCP for refills 30 capsule 0     enoxaparin (LOVENOX) 30 MG/0.3ML syringe Inject 0.3 mLs (30 mg) Subcutaneous every 24 hours 24 Syringe 0     ibuprofen (ADVIL/MOTRIN) 200 MG tablet Take 400 mg by mouth every 6 hours as needed for mild pain       levothyroxine (SYNTHROID/LEVOTHROID) 50 MCG tablet Take 50 mcg by mouth every morning         "liothyronine (CYTOMEL) 5 MCG tablet Take 5 mcg by mouth every morning        Multiple Vitamins-Minerals (MULTIVITAL PO) Take 1 tablet by mouth daily (with lunch)        Probiotic Product (PROBIOTIC DAILY PO) 1 tablet daily at lunchtime by mouth       prochlorperazine (COMPAZINE) 10 MG tablet Take 1 tablet (10 mg) by mouth every 6 hours as needed (nausea/vomiting) 30 tablet 5     psyllium (METAMUCIL/KONSYL) Packet Take 1 packet by mouth daily       loperamide (IMODIUM) 1 MG/5ML liquid Take 1 mg by mouth 6 times daily          Physical Examination:  General: The patient is a pleasant female in no acute distress.  /84   Pulse 98   Temp 97.7  F (36.5  C) (Oral)   Resp 16   Ht 1.473 m (4' 10\")   Wt 40 kg (88 lb 3.2 oz)   LMP  (LMP Unknown)   SpO2 98%   BMI 18.43 kg/m    HEENT: EOMI, PERRL. Sclerae are anicteric. Oral mucosa is pink and moist with no lesions or thrush.   Lymph: Neck is supple with no lymphadenopathy in the cervical or supraclavicular areas.   Heart: Regular rate and rhythm.   Lungs: Clear to auscultation bilaterally.   GI: Bowel sounds present, soft, nontender with no palpable hepatosplenomegaly or masses.   Extremities: No lower extremity edema noted bilaterally.   Skin: No rashes, petechiae, or bruising noted on exposed skin.    Laboratory Data:  Results for orders placed or performed in visit on 10/04/19 (from the past 24 hour(s))   CBC with platelets differential   Result Value Ref Range    WBC 3.9 (L) 4.0 - 11.0 10e9/L    RBC Count 4.23 3.8 - 5.2 10e12/L    Hemoglobin 13.0 11.7 - 15.7 g/dL    Hematocrit 39.7 35.0 - 47.0 %    MCV 94 78 - 100 fl    MCH 30.7 26.5 - 33.0 pg    MCHC 32.7 31.5 - 36.5 g/dL    RDW 14.0 10.0 - 15.0 %    Platelet Count 164 150 - 450 10e9/L    Diff Method Automated Method     % Neutrophils 55.7 %    % Lymphocytes 31.2 %    % Monocytes 10.5 %    % Eosinophils 0.8 %    % Basophils 1.5 %    % Immature Granulocytes 0.3 %    Nucleated RBCs 0 0 /100    Absolute " Neutrophil 2.2 1.6 - 8.3 10e9/L    Absolute Lymphocytes 1.2 0.8 - 5.3 10e9/L    Absolute Monocytes 0.4 0.0 - 1.3 10e9/L    Absolute Eosinophils 0.0 0.0 - 0.7 10e9/L    Absolute Basophils 0.1 0.0 - 0.2 10e9/L    Abs Immature Granulocytes 0.0 0 - 0.4 10e9/L    Absolute Nucleated RBC 0.0    Comprehensive metabolic panel   Result Value Ref Range    Sodium 138 133 - 144 mmol/L    Potassium 4.0 3.4 - 5.3 mmol/L    Chloride 106 94 - 109 mmol/L    Carbon Dioxide 30 20 - 32 mmol/L    Anion Gap 2 (L) 3 - 14 mmol/L    Glucose 82 70 - 99 mg/dL    Urea Nitrogen 12 7 - 30 mg/dL    Creatinine 0.56 0.52 - 1.04 mg/dL    GFR Estimate >90 >60 mL/min/[1.73_m2]    GFR Estimate If Black >90 >60 mL/min/[1.73_m2]    Calcium 9.2 8.5 - 10.1 mg/dL    Bilirubin Total 0.2 0.2 - 1.3 mg/dL    Albumin 3.9 3.4 - 5.0 g/dL    Protein Total 7.7 6.8 - 8.8 g/dL    Alkaline Phosphatase 63 40 - 150 U/L    ALT 32 0 - 50 U/L    AST 20 0 - 45 U/L   Magnesium   Result Value Ref Range    Magnesium 2.0 1.6 - 2.3 mg/dL         Assessment and Plan:    This is a 74-year-old  female with      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  S/P exploratory laparotomy, total abdominal hysterectomy, removal of her ovary and the appendix  08/09  Patient of    Due to high grade nature of the disease Dr. Ascencio recommended chemotherapy Taxol carbo x 6 cycles-started on 09/13/2019  Patient reports she has tolerated chemotherapy well  -labs  reviewed stable counts  Patient has follow-up appointment with me with next cycle of chemo  Per Dr Ascencio nurse - return for a visit with Dr. Ascencioafter 6 cycles of chemo and  CT CAP prior to appt.  Dr. Ascencio does not need to see her in between any of the cycles  -I will continue to see patient with each cycle of chemo      Health maintenance    flu shot today        Patient is advised to go to the ER or call 911 in the event of fever chills sweats cough shortness of breath chest pain.  Nausea vomiting diarrhea abdominal  pain bleeding or any changes in health condition-patient and son verbalized understanding      LEX Salgado CNP  Hem/Onc   [facility]     Chart documentation with Dragon Voice recognition Software. Although reviewed after completion, some words and grammatical errors may remain.

## 2019-10-04 NOTE — LETTER
"    10/4/2019         RE: Maximino Simeon  [address] [facility] [id number]        Dear Colleague,    Thank you for referring your patient, Maximino Simeon, to the [facility] CANCER [facility]. Please see a copy of my visit note below.    Oncology Rooming Note    October 4, 2019 9:33 AM   Maximino Simeon is a 74 year old female who presents for:    Chief Complaint   Patient presents with     Oncology Clinic Visit     Initial Vitals: /84   Pulse 98   Temp 97.7  F (36.5  C) (Oral)   Resp 16   Ht 1.473 m (4' 10\")   LMP  (LMP Unknown)   SpO2 98%   BMI 18.06 kg/m    Estimated body mass index is 18.06 kg/m  as calculated from the following:    Height as of this encounter: 1.473 m (4' 10\").    Weight as of 9/18/19: 39.2 kg (86 lb 6.7 oz). Body surface area is 1.27 meters squared.  No Pain (0) Comment: Data Unavailable   No LMP recorded (lmp unknown). Patient has had a hysterectomy.  Allergies reviewed: Yes  Medications reviewed: Yes    Medications: Medication refills not needed today.  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  S/P exploratory laparotomy, total abdominal hysterectomy, removal of her ovary and the appendix  08/09  Patient of    Due to high grade nature of the disease Dr. Ascencio recommended chemotherapy Taxol carbo x6 cycles  Chemotherapy started on 09 13    Interval History:  Tolerated first cycle of chemotherapy without any issues or problems.  She denies fever chills sweats denies cough no shortness of breath denies chest pain.  Denies nausea vomiting " diarrhea denies abdominal pain denies neuropathy.  She reports she is very active her appetite is good.  No new concerns today    Review of Systems:  14 point ROS of systems including Constitutional, Eyes, Respiratory, Cardiovascular, Gastroenterology, Genitourinary, Integumentary, Muscularskeletal, Psychiatric were all negative except for pertinent positives noted in my HPI.      Current Outpatient Medications   Medication Sig Dispense Refill     amLODIPine (NORVASC) 5 MG tablet Take 1 tablet (5 mg) by mouth daily 30 tablet 0     B Complex Vitamins (B COMPLEX PO) Take 1 tablet by mouth daily (with lunch)        Calcium Carbonate-Vitamin D (CALCIUM + D PO) Take 6 tablets by mouth daily.       Coenzyme Q10 (COQ10 PO) Take 200 mg by mouth 2 times daily       dronabinol (MARINOL) 2.5 MG capsule Take 1 capsule (2.5 mg) by mouth 2 times daily (before meals) 60 capsule 0     DULoxetine (CYMBALTA) 20 MG capsule Take 1 capsule (20 mg) by mouth daily Follow up with your PCP for refills 30 capsule 0     enoxaparin (LOVENOX) 30 MG/0.3ML syringe Inject 0.3 mLs (30 mg) Subcutaneous every 24 hours 24 Syringe 0     ibuprofen (ADVIL/MOTRIN) 200 MG tablet Take 400 mg by mouth every 6 hours as needed for mild pain       levothyroxine (SYNTHROID/LEVOTHROID) 50 MCG tablet Take 50 mcg by mouth every morning        liothyronine (CYTOMEL) 5 MCG tablet Take 5 mcg by mouth every morning        Multiple Vitamins-Minerals (MULTIVITAL PO) Take 1 tablet by mouth daily (with lunch)        Probiotic Product (PROBIOTIC DAILY PO) 1 tablet daily at lunchtime by mouth       prochlorperazine (COMPAZINE) 10 MG tablet Take 1 tablet (10 mg) by mouth every 6 hours as needed (nausea/vomiting) 30 tablet 5     psyllium (METAMUCIL/KONSYL) Packet Take 1 packet by mouth daily       loperamide (IMODIUM) 1 MG/5ML liquid Take 1 mg by mouth 6 times daily          Physical Examination:  General: The patient is a pleasant female in no acute distress.  /84    "Pulse 98   Temp 97.7  F (36.5  C) (Oral)   Resp 16   Ht 1.473 m (4' 10\")   Wt 40 kg (88 lb 3.2 oz)   LMP  (LMP Unknown)   SpO2 98%   BMI 18.43 kg/m     HEENT: EOMI, PERRL. Sclerae are anicteric. Oral mucosa is pink and moist with no lesions or thrush.   Lymph: Neck is supple with no lymphadenopathy in the cervical or supraclavicular areas.   Heart: Regular rate and rhythm.   Lungs: Clear to auscultation bilaterally.   GI: Bowel sounds present, soft, nontender with no palpable hepatosplenomegaly or masses.   Extremities: No lower extremity edema noted bilaterally.   Skin: No rashes, petechiae, or bruising noted on exposed skin.    Laboratory Data:  Results for orders placed or performed in visit on 10/04/19 (from the past 24 hour(s))   CBC with platelets differential   Result Value Ref Range    WBC 3.9 (L) 4.0 - 11.0 10e9/L    RBC Count 4.23 3.8 - 5.2 10e12/L    Hemoglobin 13.0 11.7 - 15.7 g/dL    Hematocrit 39.7 35.0 - 47.0 %    MCV 94 78 - 100 fl    MCH 30.7 26.5 - 33.0 pg    MCHC 32.7 31.5 - 36.5 g/dL    RDW 14.0 10.0 - 15.0 %    Platelet Count 164 150 - 450 10e9/L    Diff Method Automated Method     % Neutrophils 55.7 %    % Lymphocytes 31.2 %    % Monocytes 10.5 %    % Eosinophils 0.8 %    % Basophils 1.5 %    % Immature Granulocytes 0.3 %    Nucleated RBCs 0 0 /100    Absolute Neutrophil 2.2 1.6 - 8.3 10e9/L    Absolute Lymphocytes 1.2 0.8 - 5.3 10e9/L    Absolute Monocytes 0.4 0.0 - 1.3 10e9/L    Absolute Eosinophils 0.0 0.0 - 0.7 10e9/L    Absolute Basophils 0.1 0.0 - 0.2 10e9/L    Abs Immature Granulocytes 0.0 0 - 0.4 10e9/L    Absolute Nucleated RBC 0.0    Comprehensive metabolic panel   Result Value Ref Range    Sodium 138 133 - 144 mmol/L    Potassium 4.0 3.4 - 5.3 mmol/L    Chloride 106 94 - 109 mmol/L    Carbon Dioxide 30 20 - 32 mmol/L    Anion Gap 2 (L) 3 - 14 mmol/L    Glucose 82 70 - 99 mg/dL    Urea Nitrogen 12 7 - 30 mg/dL    Creatinine 0.56 0.52 - 1.04 mg/dL    GFR Estimate >90 >60 " mL/min/[1.73_m2]    GFR Estimate If Black >90 >60 mL/min/[1.73_m2]    Calcium 9.2 8.5 - 10.1 mg/dL    Bilirubin Total 0.2 0.2 - 1.3 mg/dL    Albumin 3.9 3.4 - 5.0 g/dL    Protein Total 7.7 6.8 - 8.8 g/dL    Alkaline Phosphatase 63 40 - 150 U/L    ALT 32 0 - 50 U/L    AST 20 0 - 45 U/L   Magnesium   Result Value Ref Range    Magnesium 2.0 1.6 - 2.3 mg/dL         Assessment and Plan:    This is a 74-year-old  female with      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  S/P exploratory laparotomy, total abdominal hysterectomy, removal of her ovary and the appendix  08/09  Patient of    Due to high grade nature of the disease Dr. Ascencio recommended chemotherapy Taxol carbo x 6 cycles-started on 09/13/2019  Patient reports she has tolerated chemotherapy well  -labs  reviewed stable counts  Patient has follow-up appointment with me with next cycle of chemo      Health maintenance    flu shot today        Patient is advised to go to the ER or call 911 in the event of fever chills sweats cough shortness of breath chest pain.  Nausea vomiting diarrhea abdominal pain bleeding or any changes in health condition-patient and son verbalized understanding      LEX Salgado CNP  Hem/Onc   [facility]     Chart documentation with Dragon Voice recognition Software. Although reviewed after completion, some words and grammatical errors may remain.          Again, thank you for allowing me to participate in the care of your patient.        Sincerely,        LEX Salgado CNP

## 2019-10-04 NOTE — PROGRESS NOTES
"Oncology Rooming Note    October 4, 2019 9:33 AM   Maximino Simeon is a 74 year old female who presents for:    Chief Complaint   Patient presents with     Oncology Clinic Visit     Initial Vitals: /84   Pulse 98   Temp 97.7  F (36.5  C) (Oral)   Resp 16   Ht 1.473 m (4' 10\")   LMP  (LMP Unknown)   SpO2 98%   BMI 18.06 kg/m   Estimated body mass index is 18.06 kg/m  as calculated from the following:    Height as of this encounter: 1.473 m (4' 10\").    Weight as of 9/18/19: 39.2 kg (86 lb 6.7 oz). Body surface area is 1.27 meters squared.  No Pain (0) Comment: Data Unavailable   No LMP recorded (lmp unknown). Patient has had a hysterectomy.  Allergies reviewed: Yes  Medications reviewed: Yes    Medications: Medication refills not needed today.  Pharmacy name entered into Libox:    [id number] [address] PHARMACY [facility] [address] PHARMACY [place], [place] - [address] [address]    Clinical concerns: no      Soraya Beltrán CMA            "

## 2019-10-04 NOTE — PROGRESS NOTES
Infusion Nursing Note:  Maximino Simeon presents today for C2D1 Taxol/ Carboplatin.    Patient seen by provider today: Yes: Shashi Smiht NP    Note: Patient reports feeling well and to tolerating treatment without issue.  Received influenza vaccine as ordered.    Taxol infusion titrated as follows:  50mg/hr x5 min  100 mg/hr x5 min  150mg/hr x5 min  196 mg/hr x 5min    Patient tolerated without issue.    Intravenous Access:  Implanted Port.      Treatment Conditions:  Lab Results   Component Value Date    HGB 13.0 10/04/2019     Lab Results   Component Value Date    WBC 3.9 10/04/2019      Lab Results   Component Value Date    ANEU 2.2 10/04/2019     Lab Results   Component Value Date     10/04/2019      Lab Results   Component Value Date     10/04/2019                   Lab Results   Component Value Date    POTASSIUM 4.0 10/04/2019           Lab Results   Component Value Date    MAG 2.0 10/04/2019            Lab Results   Component Value Date    CR 0.56 10/04/2019                   Lab Results   Component Value Date    LUCRECIA 9.2 10/04/2019                Lab Results   Component Value Date    BILITOTAL 0.2 10/04/2019           Lab Results   Component Value Date    ALBUMIN 3.9 10/04/2019                    Lab Results   Component Value Date    ALT 32 10/04/2019           Lab Results   Component Value Date    AST 20 10/04/2019       Results reviewed, labs MET treatment parameters, ok to proceed with treatment.      Post Infusion Assessment:  Patient tolerated infusion without incident.  Patient tolerated one Influenza injection without incident to left upper arm.  Blood return noted pre and post infusion.  Site patent and intact, free from redness, edema or discomfort.  No evidence of extravasations.  Access discontinued per protocol.    Discharge Plan:   Patient declined prescription refills.  Discharge instructions reviewed with: Patient.  Patient and/or family verbalized understanding of discharge  instructions and all questions answered.  Copy of AVS reviewed with patient and/or family.  Patient will return 10/25/19 for next appointment.  Patient discharged in stable condition accompanied by: self.  Departure Mode: Ambulatory.    Afshan Schrader, RN, RN

## 2019-10-08 ENCOUNTER — TELEPHONE (OUTPATIENT)
Dept: ONCOLOGY | Facility: CLINIC | Age: 74
End: 2019-10-08

## 2019-10-11 ENCOUNTER — CARE COORDINATION (OUTPATIENT)
Dept: ONCOLOGY | Facility: CLINIC | Age: 74
End: 2019-10-11

## 2019-10-11 NOTE — PROGRESS NOTES
2nd call to patient to request if she would like Dr Mason to increase her Cymbalta to help with her mood. Vm left and patient encoraged to call me back

## 2019-10-11 NOTE — TELEPHONE ENCOUNTER
Pt daughter Edwina left message on voicemail  Forms were sent to clinic on 10/3 or 10/4 wonders if we received them and when they will be complete  email them to daughter at [email or web address]    Date received in clinic:  10/3  How received:fax  Type of form received:disability/[facility]mla [facility]  Date completed:10/9/19  Patient notified:10/9 spoke with edwina explained I only received pages 1 and 3   Those were filled out and can be sent  Edwina states there were 2 more pages that must not have come through, edwina will refax those for completion     10/10 received copies of pages 2 and 4   Forms completed, MD will need to sign on 10/11 then they can be sent   Edwina requests that specific dates not be put in just a beginning and end date.  This was done  Disposition of form:10/11 via email   Sent to scanning:no placed in file for family records with no chart

## 2019-10-24 ENCOUNTER — TELEPHONE (OUTPATIENT)
Dept: NURSING | Facility: CLINIC | Age: 74
End: 2019-10-24

## 2019-10-24 NOTE — TELEPHONE ENCOUNTER
Patient calling for appt clarification.  Gave per epic. o triage was needed.  Dejah Ricketts RN [facility]

## 2019-10-27 RX ORDER — SODIUM CHLORIDE 9 MG/ML
1000 INJECTION, SOLUTION INTRAVENOUS CONTINUOUS PRN
Status: CANCELLED
Start: 2019-10-28

## 2019-10-27 RX ORDER — ALBUTEROL SULFATE 0.83 MG/ML
2.5 SOLUTION RESPIRATORY (INHALATION)
Status: CANCELLED | OUTPATIENT
Start: 2019-10-28

## 2019-10-27 RX ORDER — EPINEPHRINE 0.3 MG/.3ML
0.3 INJECTION SUBCUTANEOUS EVERY 5 MIN PRN
Status: CANCELLED | OUTPATIENT
Start: 2019-10-28

## 2019-10-27 RX ORDER — METHYLPREDNISOLONE SODIUM SUCCINATE 125 MG/2ML
125 INJECTION, POWDER, LYOPHILIZED, FOR SOLUTION INTRAMUSCULAR; INTRAVENOUS
Status: CANCELLED
Start: 2019-10-28

## 2019-10-27 RX ORDER — PALONOSETRON 0.05 MG/ML
0.25 INJECTION, SOLUTION INTRAVENOUS ONCE
Status: CANCELLED
Start: 2019-10-28

## 2019-10-27 RX ORDER — NALOXONE HYDROCHLORIDE 0.4 MG/ML
.1-.4 INJECTION, SOLUTION INTRAMUSCULAR; INTRAVENOUS; SUBCUTANEOUS
Status: CANCELLED | OUTPATIENT
Start: 2019-10-28

## 2019-10-27 RX ORDER — MEPERIDINE HYDROCHLORIDE 25 MG/ML
25 INJECTION INTRAMUSCULAR; INTRAVENOUS; SUBCUTANEOUS EVERY 30 MIN PRN
Status: CANCELLED | OUTPATIENT
Start: 2019-10-28

## 2019-10-27 RX ORDER — ALBUTEROL SULFATE 90 UG/1
1-2 AEROSOL, METERED RESPIRATORY (INHALATION)
Status: CANCELLED
Start: 2019-10-28

## 2019-10-27 RX ORDER — DIPHENHYDRAMINE HYDROCHLORIDE 50 MG/ML
50 INJECTION INTRAMUSCULAR; INTRAVENOUS
Status: CANCELLED
Start: 2019-10-28

## 2019-10-27 RX ORDER — LORAZEPAM 2 MG/ML
1 INJECTION INTRAMUSCULAR EVERY 6 HOURS PRN
Status: CANCELLED
Start: 2019-10-28

## 2019-10-27 RX ORDER — DIPHENHYDRAMINE HCL 25 MG
50 CAPSULE ORAL ONCE
Status: CANCELLED
Start: 2019-10-28

## 2019-10-27 RX ORDER — EPINEPHRINE 1 MG/ML
0.3 INJECTION, SOLUTION INTRAMUSCULAR; SUBCUTANEOUS EVERY 5 MIN PRN
Status: CANCELLED | OUTPATIENT
Start: 2019-10-28

## 2019-10-28 ENCOUNTER — ONCOLOGY VISIT (OUTPATIENT)
Dept: ONCOLOGY | Facility: CLINIC | Age: 74
End: 2019-10-28
Attending: OBSTETRICS & GYNECOLOGY
Payer: MEDICARE

## 2019-10-28 ENCOUNTER — INFUSION THERAPY VISIT (OUTPATIENT)
Dept: INFUSION THERAPY | Facility: CLINIC | Age: 74
End: 2019-10-28
Attending: NURSE PRACTITIONER
Payer: MEDICARE

## 2019-10-28 ENCOUNTER — HOSPITAL ENCOUNTER (OUTPATIENT)
Facility: CLINIC | Age: 74
Setting detail: SPECIMEN
Discharge: HOME | End: 2019-10-28
Attending: NURSE PRACTITIONER | Admitting: NURSE PRACTITIONER
Payer: MEDICARE

## 2019-10-28 VITALS
HEART RATE: 94 BPM | BODY MASS INDEX: 18.68 KG/M2 | HEIGHT: 58 IN | TEMPERATURE: 97.6 F | SYSTOLIC BLOOD PRESSURE: 149 MMHG | DIASTOLIC BLOOD PRESSURE: 88 MMHG | RESPIRATION RATE: 16 BRPM | WEIGHT: 89 LBS

## 2019-10-28 VITALS
DIASTOLIC BLOOD PRESSURE: 88 MMHG | WEIGHT: 89 LBS | BODY MASS INDEX: 18.68 KG/M2 | HEIGHT: 58 IN | TEMPERATURE: 97.6 F | HEART RATE: 94 BPM | RESPIRATION RATE: 16 BRPM | SYSTOLIC BLOOD PRESSURE: 149 MMHG

## 2019-10-28 DIAGNOSIS — D70.1 CHEMOTHERAPY-INDUCED NEUTROPENIA (H): ICD-10-CM

## 2019-10-28 DIAGNOSIS — D70.1 CHEMOTHERAPY-INDUCED NEUTROPENIA (H): Primary | ICD-10-CM

## 2019-10-28 DIAGNOSIS — T45.1X5A CHEMOTHERAPY-INDUCED NEUTROPENIA (H): Primary | ICD-10-CM

## 2019-10-28 DIAGNOSIS — C56.9 OVARIAN CANCER, UNSPECIFIED LATERALITY (H): ICD-10-CM

## 2019-10-28 DIAGNOSIS — Z51.11 ENCOUNTER FOR ANTINEOPLASTIC CHEMOTHERAPY: ICD-10-CM

## 2019-10-28 DIAGNOSIS — C56.9 OVARIAN CANCER, UNSPECIFIED LATERALITY (H): Primary | ICD-10-CM

## 2019-10-28 DIAGNOSIS — T45.1X5A CHEMOTHERAPY-INDUCED NEUTROPENIA (H): ICD-10-CM

## 2019-10-28 DIAGNOSIS — Z95.828 PORT-A-CATH IN PLACE: ICD-10-CM

## 2019-10-28 LAB
ALBUMIN SERPL-MCNC: 3.8 G/DL (ref 3.4–5)
ALP SERPL-CCNC: 47 U/L (ref 40–150)
ALT SERPL W P-5'-P-CCNC: 21 U/L (ref 0–50)
ANION GAP SERPL CALCULATED.3IONS-SCNC: 3 MMOL/L (ref 3–14)
AST SERPL W P-5'-P-CCNC: 20 U/L (ref 0–45)
BASOPHILS # BLD AUTO: 0 10E9/L (ref 0–0.2)
BASOPHILS NFR BLD AUTO: 1 %
BILIRUB SERPL-MCNC: 0.2 MG/DL (ref 0.2–1.3)
BUN SERPL-MCNC: 11 MG/DL (ref 7–30)
CALCIUM SERPL-MCNC: 9.3 MG/DL (ref 8.5–10.1)
CHLORIDE SERPL-SCNC: 105 MMOL/L (ref 94–109)
CO2 SERPL-SCNC: 30 MMOL/L (ref 20–32)
CREAT SERPL-MCNC: 0.51 MG/DL (ref 0.52–1.04)
DIFFERENTIAL METHOD BLD: ABNORMAL
EOSINOPHIL # BLD AUTO: 0 10E9/L (ref 0–0.7)
EOSINOPHIL NFR BLD AUTO: 0.7 %
ERYTHROCYTE [DISTWIDTH] IN BLOOD BY AUTOMATED COUNT: 14.4 % (ref 10–15)
GFR SERPL CREATININE-BSD FRML MDRD: >90 ML/MIN/{1.73_M2}
GLUCOSE SERPL-MCNC: 74 MG/DL (ref 70–99)
HCT VFR BLD AUTO: 39.2 % (ref 35–47)
HGB BLD-MCNC: 12.8 G/DL (ref 11.7–15.7)
IMM GRANULOCYTES # BLD: 0 10E9/L (ref 0–0.4)
IMM GRANULOCYTES NFR BLD: 0 %
LYMPHOCYTES # BLD AUTO: 1.3 10E9/L (ref 0.8–5.3)
LYMPHOCYTES NFR BLD AUTO: 45.7 %
MAGNESIUM SERPL-MCNC: 2 MG/DL (ref 1.6–2.3)
MCH RBC QN AUTO: 30.9 PG (ref 26.5–33)
MCHC RBC AUTO-ENTMCNC: 32.7 G/DL (ref 31.5–36.5)
MCV RBC AUTO: 95 FL (ref 78–100)
MONOCYTES # BLD AUTO: 0.3 10E9/L (ref 0–1.3)
MONOCYTES NFR BLD AUTO: 10.7 %
NEUTROPHILS # BLD AUTO: 1.2 10E9/L (ref 1.6–8.3)
NEUTROPHILS NFR BLD AUTO: 41.9 %
NRBC # BLD AUTO: 0 10*3/UL
NRBC BLD AUTO-RTO: 0 /100
PLATELET # BLD AUTO: 243 10E9/L (ref 150–450)
POTASSIUM SERPL-SCNC: 3.8 MMOL/L (ref 3.4–5.3)
PROT SERPL-MCNC: 7.5 G/DL (ref 6.8–8.8)
RBC # BLD AUTO: 4.14 10E12/L (ref 3.8–5.2)
SODIUM SERPL-SCNC: 138 MMOL/L (ref 133–144)
WBC # BLD AUTO: 2.9 10E9/L (ref 4–11)

## 2019-10-28 PROCEDURE — 85025 COMPLETE CBC W/AUTO DIFF WBC: CPT | Performed by: NURSE PRACTITIONER

## 2019-10-28 PROCEDURE — 25000128 H RX IP 250 OP 636: Performed by: OBSTETRICS & GYNECOLOGY

## 2019-10-28 PROCEDURE — 80053 COMPREHEN METABOLIC PANEL: CPT | Performed by: NURSE PRACTITIONER

## 2019-10-28 PROCEDURE — 36415 COLL VENOUS BLD VENIPUNCTURE: CPT

## 2019-10-28 PROCEDURE — G0463 HOSPITAL OUTPT CLINIC VISIT: HCPCS

## 2019-10-28 PROCEDURE — 99214 OFFICE O/P EST MOD 30 MIN: CPT | Performed by: NURSE PRACTITIONER

## 2019-10-28 PROCEDURE — 83735 ASSAY OF MAGNESIUM: CPT | Performed by: NURSE PRACTITIONER

## 2019-10-28 RX ORDER — DIPHENHYDRAMINE HYDROCHLORIDE 50 MG/ML
50 INJECTION INTRAMUSCULAR; INTRAVENOUS
Status: CANCELLED
Start: 2019-10-30

## 2019-10-28 RX ORDER — ALBUTEROL SULFATE 0.83 MG/ML
2.5 SOLUTION RESPIRATORY (INHALATION)
Status: CANCELLED | OUTPATIENT
Start: 2019-10-30

## 2019-10-28 RX ORDER — MEPERIDINE HYDROCHLORIDE 25 MG/ML
25 INJECTION INTRAMUSCULAR; INTRAVENOUS; SUBCUTANEOUS EVERY 30 MIN PRN
Status: CANCELLED | OUTPATIENT
Start: 2019-10-30

## 2019-10-28 RX ORDER — DIPHENHYDRAMINE HCL 25 MG
50 CAPSULE ORAL ONCE
Status: CANCELLED | OUTPATIENT
Start: 2019-10-30

## 2019-10-28 RX ORDER — EPINEPHRINE 0.3 MG/.3ML
0.3 INJECTION SUBCUTANEOUS EVERY 5 MIN PRN
Status: CANCELLED | OUTPATIENT
Start: 2019-10-30

## 2019-10-28 RX ORDER — ALBUTEROL SULFATE 90 UG/1
1-2 AEROSOL, METERED RESPIRATORY (INHALATION)
Status: CANCELLED
Start: 2019-10-30

## 2019-10-28 RX ORDER — HEPARIN SODIUM (PORCINE) LOCK FLUSH IV SOLN 100 UNIT/ML 100 UNIT/ML
5 SOLUTION INTRAVENOUS
Status: CANCELLED | OUTPATIENT
Start: 2019-10-28

## 2019-10-28 RX ORDER — NALOXONE HYDROCHLORIDE 0.4 MG/ML
.1-.4 INJECTION, SOLUTION INTRAMUSCULAR; INTRAVENOUS; SUBCUTANEOUS
Status: CANCELLED | OUTPATIENT
Start: 2019-10-30

## 2019-10-28 RX ORDER — LORAZEPAM 2 MG/ML
1 INJECTION INTRAMUSCULAR EVERY 6 HOURS PRN
Status: CANCELLED
Start: 2019-10-30

## 2019-10-28 RX ORDER — METHYLPREDNISOLONE SODIUM SUCCINATE 125 MG/2ML
125 INJECTION, POWDER, LYOPHILIZED, FOR SOLUTION INTRAMUSCULAR; INTRAVENOUS
Status: CANCELLED
Start: 2019-10-30

## 2019-10-28 RX ORDER — HEPARIN SODIUM (PORCINE) LOCK FLUSH IV SOLN 100 UNIT/ML 100 UNIT/ML
5 SOLUTION INTRAVENOUS
Status: DISCONTINUED | OUTPATIENT
Start: 2019-10-28 | End: 2019-10-28 | Stop reason: HOSPADM

## 2019-10-28 RX ORDER — PALONOSETRON 0.05 MG/ML
0.25 INJECTION, SOLUTION INTRAVENOUS ONCE
Status: CANCELLED | OUTPATIENT
Start: 2019-10-30

## 2019-10-28 RX ORDER — DIPHENHYDRAMINE HCL 25 MG
50 CAPSULE ORAL ONCE
Status: DISCONTINUED | OUTPATIENT
Start: 2019-10-28 | End: 2019-10-28

## 2019-10-28 RX ORDER — SODIUM CHLORIDE 9 MG/ML
1000 INJECTION, SOLUTION INTRAVENOUS CONTINUOUS PRN
Status: CANCELLED
Start: 2019-10-30

## 2019-10-28 RX ORDER — PALONOSETRON 0.05 MG/ML
0.25 INJECTION, SOLUTION INTRAVENOUS ONCE
Status: DISCONTINUED | OUTPATIENT
Start: 2019-10-28 | End: 2019-10-28

## 2019-10-28 RX ORDER — EPINEPHRINE 1 MG/ML
0.3 INJECTION, SOLUTION INTRAMUSCULAR; SUBCUTANEOUS EVERY 5 MIN PRN
Status: CANCELLED | OUTPATIENT
Start: 2019-10-30

## 2019-10-28 RX ADMIN — HEPARIN SODIUM (PORCINE) LOCK FLUSH IV SOLN 100 UNIT/ML 5 ML: 100 SOLUTION at 12:29

## 2019-10-28 ASSESSMENT — MIFFLIN-ST. JEOR
SCORE: 783.32
SCORE: 783.32

## 2019-10-28 ASSESSMENT — PAIN SCALES - GENERAL
PAINLEVEL: NO PAIN (0)
PAINLEVEL: NO PAIN (0)

## 2019-10-28 NOTE — LETTER
10/28/2019         RE: Maximino Simeon  [address] [facility] [address]        Dear Colleague,    Thank you for referring your patient, Maximino Simeon, to the [facility]. Please see a copy of my visit note below.    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  S/P exploratory laparotomy, total abdominal hysterectomy, removal of her ovary and the appendix  08/09  Patient of    Due to high grade nature of the disease Dr. Ascencio recommended chemotherapy Taxol carbo x6 cycles  Chemotherapy started on 09 13    Interval History:  Overall patient has been tolerating chemotherapy well.  She states she has difficulty hearing however she also reports that she had this before the chemo and she saw ENT before.  She denies new symptoms denies hearing loss denies ear pain, patient denies fever chills sweats denies cough no shortness of breath denies chest pain.  Denies nausea vomiting diarrhea denies abdominal pain denies bleeding  Energy and appetite are good      Review of Systems:  14 point ROS of systems including Constitutional, Eyes, Respiratory, Cardiovascular, Gastroenterology, Genitourinary, Integumentary, Muscularskeletal, Psychiatric were all negative except for pertinent positives noted in my HPI.      Current Outpatient Medications   Medication Sig Dispense Refill     amLODIPine (NORVASC) 5 MG tablet Take 1 tablet (5 mg) by mouth daily 30 tablet 0     B Complex Vitamins (B COMPLEX PO) Take 1 tablet by mouth daily (with lunch)        Calcium Carbonate-Vitamin D (CALCIUM + D PO) Take 6 tablets by mouth daily.       Coenzyme Q10 (COQ10 PO) Take 200 mg by mouth 2 times daily       dronabinol (MARINOL) 2.5 MG capsule Take 1 capsule (2.5 mg) by mouth 2 times daily (before meals) 60 capsule 0     DULoxetine (CYMBALTA) 20 MG capsule Take 1 capsule (20 mg) by  "mouth daily Follow up with your PCP for refills 30 capsule 0     enoxaparin (LOVENOX) 30 MG/0.3ML syringe Inject 0.3 mLs (30 mg) Subcutaneous every 24 hours 24 Syringe 0     ibuprofen (ADVIL/MOTRIN) 200 MG tablet Take 400 mg by mouth every 6 hours as needed for mild pain       levothyroxine (SYNTHROID/LEVOTHROID) 50 MCG tablet Take 50 mcg by mouth every morning        liothyronine (CYTOMEL) 5 MCG tablet Take 5 mcg by mouth every morning        loperamide (IMODIUM) 1 MG/5ML liquid Take 1 mg by mouth 6 times daily        Multiple Vitamins-Minerals (MULTIVITAL PO) Take 1 tablet by mouth daily (with lunch)        Probiotic Product (PROBIOTIC DAILY PO) 1 tablet daily at lunchtime by mouth       prochlorperazine (COMPAZINE) 10 MG tablet Take 1 tablet (10 mg) by mouth every 6 hours as needed (nausea/vomiting) 30 tablet 5     psyllium (METAMUCIL/KONSYL) Packet Take 1 packet by mouth daily         Physical Examination:  General: The patient is a pleasant female in no acute distress.  BP (!) 149/88   Pulse 94   Temp 97.6  F (36.4  C) (Oral)   Resp 16   Ht 1.473 m (4' 9.99\")   Wt 40.4 kg (89 lb)   LMP  (LMP Unknown)   BMI 18.61 kg/m     HEENT: EOMI, PERRL. Sclerae are anicteric. Oral mucosa is pink and moist with no lesions or thrush.   Lymph: Neck is supple with no lymphadenopathy in the cervical or supraclavicular areas.   Heart: Regular rate and rhythm.   Lungs: Clear to auscultation bilaterally.   GI: Bowel sounds present, soft, nontender with no palpable hepatosplenomegaly or masses.   Extremities: No lower extremity edema noted bilaterally.   Skin: No rashes, petechiae, or bruising noted on exposed skin.    Laboratory Data:  Results for orders placed or performed in visit on 10/28/19 (from the past 24 hour(s))   CBC with platelets differential   Result Value Ref Range    WBC 2.9 (L) 4.0 - 11.0 10e9/L    RBC Count 4.14 3.8 - 5.2 10e12/L    Hemoglobin 12.8 11.7 - 15.7 g/dL    Hematocrit 39.2 35.0 - 47.0 %    MCV 95 " 78 - 100 fl    MCH 30.9 26.5 - 33.0 pg    MCHC 32.7 31.5 - 36.5 g/dL    RDW 14.4 10.0 - 15.0 %    Platelet Count 243 150 - 450 10e9/L    Diff Method Automated Method     % Neutrophils 41.9 %    % Lymphocytes 45.7 %    % Monocytes 10.7 %    % Eosinophils 0.7 %    % Basophils 1.0 %    % Immature Granulocytes 0.0 %    Nucleated RBCs 0 0 /100    Absolute Neutrophil 1.2 (L) 1.6 - 8.3 10e9/L    Absolute Lymphocytes 1.3 0.8 - 5.3 10e9/L    Absolute Monocytes 0.3 0.0 - 1.3 10e9/L    Absolute Eosinophils 0.0 0.0 - 0.7 10e9/L    Absolute Basophils 0.0 0.0 - 0.2 10e9/L    Abs Immature Granulocytes 0.0 0 - 0.4 10e9/L    Absolute Nucleated RBC 0.0    Comprehensive metabolic panel   Result Value Ref Range    Sodium 138 133 - 144 mmol/L    Potassium 3.8 3.4 - 5.3 mmol/L    Chloride 105 94 - 109 mmol/L    Carbon Dioxide 30 20 - 32 mmol/L    Anion Gap 3 3 - 14 mmol/L    Glucose 74 70 - 99 mg/dL    Urea Nitrogen 11 7 - 30 mg/dL    Creatinine 0.51 (L) 0.52 - 1.04 mg/dL    GFR Estimate >90 >60 mL/min/[1.73_m2]    GFR Estimate If Black >90 >60 mL/min/[1.73_m2]    Calcium 9.3 8.5 - 10.1 mg/dL    Bilirubin Total 0.2 0.2 - 1.3 mg/dL    Albumin 3.8 3.4 - 5.0 g/dL    Protein Total 7.5 6.8 - 8.8 g/dL    Alkaline Phosphatase 47 40 - 150 U/L    ALT 21 0 - 50 U/L    AST 20 0 - 45 U/L   Magnesium   Result Value Ref Range    Magnesium 2.0 1.6 - 2.3 mg/dL         Assessment and Plan:    This is a 74-year-old  female with      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  S/P exploratory laparotomy, total abdominal hysterectomy, removal of her ovary and the appendix  08/09  Patient of    Due to high grade nature of the disease Dr. Sonu recommended chemotherapy Taxol carbo x 6 cycles-started on 09/13/2019  Patient reports she has tolerated chemotherapy well  -labs  reviewed stable counts  Patient has follow-up appointment with me with next cycle of chemo  Per Dr Ascencio nurse - return for a visit with Dr. Ascencioafter 6 cycles of chemo  and  CT CAP prior to appt.  Dr. Ascencio does not need to see her in between any of the cycles  -I will continue to see patient with each cycle of chemo    -Labs today show neutropenia  -Hold chemo today give Neupogen shot daily for 2 days.  -Schedule chemo on Wednesday we will plan to give Neulasta on pro with future chemo cycles.      Neutropenia  Secondary to chemotherapy  Neutropenic precautions discussed in detail check temperature frequently in the event of fever chills sweats or any signs symptoms of infection patient advised to go to ER  -Hold chemotherapy today  -Give daily Neupogen shots for 2 days  We will plan to add Neulasta with on pro with future chemo cycles      Health maintenance   -Patient had flu shot    Difficulty hearing  -She had this problem before the chemo she saw ENT doctor.  Told patient to schedule appointment with ENT-patient states she will call this week and schedule  Pt advised to let us know if any worsening of symptoms, ear pain, difficulty hearing  or vertigo     Patient is advised to go to the ER or call 911 in the event of fever chills sweats cough shortness of breath chest pain.  Nausea vomiting diarrhea abdominal pain bleeding or any changes in health condition-patient and son verbalized understanding      LEX Salgado CNP  Hem/Onc   [facility]     Chart documentation with Dragon Voice recognition Software. Although reviewed after completion, some words and grammatical errors may remain.          Again, thank you for allowing me to participate in the care of your patient.        Sincerely,        LEX Salgado CNP

## 2019-10-28 NOTE — PROGRESS NOTES
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  S/P exploratory laparotomy, total abdominal hysterectomy, removal of her ovary and the appendix  08/09  Patient of    Due to high grade nature of the disease Dr. Ascencio recommended chemotherapy Taxol carbo x6 cycles  Chemotherapy started on 09 13    Interval History:  Overall patient has been tolerating chemotherapy well.  She states she has difficulty hearing however she also reports that she had this before the chemo and she saw ENT before.  She denies new symptoms denies hearing loss denies ear pain, patient denies fever chills sweats denies cough no shortness of breath denies chest pain.  Denies nausea vomiting diarrhea denies abdominal pain denies bleeding  Energy and appetite are good      Review of Systems:  14 point ROS of systems including Constitutional, Eyes, Respiratory, Cardiovascular, Gastroenterology, Genitourinary, Integumentary, Muscularskeletal, Psychiatric were all negative except for pertinent positives noted in my HPI.      Current Outpatient Medications   Medication Sig Dispense Refill     amLODIPine (NORVASC) 5 MG tablet Take 1 tablet (5 mg) by mouth daily 30 tablet 0     B Complex Vitamins (B COMPLEX PO) Take 1 tablet by mouth daily (with lunch)        Calcium Carbonate-Vitamin D (CALCIUM + D PO) Take 6 tablets by mouth daily.       Coenzyme Q10 (COQ10 PO) Take 200 mg by mouth 2 times daily       dronabinol (MARINOL) 2.5 MG capsule Take 1 capsule (2.5 mg) by mouth 2 times daily (before meals) 60 capsule 0     DULoxetine (CYMBALTA) 20 MG capsule Take 1 capsule (20 mg) by mouth daily Follow up with your PCP for refills 30 capsule 0     enoxaparin (LOVENOX) 30 MG/0.3ML syringe Inject 0.3 mLs (30 mg) Subcutaneous every 24 hours 24 Syringe 0     ibuprofen (ADVIL/MOTRIN) 200 MG tablet Take 400 mg by mouth every 6 hours as  "needed for mild pain       levothyroxine (SYNTHROID/LEVOTHROID) 50 MCG tablet Take 50 mcg by mouth every morning        liothyronine (CYTOMEL) 5 MCG tablet Take 5 mcg by mouth every morning        loperamide (IMODIUM) 1 MG/5ML liquid Take 1 mg by mouth 6 times daily        Multiple Vitamins-Minerals (MULTIVITAL PO) Take 1 tablet by mouth daily (with lunch)        Probiotic Product (PROBIOTIC DAILY PO) 1 tablet daily at lunchtime by mouth       prochlorperazine (COMPAZINE) 10 MG tablet Take 1 tablet (10 mg) by mouth every 6 hours as needed (nausea/vomiting) 30 tablet 5     psyllium (METAMUCIL/KONSYL) Packet Take 1 packet by mouth daily         Physical Examination:  General: The patient is a pleasant female in no acute distress.  BP (!) 149/88   Pulse 94   Temp 97.6  F (36.4  C) (Oral)   Resp 16   Ht 1.473 m (4' 9.99\")   Wt 40.4 kg (89 lb)   LMP  (LMP Unknown)   BMI 18.61 kg/m    HEENT: EOMI, PERRL. Sclerae are anicteric. Oral mucosa is pink and moist with no lesions or thrush.   Lymph: Neck is supple with no lymphadenopathy in the cervical or supraclavicular areas.   Heart: Regular rate and rhythm.   Lungs: Clear to auscultation bilaterally.   GI: Bowel sounds present, soft, nontender with no palpable hepatosplenomegaly or masses.   Extremities: No lower extremity edema noted bilaterally.   Skin: No rashes, petechiae, or bruising noted on exposed skin.    Laboratory Data:  Results for orders placed or performed in visit on 10/28/19 (from the past 24 hour(s))   CBC with platelets differential   Result Value Ref Range    WBC 2.9 (L) 4.0 - 11.0 10e9/L    RBC Count 4.14 3.8 - 5.2 10e12/L    Hemoglobin 12.8 11.7 - 15.7 g/dL    Hematocrit 39.2 35.0 - 47.0 %    MCV 95 78 - 100 fl    MCH 30.9 26.5 - 33.0 pg    MCHC 32.7 31.5 - 36.5 g/dL    RDW 14.4 10.0 - 15.0 %    Platelet Count 243 150 - 450 10e9/L    Diff Method Automated Method     % Neutrophils 41.9 %    % Lymphocytes 45.7 %    % Monocytes 10.7 %    % " Eosinophils 0.7 %    % Basophils 1.0 %    % Immature Granulocytes 0.0 %    Nucleated RBCs 0 0 /100    Absolute Neutrophil 1.2 (L) 1.6 - 8.3 10e9/L    Absolute Lymphocytes 1.3 0.8 - 5.3 10e9/L    Absolute Monocytes 0.3 0.0 - 1.3 10e9/L    Absolute Eosinophils 0.0 0.0 - 0.7 10e9/L    Absolute Basophils 0.0 0.0 - 0.2 10e9/L    Abs Immature Granulocytes 0.0 0 - 0.4 10e9/L    Absolute Nucleated RBC 0.0    Comprehensive metabolic panel   Result Value Ref Range    Sodium 138 133 - 144 mmol/L    Potassium 3.8 3.4 - 5.3 mmol/L    Chloride 105 94 - 109 mmol/L    Carbon Dioxide 30 20 - 32 mmol/L    Anion Gap 3 3 - 14 mmol/L    Glucose 74 70 - 99 mg/dL    Urea Nitrogen 11 7 - 30 mg/dL    Creatinine 0.51 (L) 0.52 - 1.04 mg/dL    GFR Estimate >90 >60 mL/min/[1.73_m2]    GFR Estimate If Black >90 >60 mL/min/[1.73_m2]    Calcium 9.3 8.5 - 10.1 mg/dL    Bilirubin Total 0.2 0.2 - 1.3 mg/dL    Albumin 3.8 3.4 - 5.0 g/dL    Protein Total 7.5 6.8 - 8.8 g/dL    Alkaline Phosphatase 47 40 - 150 U/L    ALT 21 0 - 50 U/L    AST 20 0 - 45 U/L   Magnesium   Result Value Ref Range    Magnesium 2.0 1.6 - 2.3 mg/dL         Assessment and Plan:    This is a 74-year-old  female with      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  S/P exploratory laparotomy, total abdominal hysterectomy, removal of her ovary and the appendix  08/09  Patient of    Due to high grade nature of the disease Dr. Ascencio recommended chemotherapy Taxol carbo x 6 cycles-started on 09/13/2019  Patient reports she has tolerated chemotherapy well  -labs  reviewed stable counts  Patient has follow-up appointment with me with next cycle of chemo  Per Dr Ascencio nurse - return for a visit with Dr. Ascencioafter 6 cycles of chemo and  CT CAP prior to appt.  Dr. Ascencio does not need to see her in between any of the cycles  -I will continue to see patient with each cycle of chemo    -Labs today show neutropenia  -Hold chemo today give Neupogen shot daily for 2  days.  -Schedule chemo on Wednesday we will plan to give Neulasta on pro with future chemo cycles.      Neutropenia  Secondary to chemotherapy  Neutropenic precautions discussed in detail check temperature frequently in the event of fever chills sweats or any signs symptoms of infection patient advised to go to ER  -Hold chemotherapy today  -Give daily Neupogen shots for 2 days  We will plan to add Neulasta with on pro with future chemo cycles      Health maintenance   -Patient had flu shot    Difficulty hearing  -She had this problem before the chemo she saw ENT doctor.  Told patient to schedule appointment with ENT-patient states she will call this week and schedule  Pt advised to let us know if any worsening of symptoms, ear pain, difficulty hearing  or vertigo     Patient is advised to go to the ER or call 911 in the event of fever chills sweats cough shortness of breath chest pain.  Nausea vomiting diarrhea abdominal pain bleeding or any changes in health condition-patient and son verbalized understanding      LEX Salgado CNP  Hem/Onc   [facility]     Chart documentation with Dragon Voice recognition Software. Although reviewed after completion, some words and grammatical errors may remain.

## 2019-10-28 NOTE — PROGRESS NOTES
Infusion Nursing Note:  Maximino Simeon presents today for C3D1 carbo/taxol.    Patient seen by provider today: Yes: MARCELA Smith   present during visit today: Not Applicable.    Note: N/A.    Intravenous Access:  Implanted Port.    Treatment Conditions:  Lab Results   Component Value Date    HGB 12.8 10/28/2019     Lab Results   Component Value Date    WBC 2.9 10/28/2019      Lab Results   Component Value Date    ANEU 1.2 10/28/2019     Lab Results   Component Value Date     10/28/2019      Lab Results   Component Value Date     10/28/2019                   Lab Results   Component Value Date    POTASSIUM 3.8 10/28/2019           Lab Results   Component Value Date    MAG 2.0 10/28/2019            Lab Results   Component Value Date    CR 0.51 10/28/2019                   Lab Results   Component Value Date    LUCRECIA 9.3 10/28/2019                Lab Results   Component Value Date    BILITOTAL 0.2 10/28/2019           Lab Results   Component Value Date    ALBUMIN 3.8 10/28/2019                    Lab Results   Component Value Date    ALT 21 10/28/2019           Lab Results   Component Value Date    AST 20 10/28/2019       Results reviewed, labs did NOT meet treatment parameters: low ANC. Given neupogen today and tomorrow.      Post Infusion Assessment:  Patient tolerated injection without incident.  Site patent and intact, free from redness, edema or discomfort.  No evidence of extravasations.  Access discontinued per protocol.       Discharge Plan:   Patient and/or family verbalized understanding of discharge instructions and all questions answered.  Copy of AVS reviewed with patient and/or family.  Patient will return tomorow for next appointment.  Patient discharged in stable condition accompanied by: daughter.  Departure Mode: Ambulatory.    Florecita Granger RN

## 2019-10-29 ENCOUNTER — HOSPITAL ENCOUNTER (OUTPATIENT)
Facility: CLINIC | Age: 74
Setting detail: SPECIMEN
End: 2019-10-29
Attending: NURSE PRACTITIONER
Payer: MEDICARE

## 2019-10-29 ENCOUNTER — INFUSION THERAPY VISIT (OUTPATIENT)
Dept: INFUSION THERAPY | Facility: CLINIC | Age: 74
End: 2019-10-29
Attending: NURSE PRACTITIONER
Payer: MEDICARE

## 2019-10-29 VITALS
RESPIRATION RATE: 18 BRPM | OXYGEN SATURATION: 99 % | DIASTOLIC BLOOD PRESSURE: 97 MMHG | SYSTOLIC BLOOD PRESSURE: 145 MMHG | HEART RATE: 101 BPM | TEMPERATURE: 97.9 F

## 2019-10-29 DIAGNOSIS — D70.1 CHEMOTHERAPY-INDUCED NEUTROPENIA (H): Primary | ICD-10-CM

## 2019-10-29 DIAGNOSIS — T45.1X5A CHEMOTHERAPY-INDUCED NEUTROPENIA (H): Primary | ICD-10-CM

## 2019-10-29 DIAGNOSIS — Z51.11 ENCOUNTER FOR ANTINEOPLASTIC CHEMOTHERAPY: ICD-10-CM

## 2019-10-29 DIAGNOSIS — C56.9 OVARIAN CANCER, UNSPECIFIED LATERALITY (H): ICD-10-CM

## 2019-10-29 PROCEDURE — 96372 THER/PROPH/DIAG INJ SC/IM: CPT

## 2019-10-29 PROCEDURE — 25000128 H RX IP 250 OP 636: Performed by: NURSE PRACTITIONER

## 2019-10-29 RX ADMIN — FILGRASTIM 300 MCG: 300 INJECTION, SOLUTION INTRAVENOUS; SUBCUTANEOUS at 13:43

## 2019-10-29 ASSESSMENT — PAIN SCALES - GENERAL: PAINLEVEL: NO PAIN (0)

## 2019-10-29 NOTE — PROGRESS NOTES
Infusion Nursing Note:  Maximino Simeon presents today for neupogen.    Patient seen by provider today: No   present during visit today: Not Applicable.    Note: N/A.    Intravenous Access:  No Intravenous access/labs at this visit.    Treatment Conditions:  Not Applicable.      Post Infusion Assessment:  Patient tolerated injection without incident.  Site patent and intact, free from redness, edema or discomfort.  No evidence of extravasations.       Discharge Plan:   Discharge instructions reviewed with: Patient.  Patient and/or family verbalized understanding of discharge instructions and all questions answered.  AVS to patient via Captain Wise.  Patient will return 10/30/19 for next appointment.   Patient discharged in stable condition accompanied by: son.  Departure Mode: Ambulatory.    Milka Gonzalez RN

## 2019-10-30 ENCOUNTER — ALLIED HEALTH/NURSE VISIT (OUTPATIENT)
Dept: ONCOLOGY | Facility: CLINIC | Age: 74
End: 2019-10-30

## 2019-10-30 ENCOUNTER — INFUSION THERAPY VISIT (OUTPATIENT)
Dept: INFUSION THERAPY | Facility: CLINIC | Age: 74
End: 2019-10-30
Attending: OBSTETRICS & GYNECOLOGY
Payer: MEDICARE

## 2019-10-30 ENCOUNTER — HOSPITAL ENCOUNTER (OUTPATIENT)
Facility: CLINIC | Age: 74
Setting detail: SPECIMEN
Discharge: HOME | End: 2019-10-30
Attending: NURSE PRACTITIONER | Admitting: NURSE PRACTITIONER
Payer: MEDICARE

## 2019-10-30 VITALS
HEART RATE: 105 BPM | BODY MASS INDEX: 18.66 KG/M2 | HEIGHT: 58 IN | SYSTOLIC BLOOD PRESSURE: 149 MMHG | WEIGHT: 88.9 LBS | RESPIRATION RATE: 16 BRPM | DIASTOLIC BLOOD PRESSURE: 87 MMHG | TEMPERATURE: 97.7 F

## 2019-10-30 DIAGNOSIS — Z51.11 ENCOUNTER FOR ANTINEOPLASTIC CHEMOTHERAPY: ICD-10-CM

## 2019-10-30 DIAGNOSIS — T45.1X5A CHEMOTHERAPY-INDUCED NEUTROPENIA (H): Primary | ICD-10-CM

## 2019-10-30 DIAGNOSIS — D70.1 CHEMOTHERAPY-INDUCED NEUTROPENIA (H): Primary | ICD-10-CM

## 2019-10-30 DIAGNOSIS — Z71.9 COUNSELING, UNSPECIFIED: Primary | ICD-10-CM

## 2019-10-30 DIAGNOSIS — Z95.828 PORT-A-CATH IN PLACE: ICD-10-CM

## 2019-10-30 DIAGNOSIS — C56.9 OVARIAN CANCER, UNSPECIFIED LATERALITY (H): ICD-10-CM

## 2019-10-30 LAB
ALBUMIN SERPL-MCNC: 3.8 G/DL (ref 3.4–5)
ALP SERPL-CCNC: 52 U/L (ref 40–150)
ALT SERPL W P-5'-P-CCNC: 19 U/L (ref 0–50)
ANION GAP SERPL CALCULATED.3IONS-SCNC: 4 MMOL/L (ref 3–14)
AST SERPL W P-5'-P-CCNC: 16 U/L (ref 0–45)
BASOPHILS # BLD AUTO: 0.1 10E9/L (ref 0–0.2)
BASOPHILS NFR BLD AUTO: 1 %
BILIRUB SERPL-MCNC: 0.2 MG/DL (ref 0.2–1.3)
BUN SERPL-MCNC: 12 MG/DL (ref 7–30)
CALCIUM SERPL-MCNC: 9.3 MG/DL (ref 8.5–10.1)
CHLORIDE SERPL-SCNC: 104 MMOL/L (ref 94–109)
CO2 SERPL-SCNC: 28 MMOL/L (ref 20–32)
CREAT SERPL-MCNC: 0.48 MG/DL (ref 0.52–1.04)
DIFFERENTIAL METHOD BLD: ABNORMAL
EOSINOPHIL # BLD AUTO: 0 10E9/L (ref 0–0.7)
EOSINOPHIL NFR BLD AUTO: 0 %
ERYTHROCYTE [DISTWIDTH] IN BLOOD BY AUTOMATED COUNT: 14.7 % (ref 10–15)
GFR SERPL CREATININE-BSD FRML MDRD: >90 ML/MIN/{1.73_M2}
GLUCOSE SERPL-MCNC: 96 MG/DL (ref 70–99)
HCT VFR BLD AUTO: 39.1 % (ref 35–47)
HGB BLD-MCNC: 12.7 G/DL (ref 11.7–15.7)
LYMPHOCYTES # BLD AUTO: 1.4 10E9/L (ref 0.8–5.3)
LYMPHOCYTES NFR BLD AUTO: 10 %
MAGNESIUM SERPL-MCNC: 2 MG/DL (ref 1.6–2.3)
MCH RBC QN AUTO: 30.8 PG (ref 26.5–33)
MCHC RBC AUTO-ENTMCNC: 32.5 G/DL (ref 31.5–36.5)
MCV RBC AUTO: 95 FL (ref 78–100)
METAMYELOCYTES # BLD: 0.8 10E9/L
METAMYELOCYTES NFR BLD MANUAL: 6 %
MONOCYTES # BLD AUTO: 0.4 10E9/L (ref 0–1.3)
MONOCYTES NFR BLD AUTO: 3 %
MYELOCYTES # BLD: 0.1 10E9/L
MYELOCYTES NFR BLD MANUAL: 1 %
NEUTROPHILS # BLD AUTO: 10.8 10E9/L (ref 1.6–8.3)
NEUTROPHILS NFR BLD AUTO: 79 %
PLATELET # BLD AUTO: 221 10E9/L (ref 150–450)
PLATELET # BLD EST: ABNORMAL 10*3/UL
POTASSIUM SERPL-SCNC: 3.7 MMOL/L (ref 3.4–5.3)
PROT SERPL-MCNC: 7.4 G/DL (ref 6.8–8.8)
RBC # BLD AUTO: 4.12 10E12/L (ref 3.8–5.2)
RBC MORPH BLD: ABNORMAL
SODIUM SERPL-SCNC: 136 MMOL/L (ref 133–144)
WBC # BLD AUTO: 13.7 10E9/L (ref 4–11)

## 2019-10-30 PROCEDURE — 96367 TX/PROPH/DG ADDL SEQ IV INF: CPT

## 2019-10-30 PROCEDURE — 96413 CHEMO IV INFUSION 1 HR: CPT

## 2019-10-30 PROCEDURE — 83735 ASSAY OF MAGNESIUM: CPT | Performed by: NURSE PRACTITIONER

## 2019-10-30 PROCEDURE — 25000132 ZZH RX MED GY IP 250 OP 250 PS 637: Mod: GY | Performed by: NURSE PRACTITIONER

## 2019-10-30 PROCEDURE — 25800030 ZZH RX IP 258 OP 636: Performed by: NURSE PRACTITIONER

## 2019-10-30 PROCEDURE — 25000128 H RX IP 250 OP 636: Performed by: OBSTETRICS & GYNECOLOGY

## 2019-10-30 PROCEDURE — 25000125 ZZHC RX 250: Performed by: NURSE PRACTITIONER

## 2019-10-30 PROCEDURE — 85025 COMPLETE CBC W/AUTO DIFF WBC: CPT | Performed by: NURSE PRACTITIONER

## 2019-10-30 PROCEDURE — 80053 COMPREHEN METABOLIC PANEL: CPT | Performed by: NURSE PRACTITIONER

## 2019-10-30 PROCEDURE — 96377 APPLICATON ON-BODY INJECTOR: CPT | Mod: XU

## 2019-10-30 PROCEDURE — 96375 TX/PRO/DX INJ NEW DRUG ADDON: CPT

## 2019-10-30 PROCEDURE — 96417 CHEMO IV INFUS EACH ADDL SEQ: CPT

## 2019-10-30 PROCEDURE — 25000128 H RX IP 250 OP 636: Performed by: NURSE PRACTITIONER

## 2019-10-30 PROCEDURE — 96415 CHEMO IV INFUSION ADDL HR: CPT

## 2019-10-30 RX ORDER — DIPHENHYDRAMINE HCL 25 MG
50 CAPSULE ORAL ONCE
Status: COMPLETED | OUTPATIENT
Start: 2019-10-30 | End: 2019-10-30

## 2019-10-30 RX ORDER — PALONOSETRON 0.05 MG/ML
0.25 INJECTION, SOLUTION INTRAVENOUS ONCE
Status: COMPLETED | OUTPATIENT
Start: 2019-10-30 | End: 2019-10-30

## 2019-10-30 RX ORDER — HEPARIN SODIUM (PORCINE) LOCK FLUSH IV SOLN 100 UNIT/ML 100 UNIT/ML
5 SOLUTION INTRAVENOUS
Status: DISCONTINUED | OUTPATIENT
Start: 2019-10-30 | End: 2019-10-30 | Stop reason: HOSPADM

## 2019-10-30 RX ORDER — HEPARIN SODIUM (PORCINE) LOCK FLUSH IV SOLN 100 UNIT/ML 100 UNIT/ML
5 SOLUTION INTRAVENOUS
Status: CANCELLED | OUTPATIENT
Start: 2019-10-30

## 2019-10-30 RX ADMIN — DIPHENHYDRAMINE HYDROCHLORIDE 50 MG: 25 CAPSULE ORAL at 10:09

## 2019-10-30 RX ADMIN — DEXAMETHASONE SODIUM PHOSPHATE 20 MG: 10 INJECTION, SOLUTION INTRAMUSCULAR; INTRAVENOUS at 10:05

## 2019-10-30 RX ADMIN — PEGFILGRASTIM 6 MG: KIT SUBCUTANEOUS at 14:22

## 2019-10-30 RX ADMIN — FAMOTIDINE 40 MG: 10 INJECTION INTRAVENOUS at 10:16

## 2019-10-30 RX ADMIN — HEPARIN SODIUM (PORCINE) LOCK FLUSH IV SOLN 100 UNIT/ML 5 ML: 100 SOLUTION at 14:36

## 2019-10-30 RX ADMIN — CARBOPLATIN 500 MG: 10 INJECTION, SOLUTION INTRAVENOUS at 13:48

## 2019-10-30 RX ADMIN — SODIUM CHLORIDE 250 ML: 9 INJECTION, SOLUTION INTRAVENOUS at 10:03

## 2019-10-30 RX ADMIN — PACLITAXEL 222 MG: 6 INJECTION, SOLUTION INTRAVENOUS at 10:40

## 2019-10-30 RX ADMIN — PALONOSETRON 0.25 MG: 0.25 INJECTION, SOLUTION INTRAVENOUS at 10:05

## 2019-10-30 ASSESSMENT — PAIN SCALES - GENERAL: PAINLEVEL: NO PAIN (0)

## 2019-10-30 ASSESSMENT — MIFFLIN-ST. JEOR: SCORE: 782.87

## 2019-10-30 NOTE — PROGRESS NOTES
"Social Work Progress Note      Data/Intervention:  Patient Name:  Maximino Simeon  /Age:  1945 (74 year old)    Reason for Follow-Up:  Brittni is a 74-year-old woman with a diagnosis of clear cell carcinoma of the left ovary. This clinician met with Brittni for psychosocial check-in and emotional support.     Intervention:   Brittni reported today increased emotional distress in the context of visit from daughter and navigating communication differences. Brittni reported that she has been coping with heightened agitation and anxiety over the past few days due to a comment that daughter told her, \"you have an aggressive cancer and you might not be around that long.\" Emotional support provided to Brittni today, and extensive discussion surrounding how to support healthy communication surrounding emotional needs and how to set healthy boundaries during visit. 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 Brittni reported today an interest in engaging again with therapeutic support and an interest in connecting again with palliative care surrounding medication for mood support. Brittni reports that structured CBT therapy has been helpful for her in the past, and that she would like to engage in this therapeutic style again. Provided education about resources available in clinic for therapeutic support as well as community resources. Brittni receptive to this clinician's continued involvement as she continues to adjust to treatment. Brittni's affect appeared to brighten at conversations close.      Resources Provided:  Cleaning for a Reason  Psychology Today  [facility] therapeutic support  Onc SW contact information  [employer] & [employer]    Plan:  1) Previously provided patient/family with writer's contact information and availability. Brittni knows to reach out to this clinician in the case of " psychosocial distress or concern. This clinician will plan for next psychosocial check-in 11/22/19  2) This clinician to collaborate with palliative care team about medication for mood support.   3) Ongoing collaboration with multidisciplinary treatment team.     Please call or page if needs or concerns arise.     CLIFFORD Vora, [facility]. [facility] HospitalSW  Direct Phone: [phone]  Pager: [phone]

## 2019-10-30 NOTE — PROGRESS NOTES
Infusion Nursing Note:  Maximino Simeon presents today for C4D1 Carbo, taxol.    Patient seen by provider today: No   present during visit today: Not Applicable.    Note: MARCELA Smith okay chemo for today.P.T.O.R.B    Intravenous Access:  Implanted Port.    Treatment Conditions:  Lab Results   Component Value Date    HGB 12.7 10/30/2019     Lab Results   Component Value Date    WBC 13.7 10/30/2019      Lab Results   Component Value Date    ANEU 10.8 10/30/2019     Lab Results   Component Value Date     10/30/2019      Lab Results   Component Value Date     10/30/2019                   Lab Results   Component Value Date    POTASSIUM 3.7 10/30/2019           Lab Results   Component Value Date    MAG 2.0 10/30/2019            Lab Results   Component Value Date    CR 0.48 10/30/2019                   Lab Results   Component Value Date    LUCRECIA 9.3 10/30/2019                Lab Results   Component Value Date    BILITOTAL 0.2 10/30/2019           Lab Results   Component Value Date    ALBUMIN 3.8 10/30/2019                    Lab Results   Component Value Date    ALT 19 10/30/2019           Lab Results   Component Value Date    AST 16 10/30/2019       Results reviewed, labs MET treatment parameters, ok to proceed with treatment.      Post Infusion Assessment:  Patient tolerated infusion without incident.  Site patent and intact, free from redness, edema or discomfort.  No evidence of extravasations.  Access discontinued per protocol.       Discharge Plan:   Patient and/or family verbalized understanding of discharge instructions and all questions answered.  Copy of AVS reviewed with patient and/or family.  Patient will return on 11/22 for next appointment.  Patient discharged in stable condition accompanied by: self.  Departure Mode: Ambulatory.    ONPRO  Was placed on patient's: back of right arm.    Was placed at 1430 PM    ONPRO injector device Lot number: [id number]    Patient education included: what  patient can expect after application, what colored lights mean on the device, when to remove device, when and where to call with questions or issues, all patients questions answered and that Neulasta administration will occur at 1730.    Patient tolerated administration well.    Florecita Granger RN

## 2019-11-06 ENCOUNTER — TELEPHONE (OUTPATIENT)
Dept: ONCOLOGY | Facility: CLINIC | Age: 74
End: 2019-11-06

## 2019-11-06 ENCOUNTER — HOSPITAL ENCOUNTER (EMERGENCY)
Facility: CLINIC | Age: 74
Discharge: HOME | End: 2019-11-06
Attending: EMERGENCY MEDICINE | Admitting: EMERGENCY MEDICINE
Payer: MEDICARE

## 2019-11-06 ENCOUNTER — APPOINTMENT (OUTPATIENT)
Dept: CT IMAGING | Facility: CLINIC | Age: 74
End: 2019-11-06
Attending: EMERGENCY MEDICINE
Payer: MEDICARE

## 2019-11-06 ENCOUNTER — APPOINTMENT (OUTPATIENT)
Dept: GENERAL RADIOLOGY | Facility: CLINIC | Age: 74
End: 2019-11-06
Attending: EMERGENCY MEDICINE
Payer: MEDICARE

## 2019-11-06 VITALS
WEIGHT: 89 LBS | OXYGEN SATURATION: 98 % | RESPIRATION RATE: 14 BRPM | DIASTOLIC BLOOD PRESSURE: 86 MMHG | BODY MASS INDEX: 20.6 KG/M2 | HEART RATE: 111 BPM | HEIGHT: 55 IN | TEMPERATURE: 98.2 F | SYSTOLIC BLOOD PRESSURE: 131 MMHG

## 2019-11-06 DIAGNOSIS — R51.9 NONINTRACTABLE HEADACHE, UNSPECIFIED CHRONICITY PATTERN, UNSPECIFIED HEADACHE TYPE: ICD-10-CM

## 2019-11-06 DIAGNOSIS — M54.50 ACUTE BILATERAL LOW BACK PAIN WITHOUT SCIATICA: ICD-10-CM

## 2019-11-06 LAB
ALBUMIN SERPL-MCNC: 3.7 G/DL (ref 3.4–5)
ALBUMIN UR-MCNC: NEGATIVE MG/DL
ALP SERPL-CCNC: 71 U/L (ref 40–150)
ALT SERPL W P-5'-P-CCNC: 24 U/L (ref 0–50)
ANION GAP SERPL CALCULATED.3IONS-SCNC: 5 MMOL/L (ref 3–14)
APPEARANCE UR: CLEAR
AST SERPL W P-5'-P-CCNC: 27 U/L (ref 0–45)
BACTERIA #/AREA URNS HPF: ABNORMAL /HPF
BASOPHILS # BLD AUTO: 0.1 10E9/L (ref 0–0.2)
BASOPHILS NFR BLD AUTO: 1 %
BILIRUB SERPL-MCNC: 0.5 MG/DL (ref 0.2–1.3)
BILIRUB UR QL STRIP: NEGATIVE
BUN SERPL-MCNC: 9 MG/DL (ref 7–30)
CALCIUM SERPL-MCNC: 9.1 MG/DL (ref 8.5–10.1)
CHLORIDE SERPL-SCNC: 103 MMOL/L (ref 94–109)
CO2 BLDCOV-SCNC: 24 MMOL/L (ref 21–28)
CO2 BLDCOV-SCNC: 26 MMOL/L (ref 21–28)
CO2 SERPL-SCNC: 29 MMOL/L (ref 20–32)
COLOR UR AUTO: YELLOW
CREAT SERPL-MCNC: 0.48 MG/DL (ref 0.52–1.04)
DIFFERENTIAL METHOD BLD: ABNORMAL
ELLIPTOCYTES BLD QL SMEAR: SLIGHT
EOSINOPHIL # BLD AUTO: 0.3 10E9/L (ref 0–0.7)
EOSINOPHIL NFR BLD AUTO: 3 %
ERYTHROCYTE [DISTWIDTH] IN BLOOD BY AUTOMATED COUNT: 14.2 % (ref 10–15)
GFR SERPL CREATININE-BSD FRML MDRD: >90 ML/MIN/{1.73_M2}
GLUCOSE SERPL-MCNC: 88 MG/DL (ref 70–99)
GLUCOSE UR STRIP-MCNC: NEGATIVE MG/DL
HCT VFR BLD AUTO: 37.1 % (ref 35–47)
HGB BLD-MCNC: 12.5 G/DL (ref 11.7–15.7)
HGB UR QL STRIP: NEGATIVE
INR PPP: 1.01 (ref 0.86–1.14)
KETONES UR STRIP-MCNC: NEGATIVE MG/DL
LACTATE BLD-SCNC: 1.4 MMOL/L (ref 0.7–2.1)
LACTATE BLD-SCNC: 2.2 MMOL/L (ref 0.7–2.1)
LEUKOCYTE ESTERASE UR QL STRIP: NEGATIVE
LYMPHOCYTES # BLD AUTO: 3.5 10E9/L (ref 0.8–5.3)
LYMPHOCYTES NFR BLD AUTO: 40 %
MCH RBC QN AUTO: 31.2 PG (ref 26.5–33)
MCHC RBC AUTO-ENTMCNC: 33.7 G/DL (ref 31.5–36.5)
MCV RBC AUTO: 93 FL (ref 78–100)
METAMYELOCYTES # BLD: 0.3 10E9/L
METAMYELOCYTES NFR BLD MANUAL: 3 %
MONOCYTES # BLD AUTO: 1.1 10E9/L (ref 0–1.3)
MONOCYTES NFR BLD AUTO: 13 %
MYELOCYTES # BLD: 0.9 10E9/L
MYELOCYTES NFR BLD MANUAL: 10 %
NEUTROPHILS # BLD AUTO: 2.6 10E9/L (ref 1.6–8.3)
NEUTROPHILS NFR BLD AUTO: 30 %
NITRATE UR QL: NEGATIVE
PCO2 BLDV: 42 MM HG (ref 40–50)
PCO2 BLDV: 42 MM HG (ref 40–50)
PH BLDV: 7.37 PH (ref 7.32–7.43)
PH BLDV: 7.41 PH (ref 7.32–7.43)
PH UR STRIP: 5.5 PH (ref 5–7)
PLATELET # BLD AUTO: 149 10E9/L (ref 150–450)
PLATELET # BLD EST: ABNORMAL 10*3/UL
PO2 BLDV: 33 MM HG (ref 25–47)
PO2 BLDV: 34 MM HG (ref 25–47)
POTASSIUM SERPL-SCNC: 3.6 MMOL/L (ref 3.4–5.3)
PROT SERPL-MCNC: 7.4 G/DL (ref 6.8–8.8)
RBC # BLD AUTO: 4.01 10E12/L (ref 3.8–5.2)
RBC #/AREA URNS AUTO: <1 /HPF (ref 0–2)
RBC MORPH BLD: ABNORMAL
SAO2 % BLDV FROM PO2: 64 %
SAO2 % BLDV FROM PO2: 64 %
SMUDGE CELLS BLD QL SMEAR: PRESENT
SODIUM SERPL-SCNC: 137 MMOL/L (ref 133–144)
SOURCE: ABNORMAL
SP GR UR STRIP: 1.01 (ref 1–1.03)
SQUAMOUS #/AREA URNS AUTO: <1 /HPF (ref 0–1)
TOXIC GRANULES BLD QL SMEAR: PRESENT
UROBILINOGEN UR STRIP-MCNC: NORMAL MG/DL (ref 0–2)
WBC # BLD AUTO: 8.8 10E9/L (ref 4–11)
WBC #/AREA URNS AUTO: <1 /HPF (ref 0–5)

## 2019-11-06 PROCEDURE — 83605 ASSAY OF LACTIC ACID: CPT

## 2019-11-06 PROCEDURE — 25800030 ZZH RX IP 258 OP 636: Performed by: EMERGENCY MEDICINE

## 2019-11-06 PROCEDURE — 93005 ELECTROCARDIOGRAM TRACING: CPT

## 2019-11-06 PROCEDURE — 96361 HYDRATE IV INFUSION ADD-ON: CPT

## 2019-11-06 PROCEDURE — 96375 TX/PRO/DX INJ NEW DRUG ADDON: CPT

## 2019-11-06 PROCEDURE — 81001 URINALYSIS AUTO W/SCOPE: CPT | Performed by: EMERGENCY MEDICINE

## 2019-11-06 PROCEDURE — 96374 THER/PROPH/DIAG INJ IV PUSH: CPT

## 2019-11-06 PROCEDURE — 72131 CT LUMBAR SPINE W/O DYE: CPT

## 2019-11-06 PROCEDURE — 80053 COMPREHEN METABOLIC PANEL: CPT | Performed by: EMERGENCY MEDICINE

## 2019-11-06 PROCEDURE — 71046 X-RAY EXAM CHEST 2 VIEWS: CPT

## 2019-11-06 PROCEDURE — 99285 EMERGENCY DEPT VISIT HI MDM: CPT | Mod: 25

## 2019-11-06 PROCEDURE — 82803 BLOOD GASES ANY COMBINATION: CPT

## 2019-11-06 PROCEDURE — 25000128 H RX IP 250 OP 636: Performed by: EMERGENCY MEDICINE

## 2019-11-06 PROCEDURE — 85610 PROTHROMBIN TIME: CPT | Performed by: EMERGENCY MEDICINE

## 2019-11-06 PROCEDURE — 85025 COMPLETE CBC W/AUTO DIFF WBC: CPT | Performed by: EMERGENCY MEDICINE

## 2019-11-06 PROCEDURE — 87040 BLOOD CULTURE FOR BACTERIA: CPT | Performed by: EMERGENCY MEDICINE

## 2019-11-06 PROCEDURE — 70450 CT HEAD/BRAIN W/O DYE: CPT

## 2019-11-06 RX ORDER — DIPHENHYDRAMINE HYDROCHLORIDE 50 MG/ML
12.5 INJECTION INTRAMUSCULAR; INTRAVENOUS ONCE
Status: COMPLETED | OUTPATIENT
Start: 2019-11-06 | End: 2019-11-06

## 2019-11-06 RX ADMIN — PROCHLORPERAZINE EDISYLATE 2.5 MG: 5 INJECTION, SOLUTION INTRAMUSCULAR; INTRAVENOUS at 15:12

## 2019-11-06 RX ADMIN — SODIUM CHLORIDE 1000 ML: 9 INJECTION, SOLUTION INTRAVENOUS at 15:11

## 2019-11-06 RX ADMIN — DIPHENHYDRAMINE HYDROCHLORIDE 12.5 MG: 50 INJECTION, SOLUTION INTRAMUSCULAR; INTRAVENOUS at 15:12

## 2019-11-06 ASSESSMENT — ENCOUNTER SYMPTOMS
NAUSEA: 0
FATIGUE: 1
HEMATURIA: 0
FEVER: 0
DIARRHEA: 1
ABDOMINAL PAIN: 0
WEAKNESS: 0
VOMITING: 0
DYSURIA: 0
NUMBNESS: 0
FREQUENCY: 0
BACK PAIN: 1
HEADACHES: 1
COUGH: 0

## 2019-11-06 ASSESSMENT — MIFFLIN-ST. JEOR: SCORE: 643.75

## 2019-11-06 NOTE — ED PROVIDER NOTES
History     Chief Complaint:  Headache and Back Pain     HPI   Maximino Simeon is a 74 year old female with a history of ovarian cancer currently on chemotherapy, hypertension, hyperlipidemia, and CAD who presents for evaluation of a headache and back pain. The patient's primary oncologist is Dr. Ascencio of [place] [facility] and she had her third round of chemotherapy on 10/30/2019. Since then the patient has felt fatigued, although this is normal for her after chemotherapy. Yesterday night, the patient was in bed when she started to develop new pain across her lower back. Overnight the patient also developed a headache. This morning the patient sat against a pillow and had some improvement of her back pain. She did take seda seltzer and Claritin at home without improvement of her symptoms. Due to this new headache and back pain, the patient called her primary care provider and was advised to seek evaluation in the ED. Currently in the ED, the patient reports that her low back pain is significantly improved compared to last night but she is having a persistent headache. She reports that she has had some diarrhea recently, but she denies any recent fever, cough, abdominal pain, nausea, vomiting, dysuria, hematuria, urinary frequency, numbness, weakness, incontinence of bowel or bladder, or visual disturbance. She has not had any recent falls or trauma to her head or back.    Allergies:  Penicillins      Medications:    Amlodipine   B complex  Calcium + D   Coenzyme Q10  Marinol  Cymbalta  Ibuprofen  Levothyroxine   Liothyronine   Imodium  Multivitamin  Probiotic   Compazine  Psyllium      Past Medical History:    Anxiety  CAD   Ovarian cancer  Colitis   Fibromyalgia  GERD   Hashimoto's disease   Vitiligo   Osteoporosis   Hypothyroidism  Insomnia  Hyperlipidemia  Chemotherapy induced neutropenia   PONV  Depression     Past Surgical History:    Appendectomy  Breast biopsy  Gyn surgery  Hysterectomy total abdominal,  "bilateral salpingo-oophorectomy, node dissection, combined  IR chest port placement     Family History:    Heart disease - Father  Cerebrovascular disease - Father and sister  Thyroid disease - Sister   Breat cancer - Sister     Social History:  Tobacco use:    Never smoker   Alcohol use:    Positive, socially  Drug use:    Negative   Marital status:       Accompanied to ED by:  Friend   Living situation:   The patient lives at home alone      Review of Systems   Constitutional: Positive for fatigue. Negative for fever.   Eyes: Negative for visual disturbance.   Respiratory: Negative for cough.    Gastrointestinal: Positive for diarrhea. Negative for abdominal pain, nausea and vomiting.   Genitourinary: Negative for dysuria, frequency and hematuria.   Musculoskeletal: Positive for back pain.   Neurological: Positive for headaches. Negative for weakness and numbness.        (-) Incontinence    All other systems reviewed and are negative.    Physical Exam   First Vitals:  BP: (!) 143/77  Pulse: 111  Heart Rate: 98  Temp: 99.5  F (37.5  C)  Resp: 16  Height: 125 cm (4' 1.2\")  Weight: 40.4 kg (89 lb)  SpO2: 98 %      Physical Exam  VS: Reviewed per above  HENT: Mucous membranes moist, no nuchal rigidity  EYES: sclera anicteric  CV: Rate as noted, regular rhythm.   RESP: Effort normal. Breath sounds are normal bilaterally.  GI: no tenderness/rebound/guarding, not distended.  NEURO: GCS 15, cranial nerves II through XII are intact, 5 out of 5 strength in all 4 extremities, sensation is intact light touch in all 4 extremities  BACK: mild ttp across low back  MSK: No deformity of the extremities  SKIN: Warm and dry, no rash    Emergency Department Course   ECG (14:34:00):  Indication: Screening for cardiovascular disease.   Rate 102 bpm. NJ interval 126 ms. QRS duration 76 ms. QT/QTc 370/482 ms. P-R-T axes 41 61 54.   Interpretation: Sinus tachycardia, Low voltage QRS, Borderline ECG   Agree with computer " interpretation. Yes   No significant change compared to EKG dated 8/10/2014.   Interpreted at 1440 by Dr. Linda.      Imaging:  Radiographic findings were communicated with the patient who voiced understanding of the findings.    XR Chest:   IMPRESSION: No acute cardiopulmonary disease. Right chest port venous catheter noted.  Per radiology.     Head CT w/o Contrast:  IMPRESSION: Diffuse cerebral volume loss and cerebral white matter changes consistent with chronic small vessel ischemic disease. No evidence for acute intracranial pathology.   Per radiology.     Lumbar Spine CT w/o Contrast:  IMPRESSION:    1. No fractures are identified.  2. No lytic or destructive lesions are seen. No bone metastasis are identified on these images.  3. Degenerative change as described. The most significant degenerative changes appear to be at the L4-5 level where there are moderate-severe degenerative changes in the facet joints. This is allowing 5 mm of anterior subluxation of L4 and L5. There is moderate right and mild left foraminal stenosis at this level due to the degenerative changes.   4. Small left renal calculus.   Preliminary report per radiology.     Laboratory:  CBC:  low, o/w WNL (WBC 8.8, HGB 12.5)  CMP: Creatinine 0.48 low, o/w WNL    ISTAT gases lactate lin POCT 1424: pH 7.41, pCO2 42, pO2 33, Bicarbonate 26, O2 sat 64, Lactic acid 2.2 high   ISTAT gases lactate lin POCT 1613: pH 7.37, pCO2 43, pO2 34, Bicarbonate 24, O2 sat 64, Lactic acid 1.4   INR: 1.01   Blood culture: Pending x2   UA with Microscopic: Few bacteria, o/w Negative     Interventions:  1511 NS 1,000 mL IV   1512 Benadryl 12.5 mg IV   1512 Compazine 2.5 mg IV     Emergency Department Course:  Nursing notes and vitals reviewed.  1359: I performed an exam of the patient as documented above.     1639: I updated and reassessed the patient.      Findings and plan explained to the Patient. Patient discharged home with instructions regarding  supportive care, medications, and reasons to return. The importance of close follow-up was reviewed.     Impression & Plan    CMS Diagnoses: The Lactic acid level is elevated due to dehydration, at this time there is no sign of severe sepsis or septic shock.    Medical Decision Making:  Patient presents to the ER for evaluation of headache, low back pain.  Vital signs unrevealing.  Exam without focal neuro deficits to suggest stroke or back pain red flags to suggest cauda equina or conus medullaris.  CT of the head is unremarkable for acute process.  CT of the lumbar spine did not reveal any lytic lesions or fractures.  There was evidence of L4-L5 degenerative changes with anterior subluxation of L4 on L5.  This was relayed to patient as it could contribute to some of her pain.  Headache was not maximal in onset to suggest occult subarachnoid hemorrhage.  No fever or nuchal rigidity to suggest meningitis.  She is not neutropenic here today.  No evidence of pneumonia or UTI.  Lactic acid was mildly elevated at 2.2 and improved after IV fluids.  Possibly some dehydration causing this.  Blood cultures were sent upon arrival but based on history and exam and evaluation, I have a low suspicion for underlying serious bacterial illness.  Plan for recheck with PCP in the coming days.  Close return precautions were discussed prior to discharge.    Diagnosis:    ICD-10-CM   1. Acute bilateral low back pain without sciatica M54.5   2. Nonintractable headache, unspecified chronicity pattern, unspecified headache type R51     Disposition:  Discharged to home.     DEJAN, Jose Easton, am serving as a scribe at 1:59 PM on 11/6/2019 to document services personally performed by Oliver Linda MD, based on my observations and the provider's statements to me.     EMERGENCY DEPARTMENT       Oliver Linda MD  11/06/19 1800

## 2019-11-06 NOTE — DISCHARGE INSTRUCTIONS
Discharge Instructions  Back Pain  You were seen today for back pain. Back pain can have many causes, but most will get better without surgery or other specific treatment. Sometimes there is a herniated ( slipped ) disc. We do not usually do MRI scans to look for these right away, since most herniated discs will get better on their own with time.  Today, we did not find any evidence that your back pain was caused by a serious condition. However, sometimes symptoms develop over time and cannot be found during an emergency visit, so it is very important that you follow up with your primary provider.  Generally, every Emergency Department visit should have a follow-up clinic visit with either a primary or a specialty clinic/provider. Please follow-up as instructed by your emergency provider today.    Return to the Emergency Department if:  You develop a fever with your back pain.   You have weakness or change in sensation in one or both legs.  You lose control of your bowels or bladder, or cannot empty your bladder (cannot pee).  Your pain gets much worse.     Follow-up with your provider:  Unless your pain has completely gone away, please make an appointment with your provider within one week. Most of the routine care for back pain is available in a clinic and not the Emergency Department. You may need further management of your back pain, such as more pain medication, imaging such as an X-ray or MRI, or physical therapy.    What can I do to help myself?  Remain Active -- People are often afraid that they will hurt their back further or delay recovery by remaining active, but this is one of the best things you can do for your back. In fact, staying in bed for a long time to rest is not recommended. Studies have shown that people with low back pain recover faster when they remain active. Movement helps to bring blood flow to the muscles and relieve muscle spasms as well as preventing loss of muscle strength.  Heat --  Using a heating pad can help with low back pain during the first few weeks. Do not sleep with a heating pad, as you can be burned.   Pain medications - You may take a pain medication such as Tylenol  (acetaminophen), Advil , Motrin  (ibuprofen) or Aleve  (naproxen).  If you were given a prescription for medicine here today, be sure to read all of the information (including the package insert) that comes with your prescription.  This will include important information about the medicine, its side effects, and any warnings that you need to know about.  The pharmacist who fills the prescription can provide more information and answer questions you may have about the medicine.  If you have questions or concerns that the pharmacist cannot address, please call or return to the Emergency Department.   Remember that you can always come back to the Emergency Department if you are not able to see your regular provider in the amount of time listed above, if you get any new symptoms, or if there is anything that worries you.    Discharge Instructions  Headache    You were seen today for a headache. Headaches may be caused by many different things such as muscle tension, sinus inflammation, anxiety and stress, having too little sleep, too much alcohol, some medical conditions or injury. You may have a migraine, which is caused by changes in the blood vessels in your head.  At this time your provider does not find that your headache is a sign of anything dangerous or life-threatening.  However, sometimes the signs of serious illness do not show up right away.      Generally, every Emergency Department visit should have a follow-up clinic visit with either a primary or a specialty clinic/provider. Please follow-up as instructed by your emergency provider today.    Return to the Emergency Department if:  You get a new fever of 100.4 F or higher.  Your headache gets much worse.  You get a stiff neck with your headache.  You get a  new headache that is significantly different or worse than headaches you have had before.  You are vomiting (throwing up) and cannot keep food or water down.  You have blurry or double vision or other problems with your eyes.  You have a new weakness on one side of your body.  You have difficulty with balance which is new.  You or your family thinks you are confused.  You have a seizure.    What can I do to help myself?  Pain medications - You may take a pain medication such as Tylenol  (acetaminophen), Advil , Motrin  (ibuprofen) or Aleve  (naproxen).  Take a pain reliever as soon as you notice symptoms.  Starting medications as soon as you start to have symptoms may lessen the amount of pain you have.  Relaxing in a quiet, dark room may help.  Get enough sleep and eat meals regularly.  You may need to watch for certain foods or other things which may trigger your headaches.  Keeping a journal of your headaches and possible triggers may help you and your primary provider to identify things which you should avoid which may be causing your headaches.  If you were given a prescription for medicine here today, be sure to read all of the information (including the package insert) that comes with your prescription.  This will include important information about the medicine, its side effects, and any warnings that you need to know about.  The pharmacist who fills the prescription can provide more information and answer questions you may have about the medicine.  If you have questions or concerns that the pharmacist cannot address, please call or return to the Emergency Department.   Remember that you can always come back to the Emergency Department if you are not able to see your regular provider in the amount of time listed above, if you get any new symptoms, or if there is anything that worries you.

## 2019-11-06 NOTE — ED AVS SNAPSHOT
Emergency Department  [address] [place] [address]  Phone:  [phone]  Fax:  [phone]                                    Maximino Simeon   MRN: [id number]    Department:   Emergency Department   Date of Visit:  11/6/2019           After Visit Summary Signature Page    I have received my discharge instructions, and my questions have been answered. I have discussed any challenges I see with this plan with the nurse or doctor.    ..........................................................................................................................................  Patient/Patient Representative Signature      ..........................................................................................................................................  Patient Representative Print Name and Relationship to Patient    ..................................................               ................................................  Date                                   Time    ..........................................................................................................................................  Reviewed by Signature/Title    ...................................................              ..............................................  Date                                               Time          22EPIC Rev 08/18

## 2019-11-06 NOTE — TELEPHONE ENCOUNTER
Writer received call from patient who stated that when sh went to get in bed last night, she experienced a sudden, severe pain in her sacral region along with a slight headache. She took tylenol along with her nightly regimen of benadryl and went to sleep. Today, she is finding that tylenol is not providing sufficient relief for her continued headache and the sacral pain has continued as well. Writer discussed a clinic visit today, which patient declined stating she is too tired. Writer advised that she should be evaluated in clinic for symptoms to understand what is going on ad develop a proper and effective treatment plan. Writer discussed a possible appointment tomorrow with LEX Salgado CNP, at [facility] where patient gets all of her care. Writer sent urgent IB to Shashi and SOLO Choi and is routing encounter.    Per recommendation from Shashi, patient advised to go to ER for urgent evaluation for infection/abscess. Writer did not receive answer, left detailed VM    1115: Writer received call back from patient who stated that she didn't feel up to going in to the ER. Writer advised that if suspicions for abscess and/or infection are correct, sepsis could develop quickly and pose threat to life. Patient agreed to go in and will call for a friend or family member to bring her to ER immediately.

## 2019-11-07 LAB — INTERPRETATION ECG - MUSE: NORMAL

## 2019-11-12 LAB
BACTERIA SPEC CULT: NO GROWTH
BACTERIA SPEC CULT: NO GROWTH
Lab: NORMAL
Lab: NORMAL
SPECIMEN SOURCE: NORMAL
SPECIMEN SOURCE: NORMAL

## 2019-11-22 ENCOUNTER — ONCOLOGY VISIT (OUTPATIENT)
Dept: ONCOLOGY | Facility: CLINIC | Age: 74
End: 2019-11-22
Attending: OBSTETRICS & GYNECOLOGY
Payer: MEDICARE

## 2019-11-22 ENCOUNTER — HOSPITAL ENCOUNTER (OUTPATIENT)
Facility: CLINIC | Age: 74
Setting detail: SPECIMEN
Discharge: HOME | End: 2019-11-22
Attending: OBSTETRICS & GYNECOLOGY | Admitting: NURSE PRACTITIONER
Payer: MEDICARE

## 2019-11-22 ENCOUNTER — ALLIED HEALTH/NURSE VISIT (OUTPATIENT)
Dept: ONCOLOGY | Facility: CLINIC | Age: 74
End: 2019-11-22

## 2019-11-22 ENCOUNTER — INFUSION THERAPY VISIT (OUTPATIENT)
Dept: INFUSION THERAPY | Facility: CLINIC | Age: 74
End: 2019-11-22
Attending: OBSTETRICS & GYNECOLOGY
Payer: MEDICARE

## 2019-11-22 VITALS
DIASTOLIC BLOOD PRESSURE: 89 MMHG | HEART RATE: 102 BPM | RESPIRATION RATE: 18 BRPM | TEMPERATURE: 98.3 F | BODY MASS INDEX: 25.85 KG/M2 | SYSTOLIC BLOOD PRESSURE: 143 MMHG | WEIGHT: 89 LBS

## 2019-11-22 VITALS
DIASTOLIC BLOOD PRESSURE: 89 MMHG | SYSTOLIC BLOOD PRESSURE: 143 MMHG | BODY MASS INDEX: 20.6 KG/M2 | TEMPERATURE: 98.3 F | WEIGHT: 89 LBS | HEIGHT: 55 IN | RESPIRATION RATE: 18 BRPM | HEART RATE: 102 BPM

## 2019-11-22 DIAGNOSIS — D70.1 CHEMOTHERAPY-INDUCED NEUTROPENIA (H): ICD-10-CM

## 2019-11-22 DIAGNOSIS — Z71.9 COUNSELING, UNSPECIFIED: Primary | ICD-10-CM

## 2019-11-22 DIAGNOSIS — T45.1X5A CHEMOTHERAPY-INDUCED NEUTROPENIA (H): Primary | ICD-10-CM

## 2019-11-22 DIAGNOSIS — Z95.828 PORT-A-CATH IN PLACE: ICD-10-CM

## 2019-11-22 DIAGNOSIS — C56.9 OVARIAN CANCER, UNSPECIFIED LATERALITY (H): ICD-10-CM

## 2019-11-22 DIAGNOSIS — Z51.11 ENCOUNTER FOR ANTINEOPLASTIC CHEMOTHERAPY: ICD-10-CM

## 2019-11-22 DIAGNOSIS — T45.1X5A CHEMOTHERAPY-INDUCED NEUTROPENIA (H): ICD-10-CM

## 2019-11-22 DIAGNOSIS — D70.1 CHEMOTHERAPY-INDUCED NEUTROPENIA (H): Primary | ICD-10-CM

## 2019-11-22 LAB
ALBUMIN SERPL-MCNC: 3.9 G/DL (ref 3.4–5)
ALP SERPL-CCNC: 64 U/L (ref 40–150)
ALT SERPL W P-5'-P-CCNC: 25 U/L (ref 0–50)
ANION GAP SERPL CALCULATED.3IONS-SCNC: 5 MMOL/L (ref 3–14)
AST SERPL W P-5'-P-CCNC: 37 U/L (ref 0–45)
BASOPHILS # BLD AUTO: 0.1 10E9/L (ref 0–0.2)
BASOPHILS NFR BLD AUTO: 1.8 %
BILIRUB SERPL-MCNC: 0.3 MG/DL (ref 0.2–1.3)
BUN SERPL-MCNC: 15 MG/DL (ref 7–30)
CALCIUM SERPL-MCNC: 8.8 MG/DL (ref 8.5–10.1)
CHLORIDE SERPL-SCNC: 109 MMOL/L (ref 94–109)
CO2 SERPL-SCNC: 27 MMOL/L (ref 20–32)
CREAT SERPL-MCNC: 0.51 MG/DL (ref 0.52–1.04)
DIFFERENTIAL METHOD BLD: ABNORMAL
EOSINOPHIL # BLD AUTO: 0 10E9/L (ref 0–0.7)
EOSINOPHIL NFR BLD AUTO: 0.4 %
ERYTHROCYTE [DISTWIDTH] IN BLOOD BY AUTOMATED COUNT: 16.1 % (ref 10–15)
GFR SERPL CREATININE-BSD FRML MDRD: >90 ML/MIN/{1.73_M2}
GLUCOSE SERPL-MCNC: 123 MG/DL (ref 70–99)
HCT VFR BLD AUTO: 38.5 % (ref 35–47)
HGB BLD-MCNC: 12.5 G/DL (ref 11.7–15.7)
IMM GRANULOCYTES # BLD: 0 10E9/L (ref 0–0.4)
IMM GRANULOCYTES NFR BLD: 0.4 %
LYMPHOCYTES # BLD AUTO: 1.1 10E9/L (ref 0.8–5.3)
LYMPHOCYTES NFR BLD AUTO: 21.4 %
MAGNESIUM SERPL-MCNC: 2.3 MG/DL (ref 1.6–2.3)
MCH RBC QN AUTO: 30.9 PG (ref 26.5–33)
MCHC RBC AUTO-ENTMCNC: 32.5 G/DL (ref 31.5–36.5)
MCV RBC AUTO: 95 FL (ref 78–100)
MONOCYTES # BLD AUTO: 0.6 10E9/L (ref 0–1.3)
MONOCYTES NFR BLD AUTO: 11.3 %
NEUTROPHILS # BLD AUTO: 3.3 10E9/L (ref 1.6–8.3)
NEUTROPHILS NFR BLD AUTO: 64.7 %
NRBC # BLD AUTO: 0 10*3/UL
NRBC BLD AUTO-RTO: 0 /100
PLATELET # BLD AUTO: 227 10E9/L (ref 150–450)
POTASSIUM SERPL-SCNC: 4.4 MMOL/L (ref 3.4–5.3)
PROT SERPL-MCNC: 7.8 G/DL (ref 6.8–8.8)
RBC # BLD AUTO: 4.04 10E12/L (ref 3.8–5.2)
SODIUM SERPL-SCNC: 141 MMOL/L (ref 133–144)
WBC # BLD AUTO: 5 10E9/L (ref 4–11)

## 2019-11-22 PROCEDURE — G0463 HOSPITAL OUTPT CLINIC VISIT: HCPCS | Mod: 25

## 2019-11-22 PROCEDURE — 96415 CHEMO IV INFUSION ADDL HR: CPT

## 2019-11-22 PROCEDURE — 96377 APPLICATON ON-BODY INJECTOR: CPT | Mod: XS

## 2019-11-22 PROCEDURE — 25800030 ZZH RX IP 258 OP 636: Performed by: NURSE PRACTITIONER

## 2019-11-22 PROCEDURE — 85025 COMPLETE CBC W/AUTO DIFF WBC: CPT | Performed by: NURSE PRACTITIONER

## 2019-11-22 PROCEDURE — 96367 TX/PROPH/DG ADDL SEQ IV INF: CPT

## 2019-11-22 PROCEDURE — 96413 CHEMO IV INFUSION 1 HR: CPT

## 2019-11-22 PROCEDURE — 25000128 H RX IP 250 OP 636: Performed by: NURSE PRACTITIONER

## 2019-11-22 PROCEDURE — 99213 OFFICE O/P EST LOW 20 MIN: CPT | Performed by: NURSE PRACTITIONER

## 2019-11-22 PROCEDURE — 80053 COMPREHEN METABOLIC PANEL: CPT | Performed by: NURSE PRACTITIONER

## 2019-11-22 PROCEDURE — 83735 ASSAY OF MAGNESIUM: CPT | Performed by: NURSE PRACTITIONER

## 2019-11-22 PROCEDURE — 96375 TX/PRO/DX INJ NEW DRUG ADDON: CPT

## 2019-11-22 PROCEDURE — 96417 CHEMO IV INFUS EACH ADDL SEQ: CPT

## 2019-11-22 PROCEDURE — 25000128 H RX IP 250 OP 636: Performed by: OBSTETRICS & GYNECOLOGY

## 2019-11-22 PROCEDURE — 25000132 ZZH RX MED GY IP 250 OP 250 PS 637: Mod: GY | Performed by: NURSE PRACTITIONER

## 2019-11-22 PROCEDURE — 25000125 ZZHC RX 250: Performed by: NURSE PRACTITIONER

## 2019-11-22 RX ORDER — MEPERIDINE HYDROCHLORIDE 25 MG/ML
25 INJECTION INTRAMUSCULAR; INTRAVENOUS; SUBCUTANEOUS EVERY 30 MIN PRN
Status: CANCELLED | OUTPATIENT
Start: 2019-11-22

## 2019-11-22 RX ORDER — LORAZEPAM 2 MG/ML
1 INJECTION INTRAMUSCULAR EVERY 6 HOURS PRN
Status: CANCELLED
Start: 2019-11-22

## 2019-11-22 RX ORDER — DIPHENHYDRAMINE HYDROCHLORIDE 50 MG/ML
50 INJECTION INTRAMUSCULAR; INTRAVENOUS
Status: CANCELLED
Start: 2019-11-22

## 2019-11-22 RX ORDER — PALONOSETRON 0.05 MG/ML
0.25 INJECTION, SOLUTION INTRAVENOUS ONCE
Status: COMPLETED | OUTPATIENT
Start: 2019-11-22 | End: 2019-11-22

## 2019-11-22 RX ORDER — EPINEPHRINE 0.3 MG/.3ML
0.3 INJECTION SUBCUTANEOUS EVERY 5 MIN PRN
Status: CANCELLED | OUTPATIENT
Start: 2019-11-22

## 2019-11-22 RX ORDER — SODIUM CHLORIDE 9 MG/ML
1000 INJECTION, SOLUTION INTRAVENOUS CONTINUOUS PRN
Status: CANCELLED
Start: 2019-11-22

## 2019-11-22 RX ORDER — METHYLPREDNISOLONE SODIUM SUCCINATE 125 MG/2ML
125 INJECTION, POWDER, LYOPHILIZED, FOR SOLUTION INTRAMUSCULAR; INTRAVENOUS
Status: CANCELLED
Start: 2019-11-22

## 2019-11-22 RX ORDER — HEPARIN SODIUM (PORCINE) LOCK FLUSH IV SOLN 100 UNIT/ML 100 UNIT/ML
5 SOLUTION INTRAVENOUS
Status: DISCONTINUED | OUTPATIENT
Start: 2019-11-22 | End: 2019-11-22 | Stop reason: HOSPADM

## 2019-11-22 RX ORDER — NALOXONE HYDROCHLORIDE 0.4 MG/ML
.1-.4 INJECTION, SOLUTION INTRAMUSCULAR; INTRAVENOUS; SUBCUTANEOUS
Status: CANCELLED | OUTPATIENT
Start: 2019-11-22

## 2019-11-22 RX ORDER — DIPHENHYDRAMINE HCL 25 MG
50 CAPSULE ORAL ONCE
Status: CANCELLED
Start: 2019-11-22

## 2019-11-22 RX ORDER — ALBUTEROL SULFATE 90 UG/1
1-2 AEROSOL, METERED RESPIRATORY (INHALATION)
Status: CANCELLED
Start: 2019-11-22

## 2019-11-22 RX ORDER — EPINEPHRINE 1 MG/ML
0.3 INJECTION, SOLUTION INTRAMUSCULAR; SUBCUTANEOUS EVERY 5 MIN PRN
Status: CANCELLED | OUTPATIENT
Start: 2019-11-22

## 2019-11-22 RX ORDER — DIPHENHYDRAMINE HCL 25 MG
50 CAPSULE ORAL ONCE
Status: COMPLETED | OUTPATIENT
Start: 2019-11-22 | End: 2019-11-22

## 2019-11-22 RX ORDER — PALONOSETRON 0.05 MG/ML
0.25 INJECTION, SOLUTION INTRAVENOUS ONCE
Status: CANCELLED
Start: 2019-11-22

## 2019-11-22 RX ORDER — ALBUTEROL SULFATE 0.83 MG/ML
2.5 SOLUTION RESPIRATORY (INHALATION)
Status: CANCELLED | OUTPATIENT
Start: 2019-11-22

## 2019-11-22 RX ORDER — HEPARIN SODIUM (PORCINE) LOCK FLUSH IV SOLN 100 UNIT/ML 100 UNIT/ML
5 SOLUTION INTRAVENOUS
Status: CANCELLED | OUTPATIENT
Start: 2019-11-22

## 2019-11-22 RX ADMIN — PALONOSETRON 0.25 MG: 0.25 INJECTION, SOLUTION INTRAVENOUS at 10:53

## 2019-11-22 RX ADMIN — HEPARIN SODIUM (PORCINE) LOCK FLUSH IV SOLN 100 UNIT/ML 5 ML: 100 SOLUTION at 15:35

## 2019-11-22 RX ADMIN — PACLITAXEL 222 MG: 6 INJECTION, SOLUTION INTRAVENOUS at 11:36

## 2019-11-22 RX ADMIN — DEXAMETHASONE SODIUM PHOSPHATE 20 MG: 10 INJECTION, SOLUTION INTRAMUSCULAR; INTRAVENOUS at 11:15

## 2019-11-22 RX ADMIN — PEGFILGRASTIM 6 MG: KIT SUBCUTANEOUS at 14:45

## 2019-11-22 RX ADMIN — SODIUM CHLORIDE 250 ML: 9 INJECTION, SOLUTION INTRAVENOUS at 10:53

## 2019-11-22 RX ADMIN — FAMOTIDINE 40 MG: 10 INJECTION INTRAVENOUS at 10:57

## 2019-11-22 RX ADMIN — DIPHENHYDRAMINE HYDROCHLORIDE 25 MG: 25 CAPSULE ORAL at 10:53

## 2019-11-22 RX ADMIN — CARBOPLATIN 400 MG: 10 INJECTION, SOLUTION INTRAVENOUS at 14:42

## 2019-11-22 ASSESSMENT — MIFFLIN-ST. JEOR: SCORE: 643.96

## 2019-11-22 ASSESSMENT — PAIN SCALES - GENERAL
PAINLEVEL: NO PAIN (0)
PAINLEVEL: NO PAIN (0)

## 2019-11-22 NOTE — PROGRESS NOTES
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  S/P exploratory laparotomy, total abdominal hysterectomy, removal of her ovary and the appendix  08/09  Patient of    Due to high grade nature of the disease Dr. Ascencio recommended chemotherapy Taxol carbo x6 cycles  Chemotherapy started on 09 13  Comes here today for cycle 4 of the chemo      Interval History:  Patient reports feeling well.  She has been tolerating chemotherapy well.  She denies fever chills sweats denies cough denies shortness of breath denies chest pain.  Denies nausea vomiting diarrhea denies abdominal pain denies bleeding energy and appetite are good   Reports no new concerns today      Review of Systems:  14 point ROS of systems including Constitutional, Eyes, Respiratory, Cardiovascular, Gastroenterology, Genitourinary, Integumentary, Muscularskeletal, Psychiatric were all negative except for pertinent positives noted in my HPI.      Current Outpatient Medications   Medication Sig Dispense Refill     B Complex Vitamins (B COMPLEX PO) Take 1 tablet by mouth daily (with lunch)        Calcium Carbonate-Vitamin D (CALCIUM + D PO) Take 6 tablets by mouth daily.       dronabinol (MARINOL) 2.5 MG capsule Take 1 capsule (2.5 mg) by mouth 2 times daily (before meals) 60 capsule 0     DULoxetine (CYMBALTA) 20 MG capsule Take 1 capsule (20 mg) by mouth daily Follow up with your PCP for refills 30 capsule 0     levothyroxine (SYNTHROID/LEVOTHROID) 50 MCG tablet Take 50 mcg by mouth every morning        liothyronine (CYTOMEL) 5 MCG tablet Take 5 mcg by mouth every morning        loperamide (IMODIUM) 1 MG/5ML liquid Take 1 mg by mouth 6 times daily        Multiple Vitamins-Minerals (MULTIVITAL PO) Take 1 tablet by mouth daily (with lunch)        Probiotic Product (PROBIOTIC DAILY PO) 1 tablet daily at lunchtime by mouth        "prochlorperazine (COMPAZINE) 10 MG tablet Take 1 tablet (10 mg) by mouth every 6 hours as needed (nausea/vomiting) 30 tablet 5     psyllium (METAMUCIL/KONSYL) Packet Take 1 packet by mouth daily       amLODIPine (NORVASC) 5 MG tablet Take 1 tablet (5 mg) by mouth daily (Patient not taking: Reported on 11/22/2019) 30 tablet 0     Coenzyme Q10 (COQ10 PO) Take 200 mg by mouth 2 times daily       enoxaparin (LOVENOX) 30 MG/0.3ML syringe Inject 0.3 mLs (30 mg) Subcutaneous every 24 hours (Patient not taking: Reported on 11/22/2019) 24 Syringe 0     ibuprofen (ADVIL/MOTRIN) 200 MG tablet Take 400 mg by mouth every 6 hours as needed for mild pain         Physical Examination:  General: The patient is a pleasant female in no acute distress.  BP (!) 143/89   Pulse 102   Temp 98.3  F (36.8  C) (Oral)   Resp 18   Ht (!) 1.25 m (4' 1.21\")   Wt 40.4 kg (89 lb)   LMP  (LMP Unknown)   BMI 25.84 kg/m    HEENT: EOMI, PERRL. Sclerae are anicteric. Oral mucosa is pink and moist with no lesions or thrush.   Lymph: Neck is supple with no lymphadenopathy in the cervical or supraclavicular areas.   Heart: Regular rate and rhythm.   Lungs: Clear to auscultation bilaterally.   GI: Bowel sounds present, soft, nontender with no palpable hepatosplenomegaly or masses.   Extremities: No lower extremity edema noted bilaterally.   Skin: No rashes, petechiae, or bruising noted on exposed skin.    Laboratory Data:  Results for orders placed or performed in visit on 11/22/19 (from the past 24 hour(s))   CBC with platelets differential   Result Value Ref Range    WBC 5.0 4.0 - 11.0 10e9/L    RBC Count 4.04 3.8 - 5.2 10e12/L    Hemoglobin 12.5 11.7 - 15.7 g/dL    Hematocrit 38.5 35.0 - 47.0 %    MCV 95 78 - 100 fl    MCH 30.9 26.5 - 33.0 pg    MCHC 32.5 31.5 - 36.5 g/dL    RDW 16.1 (H) 10.0 - 15.0 %    Platelet Count 227 150 - 450 10e9/L    Diff Method Automated Method     % Neutrophils 64.7 %    % Lymphocytes 21.4 %    % Monocytes 11.3 %    % " Eosinophils 0.4 %    % Basophils 1.8 %    % Immature Granulocytes 0.4 %    Nucleated RBCs 0 0 /100    Absolute Neutrophil 3.3 1.6 - 8.3 10e9/L    Absolute Lymphocytes 1.1 0.8 - 5.3 10e9/L    Absolute Monocytes 0.6 0.0 - 1.3 10e9/L    Absolute Eosinophils 0.0 0.0 - 0.7 10e9/L    Absolute Basophils 0.1 0.0 - 0.2 10e9/L    Abs Immature Granulocytes 0.0 0 - 0.4 10e9/L    Absolute Nucleated RBC 0.0    Comprehensive metabolic panel   Result Value Ref Range    Sodium 141 133 - 144 mmol/L    Potassium 4.4 3.4 - 5.3 mmol/L    Chloride 109 94 - 109 mmol/L    Carbon Dioxide 27 20 - 32 mmol/L    Anion Gap 5 3 - 14 mmol/L    Glucose 123 (H) 70 - 99 mg/dL    Urea Nitrogen 15 7 - 30 mg/dL    Creatinine 0.51 (L) 0.52 - 1.04 mg/dL    GFR Estimate >90 >60 mL/min/[1.73_m2]    GFR Estimate If Black >90 >60 mL/min/[1.73_m2]    Calcium 8.8 8.5 - 10.1 mg/dL    Bilirubin Total 0.3 0.2 - 1.3 mg/dL    Albumin 3.9 3.4 - 5.0 g/dL    Protein Total 7.8 6.8 - 8.8 g/dL    Alkaline Phosphatase 64 40 - 150 U/L    ALT 25 0 - 50 U/L    AST 37 0 - 45 U/L   Magnesium   Result Value Ref Range    Magnesium 2.3 1.6 - 2.3 mg/dL         Assessment and Plan:    This is a 74-year-old  female with      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  S/P exploratory laparotomy, total abdominal hysterectomy, removal of her ovary and the appendix  08/09  Patient of    Due to high grade nature of the disease Dr. Ascencio recommended chemotherapy Taxol carbo x 6 cycles-started on 09/13/2019  Patient reports she has tolerated chemotherapy well  Patient has follow-up appointment with me with next cycle of chemo    Per Dr Ascencio nurse - return for a visit with Dr. Ascencioafter 6 cycles of chemo and  CT CAP prior to appt.  Dr. Ascencio does not need to see her in between any of the cycles  -I will continue to see patient with each cycle of chemo  -Labs reviewed okay to proceed with chemo  -On pro Neulasta      Health maintenance   -Patient had flu shot    Patient  is advised to go to the ER or call 911 in the event of fever chills sweats cough shortness of breath chest pain.  Nausea vomiting diarrhea abdominal pain bleeding or any changes in health condition-patient and son verbalized understanding      LEX Salgado CNP  Hem/Onc   [facility]     Chart documentation with Dragon Voice recognition Software. Although reviewed after completion, some words and grammatical errors may remain.

## 2019-11-22 NOTE — PROGRESS NOTES
"Oncology Rooming Note    November 22, 2019 10:11 AM   Maximino Simeon is a 74 year old female who presents for:    Chief Complaint   Patient presents with     Oncology Clinic Visit     Initial Vitals: BP (!) 143/89   Pulse 102   Temp 98.3  F (36.8  C) (Oral)   Resp 18   Ht (!) 1.25 m (4' 1.21\")   Wt 40.4 kg (89 lb)   LMP  (LMP Unknown)   BMI 25.84 kg/m   Estimated body mass index is 25.84 kg/m  as calculated from the following:    Height as of this encounter: 1.25 m (4' 1.21\").    Weight as of this encounter: 40.4 kg (89 lb). Body surface area is 1.18 meters squared.  No Pain (0) Comment: Data Unavailable   No LMP recorded (lmp unknown). Patient has had a hysterectomy.  Allergies reviewed: Yes  Medications reviewed: Yes    Medications: Medication refills not needed today.  Pharmacy name entered into [employer]:    [id number] [address] [facility] [address] [place]ARMACY [place], [place] - [address] [address]    Clinical concerns:  NP was notified.      Liza Traore CMA            "

## 2019-11-22 NOTE — LETTER
"    11/22/2019         RE: Maximino Simeon  [address] [address] [facility] [id number]        Dear Colleague,    Thank you for referring your patient, Maximino Simeon, to the [facility]. Please see a copy of my visit note below.    Oncology Rooming Note    November 22, 2019 10:11 AM   Maximino Simeon is a 74 year old female who presents for:    Chief Complaint   Patient presents with     Oncology Clinic Visit     Initial Vitals: BP (!) 143/89   Pulse 102   Temp 98.3  F (36.8  C) (Oral)   Resp 18   Ht (!) 1.25 m (4' 1.21\")   Wt 40.4 kg (89 lb)   LMP  (LMP Unknown)   BMI 25.84 kg/m    Estimated body mass index is 25.84 kg/m  as calculated from the following:    Height as of this encounter: 1.25 m (4' 1.21\").    Weight as of this encounter: 40.4 kg (89 lb). Body surface area is 1.18 meters squared.  No Pain (0) Comment: Data Unavailable   No LMP recorded (lmp unknown). Patient has had a hysterectomy.  Allergies reviewed: Yes  Medications reviewed: Yes    Medications: Medication refills not needed today.  Pharmacy name entered into [employer]:    [facility][id number] [place], [place] - [address] [facility] [address] PHARMACY [place], [place] - [address] [address]    Clinical concerns:  NP was notified.      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  S/P exploratory laparotomy, total abdominal hysterectomy, removal of her ovary and the appendix  08/09  Patient of    Due to high grade nature of the disease Dr. Ascencio recommended chemotherapy Taxol carbo x6 cycles  Chemotherapy started on 09 13  Comes here today for cycle 4 of the chemo      Interval History:  Patient reports feeling well.  She has been tolerating chemotherapy well.  She denies fever chills " sweats denies cough denies shortness of breath denies chest pain.  Denies nausea vomiting diarrhea denies abdominal pain denies bleeding energy and appetite are good   Reports no new concerns today      Review of Systems:  14 point ROS of systems including Constitutional, Eyes, Respiratory, Cardiovascular, Gastroenterology, Genitourinary, Integumentary, Muscularskeletal, Psychiatric were all negative except for pertinent positives noted in my HPI.      Current Outpatient Medications   Medication Sig Dispense Refill     B Complex Vitamins (B COMPLEX PO) Take 1 tablet by mouth daily (with lunch)        Calcium Carbonate-Vitamin D (CALCIUM + D PO) Take 6 tablets by mouth daily.       dronabinol (MARINOL) 2.5 MG capsule Take 1 capsule (2.5 mg) by mouth 2 times daily (before meals) 60 capsule 0     DULoxetine (CYMBALTA) 20 MG capsule Take 1 capsule (20 mg) by mouth daily Follow up with your PCP for refills 30 capsule 0     levothyroxine (SYNTHROID/LEVOTHROID) 50 MCG tablet Take 50 mcg by mouth every morning        liothyronine (CYTOMEL) 5 MCG tablet Take 5 mcg by mouth every morning        loperamide (IMODIUM) 1 MG/5ML liquid Take 1 mg by mouth 6 times daily        Multiple Vitamins-Minerals (MULTIVITAL PO) Take 1 tablet by mouth daily (with lunch)        Probiotic Product (PROBIOTIC DAILY PO) 1 tablet daily at lunchtime by mouth       prochlorperazine (COMPAZINE) 10 MG tablet Take 1 tablet (10 mg) by mouth every 6 hours as needed (nausea/vomiting) 30 tablet 5     psyllium (METAMUCIL/KONSYL) Packet Take 1 packet by mouth daily       amLODIPine (NORVASC) 5 MG tablet Take 1 tablet (5 mg) by mouth daily (Patient not taking: Reported on 11/22/2019) 30 tablet 0     Coenzyme Q10 (COQ10 PO) Take 200 mg by mouth 2 times daily       enoxaparin (LOVENOX) 30 MG/0.3ML syringe Inject 0.3 mLs (30 mg) Subcutaneous every 24 hours (Patient not taking: Reported on 11/22/2019) 24 Syringe 0     ibuprofen (ADVIL/MOTRIN) 200 MG tablet Take  "400 mg by mouth every 6 hours as needed for mild pain         Physical Examination:  General: The patient is a pleasant female in no acute distress.  BP (!) 143/89   Pulse 102   Temp 98.3  F (36.8  C) (Oral)   Resp 18   Ht (!) 1.25 m (4' 1.21\")   Wt 40.4 kg (89 lb)   LMP  (LMP Unknown)   BMI 25.84 kg/m     HEENT: EOMI, PERRL. Sclerae are anicteric. Oral mucosa is pink and moist with no lesions or thrush.   Lymph: Neck is supple with no lymphadenopathy in the cervical or supraclavicular areas.   Heart: Regular rate and rhythm.   Lungs: Clear to auscultation bilaterally.   GI: Bowel sounds present, soft, nontender with no palpable hepatosplenomegaly or masses.   Extremities: No lower extremity edema noted bilaterally.   Skin: No rashes, petechiae, or bruising noted on exposed skin.    Laboratory Data:  Results for orders placed or performed in visit on 11/22/19 (from the past 24 hour(s))   CBC with platelets differential   Result Value Ref Range    WBC 5.0 4.0 - 11.0 10e9/L    RBC Count 4.04 3.8 - 5.2 10e12/L    Hemoglobin 12.5 11.7 - 15.7 g/dL    Hematocrit 38.5 35.0 - 47.0 %    MCV 95 78 - 100 fl    MCH 30.9 26.5 - 33.0 pg    MCHC 32.5 31.5 - 36.5 g/dL    RDW 16.1 (H) 10.0 - 15.0 %    Platelet Count 227 150 - 450 10e9/L    Diff Method Automated Method     % Neutrophils 64.7 %    % Lymphocytes 21.4 %    % Monocytes 11.3 %    % Eosinophils 0.4 %    % Basophils 1.8 %    % Immature Granulocytes 0.4 %    Nucleated RBCs 0 0 /100    Absolute Neutrophil 3.3 1.6 - 8.3 10e9/L    Absolute Lymphocytes 1.1 0.8 - 5.3 10e9/L    Absolute Monocytes 0.6 0.0 - 1.3 10e9/L    Absolute Eosinophils 0.0 0.0 - 0.7 10e9/L    Absolute Basophils 0.1 0.0 - 0.2 10e9/L    Abs Immature Granulocytes 0.0 0 - 0.4 10e9/L    Absolute Nucleated RBC 0.0    Comprehensive metabolic panel   Result Value Ref Range    Sodium 141 133 - 144 mmol/L    Potassium 4.4 3.4 - 5.3 mmol/L    Chloride 109 94 - 109 mmol/L    Carbon Dioxide 27 20 - 32 mmol/L    " Anion Gap 5 3 - 14 mmol/L    Glucose 123 (H) 70 - 99 mg/dL    Urea Nitrogen 15 7 - 30 mg/dL    Creatinine 0.51 (L) 0.52 - 1.04 mg/dL    GFR Estimate >90 >60 mL/min/[1.73_m2]    GFR Estimate If Black >90 >60 mL/min/[1.73_m2]    Calcium 8.8 8.5 - 10.1 mg/dL    Bilirubin Total 0.3 0.2 - 1.3 mg/dL    Albumin 3.9 3.4 - 5.0 g/dL    Protein Total 7.8 6.8 - 8.8 g/dL    Alkaline Phosphatase 64 40 - 150 U/L    ALT 25 0 - 50 U/L    AST 37 0 - 45 U/L   Magnesium   Result Value Ref Range    Magnesium 2.3 1.6 - 2.3 mg/dL         Assessment and Plan:    This is a 74-year-old  female with      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  S/P exploratory laparotomy, total abdominal hysterectomy, removal of her ovary and the appendix  08/09  Patient of    Due to high grade nature of the disease Dr. Ascencio recommended chemotherapy Taxol carbo x 6 cycles-started on 09/13/2019  Patient reports she has tolerated chemotherapy well  Patient has follow-up appointment with me with next cycle of chemo    Per Dr Ascencio nurse - return for a visit with Dr. Ascencioafter 6 cycles of chemo and  CT CAP prior to appt.  Dr. Ascencio does not need to see her in between any of the cycles  -I will continue to see patient with each cycle of chemo  -Labs reviewed okay to proceed with chemo  -On pro Neulasta      Health maintenance   -Patient had flu shot    Patient is advised to go to the ER or call 911 in the event of fever chills sweats cough shortness of breath chest pain.  Nausea vomiting diarrhea abdominal pain bleeding or any changes in health condition-patient and son verbalized understanding      LEX Salgado CNP  Hem/Onc   [facility]     Chart documentation with Dragon Voice recognition Software. Although reviewed after completion, some words and grammatical errors may remain.          Again, thank you for allowing me to participate in the care of your patient.        Sincerely,        LEX Salgado  CNP

## 2019-11-22 NOTE — PROGRESS NOTES
Infusion Nursing Note:  Maximino Simeon presents today for C4D1 carbo/taxol.    Patient seen by provider today: Yes: Shashi   present during visit today: Not Applicable.    Note: N/A.    Intravenous Access:  Implanted Port.    Treatment Conditions:  Lab Results   Component Value Date    HGB 12.5 11/22/2019     Lab Results   Component Value Date    WBC 5.0 11/22/2019      Lab Results   Component Value Date    ANEU 3.3 11/22/2019     Lab Results   Component Value Date     11/22/2019      Lab Results   Component Value Date     11/22/2019                   Lab Results   Component Value Date    POTASSIUM 4.4 11/22/2019           Lab Results   Component Value Date    MAG 2.3 11/22/2019            Lab Results   Component Value Date    CR 0.51 11/22/2019                   Lab Results   Component Value Date    LUCRECIA 8.8 11/22/2019                Lab Results   Component Value Date    BILITOTAL 0.3 11/22/2019           Lab Results   Component Value Date    ALBUMIN 3.9 11/22/2019                    Lab Results   Component Value Date    ALT 25 11/22/2019           Lab Results   Component Value Date    AST 37 11/22/2019       Results reviewed, labs MET treatment parameters, ok to proceed with treatment.      Post Infusion Assessment:  Patient tolerated infusion without incident.  Blood return noted pre and post infusion.  Site patent and intact, free from redness, edema or discomfort.  No evidence of extravasations.  Access discontinued per protocol.     ONPRO  Was placed on patient's: back of right arm.    Was placed at 245 PM    ONPRO injector device Lot number: [id number]    Patient education included: what patient can expect after application, what colored lights mean on the device, when to remove device, when and where to call with questions or issues, all patients questions answered and 545pm.    Patient tolerated administration well.    Discharge Plan:   Discharge instructions reviewed with:  Patient.  Patient and/or family verbalized understanding of discharge instructions and all questions answered.  Patient discharged in stable condition accompanied by: self.  Departure Mode: Ambulatory.    Justyna Dave RN

## 2019-11-22 NOTE — PROGRESS NOTES
Discussed the risks/benefits of laser capsulotomy. Laser recommended. Patient elects to proceed. Multiple attempts made to see Brittni today for planned psychosocial check-in at C4D1 carbo/taxol, Brittni sleeping. This clinician will plan for psychosocial check-in and support at next infusion. Brittni knows to reach out prior in the case of psychosocial distress or concern.     CLIFFORD Vora, [facility]  Phone: [phone]  Pager: [phone]    [facility]: M, Thu  *every other Tue, 8am-4:30pm  [facility]: W, F, *every other Tue, 8am-4:30pm

## 2019-11-22 NOTE — PATIENT INSTRUCTIONS
Your On-body Neulasta Injector was applied to your R arm at 245pm.  At approximately 545pm on 11/23, your On-body Injector will beep to let you know your dose delivery will begin in 2 minutes.  Your medication will be delivered over the next 45 minutes.  You can remove your Injector at 630pm.  Please make sure your Injector has a solid green light or has turned off prior to removing the device.  Please contact your provider at [phone] with questions or concerns.

## 2019-12-12 NOTE — PROGRESS NOTES
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  She underwent exploratory laparotomy, total abdominal hysterectomy and oophorectomy on the left side with appendectomy in August 2019.  Dr. Ascencio is her primary gynecology oncologist and due to high-grade nature of the disease, she recommended chemotherapy with carboplatin/Taxol for 6 cycles.  For cycle completed on 9/13/2019 and she has completed 4 cycles.    She is here today for cycle 5.     Interval History: Kali is here unaccompanied.  She is completed 4 cycles of carboplatin/Taxol and she is doing fairly well.  She admits to insomnia and she has tried Benadryl 50 mg with relief.  She was instructed to try melatonin, however she has not tried this yet.  She has a history of colitis and has diarrhea regularly, intermittently.  She states that she normally has 1-2 episodes per day and she does occasionally have 3-4 and she takes loperamide.  She denies nausea, vomiting, constipation, bleeding, fever, chills.  She has no other complaints.    Review of Systems: See interval hx. Denies fevers, chills, HA, dizziness, n/t, changes in vision, cough, sore throat, CP, SOB, abdominal pain, N/V, diarrhea, changes in urination, bleeding, bruising, rash.     PMHx and Social Hx reviewed per EPIC.      Medications:  Current Outpatient Medications   Medication Sig Dispense Refill     B Complex Vitamins (B COMPLEX PO) Take 1 tablet by mouth daily (with lunch)        Calcium Carbonate-Vitamin D (CALCIUM + D PO) Take 6 tablets by mouth daily.       Coenzyme Q10 (COQ10 PO) Take 200 mg by mouth 2 times daily       DULoxetine (CYMBALTA) 20 MG capsule Take 1 capsule (20 mg) by mouth daily Follow up with your PCP for refills 30 capsule 0     levothyroxine (SYNTHROID/LEVOTHROID) 50 MCG tablet Take 50 mcg by mouth every  morning        liothyronine (CYTOMEL) 5 MCG tablet Take 5 mcg by mouth every morning        loperamide (IMODIUM) 1 MG/5ML liquid Take 1 mg by mouth 6 times daily        Multiple Vitamins-Minerals (MULTIVITAL PO) Take 1 tablet by mouth daily (with lunch)        Probiotic Product (PROBIOTIC DAILY PO) 1 tablet daily at lunchtime by mouth       prochlorperazine (COMPAZINE) 10 MG tablet Take 1 tablet (10 mg) by mouth every 6 hours as needed (nausea/vomiting) 30 tablet 5     amLODIPine (NORVASC) 5 MG tablet Take 1 tablet (5 mg) by mouth daily (Patient not taking: Reported on 11/22/2019) 30 tablet 0     dronabinol (MARINOL) 2.5 MG capsule Take 1 capsule (2.5 mg) by mouth 2 times daily (before meals) (Patient not taking: Reported on 12/13/2019) 60 capsule 0     enoxaparin (LOVENOX) 30 MG/0.3ML syringe Inject 0.3 mLs (30 mg) Subcutaneous every 24 hours (Patient not taking: Reported on 11/22/2019) 24 Syringe 0     ibuprofen (ADVIL/MOTRIN) 200 MG tablet Take 400 mg by mouth every 6 hours as needed for mild pain       psyllium (METAMUCIL/KONSYL) Packet Take 1 packet by mouth daily         Allergies   Allergen Reactions     Adhesive Tape      bandaids     Liquid Adhesive Rash     Penicillins Rash       EXAM:    /81   Pulse 96   Temp 97.6  F (36.4  C) (Oral)   Resp 18   LMP  (LMP Unknown)   SpO2 96%     GENERAL:  Female, thin, in no acute distress.  Alert and oriented x3.   HEENT:  Normocephalic, atraumatic.  PERRL, oropharynx clear with no sores or thrush.   LYMPH NODES:  No palpable pre/post-auricular, cervical, axillary lymphadenopathy appreciated.  CV:  RRR, No murmurs, gallops, or rubs.   LUNGS:  Clear to auscultation bilaterally.   ABDOMEN:  Soft, nontender and nondistended.  Bowel sounds heard x4.  No apparent hepatosplenomegaly.   EXTREMITIES:  No clubbing, cyanosis, or edema.   SKIN: No rash on exposed skin  PSYCH: Mood stable      Labs:   Results for ALISHA BATISTA (MRN [id number]) as of 12/13/2019 11:48    12/13/2019 08:30   Sodium 141   Potassium 3.8   Chloride 109   Carbon Dioxide 28   Urea Nitrogen 11   Creatinine 0.42 (L)   GFR Estimate >90   GFR Estimate If Black >90   Calcium 9.4   Anion Gap 4   Magnesium 2.1   Albumin 4.1   Protein Total 7.3   Bilirubin Total 0.9   Alkaline Phosphatase 69   ALT 24   AST 20   Glucose 89   WBC 3.5 (L)   Hemoglobin 11.7   Hematocrit 35.0   Platelet Count 142 (L)   RBC Count 3.58 (L)   MCV 98   MCH 32.7   MCHC 33.4   RDW 16.8 (H)   Diff Method Automated Method   % Neutrophils 66.0   % Lymphocytes 23.8   % Monocytes 8.7   % Eosinophils 0.9   % Basophils 0.6   % Immature Granulocytes 0.0   Nucleated RBCs 0   Absolute Neutrophil 2.3   Absolute Lymphocytes 0.8   Absolute Monocytes 0.3   Absolute Eosinophils 0.0   Absolute Basophils 0.0   Abs Immature Granulocytes 0.0   Absolute Nucleated RBC 0.0       Imaging: n/a    Impression/Plan: Maximino Simeon is a 74 year old female with 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    Ovarian cancer: Stage 1A clear cell carcinoma of the left ovary and has now completed 4 cycles of carboplatin/Taxol. She has tolerated this chemotherapy fairly well so far.  Her weight was incorrect in the computer for cycle 4 and therefore this was adjusted and her dose will be appropriate for cycle 5.  Leukopenia at 3.5, however ANC normal at 2.3.  Labs within treat parameters to proceed with treatment today.  She will return in 3 weeks for cycle 6 and then follow-up with Dr. Ascencio at the end of January 2020.  She will call the clinic with any questions or concerns.  --1/3/20 Shashi, RENE  --Dr Moncada in 6 weeks    Heme: Leukopenia with WBC 3.5, however ANC is normal at 2.3, we will proceed with chemotherapy.  Thrombocytopenia with platelets of 142, no active bleeding.  Hemoglobin normal at 11.7.    Diarrhea:  She has a history of colitis and this is chronic for her, however she has had intermittent episodes of increased diarrhea since starting Taxol.  She has Imodium at home and this is controlling it well.  She was instructed to call the clinic if any worsening diarrhea.      Chart documentation with Dragon Voice recognition Software. Although reviewed after completion, some words and grammatical errors may remain.      Mela Potts PA-C  Hematology/Oncology  [facility]

## 2019-12-13 ENCOUNTER — HOSPITAL ENCOUNTER (OUTPATIENT)
Facility: CLINIC | Age: 74
Setting detail: SPECIMEN
Discharge: HOME | End: 2019-12-13
Attending: NURSE PRACTITIONER | Admitting: NURSE PRACTITIONER
Payer: MEDICARE

## 2019-12-13 ENCOUNTER — INFUSION THERAPY VISIT (OUTPATIENT)
Dept: INFUSION THERAPY | Facility: CLINIC | Age: 74
End: 2019-12-13
Attending: NURSE PRACTITIONER
Payer: MEDICARE

## 2019-12-13 ENCOUNTER — ONCOLOGY VISIT (OUTPATIENT)
Dept: ONCOLOGY | Facility: CLINIC | Age: 74
End: 2019-12-13
Attending: PHYSICIAN ASSISTANT
Payer: MEDICARE

## 2019-12-13 ENCOUNTER — ALLIED HEALTH/NURSE VISIT (OUTPATIENT)
Dept: ONCOLOGY | Facility: CLINIC | Age: 74
End: 2019-12-13

## 2019-12-13 VITALS
OXYGEN SATURATION: 96 % | TEMPERATURE: 97.6 F | RESPIRATION RATE: 18 BRPM | SYSTOLIC BLOOD PRESSURE: 126 MMHG | HEART RATE: 96 BPM | DIASTOLIC BLOOD PRESSURE: 81 MMHG

## 2019-12-13 VITALS — WEIGHT: 89.29 LBS | BODY MASS INDEX: 18.74 KG/M2 | HEIGHT: 58 IN

## 2019-12-13 DIAGNOSIS — C56.9 OVARIAN CANCER, UNSPECIFIED LATERALITY (H): ICD-10-CM

## 2019-12-13 DIAGNOSIS — T45.1X5A CHEMOTHERAPY-INDUCED NEUTROPENIA (H): Primary | ICD-10-CM

## 2019-12-13 DIAGNOSIS — Z71.9 COUNSELING, UNSPECIFIED: Primary | ICD-10-CM

## 2019-12-13 DIAGNOSIS — Z51.11 ENCOUNTER FOR ANTINEOPLASTIC CHEMOTHERAPY: ICD-10-CM

## 2019-12-13 DIAGNOSIS — D70.1 CHEMOTHERAPY-INDUCED NEUTROPENIA (H): Primary | ICD-10-CM

## 2019-12-13 DIAGNOSIS — Z95.828 PORT-A-CATH IN PLACE: ICD-10-CM

## 2019-12-13 LAB
ALBUMIN SERPL-MCNC: 4.1 G/DL (ref 3.4–5)
ALP SERPL-CCNC: 69 U/L (ref 40–150)
ALT SERPL W P-5'-P-CCNC: 24 U/L (ref 0–50)
ANION GAP SERPL CALCULATED.3IONS-SCNC: 4 MMOL/L (ref 3–14)
AST SERPL W P-5'-P-CCNC: 20 U/L (ref 0–45)
BASOPHILS # BLD AUTO: 0 10E9/L (ref 0–0.2)
BASOPHILS NFR BLD AUTO: 0.6 %
BILIRUB SERPL-MCNC: 0.9 MG/DL (ref 0.2–1.3)
BUN SERPL-MCNC: 11 MG/DL (ref 7–30)
CALCIUM SERPL-MCNC: 9.4 MG/DL (ref 8.5–10.1)
CHLORIDE SERPL-SCNC: 109 MMOL/L (ref 94–109)
CO2 SERPL-SCNC: 28 MMOL/L (ref 20–32)
CREAT SERPL-MCNC: 0.42 MG/DL (ref 0.52–1.04)
DIFFERENTIAL METHOD BLD: ABNORMAL
EOSINOPHIL # BLD AUTO: 0 10E9/L (ref 0–0.7)
EOSINOPHIL NFR BLD AUTO: 0.9 %
ERYTHROCYTE [DISTWIDTH] IN BLOOD BY AUTOMATED COUNT: 16.8 % (ref 10–15)
GFR SERPL CREATININE-BSD FRML MDRD: >90 ML/MIN/{1.73_M2}
GLUCOSE SERPL-MCNC: 89 MG/DL (ref 70–99)
HCT VFR BLD AUTO: 35 % (ref 35–47)
HGB BLD-MCNC: 11.7 G/DL (ref 11.7–15.7)
IMM GRANULOCYTES # BLD: 0 10E9/L (ref 0–0.4)
IMM GRANULOCYTES NFR BLD: 0 %
LYMPHOCYTES # BLD AUTO: 0.8 10E9/L (ref 0.8–5.3)
LYMPHOCYTES NFR BLD AUTO: 23.8 %
MAGNESIUM SERPL-MCNC: 2.1 MG/DL (ref 1.6–2.3)
MCH RBC QN AUTO: 32.7 PG (ref 26.5–33)
MCHC RBC AUTO-ENTMCNC: 33.4 G/DL (ref 31.5–36.5)
MCV RBC AUTO: 98 FL (ref 78–100)
MONOCYTES # BLD AUTO: 0.3 10E9/L (ref 0–1.3)
MONOCYTES NFR BLD AUTO: 8.7 %
NEUTROPHILS # BLD AUTO: 2.3 10E9/L (ref 1.6–8.3)
NEUTROPHILS NFR BLD AUTO: 66 %
NRBC # BLD AUTO: 0 10*3/UL
NRBC BLD AUTO-RTO: 0 /100
PLATELET # BLD AUTO: 142 10E9/L (ref 150–450)
POTASSIUM SERPL-SCNC: 3.8 MMOL/L (ref 3.4–5.3)
PROT SERPL-MCNC: 7.3 G/DL (ref 6.8–8.8)
RBC # BLD AUTO: 3.58 10E12/L (ref 3.8–5.2)
SODIUM SERPL-SCNC: 141 MMOL/L (ref 133–144)
WBC # BLD AUTO: 3.5 10E9/L (ref 4–11)

## 2019-12-13 PROCEDURE — G0463 HOSPITAL OUTPT CLINIC VISIT: HCPCS | Mod: 25

## 2019-12-13 PROCEDURE — 96413 CHEMO IV INFUSION 1 HR: CPT

## 2019-12-13 PROCEDURE — 96367 TX/PROPH/DG ADDL SEQ IV INF: CPT

## 2019-12-13 PROCEDURE — 25000128 H RX IP 250 OP 636: Performed by: PHYSICIAN ASSISTANT

## 2019-12-13 PROCEDURE — 96375 TX/PRO/DX INJ NEW DRUG ADDON: CPT

## 2019-12-13 PROCEDURE — 85025 COMPLETE CBC W/AUTO DIFF WBC: CPT | Performed by: NURSE PRACTITIONER

## 2019-12-13 PROCEDURE — 83735 ASSAY OF MAGNESIUM: CPT | Performed by: NURSE PRACTITIONER

## 2019-12-13 PROCEDURE — 25000128 H RX IP 250 OP 636: Performed by: OBSTETRICS & GYNECOLOGY

## 2019-12-13 PROCEDURE — 25000132 ZZH RX MED GY IP 250 OP 250 PS 637: Mod: GY | Performed by: PHYSICIAN ASSISTANT

## 2019-12-13 PROCEDURE — 80053 COMPREHEN METABOLIC PANEL: CPT | Performed by: NURSE PRACTITIONER

## 2019-12-13 PROCEDURE — 25000125 ZZHC RX 250: Performed by: PHYSICIAN ASSISTANT

## 2019-12-13 PROCEDURE — 25800030 ZZH RX IP 258 OP 636: Performed by: PHYSICIAN ASSISTANT

## 2019-12-13 PROCEDURE — 96415 CHEMO IV INFUSION ADDL HR: CPT

## 2019-12-13 PROCEDURE — 96377 APPLICATON ON-BODY INJECTOR: CPT | Mod: XS

## 2019-12-13 PROCEDURE — 99214 OFFICE O/P EST MOD 30 MIN: CPT | Performed by: PHYSICIAN ASSISTANT

## 2019-12-13 PROCEDURE — 96417 CHEMO IV INFUS EACH ADDL SEQ: CPT

## 2019-12-13 RX ORDER — DIPHENHYDRAMINE HCL 25 MG
50 CAPSULE ORAL ONCE
Status: CANCELLED
Start: 2019-12-13

## 2019-12-13 RX ORDER — EPINEPHRINE 1 MG/ML
0.3 INJECTION, SOLUTION INTRAMUSCULAR; SUBCUTANEOUS EVERY 5 MIN PRN
Status: CANCELLED | OUTPATIENT
Start: 2019-12-13

## 2019-12-13 RX ORDER — ALBUTEROL SULFATE 0.83 MG/ML
2.5 SOLUTION RESPIRATORY (INHALATION)
Status: CANCELLED | OUTPATIENT
Start: 2019-12-13

## 2019-12-13 RX ORDER — EPINEPHRINE 0.3 MG/.3ML
0.3 INJECTION SUBCUTANEOUS EVERY 5 MIN PRN
Status: CANCELLED | OUTPATIENT
Start: 2019-12-13

## 2019-12-13 RX ORDER — PALONOSETRON 0.05 MG/ML
0.25 INJECTION, SOLUTION INTRAVENOUS ONCE
Status: COMPLETED | OUTPATIENT
Start: 2019-12-13 | End: 2019-12-13

## 2019-12-13 RX ORDER — ALBUTEROL SULFATE 90 UG/1
1-2 AEROSOL, METERED RESPIRATORY (INHALATION)
Status: CANCELLED
Start: 2019-12-13

## 2019-12-13 RX ORDER — SODIUM CHLORIDE 9 MG/ML
1000 INJECTION, SOLUTION INTRAVENOUS CONTINUOUS PRN
Status: CANCELLED
Start: 2019-12-13

## 2019-12-13 RX ORDER — HEPARIN SODIUM (PORCINE) LOCK FLUSH IV SOLN 100 UNIT/ML 100 UNIT/ML
5 SOLUTION INTRAVENOUS
Status: CANCELLED | OUTPATIENT
Start: 2019-12-13

## 2019-12-13 RX ORDER — LORAZEPAM 2 MG/ML
1 INJECTION INTRAMUSCULAR EVERY 6 HOURS PRN
Status: CANCELLED
Start: 2019-12-13

## 2019-12-13 RX ORDER — MEPERIDINE HYDROCHLORIDE 25 MG/ML
25 INJECTION INTRAMUSCULAR; INTRAVENOUS; SUBCUTANEOUS EVERY 30 MIN PRN
Status: CANCELLED | OUTPATIENT
Start: 2019-12-13

## 2019-12-13 RX ORDER — DIPHENHYDRAMINE HYDROCHLORIDE 50 MG/ML
50 INJECTION INTRAMUSCULAR; INTRAVENOUS
Status: CANCELLED
Start: 2019-12-13

## 2019-12-13 RX ORDER — DIPHENHYDRAMINE HCL 25 MG
50 CAPSULE ORAL ONCE
Status: COMPLETED | OUTPATIENT
Start: 2019-12-13 | End: 2019-12-13

## 2019-12-13 RX ORDER — HEPARIN SODIUM (PORCINE) LOCK FLUSH IV SOLN 100 UNIT/ML 100 UNIT/ML
5 SOLUTION INTRAVENOUS
Status: DISCONTINUED | OUTPATIENT
Start: 2019-12-13 | End: 2019-12-13 | Stop reason: HOSPADM

## 2019-12-13 RX ORDER — NALOXONE HYDROCHLORIDE 0.4 MG/ML
.1-.4 INJECTION, SOLUTION INTRAMUSCULAR; INTRAVENOUS; SUBCUTANEOUS
Status: CANCELLED | OUTPATIENT
Start: 2019-12-13

## 2019-12-13 RX ORDER — PALONOSETRON 0.05 MG/ML
0.25 INJECTION, SOLUTION INTRAVENOUS ONCE
Status: CANCELLED
Start: 2019-12-13

## 2019-12-13 RX ORDER — METHYLPREDNISOLONE SODIUM SUCCINATE 125 MG/2ML
125 INJECTION, POWDER, LYOPHILIZED, FOR SOLUTION INTRAMUSCULAR; INTRAVENOUS
Status: CANCELLED
Start: 2019-12-13

## 2019-12-13 RX ADMIN — PEGFILGRASTIM 6 MG: KIT SUBCUTANEOUS at 15:24

## 2019-12-13 RX ADMIN — CARBOPLATIN 550 MG: 10 INJECTION, SOLUTION INTRAVENOUS at 14:32

## 2019-12-13 RX ADMIN — PACLITAXEL 222 MG: 6 INJECTION, SOLUTION INTRAVENOUS at 11:04

## 2019-12-13 RX ADMIN — FAMOTIDINE 40 MG: 10 INJECTION INTRAVENOUS at 10:35

## 2019-12-13 RX ADMIN — DEXAMETHASONE SODIUM PHOSPHATE 20 MG: 10 INJECTION, SOLUTION INTRAMUSCULAR; INTRAVENOUS at 10:06

## 2019-12-13 RX ADMIN — PALONOSETRON HYDROCHLORIDE 0.25 MG: 0.25 INJECTION, SOLUTION INTRAVENOUS at 10:02

## 2019-12-13 RX ADMIN — DIPHENHYDRAMINE HYDROCHLORIDE 50 MG: 25 CAPSULE ORAL at 10:01

## 2019-12-13 RX ADMIN — HEPARIN SODIUM (PORCINE) LOCK FLUSH IV SOLN 100 UNIT/ML 5 ML: 100 SOLUTION at 15:32

## 2019-12-13 RX ADMIN — SODIUM CHLORIDE 250 ML: 9 INJECTION, SOLUTION INTRAVENOUS at 10:02

## 2019-12-13 ASSESSMENT — MIFFLIN-ST. JEOR
SCORE: 645.26
SCORE: 784.75

## 2019-12-13 ASSESSMENT — PAIN SCALES - GENERAL: PAINLEVEL: NO PAIN (0)

## 2019-12-13 NOTE — LETTER
12/13/2019         RE: Maximino Simeon  [address] [id number]        Dear Colleague,    Thank you for referring your patient, Maximino Simeon, to the [facility]. Please see a copy of my visit note below.    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  She underwent exploratory laparotomy, total abdominal hysterectomy and oophorectomy on the left side with appendectomy in August 2019.  Dr. Ascencio is her primary gynecology oncologist and due to high-grade nature of the disease, she recommended chemotherapy with carboplatin/Taxol for 6 cycles.  For cycle completed on 9/13/2019 and she has completed 4 cycles.    She is here today for cycle 5.     Interval History: Kali is here unaccompanied.  She is completed 4 cycles of carboplatin/Taxol and she is doing fairly well.  She admits to insomnia and she has tried Benadryl 50 mg with relief.  She was instructed to try melatonin, however she has not tried this yet.  She has a history of colitis and has diarrhea regularly, intermittently.  She states that she normally has 1-2 episodes per day and she does occasionally have 3-4 and she takes loperamide.  She denies nausea, vomiting, constipation, bleeding, fever, chills.  She has no other complaints.    Review of Systems: See interval hx. Denies fevers, chills, HA, dizziness, n/t, changes in vision, cough, sore throat, CP, SOB, abdominal pain, N/V, diarrhea, changes in urination, bleeding, bruising, rash.     PMHx and Social Hx reviewed per EPIC.      Medications:  Current Outpatient Medications   Medication Sig Dispense Refill     B Complex Vitamins (B COMPLEX PO) Take 1 tablet by mouth daily (with lunch)        Calcium Carbonate-Vitamin D (CALCIUM + D PO) Take 6 tablets by mouth daily.       Coenzyme Q10 (COQ10 PO)  Take 200 mg by mouth 2 times daily       DULoxetine (CYMBALTA) 20 MG capsule Take 1 capsule (20 mg) by mouth daily Follow up with your PCP for refills 30 capsule 0     levothyroxine (SYNTHROID/LEVOTHROID) 50 MCG tablet Take 50 mcg by mouth every morning        liothyronine (CYTOMEL) 5 MCG tablet Take 5 mcg by mouth every morning        loperamide (IMODIUM) 1 MG/5ML liquid Take 1 mg by mouth 6 times daily        Multiple Vitamins-Minerals (MULTIVITAL PO) Take 1 tablet by mouth daily (with lunch)        Probiotic Product (PROBIOTIC DAILY PO) 1 tablet daily at lunchtime by mouth       prochlorperazine (COMPAZINE) 10 MG tablet Take 1 tablet (10 mg) by mouth every 6 hours as needed (nausea/vomiting) 30 tablet 5     amLODIPine (NORVASC) 5 MG tablet Take 1 tablet (5 mg) by mouth daily (Patient not taking: Reported on 11/22/2019) 30 tablet 0     dronabinol (MARINOL) 2.5 MG capsule Take 1 capsule (2.5 mg) by mouth 2 times daily (before meals) (Patient not taking: Reported on 12/13/2019) 60 capsule 0     enoxaparin (LOVENOX) 30 MG/0.3ML syringe Inject 0.3 mLs (30 mg) Subcutaneous every 24 hours (Patient not taking: Reported on 11/22/2019) 24 Syringe 0     ibuprofen (ADVIL/MOTRIN) 200 MG tablet Take 400 mg by mouth every 6 hours as needed for mild pain       psyllium (METAMUCIL/KONSYL) Packet Take 1 packet by mouth daily         Allergies   Allergen Reactions     Adhesive Tape      bandaids     Liquid Adhesive Rash     Penicillins Rash       EXAM:    /81   Pulse 96   Temp 97.6  F (36.4  C) (Oral)   Resp 18   LMP  (LMP Unknown)   SpO2 96%     GENERAL:   Female, thin, in no acute distress.  Alert and oriented x3.   HEENT:  Normocephalic, atraumatic.  PERRL, oropharynx clear with no sores or thrush.   LYMPH NODES:  No palpable pre/post-auricular, cervical, axillary lymphadenopathy appreciated.  CV:  RRR, No murmurs, gallops, or rubs.   LUNGS:  Clear to auscultation bilaterally.   ABDOMEN:  Soft, nontender and  nondistended.  Bowel sounds heard x4.  No apparent hepatosplenomegaly.   EXTREMITIES:  No clubbing, cyanosis, or edema.   SKIN: No rash on exposed skin  PSYCH: Mood stable      Labs:   Results for ALISHA BATISTA (MRN [id number]) as of 12/13/2019 11:48   12/13/2019 08:30   Sodium 141   Potassium 3.8   Chloride 109   Carbon Dioxide 28   Urea Nitrogen 11   Creatinine 0.42 (L)   GFR Estimate >90   GFR Estimate If Black >90   Calcium 9.4   Anion Gap 4   Magnesium 2.1   Albumin 4.1   Protein Total 7.3   Bilirubin Total 0.9   Alkaline Phosphatase 69   ALT 24   AST 20   Glucose 89   WBC 3.5 (L)   Hemoglobin 11.7   Hematocrit 35.0   Platelet Count 142 (L)   RBC Count 3.58 (L)   MCV 98   MCH 32.7   MCHC 33.4   RDW 16.8 (H)   Diff Method Automated Method   % Neutrophils 66.0   % Lymphocytes 23.8   % Monocytes 8.7   % Eosinophils 0.9   % Basophils 0.6   % Immature Granulocytes 0.0   Nucleated RBCs 0   Absolute Neutrophil 2.3   Absolute Lymphocytes 0.8   Absolute Monocytes 0.3   Absolute Eosinophils 0.0   Absolute Basophils 0.0   Abs Immature Granulocytes 0.0   Absolute Nucleated RBC 0.0       Imaging: n/a    Impression/Plan: Maximino Batista is a 74 year old female with 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    Ovarian cancer: Stage 1A clear cell carcinoma of the left ovary and has now completed 4 cycles of carboplatin/Taxol. She has tolerated this chemotherapy fairly well so far.  Her weight was incorrect in the computer for cycle 4 and therefore this was adjusted and her dose will be appropriate for cycle 5.  Leukopenia at 3.5, however ANC normal at 2.3.  Labs within treat parameters to proceed with treatment today.  She will return in 3 weeks for cycle 6 and then follow-up with Dr. Ascencio at the end of January 2020.  She will call the clinic with any questions or  "concerns.  --1/3/20 Shashi, RENE  --Dr Moncada in 6 weeks    Heme: Leukopenia with WBC 3.5, however ANC is normal at 2.3, we will proceed with chemotherapy.  Thrombocytopenia with platelets of 142, no active bleeding.  Hemoglobin normal at 11.7.    Diarrhea: She has a history of colitis and this is chronic for her, however she has had intermittent episodes of increased diarrhea since starting Taxol.  She has Imodium at home and this is controlling it well.  She was instructed to call the clinic if any worsening diarrhea.      Chart documentation with Dragon Voice recognition Software. Although reviewed after completion, some words and grammatical errors may remain.      Mela Potts PA-C  Hematology/Oncology  [facility]                  Oncology Rooming Note    December 13, 2019 8:57 AM   Maximino Simeon is a 74 year old female who presents for:    Chief Complaint   Patient presents with     Oncology Clinic Visit     Initial Vitals: LMP  (LMP Unknown)  Estimated body mass index is 25.84 kg/m  as calculated from the following:    Height as of 11/22/19: 1.25 m (4' 1.21\").    Weight as of 11/22/19: 40.4 kg (89 lb). There is no height or weight on file to calculate BSA.  Data Unavailable Comment: Data Unavailable   No LMP recorded (lmp unknown). Patient has had a hysterectomy.  Allergies reviewed: Yes  Medications reviewed: Yes    Medications: Medication refills not needed today.  Pharmacy name entered into [employer]:    [facility] #[id number] [place], [place] - [address] PHARMACY [facility] [address] PHARMACY [facility] [address] [place] [address]    Clinical concerns: NO        Liza Traore, [facility]              Again, thank you for allowing me to participate in the care of your patient.        Sincerely,        Mela Potts PA-C    "

## 2019-12-13 NOTE — PATIENT INSTRUCTIONS
Your On-body Neulasta Injector was applied to your right arm at 3:30pm.  At approximately 6:30 on 12/14(Saturday), your On-body Injector will beep to let you know your dose delivery will begin in 2 minutes.  Your medication will be delivered over the next 45 minutes.  You can remove your Injector at 7:30pm.  Please make sure your Injector has a solid green light or has turned off prior to removing the device.  Please contact your provider at [phone] with questions or concerns.

## 2019-12-13 NOTE — PROGRESS NOTES
Social Work Progress Note      Data/Intervention:  Patient Name:  Maximino Simeon  /Age:  1945 (74 year old)    Reason for Follow-Up:  Brittni is a 74-year-old woman with a diagnosis of clear cell carcinoma of the left ovary who comes to clinic today for C5D1 Taxol, Carboplatin. This clinician met with Brittni for routine psychosocial check-in and emotional support.     Intervention:   Brittni reports that she has been coping well from a side effect standpoint, with fatigue management being her greatest concern. 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     Brittni reports that she has been feeling more depressed in evenings with worry about sister in [place] who is coping with dementia and cancer, and son who recently had injury and has been out of work for a few weeks. Provided safe place for Brittni to express range of feelings that have surfaced in caring and worrying about family members, and supported her in exploration of limit setting surrounding emotional distress, and strategies to enhance communication with granddaughter Madison regarding sister's health and son regarding financial concerns.     Brittni appeared appreciative of having a space to discuss stressors, and acknowledged a hopefulness of regaining more control over fatigue when chemotherapy ends. No other psychosocial concerns reported or observed today.     Plan:  1) Previously provided patient/family with writer's contact information and availability. This clinician will plan on next visit 1/3/19 per pt request for psychosocial check-in and support.   2) Ongoing collaboration with multidisciplinary care team.       Please call or page if needs or concerns arise.     CLIFFORD Vora, [facility]  Direct Phone: [phone]  Pager: [phone]

## 2019-12-13 NOTE — PROGRESS NOTES
"Oncology Rooming Note    December 13, 2019 8:57 AM   Maximino Simeon is a 74 year old female who presents for:    Chief Complaint   Patient presents with     Oncology Clinic Visit     Initial Vitals: LMP  (LMP Unknown)  Estimated body mass index is 25.84 kg/m  as calculated from the following:    Height as of 11/22/19: 1.25 m (4' 1.21\").    Weight as of 11/22/19: 40.4 kg (89 lb). There is no height or weight on file to calculate BSA.  Data Unavailable Comment: Data Unavailable   No LMP recorded (lmp unknown). Patient has had a hysterectomy.  Allergies reviewed: Yes  Medications reviewed: Yes    Medications: Medication refills not needed today.  Pharmacy name entered into [employer]:    [id number] [place], [place] - [address] PHARMACY [place], [place] - [address] PHARMACY [place], [place] - [address] [place] [address]    Clinical concerns: NO        Liza Traore CMA            "

## 2019-12-13 NOTE — PROGRESS NOTES
Infusion Nursing Note:  Maximino Simeon presents today for C5D1 Taxol, Carboplatin.    Patient seen by provider today: Yes: Mela BURNETT   present during visit today: Not Applicable.    Note: N/A.    Intravenous Access:  Implanted Port.    Treatment Conditions:  Lab Results   Component Value Date    HGB 11.7 12/13/2019     Lab Results   Component Value Date    WBC 3.5 12/13/2019      Lab Results   Component Value Date    ANEU 2.3 12/13/2019     Lab Results   Component Value Date     12/13/2019      Lab Results   Component Value Date     12/13/2019                   Lab Results   Component Value Date    POTASSIUM 3.8 12/13/2019           Lab Results   Component Value Date    MAG 2.1 12/13/2019            Lab Results   Component Value Date    CR 0.42 12/13/2019                   Lab Results   Component Value Date    LUCRECIA 9.4 12/13/2019                Lab Results   Component Value Date    BILITOTAL 0.9 12/13/2019           Lab Results   Component Value Date    ALBUMIN 4.1 12/13/2019                    Lab Results   Component Value Date    ALT 24 12/13/2019           Lab Results   Component Value Date    AST 20 12/13/2019       Results reviewed, labs MET treatment parameters, ok to proceed with treatment.  BSA 1.29    Post Infusion Assessment:  Patient tolerated infusion without incident.  Blood return noted pre and post infusion.  Site patent and intact, free from redness, edema or discomfort.  No evidence of extravasations.  Access discontinued per protocol.     ONPRO  Was placed on patient's: back of right arm.    Was placed at 330 PM    ONPRO injector device Lot number: [id number]    Patient education included: what patient can expect after application, what colored lights mean on the device, when to remove device, when and where to call with questions or issues, all patients questions answered.    Patient tolerated administration well.    Discharge Plan:   Discharge instructions reviewed  with: Patient.  Patient and/or family verbalized understanding of discharge instructions and all questions answered.  Copy of AVS reviewed with patient and/or family.  Patient will return as scheduled for next appointment.  Patient discharged in stable condition accompanied by: self.  Departure Mode: Ambulatory.    Monique Rincon RN

## 2020-01-03 ENCOUNTER — HOSPITAL ENCOUNTER (OUTPATIENT)
Facility: CLINIC | Age: 75
End: 2020-01-03
Payer: MEDICARE

## 2020-01-03 ENCOUNTER — HOSPITAL ENCOUNTER (OUTPATIENT)
Facility: CLINIC | Age: 75
Setting detail: SPECIMEN
Discharge: HOME | End: 2020-01-03
Attending: NURSE PRACTITIONER | Admitting: NURSE PRACTITIONER
Payer: MEDICARE

## 2020-01-03 ENCOUNTER — INFUSION THERAPY VISIT (OUTPATIENT)
Dept: INFUSION THERAPY | Facility: CLINIC | Age: 75
End: 2020-01-03
Attending: NURSE PRACTITIONER
Payer: MEDICARE

## 2020-01-03 ENCOUNTER — ONCOLOGY VISIT (OUTPATIENT)
Dept: ONCOLOGY | Facility: CLINIC | Age: 75
End: 2020-01-03
Attending: NURSE PRACTITIONER
Payer: MEDICARE

## 2020-01-03 ENCOUNTER — TELEPHONE (OUTPATIENT)
Dept: ONCOLOGY | Facility: CLINIC | Age: 75
End: 2020-01-03

## 2020-01-03 VITALS
HEART RATE: 92 BPM | TEMPERATURE: 97.7 F | OXYGEN SATURATION: 100 % | DIASTOLIC BLOOD PRESSURE: 87 MMHG | BODY MASS INDEX: 18.93 KG/M2 | RESPIRATION RATE: 18 BRPM | WEIGHT: 90.2 LBS | HEIGHT: 58 IN | SYSTOLIC BLOOD PRESSURE: 157 MMHG

## 2020-01-03 DIAGNOSIS — Z51.11 ENCOUNTER FOR ANTINEOPLASTIC CHEMOTHERAPY: ICD-10-CM

## 2020-01-03 DIAGNOSIS — C56.9 OVARIAN CANCER, UNSPECIFIED LATERALITY (H): ICD-10-CM

## 2020-01-03 DIAGNOSIS — D70.1 CHEMOTHERAPY-INDUCED NEUTROPENIA (H): Primary | ICD-10-CM

## 2020-01-03 DIAGNOSIS — C56.2 OVARIAN CANCER, LEFT (H): Primary | ICD-10-CM

## 2020-01-03 DIAGNOSIS — Z95.828 PORT-A-CATH IN PLACE: ICD-10-CM

## 2020-01-03 DIAGNOSIS — T45.1X5A CHEMOTHERAPY-INDUCED NEUTROPENIA (H): ICD-10-CM

## 2020-01-03 DIAGNOSIS — T45.1X5A CHEMOTHERAPY-INDUCED NEUTROPENIA (H): Primary | ICD-10-CM

## 2020-01-03 DIAGNOSIS — D70.1 CHEMOTHERAPY-INDUCED NEUTROPENIA (H): ICD-10-CM

## 2020-01-03 LAB
ALBUMIN SERPL-MCNC: 3.9 G/DL (ref 3.4–5)
ALP SERPL-CCNC: 63 U/L (ref 40–150)
ALT SERPL W P-5'-P-CCNC: 20 U/L (ref 0–50)
ANION GAP SERPL CALCULATED.3IONS-SCNC: 4 MMOL/L (ref 3–14)
AST SERPL W P-5'-P-CCNC: 20 U/L (ref 0–45)
BASOPHILS # BLD AUTO: 0 10E9/L (ref 0–0.2)
BASOPHILS NFR BLD AUTO: 0.5 %
BILIRUB SERPL-MCNC: 0.3 MG/DL (ref 0.2–1.3)
BUN SERPL-MCNC: 15 MG/DL (ref 7–30)
CALCIUM SERPL-MCNC: 9.2 MG/DL (ref 8.5–10.1)
CHLORIDE SERPL-SCNC: 106 MMOL/L (ref 94–109)
CO2 SERPL-SCNC: 28 MMOL/L (ref 20–32)
CREAT SERPL-MCNC: 0.56 MG/DL (ref 0.52–1.04)
DIFFERENTIAL METHOD BLD: ABNORMAL
EOSINOPHIL # BLD AUTO: 0 10E9/L (ref 0–0.7)
EOSINOPHIL NFR BLD AUTO: 0 %
ERYTHROCYTE [DISTWIDTH] IN BLOOD BY AUTOMATED COUNT: 17.3 % (ref 10–15)
GFR SERPL CREATININE-BSD FRML MDRD: >90 ML/MIN/{1.73_M2}
GLUCOSE SERPL-MCNC: 107 MG/DL (ref 70–99)
HCT VFR BLD AUTO: 34.3 % (ref 35–47)
HGB BLD-MCNC: 11.3 G/DL (ref 11.7–15.7)
IMM GRANULOCYTES # BLD: 0 10E9/L (ref 0–0.4)
IMM GRANULOCYTES NFR BLD: 0.2 %
LYMPHOCYTES # BLD AUTO: 0.9 10E9/L (ref 0.8–5.3)
LYMPHOCYTES NFR BLD AUTO: 20.8 %
MAGNESIUM SERPL-MCNC: 1.9 MG/DL (ref 1.6–2.3)
MCH RBC QN AUTO: 33.2 PG (ref 26.5–33)
MCHC RBC AUTO-ENTMCNC: 32.9 G/DL (ref 31.5–36.5)
MCV RBC AUTO: 101 FL (ref 78–100)
MONOCYTES # BLD AUTO: 0.6 10E9/L (ref 0–1.3)
MONOCYTES NFR BLD AUTO: 12.6 %
NEUTROPHILS # BLD AUTO: 2.9 10E9/L (ref 1.6–8.3)
NEUTROPHILS NFR BLD AUTO: 65.9 %
NRBC # BLD AUTO: 0 10*3/UL
NRBC BLD AUTO-RTO: 0 /100
PLATELET # BLD AUTO: 238 10E9/L (ref 150–450)
POTASSIUM SERPL-SCNC: 4 MMOL/L (ref 3.4–5.3)
PROT SERPL-MCNC: 7.5 G/DL (ref 6.8–8.8)
RBC # BLD AUTO: 3.4 10E12/L (ref 3.8–5.2)
SODIUM SERPL-SCNC: 138 MMOL/L (ref 133–144)
WBC # BLD AUTO: 4.4 10E9/L (ref 4–11)

## 2020-01-03 PROCEDURE — G0463 HOSPITAL OUTPT CLINIC VISIT: HCPCS | Mod: 25

## 2020-01-03 PROCEDURE — 25000128 H RX IP 250 OP 636: Performed by: NURSE PRACTITIONER

## 2020-01-03 PROCEDURE — G0463 HOSPITAL OUTPT CLINIC VISIT: HCPCS

## 2020-01-03 PROCEDURE — 85025 COMPLETE CBC W/AUTO DIFF WBC: CPT | Performed by: NURSE PRACTITIONER

## 2020-01-03 PROCEDURE — 96415 CHEMO IV INFUSION ADDL HR: CPT

## 2020-01-03 PROCEDURE — 25000125 ZZHC RX 250: Performed by: NURSE PRACTITIONER

## 2020-01-03 PROCEDURE — 25000128 H RX IP 250 OP 636: Performed by: OBSTETRICS & GYNECOLOGY

## 2020-01-03 PROCEDURE — 99213 OFFICE O/P EST LOW 20 MIN: CPT | Performed by: NURSE PRACTITIONER

## 2020-01-03 PROCEDURE — 96377 APPLICATON ON-BODY INJECTOR: CPT | Mod: XS

## 2020-01-03 PROCEDURE — 96417 CHEMO IV INFUS EACH ADDL SEQ: CPT

## 2020-01-03 PROCEDURE — 96375 TX/PRO/DX INJ NEW DRUG ADDON: CPT

## 2020-01-03 PROCEDURE — 25000132 ZZH RX MED GY IP 250 OP 250 PS 637: Mod: GY | Performed by: NURSE PRACTITIONER

## 2020-01-03 PROCEDURE — 80053 COMPREHEN METABOLIC PANEL: CPT | Performed by: NURSE PRACTITIONER

## 2020-01-03 PROCEDURE — 96413 CHEMO IV INFUSION 1 HR: CPT

## 2020-01-03 PROCEDURE — 25800030 ZZH RX IP 258 OP 636: Performed by: NURSE PRACTITIONER

## 2020-01-03 PROCEDURE — 83735 ASSAY OF MAGNESIUM: CPT | Performed by: NURSE PRACTITIONER

## 2020-01-03 RX ORDER — NALOXONE HYDROCHLORIDE 0.4 MG/ML
.1-.4 INJECTION, SOLUTION INTRAMUSCULAR; INTRAVENOUS; SUBCUTANEOUS
Status: CANCELLED | OUTPATIENT
Start: 2020-01-03

## 2020-01-03 RX ORDER — HEPARIN SODIUM (PORCINE) LOCK FLUSH IV SOLN 100 UNIT/ML 100 UNIT/ML
5 SOLUTION INTRAVENOUS
Status: CANCELLED | OUTPATIENT
Start: 2020-01-03

## 2020-01-03 RX ORDER — DIPHENHYDRAMINE HCL 25 MG
50 CAPSULE ORAL ONCE
Status: CANCELLED
Start: 2020-01-03

## 2020-01-03 RX ORDER — SODIUM CHLORIDE 9 MG/ML
1000 INJECTION, SOLUTION INTRAVENOUS CONTINUOUS PRN
Status: CANCELLED
Start: 2020-01-03

## 2020-01-03 RX ORDER — DIPHENHYDRAMINE HYDROCHLORIDE 50 MG/ML
50 INJECTION INTRAMUSCULAR; INTRAVENOUS
Status: CANCELLED
Start: 2020-01-03

## 2020-01-03 RX ORDER — LORAZEPAM 2 MG/ML
1 INJECTION INTRAMUSCULAR EVERY 6 HOURS PRN
Status: CANCELLED
Start: 2020-01-03

## 2020-01-03 RX ORDER — PALONOSETRON 0.05 MG/ML
0.25 INJECTION, SOLUTION INTRAVENOUS ONCE
Status: CANCELLED
Start: 2020-01-03

## 2020-01-03 RX ORDER — ALBUTEROL SULFATE 90 UG/1
1-2 AEROSOL, METERED RESPIRATORY (INHALATION)
Status: CANCELLED
Start: 2020-01-03

## 2020-01-03 RX ORDER — HEPARIN SODIUM (PORCINE) LOCK FLUSH IV SOLN 100 UNIT/ML 100 UNIT/ML
5 SOLUTION INTRAVENOUS
Status: DISCONTINUED | OUTPATIENT
Start: 2020-01-03 | End: 2020-01-03 | Stop reason: HOSPADM

## 2020-01-03 RX ORDER — MEPERIDINE HYDROCHLORIDE 25 MG/ML
25 INJECTION INTRAMUSCULAR; INTRAVENOUS; SUBCUTANEOUS EVERY 30 MIN PRN
Status: CANCELLED | OUTPATIENT
Start: 2020-01-03

## 2020-01-03 RX ORDER — PALONOSETRON 0.05 MG/ML
0.25 INJECTION, SOLUTION INTRAVENOUS ONCE
Status: COMPLETED | OUTPATIENT
Start: 2020-01-03 | End: 2020-01-03

## 2020-01-03 RX ORDER — METHYLPREDNISOLONE SODIUM SUCCINATE 125 MG/2ML
125 INJECTION, POWDER, LYOPHILIZED, FOR SOLUTION INTRAMUSCULAR; INTRAVENOUS
Status: CANCELLED
Start: 2020-01-03

## 2020-01-03 RX ORDER — EPINEPHRINE 1 MG/ML
0.3 INJECTION, SOLUTION INTRAMUSCULAR; SUBCUTANEOUS EVERY 5 MIN PRN
Status: CANCELLED | OUTPATIENT
Start: 2020-01-03

## 2020-01-03 RX ORDER — ALBUTEROL SULFATE 0.83 MG/ML
2.5 SOLUTION RESPIRATORY (INHALATION)
Status: CANCELLED | OUTPATIENT
Start: 2020-01-03

## 2020-01-03 RX ORDER — DIPHENHYDRAMINE HCL 25 MG
50 CAPSULE ORAL ONCE
Status: COMPLETED | OUTPATIENT
Start: 2020-01-03 | End: 2020-01-03

## 2020-01-03 RX ORDER — EPINEPHRINE 0.3 MG/.3ML
0.3 INJECTION SUBCUTANEOUS EVERY 5 MIN PRN
Status: CANCELLED | OUTPATIENT
Start: 2020-01-03

## 2020-01-03 RX ADMIN — CARBOPLATIN 450 MG: 10 INJECTION, SOLUTION INTRAVENOUS at 14:09

## 2020-01-03 RX ADMIN — SODIUM CHLORIDE 250 ML: 9 INJECTION, SOLUTION INTRAVENOUS at 10:29

## 2020-01-03 RX ADMIN — HEPARIN SODIUM (PORCINE) LOCK FLUSH IV SOLN 100 UNIT/ML 5 ML: 100 SOLUTION at 14:59

## 2020-01-03 RX ADMIN — DIPHENHYDRAMINE HYDROCHLORIDE 50 MG: 25 CAPSULE ORAL at 10:29

## 2020-01-03 RX ADMIN — FAMOTIDINE 40 MG: 10 INJECTION INTRAVENOUS at 10:43

## 2020-01-03 RX ADMIN — PEGFILGRASTIM 6 MG: KIT SUBCUTANEOUS at 14:17

## 2020-01-03 RX ADMIN — PALONOSETRON HYDROCHLORIDE 0.25 MG: 0.25 INJECTION, SOLUTION INTRAVENOUS at 10:45

## 2020-01-03 RX ADMIN — DEXAMETHASONE SODIUM PHOSPHATE 20 MG: 10 INJECTION, SOLUTION INTRAMUSCULAR; INTRAVENOUS at 10:48

## 2020-01-03 RX ADMIN — PACLITAXEL 222 MG: 6 INJECTION, SOLUTION INTRAVENOUS at 11:03

## 2020-01-03 ASSESSMENT — PAIN SCALES - GENERAL: PAINLEVEL: NO PAIN (0)

## 2020-01-03 ASSESSMENT — MIFFLIN-ST. JEOR: SCORE: 788.89

## 2020-01-03 NOTE — PROGRESS NOTES
"Oncology Rooming Note    January 3, 2020 9:53 AM   Maximino Simeon is a 74 year old female who presents for:    Chief Complaint   Patient presents with     Oncology Clinic Visit     Initial Vitals: BP (!) 157/87   Pulse 92   Temp 97.7  F (36.5  C) (Oral)   Resp 18   Ht 1.473 m (4' 10\")   Wt 40.9 kg (90 lb 3.2 oz)   LMP  (LMP Unknown)   SpO2 100%   BMI 18.85 kg/m   Estimated body mass index is 18.85 kg/m  as calculated from the following:    Height as of this encounter: 1.473 m (4' 10\").    Weight as of this encounter: 40.9 kg (90 lb 3.2 oz). Body surface area is 1.29 meters squared.  No Pain (0) Comment: Data Unavailable   No LMP recorded (lmp unknown). Patient has had a hysterectomy.  Allergies reviewed: Yes  Medications reviewed: Yes    Medications: Medication refills not needed today.  Pharmacy name entered into [employer]:    [id number] [address] PHARMACY [facility] [address] PHARMACY [place], [place] - [address] [address]    Clinical concerns:  NP was notified.      Liza Traore CMA            "

## 2020-01-03 NOTE — PROGRESS NOTES
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  S/P exploratory laparotomy, total abdominal hysterectomy, removal of her ovary and the appendix  08/09  Patient of    Due to high grade nature of the disease Dr. Ascencio recommended chemotherapy Taxol carbo x6 cycles  Chemotherapy started on 09 13  Comes here today for cycle 6 of the chemo      Interval History:  Patient continues to feel well.  She denies fever chills sweats denies cough no shortness of breath denies chest pain denies nausea vomiting diarrhea denies abdominal pain denies bleeding      Review of Systems:  14 point ROS of systems including Constitutional, Eyes, Respiratory, Cardiovascular, Gastroenterology, Genitourinary, Integumentary, Muscularskeletal, Psychiatric were all negative except for pertinent positives noted in my HPI.      Current Outpatient Medications   Medication Sig Dispense Refill     B Complex Vitamins (B COMPLEX PO) Take 1 tablet by mouth daily (with lunch)        Calcium Carbonate-Vitamin D (CALCIUM + D PO) Take 6 tablets by mouth daily.       Coenzyme Q10 (COQ10 PO) Take 200 mg by mouth 2 times daily       DULoxetine (CYMBALTA) 20 MG capsule Take 1 capsule (20 mg) by mouth daily Follow up with your PCP for refills 30 capsule 0     levothyroxine (SYNTHROID/LEVOTHROID) 50 MCG tablet Take 50 mcg by mouth every morning        liothyronine (CYTOMEL) 5 MCG tablet Take 5 mcg by mouth every morning        loperamide (IMODIUM) 1 MG/5ML liquid Take 1 mg by mouth 6 times daily        Multiple Vitamins-Minerals (MULTIVITAL PO) Take 1 tablet by mouth daily (with lunch)        order for DME Cranial prothesis 1 each 0     Probiotic Product (PROBIOTIC DAILY PO) 1 tablet daily at lunchtime by mouth       prochlorperazine (COMPAZINE) 10 MG tablet Take 1 tablet (10 mg) by mouth every 6 hours as needed (nausea/vomiting) 30  "tablet 5     amLODIPine (NORVASC) 5 MG tablet Take 1 tablet (5 mg) by mouth daily (Patient not taking: Reported on 11/22/2019) 30 tablet 0     dronabinol (MARINOL) 2.5 MG capsule Take 1 capsule (2.5 mg) by mouth 2 times daily (before meals) (Patient not taking: Reported on 12/13/2019) 60 capsule 0     enoxaparin (LOVENOX) 30 MG/0.3ML syringe Inject 0.3 mLs (30 mg) Subcutaneous every 24 hours (Patient not taking: Reported on 11/22/2019) 24 Syringe 0     ibuprofen (ADVIL/MOTRIN) 200 MG tablet Take 400 mg by mouth every 6 hours as needed for mild pain       psyllium (METAMUCIL/KONSYL) Packet Take 1 packet by mouth daily         Physical Examination:  General: The patient is a pleasant female in no acute distress.  BP (!) 157/87   Pulse 92   Temp 97.7  F (36.5  C) (Oral)   Resp 18   Ht 1.473 m (4' 10\")   Wt 40.9 kg (90 lb 3.2 oz)   LMP  (LMP Unknown)   SpO2 100%   BMI 18.85 kg/m    HEENT: EOMI, PERRL. Sclerae are anicteric. Oral mucosa is pink and moist with no lesions or thrush.   Lymph: Neck is supple with no lymphadenopathy in the cervical or supraclavicular areas.   Heart: Regular rate and rhythm.   Lungs: Clear to auscultation bilaterally.   GI: Bowel sounds present, soft, nontender with no palpable hepatosplenomegaly or masses.   Extremities: No lower extremity edema noted bilaterally.   Skin: No rashes, petechiae, or bruising noted on exposed skin.    Laboratory Data:  Results for orders placed or performed in visit on 01/03/20 (from the past 24 hour(s))   CBC with platelets differential   Result Value Ref Range    WBC 4.4 4.0 - 11.0 10e9/L    RBC Count 3.40 (L) 3.8 - 5.2 10e12/L    Hemoglobin 11.3 (L) 11.7 - 15.7 g/dL    Hematocrit 34.3 (L) 35.0 - 47.0 %     (H) 78 - 100 fl    MCH 33.2 (H) 26.5 - 33.0 pg    MCHC 32.9 31.5 - 36.5 g/dL    RDW 17.3 (H) 10.0 - 15.0 %    Platelet Count 238 150 - 450 10e9/L    Diff Method Automated Method     % Neutrophils 65.9 %    % Lymphocytes 20.8 %    % Monocytes " 12.6 %    % Eosinophils 0.0 %    % Basophils 0.5 %    % Immature Granulocytes 0.2 %    Nucleated RBCs 0 0 /100    Absolute Neutrophil 2.9 1.6 - 8.3 10e9/L    Absolute Lymphocytes 0.9 0.8 - 5.3 10e9/L    Absolute Monocytes 0.6 0.0 - 1.3 10e9/L    Absolute Eosinophils 0.0 0.0 - 0.7 10e9/L    Absolute Basophils 0.0 0.0 - 0.2 10e9/L    Abs Immature Granulocytes 0.0 0 - 0.4 10e9/L    Absolute Nucleated RBC 0.0    Comprehensive metabolic panel   Result Value Ref Range    Sodium 138 133 - 144 mmol/L    Potassium 4.0 3.4 - 5.3 mmol/L    Chloride 106 94 - 109 mmol/L    Carbon Dioxide 28 20 - 32 mmol/L    Anion Gap 4 3 - 14 mmol/L    Glucose 107 (H) 70 - 99 mg/dL    Urea Nitrogen 15 7 - 30 mg/dL    Creatinine 0.56 0.52 - 1.04 mg/dL    GFR Estimate >90 >60 mL/min/[1.73_m2]    GFR Estimate If Black >90 >60 mL/min/[1.73_m2]    Calcium 9.2 8.5 - 10.1 mg/dL    Bilirubin Total 0.3 0.2 - 1.3 mg/dL    Albumin 3.9 3.4 - 5.0 g/dL    Protein Total 7.5 6.8 - 8.8 g/dL    Alkaline Phosphatase 63 40 - 150 U/L    ALT 20 0 - 50 U/L    AST 20 0 - 45 U/L   Magnesium   Result Value Ref Range    Magnesium 1.9 1.6 - 2.3 mg/dL         Assessment and Plan:    This is a 74-year-old  female with      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  S/P exploratory laparotomy, total abdominal hysterectomy, removal of her ovary and the appendix  08/09  Patient of    Due to high grade nature of the disease Dr. Ascencio recommended chemotherapy Taxol carbo x 6 cycles-started on 09/13/2019    Per Dr Ascencio nurse - return for a visit with Dr. Ascencioafter 6 cycles of chemo and  CT CAP prior to shamika    Today patient will complete cycle 6 of the chemotherapy.  I will schedule patient for CT scan and follow-up with Dr. Ascencio after the CT scan      Health maintenance   -Patient had flu shot    Patient is advised to go to the ER or call 911 in the event of fever chills sweats cough shortness of breath chest pain.  Nausea vomiting diarrhea abdominal pain  bleeding or any changes in health condition-patient and son verbalized understanding      LEX Salgado CNP  Hem/Onc   [facility]     Chart documentation with Dragon Voice recognition Software. Although reviewed after completion, some words and grammatical errors may remain.

## 2020-01-03 NOTE — LETTER
"    1/3/2020         RE: Maximino Simeon  [id number] [facility] [id number]        Dear Colleague,    Thank you for referring your patient, Maximino Simeon, to the [facility]. Please see a copy of my visit note below.    Oncology Rooming Note    January 3, 2020 9:53 AM   Maximino Simeon is a 74 year old female who presents for:    Chief Complaint   Patient presents with     Oncology Clinic Visit     Initial Vitals: BP (!) 157/87   Pulse 92   Temp 97.7  F (36.5  C) (Oral)   Resp 18   Ht 1.473 m (4' 10\")   Wt 40.9 kg (90 lb 3.2 oz)   LMP  (LMP Unknown)   SpO2 100%   BMI 18.85 kg/m    Estimated body mass index is 18.85 kg/m  as calculated from the following:    Height as of this encounter: 1.473 m (4' 10\").    Weight as of this encounter: 40.9 kg (90 lb 3.2 oz). Body surface area is 1.29 meters squared.  No Pain (0) Comment: Data Unavailable   No LMP recorded (lmp unknown). Patient has had a hysterectomy.  Allergies reviewed: Yes  Medications reviewed: Yes    Medications: Medication refills not needed today.  Pharmacy name entered into [employer]:    [facility] #[id number] [place], [place] - [address] PHARMACY [facility] [address] PHARMACY [place], [place] - [address] [address]    Clinical concerns:  NP was notified.      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  S/P exploratory laparotomy, total abdominal hysterectomy, removal of her ovary and the appendix  08/09  Patient of    Due to high grade nature of the disease Dr. Ascencio recommended chemotherapy Taxol carbo x6 cycles  Chemotherapy started on 09 13  Comes here today for cycle 6 of the chemo      Interval History:  Patient continues to feel well.  She denies fever chills sweats denies cough no " shortness of breath denies chest pain denies nausea vomiting diarrhea denies abdominal pain denies bleeding      Review of Systems:  14 point ROS of systems including Constitutional, Eyes, Respiratory, Cardiovascular, Gastroenterology, Genitourinary, Integumentary, Muscularskeletal, Psychiatric were all negative except for pertinent positives noted in my HPI.      Current Outpatient Medications   Medication Sig Dispense Refill     B Complex Vitamins (B COMPLEX PO) Take 1 tablet by mouth daily (with lunch)        Calcium Carbonate-Vitamin D (CALCIUM + D PO) Take 6 tablets by mouth daily.       Coenzyme Q10 (COQ10 PO) Take 200 mg by mouth 2 times daily       DULoxetine (CYMBALTA) 20 MG capsule Take 1 capsule (20 mg) by mouth daily Follow up with your PCP for refills 30 capsule 0     levothyroxine (SYNTHROID/LEVOTHROID) 50 MCG tablet Take 50 mcg by mouth every morning        liothyronine (CYTOMEL) 5 MCG tablet Take 5 mcg by mouth every morning        loperamide (IMODIUM) 1 MG/5ML liquid Take 1 mg by mouth 6 times daily        Multiple Vitamins-Minerals (MULTIVITAL PO) Take 1 tablet by mouth daily (with lunch)        order for DME Cranial prothesis 1 each 0     Probiotic Product (PROBIOTIC DAILY PO) 1 tablet daily at lunchtime by mouth       prochlorperazine (COMPAZINE) 10 MG tablet Take 1 tablet (10 mg) by mouth every 6 hours as needed (nausea/vomiting) 30 tablet 5     amLODIPine (NORVASC) 5 MG tablet Take 1 tablet (5 mg) by mouth daily (Patient not taking: Reported on 11/22/2019) 30 tablet 0     dronabinol (MARINOL) 2.5 MG capsule Take 1 capsule (2.5 mg) by mouth 2 times daily (before meals) (Patient not taking: Reported on 12/13/2019) 60 capsule 0     enoxaparin (LOVENOX) 30 MG/0.3ML syringe Inject 0.3 mLs (30 mg) Subcutaneous every 24 hours (Patient not taking: Reported on 11/22/2019) 24 Syringe 0     ibuprofen (ADVIL/MOTRIN) 200 MG tablet Take 400 mg by mouth every 6 hours as needed for mild pain       psyllium  "(METAMUCIL/KONSYL) Packet Take 1 packet by mouth daily         Physical Examination:  General: The patient is a pleasant female in no acute distress.  BP (!) 157/87   Pulse 92   Temp 97.7  F (36.5  C) (Oral)   Resp 18   Ht 1.473 m (4' 10\")   Wt 40.9 kg (90 lb 3.2 oz)   LMP  (LMP Unknown)   SpO2 100%   BMI 18.85 kg/m     HEENT: EOMI, PERRL. Sclerae are anicteric. Oral mucosa is pink and moist with no lesions or thrush.   Lymph: Neck is supple with no lymphadenopathy in the cervical or supraclavicular areas.   Heart: Regular rate and rhythm.   Lungs: Clear to auscultation bilaterally.   GI: Bowel sounds present, soft, nontender with no palpable hepatosplenomegaly or masses.   Extremities: No lower extremity edema noted bilaterally.   Skin: No rashes, petechiae, or bruising noted on exposed skin.    Laboratory Data:  Results for orders placed or performed in visit on 01/03/20 (from the past 24 hour(s))   CBC with platelets differential   Result Value Ref Range    WBC 4.4 4.0 - 11.0 10e9/L    RBC Count 3.40 (L) 3.8 - 5.2 10e12/L    Hemoglobin 11.3 (L) 11.7 - 15.7 g/dL    Hematocrit 34.3 (L) 35.0 - 47.0 %     (H) 78 - 100 fl    MCH 33.2 (H) 26.5 - 33.0 pg    MCHC 32.9 31.5 - 36.5 g/dL    RDW 17.3 (H) 10.0 - 15.0 %    Platelet Count 238 150 - 450 10e9/L    Diff Method Automated Method     % Neutrophils 65.9 %    % Lymphocytes 20.8 %    % Monocytes 12.6 %    % Eosinophils 0.0 %    % Basophils 0.5 %    % Immature Granulocytes 0.2 %    Nucleated RBCs 0 0 /100    Absolute Neutrophil 2.9 1.6 - 8.3 10e9/L    Absolute Lymphocytes 0.9 0.8 - 5.3 10e9/L    Absolute Monocytes 0.6 0.0 - 1.3 10e9/L    Absolute Eosinophils 0.0 0.0 - 0.7 10e9/L    Absolute Basophils 0.0 0.0 - 0.2 10e9/L    Abs Immature Granulocytes 0.0 0 - 0.4 10e9/L    Absolute Nucleated RBC 0.0    Comprehensive metabolic panel   Result Value Ref Range    Sodium 138 133 - 144 mmol/L    Potassium 4.0 3.4 - 5.3 mmol/L    Chloride 106 94 - 109 mmol/L    " Carbon Dioxide 28 20 - 32 mmol/L    Anion Gap 4 3 - 14 mmol/L    Glucose 107 (H) 70 - 99 mg/dL    Urea Nitrogen 15 7 - 30 mg/dL    Creatinine 0.56 0.52 - 1.04 mg/dL    GFR Estimate >90 >60 mL/min/[1.73_m2]    GFR Estimate If Black >90 >60 mL/min/[1.73_m2]    Calcium 9.2 8.5 - 10.1 mg/dL    Bilirubin Total 0.3 0.2 - 1.3 mg/dL    Albumin 3.9 3.4 - 5.0 g/dL    Protein Total 7.5 6.8 - 8.8 g/dL    Alkaline Phosphatase 63 40 - 150 U/L    ALT 20 0 - 50 U/L    AST 20 0 - 45 U/L   Magnesium   Result Value Ref Range    Magnesium 1.9 1.6 - 2.3 mg/dL         Assessment and Plan:    This is a 74-year-old  female with      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  S/P exploratory laparotomy, total abdominal hysterectomy, removal of her ovary and the appendix  08/09  Patient of    Due to high grade nature of the disease Dr. Ascencio recommended chemotherapy Taxol carbo x 6 cycles-started on 09/13/2019    Per Dr Ascencio nurse - return for a visit with Dr. Ascencioafter 6 cycles of chemo and  CT CAP prior to shamika    Today patient will complete cycle 6 of the chemotherapy.  I will schedule patient for CT scan and follow-up with Dr. Ascencio after the CT scan      Health maintenance   -Patient had flu shot    Patient is advised to go to the ER or call 911 in the event of fever chills sweats cough shortness of breath chest pain.  Nausea vomiting diarrhea abdominal pain bleeding or any changes in health condition-patient and son verbalized understanding      LEX Salgado CNP  Hem/Onc   [facility]     Chart documentation with [employer]. Although reviewed after completion, some words and grammatical errors may remain.          Again, thank you for allowing me to participate in the care of your patient.        Sincerely,        LEX Salgado CNP

## 2020-01-03 NOTE — PROGRESS NOTES
Infusion Nursing Note:  Maximino Simeon presents today for Cycle 6 Day 1 Taxol, Carboplatin.    Patient seen by provider today: Yes: Shashi Smith NP   present during visit today: Not Applicable.    Note: N/A.    Intravenous Access:  Implanted Port.    Treatment Conditions:  Lab Results   Component Value Date    HGB 11.3 01/03/2020     Lab Results   Component Value Date    WBC 4.4 01/03/2020      Lab Results   Component Value Date    ANEU 2.9 01/03/2020     Lab Results   Component Value Date     01/03/2020      Lab Results   Component Value Date     01/03/2020                   Lab Results   Component Value Date    POTASSIUM 4.0 01/03/2020           Lab Results   Component Value Date    MAG 1.9 01/03/2020            Lab Results   Component Value Date    CR 0.56 01/03/2020                   Lab Results   Component Value Date    LUCRECIA 9.2 01/03/2020                Lab Results   Component Value Date    BILITOTAL 0.3 01/03/2020           Lab Results   Component Value Date    ALBUMIN 3.9 01/03/2020                    Lab Results   Component Value Date    ALT 20 01/03/2020           Lab Results   Component Value Date    AST 20 01/03/2020       Results reviewed, labs MET treatment parameters, ok to proceed with treatment.      Post Infusion Assessment:  Patient tolerated infusion without incident.  Blood return noted pre and post infusion.  Site patent and intact, free from redness, edema or discomfort.  No evidence of extravasations.  Access discontinued per protocol.     ONPRO  Was placed on patient's: back of right arm.    Was placed at 2:30 PM    ONPRO injector device Lot number: [id number]    Patient education included: what patient can expect after application, what colored lights mean on the device, when to remove device, when and where to call with questions or issues, all patients questions answered and that Neulasta administration will occur at 5:30 on 1/4/20.    Patient tolerated administration  well.      Discharge Plan:   Patient declined prescription refills.  Discharge instructions reviewed with: Patient.  Patient verbalized understanding of discharge instructions and all questions answered.  AVS to patient via [employer].  Patient will return as scheduled for next appointment.   Patient discharged in stable condition accompanied by: self and daughter.  Departure Mode: Ambulatory.    Bethany Arechiga RN

## 2020-01-03 NOTE — TELEPHONE ENCOUNTER
Message left for patient to reschedule CT scan a couple weeks out per Dr Ascencio they suggest 1-20-20 and see Dr Ascencio 1-23-20.

## 2020-01-03 NOTE — PROGRESS NOTES
Nursing Note:  Maximino Simeon presents today for port labs prior to treatment.    Patient seen by provider today: Yes: AMI Danielle   present during visit today: Not Applicable.    Note: N/A.    Intravenous Access:  Labs drawn without difficulty.  Implanted Port.    Discharge Plan:   Patient was sent to [facility] for clinic appointment.    Coleman Arceo RN

## 2020-01-03 NOTE — PATIENT INSTRUCTIONS
Your On-body Neulasta Injector was applied to your right arm at 2:30pm.  At approximately 5:30pm on 1/4/20, your On-body Injector will beep to let you know your dose delivery will begin in 2 minutes.  Your medication will be delivered over the next 45 minutes.  You can remove your Injector at 6:30PM.  Please make sure your Injector has a solid green light or has turned off prior to removing the device.  Please contact your provider at [phone] with questions or concerns.

## 2020-01-06 ENCOUNTER — TELEPHONE (OUTPATIENT)
Dept: ONCOLOGY | Facility: CLINIC | Age: 75
End: 2020-01-06

## 2020-01-06 ENCOUNTER — PATIENT OUTREACH (OUTPATIENT)
Dept: ONCOLOGY | Facility: CLINIC | Age: 75
End: 2020-01-06

## 2020-01-06 NOTE — TELEPHONE ENCOUNTER
Patient called stating she was returning a call she received from Neha Arechiga RN in infusion on Friday 1/3. Writer reviewed chart and informed patient that I do not see a message and that I would have the infusion RN return her call tomorrow when she is back in clinic.    Esthela Servin, RN

## 2020-01-06 NOTE — TELEPHONE ENCOUNTER
Lm for pt with rescheduled date/time/location of CT at [facility] to 1/20/20 per IB from [employer].  Scheduled pt with Dr. Ascencio also per IB from [facility] on 1/23/20 at 2:55pm.

## 2020-01-07 DIAGNOSIS — C56.9 OVARIAN CANCER, UNSPECIFIED LATERALITY (H): Primary | ICD-10-CM

## 2020-01-09 DIAGNOSIS — C56.9 OVARIAN CANCER, UNSPECIFIED LATERALITY (H): Primary | ICD-10-CM

## 2020-01-20 ENCOUNTER — HOSPITAL ENCOUNTER (OUTPATIENT)
Dept: CT IMAGING | Facility: CLINIC | Age: 75
End: 2020-01-20
Attending: NURSE PRACTITIONER
Payer: MEDICARE

## 2020-01-20 ENCOUNTER — HOSPITAL ENCOUNTER (OUTPATIENT)
Facility: CLINIC | Age: 75
Discharge: HOME | End: 2020-01-20
Admitting: FAMILY MEDICINE
Payer: MEDICARE

## 2020-01-20 DIAGNOSIS — C56.2 OVARIAN CANCER, LEFT (H): ICD-10-CM

## 2020-01-20 PROCEDURE — 25000128 H RX IP 250 OP 636: Performed by: NURSE PRACTITIONER

## 2020-01-20 PROCEDURE — 71260 CT THORAX DX C+: CPT

## 2020-01-20 PROCEDURE — 25000125 ZZHC RX 250: Performed by: NURSE PRACTITIONER

## 2020-01-20 PROCEDURE — 25000128 H RX IP 250 OP 636

## 2020-01-20 RX ORDER — NICOTINE POLACRILEX 4 MG
15-30 LOZENGE BUCCAL
Status: DISCONTINUED | OUTPATIENT
Start: 2020-01-20 | End: 2020-01-21 | Stop reason: HOSPADM

## 2020-01-20 RX ORDER — DEXTROSE MONOHYDRATE 25 G/50ML
25-50 INJECTION, SOLUTION INTRAVENOUS
Status: DISCONTINUED | OUTPATIENT
Start: 2020-01-20 | End: 2020-01-21 | Stop reason: HOSPADM

## 2020-01-20 RX ORDER — IOPAMIDOL 755 MG/ML
44 INJECTION, SOLUTION INTRAVASCULAR ONCE
Status: COMPLETED | OUTPATIENT
Start: 2020-01-20 | End: 2020-01-20

## 2020-01-20 RX ORDER — HEPARIN SODIUM,PORCINE 10 UNIT/ML
10 VIAL (ML) INTRAVENOUS EVERY 24 HOURS
Status: DISCONTINUED | OUTPATIENT
Start: 2020-01-20 | End: 2020-01-21 | Stop reason: HOSPADM

## 2020-01-20 RX ADMIN — HEPARIN SODIUM (PORCINE) LOCK FLUSH IV SOLN 100 UNIT/ML 500 UNITS: 100 SOLUTION at 16:32

## 2020-01-20 RX ADMIN — IOPAMIDOL 44 ML: 755 INJECTION, SOLUTION INTRAVENOUS at 16:21

## 2020-01-20 RX ADMIN — SODIUM CHLORIDE 73 ML: 9 INJECTION, SOLUTION INTRAVENOUS at 16:21

## 2020-01-23 ENCOUNTER — APPOINTMENT (OUTPATIENT)
Dept: LAB | Facility: CLINIC | Age: 75
End: 2020-01-23
Attending: OBSTETRICS & GYNECOLOGY
Payer: MEDICARE

## 2020-01-23 ENCOUNTER — ONCOLOGY VISIT (OUTPATIENT)
Dept: ONCOLOGY | Facility: CLINIC | Age: 75
End: 2020-01-23
Attending: OBSTETRICS & GYNECOLOGY
Payer: MEDICARE

## 2020-01-23 VITALS
RESPIRATION RATE: 16 BRPM | HEART RATE: 105 BPM | TEMPERATURE: 97.9 F | BODY MASS INDEX: 19.52 KG/M2 | DIASTOLIC BLOOD PRESSURE: 84 MMHG | SYSTOLIC BLOOD PRESSURE: 137 MMHG | OXYGEN SATURATION: 96 % | WEIGHT: 93.4 LBS

## 2020-01-23 DIAGNOSIS — C56.9 OVARIAN CANCER, UNSPECIFIED LATERALITY (H): ICD-10-CM

## 2020-01-23 LAB — CANCER AG125 SERPL-ACNC: 8 U/ML (ref 0–30)

## 2020-01-23 PROCEDURE — G0463 HOSPITAL OUTPT CLINIC VISIT: HCPCS | Mod: ZF

## 2020-01-23 PROCEDURE — 99214 OFFICE O/P EST MOD 30 MIN: CPT | Mod: ZP | Performed by: OBSTETRICS & GYNECOLOGY

## 2020-01-23 PROCEDURE — 25000128 H RX IP 250 OP 636: Mod: ZF | Performed by: OBSTETRICS & GYNECOLOGY

## 2020-01-23 PROCEDURE — 86304 IMMUNOASSAY TUMOR CA 125: CPT | Performed by: OBSTETRICS & GYNECOLOGY

## 2020-01-23 PROCEDURE — 36591 DRAW BLOOD OFF VENOUS DEVICE: CPT

## 2020-01-23 RX ORDER — HEPARIN SODIUM (PORCINE) LOCK FLUSH IV SOLN 100 UNIT/ML 100 UNIT/ML
5 SOLUTION INTRAVENOUS
Status: COMPLETED | OUTPATIENT
Start: 2020-01-23 | End: 2020-01-23

## 2020-01-23 RX ADMIN — Medication 5 ML: at 14:06

## 2020-01-23 ASSESSMENT — PAIN SCALES - GENERAL: PAINLEVEL: NO PAIN (0)

## 2020-01-23 NOTE — NURSING NOTE
"Oncology Rooming Note    January 23, 2020 2:55 PM   Maximino Simeon is a 74 year old female who presents for:    Chief Complaint   Patient presents with     Port Draw     labs drawn from port by rn.  vs taken     Oncology Clinic Visit     Return Ovarian Ca     Initial Vitals: /84 (BP Location: Right arm, Patient Position: Sitting, Cuff Size: Adult Small)   Pulse 105   Temp 97.9  F (36.6  C) (Oral)   Resp 16   Wt 42.4 kg (93 lb 6.4 oz)   LMP  (LMP Unknown)   SpO2 96%   BMI 19.52 kg/m   Estimated body mass index is 19.52 kg/m  as calculated from the following:    Height as of 1/3/20: 1.473 m (4' 10\").    Weight as of this encounter: 42.4 kg (93 lb 6.4 oz). Body surface area is 1.32 meters squared.  No Pain (0) Comment: Data Unavailable   No LMP recorded (lmp unknown). Patient has had a hysterectomy.  Allergies reviewed: Yes  Medications reviewed: Yes    Medications: Medication refills not needed today.  Pharmacy name entered into [employer]:    [facility] #[id number] - [place], [place] - [address] [place] [address] [facility] #[id number] - [place], [place] - [address] [facility] [address]    Clinical concerns: CT scan and lab results       Rachel Gomez CMA              "

## 2020-01-23 NOTE — NURSING NOTE
"Chief Complaint   Patient presents with     Port Draw     labs drawn from port by rn.  vs taken     Port accessed with 20 gauge 3/4\" gripper needle and labs drawn by rn.  Port flushed with NS and heparin then de-accessed.  Pt tolerated well.  VS taken.  Pt checked in for next appt.    Mariam Sequeira RN      "

## 2020-01-23 NOTE — LETTER
2020       RE: Maximino Simeon  [address] [facility] [id number]     Dear Colleague,    Thank you for referring your patient, Maximino Simeon, to the [facility]. Please see a copy of my visit note below.                            Consult Notes on Referred Patient    Date: 2020      Dr. [facility]  [address]  [place] [address]       RE: Maximino Simeon  : 1945  LORNA: 2020        Dear Dr. [facility] [place] *:    I had the pleasure of seeing your patient Maximino Simeon here at the Gynecologic Cancer Clinic at the [facility] on 2020.  As you know she is a very pleasant 74 year old woman with a recent diagnosis of  Pelvic mass.  Now s/p surgical staging procedure.    Today, Brittni c/o persistent fatigue that has been interfering with daily activities. She would like to get back to her baseline exercise schedule. Patient also has numbness and tingling in both ahnds and feet, which is worsening on the lateral aspect of her left hand. She has some trouble getting to sleep and recently started melatonin. She has a good appetite and has gained weight since her procedure. Patient has a Hx of collagenous colitis and has irregular bowel movements at baseline. She has also been occasionally incontinent of urine for the past 2 months. She has incidental complaints of palpitations and some lightheadedness upon standing. Denies nausea, vomiting, abdominal pain, vaginal bleeding, chest pain, blood in stool.     Brittni also had a list of questions, all of which were answered during this visit.    No results found for:       HPI:    Saw patient at [facility] originally during my call week was to follow up with outpatient GYN ONC and general surgery due to appendicits and enlarged ovary.      54  CA 19-9 15  CEA 2.8    2019: CT abd pelvis: IMPRESSION:      1. Dilated,  fluid-filled appendix with mild increased enhancement and slight stranding at its tip is worrisome for acute appendicitis.  2. Large complex cystic pelvic mass with soft tissue nodularity with adjacent prominent left adnexal vessels is worrisome for neoplasm of ovarian origin. No mesenteric nodularity, lymph node enlargement, or other findings that are suspicious for metastatic disease in the abdomen or pelvis.    7/2/2019: CT abd pelvis REPRODUCTIVE ORGANS with IV and oral contrast: Again noted is a large complex cystic mass in the pelvis measuring approximately 13.3 x 8.1 x 9.7 cm in size which has areas of mural nodularity and soft tissue density.    1. Again noted is a thick-walled dilated appendix compatible with appendicitis. There is a questionable new small area of appendiceal wall discontinuity and adjacent 1.2 cm fluid pocket on axial image 57 and sagittal image 23. Findings raise question of a small area of appendiceal wall perforation.    2. Large complex pelvic mass again concerning for ovarian neoplasm.    3. Marked bladder distention. Mild prominence of the urinary collecting systems likely secondary to this.    7/2/2019-7/7/2019: General surgery consulted for perforated appendicitis.  Not septic as no fever.Treated with Cefuroxime and metronidazole (PCN allergy). Surgery recommending postponing appendectomy for about 6 weeks or so. Presenting leukocytosis and fever resolved by discharge.  Will finish oral course of ceftin and flagyl as outpatient.     Maximino Simeon is a 74 y.o. female with history of colitis with recent admission for acute appendicitis and incidental pelvic mass who was treated conservatively with antibiotics who returns with worsening pain and CT with evidence of perforation and abscess.     Per general surgery note:  - No indication for surgery at this time  - Recommend admission to medicine service  - Start IV antibiotics  - Will again need to discuss the timing of appendectomy  with Gyn/Onc to allow for evaluation of her pelvic mass simultaneously, this will likely have to be in 4-6 weeks following possible appendix perforation  - Surgery will continue to follow  Samuel Eller MD       SPECIMEN(S):   A: Ovary and fallopian tube, left   B: Uterus, cervix   C: Appendix   D: Lymph nodes, left pelvic   E: Lymph nodes, left para aortic   F: Lymph nodes, right pelvic   G: Lymph nodes, right para aortic   H: Right pedro colic gutter   I: Left pedro colic gutter   J: Left pelvic peritoneum   K: Right pelvic peritoneum   L: Bladder peritoneum   M: Omentum   N: Cul de sac     FINAL DIAGNOSIS:   A. 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 UTERUS, CERVIX:   - Inactive/atrophic endometrium   - Leiomyomas with hyaline degeneration   - Cervix with no significant histopathologic abnormality     C. APPENDIX:   - Low grade appendiceal mucinous neoplasm (LAMN)     D. LYMPH NODES, LEFT PELVIC:   - Eight reactive lymph nodes, negative for malignancy (0/8)     E. LYMPH NODES, LEFT PARA AORTIC:   - Four reactive lymph nodes, negative for malignancy (0/4)     F. LYMPH NODES, RIGHT PELVIC:   - Nine reactive lymph nodes, negative for malignancy (0/9)     G. LYMPH NODES, RIGHT PARA AORTIC:   - Four reactive lymph nodes, negative for malignancy (0/4)     H. RIGHT PEDRO COLIC GUTTER:   -Fibroadipose tissue, negative for malignancy.     I. LEFT PEDRO COLIC GUTTER:   -Fibroadipose tissue, negative for malignancy.     J. LEFT PELVIC PERITONEUM:   -Fibroadipose tissue, negative for malignancy.     K. RIGHT PELVIC PERITONEUM:   -Fibroadipose tissue, negative for malignancy.     L. BLADDER PERITONEUM:   -Fibrous tissue, negative for malignancy.     M. OMENTUM:   - Fibroadipose tissue, negative for malignancy.   - One reactive lymph node,  negative for malignancy (0/1)     N. CUL DE SAC:   - Hemorrhagic fibrous tissue, negative for malignancy.     Pelvic Washing:   - Negative for malignancy   Specimen Adequacy: Satisfactory for evaluation.     PATHOLOGIC STAGE CLASSIFICATION (PTNM, AJCC 8TH EDITION)     Primary Tumor (pT):         - pT1a     Regional Lymph Nodes (pN):         - pN0     FIGO STAGE     FIGO Stage:         - IA     ADDITIONAL FINDINGS     Additional Pathologic Findings:         appendix with low grade appendiceal mucinous neoplasm (LAMN)  Proximal Margin:         - Uninvolved by invasive carcinoma       Status of Mucinous Neoplasm at Proximal Margin:           - Uninvolved by appendiceal mucinous neoplasm     Mesenteric Margin:         - Uninvolved by invasive carcinoma     1/3/2020-Finished last cycle of carbo/taxol #6 completed at [facility].       Date Value Ref Range Status   01/23/2020 8 0 - 30 U/mL Final     Comment:     Assay Method:  Chemiluminescence using Siemens Centaur XP       Today: + anxiety, forcing herself to eat. Doesn't know what to eat. Fatigue, eats organic oats, yogurt is what usually eats. Has history of colitis, having BM's is off steroids prior to surgery. Gets constipated if not taking metamucil takes once a day.     Review of Systems:    Systemic           no weight changes; no fever; no chills; no night sweats; no appetite changes  Skin           no rashes, or lesions  Eye           no irritation; no changes in vision  Jarred-Laryngeal           no dysphagia; no hoarseness   Pulmonary    no cough; no shortness of breath  Cardiovascular    no chest pain; no palpitations  Gastrointestinal    no diarrhea; no constipation; no abdominal pain; no changes in bowel  habits; no blood in stool  Genitourinary   no urinary frequency; no urinary urgency; no dysuria; no pain; no abnormal vaginal discharge; no abnormal vaginal bleeding  Breast   no breast discharge; no breast changes; no breast pain  Musculoskeletal    no  myalgias; no arthralgias; no back pain  Psychiatric           no depressed mood; no anxiety    Hematologic           no tender lymph nodes; no noticeable swellings or lumps   Endocrine    no hot flashes; no heat/cold intolerance         Neurological   no tremor; no numbness and tingling; no headaches; no difficulty  sleeping      Past Medical History:    Asymptomatic CAD  Collagenous colitis   Major depressive disorder, recurrent episode, moderate (HC)    Anxiety state, unspecified      Past Surgical History:    One ovary removed for endometriosis, still has uterus and other ovary.    Health Maintenance:  Health Maintenance Due   Topic Date Due     DEXA  1945     ADVANCE CARE PLANNING  1945     DEPRESSION ACTION PLAN  1945     LIPID  02/01/1988     MEDICARE ANNUAL WELLNESS VISIT  02/01/2008     ZOSTER IMMUNIZATION (2 of 3) 09/21/2015     PHQ-9  01/12/2020       Last Pap Smear: 10 years ago            Result: normal  She has not had a history of abnormal Pap smears.    Last Mammogram: Due 9/2019             Result: normal         Last Colonoscopy: 2013           Result: microscopic colitis                           Current Medications:     has a current medication list which includes the following prescription(s): amlodipine, b complex vitamins, calcium citrate-vitamin d, coenzyme q10, dronabinol, duloxetine, enoxaparin anticoagulant, ibuprofen, levothyroxine, liothyronine, loperamide, multiple vitamins-minerals, order for dme, probiotic product, prochlorperazine, and psyllium.       Allergies:     Allergies   Allergen Reactions     Adhesive Tape      bandaids     Liquid Adhesive Rash     Penicillins Rash            Social History:     Social History     Tobacco Use     Smoking status: Never Smoker     Smokeless tobacco: Never Used   Substance Use Topics     Alcohol use: Yes     Comment: socially       History   Drug Use Unknown         Family History:     The patient's family history is notable for  the following.    Breast cancer sister in her 50's, maternal aunt breast cancer >50 years, no ovarian cancer    Physical Exam:     /84 (BP Location: Right arm, Patient Position: Sitting, Cuff Size: Adult Small)   Pulse 105   Temp 97.9  F (36.6  C) (Oral)   Resp 16   Wt 42.4 kg (93 lb 6.4 oz)   LMP  (LMP Unknown)   SpO2 96%   BMI 19.52 kg/m     Body mass index is 19.52 kg/m .    General Appearance: healthy and alert, NAD     HEENT:  no thyromegaly, no palpable nodules or masses        Cardiovascular: regular rate and rhythm, no gallops, rubs or murmurs     Respiratory: lungs clear, no rales, rhonchi or wheezes, normal diaphragmatic excursion    Musculoskeletal: extremities non tender and without edema    Skin: no lesions or rashes     Neurological: normal gait, no gross defects     Psychiatric: appropriate mood and affect                               Hematological: normal cervical, supraclavicular and inguinal lymph nodes     Gastrointestinal:       abdomen soft, mildly tender, non-distended, no organomegaly or masses, wounds CDI  Genitourinary: Derferred.    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 Some urinary incontinence - urge appears. Doing well now following completion of chemotherapy.      Plan:     1.)    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 Completed chemotherapy x 6 cycles taxol/carbo. Patient will have surveillance visits pelvic exams every 3 months. NP visit in 3 mos. In 6 mos at [facility]. At 6 mos if all good, port removal.   -If no improvement with urge incontinence now that chemo done will send to urogyn for work up.    2.) GC Visit scheduled for 2/28/2020    3.) Labs and/or tests ordered include:  pending    4.) Pain: on tylenol twice daily can increase to tid and ibuprofen q 6 hours 600 mg.     5.) Appetite: has improved significantly.         Thank  you for allowing us to participate in the care of your patient.         Sincerely,    I acted as a scribe for Dr. Sonu Hale, MS3  [employer]  Class of 2021 January 23, 2020    Naz Ascencio MD    Department of Ob/Gyn and Women's Health  Division of Gynecologic Oncology  [facility]  [phone]    I saw and evaluated the patient with the medical student. I reviewed and edited the above note.  The medical student acted as a scribe for this visit.    Of a 25 minute appointment, more than 50% was spent in counseling the patient.    CC  Patient Care Team:  Cecelia Manzo MD as PCP - General  Sonu, Naz Khan MD as MD (Oncology)  Parvez Abreu MD as MD (Surgery)  Micheline Soler MD as MD (Emergency Medicine)  [facility] ([facility]  Krysten Patel, RN as Specialty Care Coordinator (Gyn-Onc)  [facility]

## 2020-01-23 NOTE — PROGRESS NOTES
Consult Notes on Referred Patient    Date: 2020      [facility]. [facility]  [address] [place] [address]       RE: Maximino Simeon  : 1945  LORNA: 2020        Dear Dr. [facility] Jennifer *:    I had the pleasure of seeing your patient Maximino Simeon here at the Gynecologic Cancer Clinic at the [facility] on 2020.  As you know she is a very pleasant 74 year old woman with a recent diagnosis of  Pelvic mass.  Now s/p surgical staging procedure.    Today, Brittni c/o persistent fatigue that has been interfering with daily activities. She would like to get back to her baseline exercise schedule. Patient also has numbness and tingling in both ahnds and feet, which is worsening on the lateral aspect of her left hand. She has some trouble getting to sleep and recently started melatonin. She has a good appetite and has gained weight since her procedure. Patient has a Hx of collagenous colitis and has irregular bowel movements at baseline. She has also been occasionally incontinent of urine for the past 2 months. She has incidental complaints of palpitations and some lightheadedness upon standing. Denies nausea, vomiting, abdominal pain, vaginal bleeding, chest pain, blood in stool.     Brittni also had a list of questions, all of which were answered during this visit.    No results found for:       HPI:    Saw patient at [facility] originally during my call week was to follow up with outpatient GYN ONC and general surgery due to appendicits and enlarged ovary.      54  CA 19-9 15  CEA 2.8    2019: CT abd pelvis: IMPRESSION:      1. Dilated, fluid-filled appendix with mild increased enhancement and slight stranding at its tip is worrisome for acute appendicitis.  2. Large complex cystic pelvic mass with soft tissue nodularity with adjacent prominent left adnexal vessels is worrisome for  neoplasm of ovarian origin. No mesenteric nodularity, lymph node enlargement, or other findings that are suspicious for metastatic disease in the abdomen or pelvis.    7/2/2019: CT abd pelvis REPRODUCTIVE ORGANS with IV and oral contrast: Again noted is a large complex cystic mass in the pelvis measuring approximately 13.3 x 8.1 x 9.7 cm in size which has areas of mural nodularity and soft tissue density.    1. Again noted is a thick-walled dilated appendix compatible with appendicitis. There is a questionable new small area of appendiceal wall discontinuity and adjacent 1.2 cm fluid pocket on axial image 57 and sagittal image 23. Findings raise question of a small area of appendiceal wall perforation.    2. Large complex pelvic mass again concerning for ovarian neoplasm.    3. Marked bladder distention. Mild prominence of the urinary collecting systems likely secondary to this.    7/2/2019-7/7/2019: General surgery consulted for perforated appendicitis.  Not septic as no fever.Treated with Cefuroxime and metronidazole (PCN allergy). Surgery recommending postponing appendectomy for about 6 weeks or so. Presenting leukocytosis and fever resolved by discharge.  Will finish oral course of ceftin and flagyl as outpatient.     Maximino Simeon is a 74 y.o. female with history of colitis with recent admission for acute appendicitis and incidental pelvic mass who was treated conservatively with antibiotics who returns with worsening pain and CT with evidence of perforation and abscess.     Per general surgery note:  - No indication for surgery at this time  - Recommend admission to medicine service  - Start IV antibiotics  - Will again need to discuss the timing of appendectomy with Gyn/Onc to allow for evaluation of her pelvic mass simultaneously, this will likely have to be in 4-6 weeks following possible appendix perforation  - Surgery will continue to follow  Samuel Eller MD       SPECIMEN(S):   A: Ovary and  fallopian tube, left   B: Uterus, cervix   C: Appendix   D: Lymph nodes, left pelvic   E: Lymph nodes, left para aortic   F: Lymph nodes, right pelvic   G: Lymph nodes, right para aortic   H: Right pedro colic gutter   I: Left pedro colic gutter   J: Left pelvic peritoneum   K: Right pelvic peritoneum   L: Bladder peritoneum   M: Omentum   N: Cul de sac     FINAL DIAGNOSIS:   A. 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 UTERUS, CERVIX:   - Inactive/atrophic endometrium   - Leiomyomas with hyaline degeneration   - Cervix with no significant histopathologic abnormality     C. APPENDIX:   - Low grade appendiceal mucinous neoplasm (LAMN)     D. LYMPH NODES, LEFT PELVIC:   - Eight reactive lymph nodes, negative for malignancy (0/8)     E. LYMPH NODES, LEFT PARA AORTIC:   - Four reactive lymph nodes, negative for malignancy (0/4)     F. LYMPH NODES, RIGHT PELVIC:   - Nine reactive lymph nodes, negative for malignancy (0/9)     G. LYMPH NODES, RIGHT PARA AORTIC:   - Four reactive lymph nodes, negative for malignancy (0/4)     H. RIGHT PEDRO COLIC GUTTER:   -Fibroadipose tissue, negative for malignancy.     I. LEFT PEDRO COLIC GUTTER:   -Fibroadipose tissue, negative for malignancy.     J. LEFT PELVIC PERITONEUM:   -Fibroadipose tissue, negative for malignancy.     K. RIGHT PELVIC PERITONEUM:   -Fibroadipose tissue, negative for malignancy.     L. BLADDER PERITONEUM:   -Fibrous tissue, negative for malignancy.     M. OMENTUM:   - Fibroadipose tissue, negative for malignancy.   - One reactive lymph node, negative for malignancy (0/1)     N. CUL DE SAC:   - Hemorrhagic fibrous tissue, negative for malignancy.     Pelvic Washing:   - Negative for malignancy   Specimen Adequacy: Satisfactory for evaluation.     PATHOLOGIC STAGE CLASSIFICATION (PTNM,  AJCC 8TH EDITION)     Primary Tumor (pT):         - pT1a     Regional Lymph Nodes (pN):         - pN0     FIGO STAGE     FIGO Stage:         - IA     ADDITIONAL FINDINGS     Additional Pathologic Findings:         appendix with low grade appendiceal mucinous neoplasm (LAMN)  Proximal Margin:         - Uninvolved by invasive carcinoma       Status of Mucinous Neoplasm at Proximal Margin:           - Uninvolved by appendiceal mucinous neoplasm     Mesenteric Margin:         - Uninvolved by invasive carcinoma     1/3/2020-Finished last cycle of carbo/taxol #6 completed at [facility].       Date Value Ref Range Status   01/23/2020 8 0 - 30 U/mL Final     Comment:     Assay Method:  Chemiluminescence using Siemens Centaur XP       Today: + anxiety, forcing herself to eat. Doesn't know what to eat. Fatigue, eats organic oats, yogurt is what usually eats. Has history of colitis, having BM's is off steroids prior to surgery. Gets constipated if not taking metamucil takes once a day.     Review of Systems:    Systemic           no weight changes; no fever; no chills; no night sweats; no appetite changes  Skin           no rashes, or lesions  Eye           no irritation; no changes in vision  Jarred-Laryngeal           no dysphagia; no hoarseness   Pulmonary    no cough; no shortness of breath  Cardiovascular    no chest pain; no palpitations  Gastrointestinal    no diarrhea; no constipation; no abdominal pain; no changes in bowel  habits; no blood in stool  Genitourinary   no urinary frequency; no urinary urgency; no dysuria; no pain; no abnormal vaginal discharge; no abnormal vaginal bleeding  Breast   no breast discharge; no breast changes; no breast pain  Musculoskeletal    no myalgias; no arthralgias; no back pain  Psychiatric           no depressed mood; no anxiety    Hematologic           no tender lymph nodes; no noticeable swellings or lumps   Endocrine    no hot flashes; no heat/cold intolerance          Neurological   no tremor; no numbness and tingling; no headaches; no difficulty  sleeping      Past Medical History:    Asymptomatic CAD  Collagenous colitis   Major depressive disorder, recurrent episode, moderate (HC)    Anxiety state, unspecified      Past Surgical History:    One ovary removed for endometriosis, still has uterus and other ovary.    Health Maintenance:  Health Maintenance Due   Topic Date Due     DEXA  1945     ADVANCE CARE PLANNING  1945     DEPRESSION ACTION PLAN  1945     LIPID  02/01/1988     MEDICARE ANNUAL WELLNESS VISIT  02/01/2008     ZOSTER IMMUNIZATION (2 of 3) 09/21/2015     PHQ-9  01/12/2020       Last Pap Smear: 10 years ago            Result: normal  She has not had a history of abnormal Pap smears.    Last Mammogram: Due 9/2019             Result: normal         Last Colonoscopy: 2013           Result: microscopic colitis                           Current Medications:     has a current medication list which includes the following prescription(s): amlodipine, b complex vitamins, calcium citrate-vitamin d, coenzyme q10, dronabinol, duloxetine, enoxaparin anticoagulant, ibuprofen, levothyroxine, liothyronine, loperamide, multiple vitamins-minerals, order for dme, probiotic product, prochlorperazine, and psyllium.       Allergies:     Allergies   Allergen Reactions     Adhesive Tape      bandaids     Liquid Adhesive Rash     Penicillins Rash            Social History:     Social History     Tobacco Use     Smoking status: Never Smoker     Smokeless tobacco: Never Used   Substance Use Topics     Alcohol use: Yes     Comment: socially       History   Drug Use Unknown         Family History:     The patient's family history is notable for the following.    Breast cancer sister in her 50's, maternal aunt breast cancer >50 years, no ovarian cancer    Physical Exam:     /84 (BP Location: Right arm, Patient Position: Sitting, Cuff Size: Adult Small)   Pulse 105    Temp 97.9  F (36.6  C) (Oral)   Resp 16   Wt 42.4 kg (93 lb 6.4 oz)   LMP  (LMP Unknown)   SpO2 96%   BMI 19.52 kg/m    Body mass index is 19.52 kg/m .    General Appearance: healthy and alert, NAD     HEENT:  no thyromegaly, no palpable nodules or masses        Cardiovascular: regular rate and rhythm, no gallops, rubs or murmurs     Respiratory: lungs clear, no rales, rhonchi or wheezes, normal diaphragmatic excursion    Musculoskeletal: extremities non tender and without edema    Skin: no lesions or rashes     Neurological: normal gait, no gross defects     Psychiatric: appropriate mood and affect                               Hematological: normal cervical, supraclavicular and inguinal lymph nodes     Gastrointestinal:       abdomen soft, mildly tender, non-distended, no organomegaly or masses, wounds CDI  Genitourinary: Derferred.    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 Some urinary incontinence - urge appears. Doing well now following completion of chemotherapy.      Plan:     1.)    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 Completed chemotherapy x 6 cycles taxol/carbo. Patient will have surveillance visits pelvic exams every 3 months. NP visit in 3 mos. In 6 mos at [facility]. At 6 mos if all good, port removal.   -If no improvement with urge incontinence now that chemo done will send to urogyn for work up.    2.) GC Visit scheduled for 2/28/2020    3.) Labs and/or tests ordered include:  pending    4.) Pain: on tylenol twice daily can increase to tid and ibuprofen q 6 hours 600 mg.     5.) Appetite: has improved significantly.         Thank you for allowing us to participate in the care of your patient.         Sincerely,    I acted as a scribe for Dr. Sonu Hale, MS3  [employer]  Class of 2021 January 23, 2020    Naz  MD Sonu    Department of Ob/Gyn and Women's Health  Division of Gynecologic Oncology  [facility]  [phone]    I saw and evaluated the patient with the medical student. I reviewed and edited the above note.  The medical student acted as a scribe for this visit.    Of a 25 minute appointment, more than 50% was spent in counseling the patient.    CC  Patient Care Team:  Cecelia Manzo MD as PCP - General  Sonu, Naz Khan MD as MD (Oncology)  Parvez Abreu MD as MD (Surgery)  Micheline Soler MD as MD (Emergency Medicine)  [facility]  Krysten Patel, RN as Specialty Care Coordinator (Gyn-Onc)  [facility]

## 2020-01-24 NOTE — RESULT ENCOUNTER NOTE
Normal after 6 cycles of chemo.  Krysten KATZ RN, OCN  Care Coordinator   Gynecologic Cancer   Office [phone]

## 2020-02-21 NOTE — PROGRESS NOTES
"2020    Referring Provider: Dr. Naz Ascencio     Presenting Information:   I met with Brittni Simeon today for genetic counseling at [facility] to discuss her personal and family history of ovarian and breast cancer.  She is here today to review this history, cancer screening recommendations, and available genetic testing options.     Personal History:  Brittni is a 75-year-old female. She was diagnosed with clear cell carcinoma of the left ovary and low grade appendiceal mucinous neoplasm in her appendix in 2019; treatment included a hysterectomy, left salpingo-oophorectomy, and an appendectomy. She also completed chemotherapy with carboplatin/Taxol.      She had her first menstrual period at age 9, her first child at age 23, and is postmenopausal (age 48).  She that she has a history of oral contraceptive use of about 30 years and she has not had hormone replacement therapy.  She had her right ovary out previously due to endometriosis.    She has had clinical breast exams and mammograms; her most recent mammogram on 2018 was normal; her breast tissue was categorized as heterogeneously dense. Her most recent colonoscopy on 2013 was normal except for collagenous colitis. Brittni was unsure when she was supposed to have follow-up.     She has a history of epidermoid cysts on her neck and upper chest.     Brittni does not regularly do any other cancer screening at this time. She reported no tobacco use and occasional alcohol use.     Family History: (Please see scanned pedigree for detailed family history information)     Her sister had breast cancer at 65 that has metastasized to her bones; she is currently 88.     Her maternal aunt had breast cancer in her 50's; she passed away soon after.    Her mother  at 56 of an aggressive \"acute lymphomatic disease\" that Brittni reported was cancerous.     Her paternal aunt had lung cancer and passed away in her 70's; it was " reported that she was exposed to second-hand smoke.    Her maternal ethnicity is Afghan and [place]. Her paternal ethnicity is Greenlandic and English. There is no known Ashkenazi Restorationist ancestry on either side of her family. There is no reported consanguinity.   Discussion:     Brittni personal history of ovarian cancer and family history of breast cancer is suggestive of a possible cancer susceptibility gene in the family. We reviewed the features of sporadic, familial, and hereditary cancers. In looking at Brittni family history, it is possible that a cancer susceptibility gene is present due to her personal history of ovarian cancer and her sister and maternal aunt's breast cancer.     We discussed the natural history and genetics of Hereditary Breast and Ovarian Cancer (HBOC), which is caused by a mutation in the BRCA1 or BRCA2 gene. HBOC typically presents with multiple family members diagnosed with breast cancer before age 50 and/or ovarian cancer. Other cancer risks associated with HBOC include male breast cancer, prostate cancer, pancreatic cancer, and melanoma.     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 It is caused by a mutation in one of five genes: MLH1, MSH2, MSH6, PMS2, and EPCAM. A single mutation in one of the Pagan Syndrome genes increases the risk for several cancers, such as colon, endometrial, ovarian, and stomach. Other cancers that can be seen in some families with Pagan Syndrome include pancreatic, bladder, biliary tract, urothelial, small bowel, prostate, breast and brain cancers.     A detailed handout regarding hereditary gynecologic cancer risk genes and the information we discussed was provided to Brittni at the end of our appointment today and can be found in the after visit summary. Topics included: inheritance pattern, cancer risks, cancer screening recommendations, and also risks, benefits and limitations of testing. Based on her personal history, Brittni meets current  National Comprehensive Cancer Network (NCCN) criteria for genetic testing of BRCA1 and BRCA2.     We discussed that there are additional genes that could cause increased risk for ovarian and breast cancer. As many of these genes present with overlapping features in a family and accurate cancer risk cannot always be established based upon the pedigree analysis alone, it would be reasonable for Brittni to consider panel genetic testing to analyze multiple genes at once.     We reviewed genetic testing options for the cancers seen in her family history that suggest a hereditary cause: an actionable high/moderate risk gene custom panel and an expanded high and moderate risk custom panel. Brittni expressed an interest in learning as much information as possible from the testing. She opted for the expanded panel.     Brittni asked that a pre-verification for [facility] via [employer] be run so that she can approve the out-of-pocket cost for genetic testing. Once ison [employer] has run a benefits investigation, I will call Brittni in a few weeks to discuss that cost. If she approves the cost, I will have her come back to clinic to have a blood draw and sign consent forms.     Medical Management: For Brittni, we reviewed that the information from genetic testing may determine:     additional cancer screening for which Brittni may qualify (i.e. mammogram and breast MRI, more frequent colonoscopies, more frequent dermatologic exams, etc.),     options for risk reducing surgeries Brittni could consider (i.e. bilateral mastectomy, etc.),     and targeted chemotherapies if she were to develop certain cancers in the future (i.e. immunotherapy for individuals with Pagan syndrome, PARP inhibitors, etc.).     These recommendations and possible targeted chemotherapies will be discussed in detail once genetic testing is completed.   Plan:   1) Today Brittni elected to proceed with a pre-verification for the CancerNext test via [employer].   2) This  information should be available in about 2-3 weeks.  3) I will call Brittni with this estimated out-of-pocket cost.   4) If she approves the cost, Brittni will return to clinic to have a blood draw and sign consent forms.      Face to face time: 45 minutes      Eleanor Melara MS, [facility]   Licensed genetic counselor   [phone]

## 2020-02-21 NOTE — PATIENT INSTRUCTIONS
Assessing Cancer Risk  Only about 5-10% of cancers are thought to be due to an inherited cancer susceptibility gene.    These families often have:    Several people with the same or related types of cancer    Cancers diagnosed at a young age (before age 50)    Individuals with more than one primary cancer    Multiple generations of the family affected with cancer    Some people may be candidates for genetic testing of more than one gene.  For these families, genetic testing using a cancer panel may be offered.  These panels will test different genes known to increase the risk for breast, ovarian, uterine, and/or other cancers. All of the genes discussed below have published clinical management guidelines for individuals who are found to carry a mutation. The purpose of this handout is to serve as a brief summary of the genes analyzed by the panels used to inquire about hereditary breast and gynecologic cancer:  MARV, BRCA1, BRCA2, BRIP1, CDH1, CHEK2, MLH1, MSH2, MSH6, PMS2, EPCAM, PTEN, PALB2, RAD51C, RAD51D, and TP53.  ______________________________________________________________________________  Hereditary Breast and Ovarian Cancer Syndrome   (BRCA1 and BRCA2)  A single mutation in one of the copies of BRCA1 or BRCA2 increases the risk for breast and ovarian cancer, among others.  The risk for pancreatic cancer and melanoma may also be slightly increased in some families.  The chart below shows the chance that someone with a BRCA mutation would develop cancer in his or her lifetime1,2,3,4.        A person s ethnic background is also important to consider, as individuals of Ashkenazi Orthodox ancestry have a higher chance of having a BRCA gene mutation.  There are three BRCA mutations that occur more frequently in this population.    Pagan Syndrome   (MLH1, MSH2, MSH6, PMS2, and EPCAM)  Currently five genes are known to cause Pagan Syndrome: MLH1, MSH2, MSH6, PMS2, and EPCAM.  A single mutation in one of the  Pagan Syndrome genes increases the risk for colon, endometrial, ovarian, and stomach cancers.  Other cancers that occur less commonly in Pagan Syndrome include urinary tract, skin, and brain cancers.  The chart below shows the chance that a person with Pagan syndrome would develop cancer in his or her lifetime5.      *Cancer risk varies depending on Pagan syndrome gene found    Cowden Syndrome   (PTEN)  Cowden syndrome is a hereditary condition that increases the risk for breast, thyroid, endometrial, colon, and kidney cancer.  Cowden syndrome is caused by a mutation in the PTEN gene.  A single mutation in one of the copies of PTEN causes Cowden syndrome and increases cancer risk.  The chart below shows the chance that someone with a PTEN mutation would develop cancer in their lifetime6,7.  Other benign features seen in some individuals with Cowden syndrome include benign skin lesions (facial papules, keratoses, lipomas), learning disability, autism, thyroid nodules, colon polyps, and larger head size.      *One recent study found breast cancer risk to be increased to 85%    Li-Fraumeni Syndrome   (TP53)  Li-Fraumeni Syndrome (LFS) is a cancer predisposition syndrome caused by a mutation in the TP53 gene. A single mutation in one of the copies of TP53 increases the risk for multiple cancers. Individuals with LFS are at an increased risk for developing cancer at a young age. The lifetime risk for development of a LFS-associated cancer is 50% by age 30 and 90% by age 60.   Core Cancers: Sarcomas, Breast, Brain, Lung, Leukemias/Lymphomas, Adrenocortical carcinomas  Other Cancers: Gastrointestinal, Thyroid, Skin, Genitourinary    Hereditary Diffuse Gastric Cancer   (CDH1)  Currently, one gene is known to cause hereditary diffuse gastric cancer (HDGC): CDH1.  Individuals with HDGC are at increased risk for diffuse gastric cancer and lobular breast cancer. Of people diagnosed with HDGC, 30-50% have a mutation in the CDH1  gene.  This suggests there are likely other genes that may cause HDGC that have not been identified yet.      Lifetime Cancer Risks    General Population HDGC    Diffuse Gastric  <1% ~80%   Breast 12% 39-52%         Additional Genes  MARV  MARV is a moderate-risk breast cancer gene. Women who have a mutation in MARV can have between a 2-4 fold increased risk for breast cancer compared to the general population8. MARV mutations have also been associated with increased risk for pancreatic cancer, however an estimate of this cancer risk is not well understood9. Individuals who inherit two MARV mutations have a condition called ataxia-telangiectasia (AT).  This rare autosomal recessive condition affects the nervous system and immune system, and is associated with progressive cerebellar ataxia beginning in childhood.  Individuals with ataxia-telangiectasia often have a weakened immune system and have an increased risk for childhood cancers.    PALB2  Mutations in PALB2 have been shown to increase the risk of breast cancer up to 33-58% in some families; where individuals fall within this risk range is dependent upon family upwwytt92. PALB2 mutations have also been associated with increased risk for pancreatic cancer, although this risk has not been quantified yet.  Individuals who inherit two PALB2 mutations--one from their mother and one from their father--have a condition called Fanconi Anemia.  This rare autosomal recessive condition is associated with short stature, developmental delay, bone marrow failure, and increased risk for childhood cancers.    CHEK2   CHEK2 is a moderate-risk breast cancer gene.  Women who have a mutation in CHEK2 have around a 2-fold increased risk for breast cancer compared to the general population, and this risk may be higher depending upon family history.11,12,13 Mutations in CHEK2 have also been shown to increase the risk of a number of other cancers, including colon and prostate, however  these cancer risks are currently not well understood.    BRIP1, RAD51C and RAD51D  Mutations in BRIP1, RAD51C, and RAD51D have been shown to increase the risk of ovarian cancer and possibly female breast cancer as well14,15 .       Lifetime Cancer Risk    General Population BRIP1 RAD51C RAD51D   Ovarian 1-2% ~5-8% ~5-9% ~7-15%           Inheritance  All of the cancer syndromes reviewed above are inherited in an autosomal dominant pattern.  This means that if a parent has a mutation, each of his or her children will have a 50% chance of inheriting that same mutation.  Therefore, each child--male or female--would have a 50% chance of being at increased risk for developing cancer.      Image obtained from Genetics Home Reference, 2013     Mutations in some genes can occur de marga, which means that a person s mutation occurred for the first time in them and was not inherited from a parent.  Now that they have the mutation, however, it can be passed on to future generations.    Genetic Testing  Genetic testing involves a blood test and will look at the genetic information in the MARV, BRCA1, BRCA2, BRIP1, CDH1, CHEK2, MLH1, MSH2, MSH6, PMS2, EPCAM, PTEN, PALB2, RAD51C, RAD51D, and TP53 genes for any harmful mutations that are associated with increased cancer risk.  If possible, it is recommended that the person(s) who has had cancer be tested before other family members.  That person will give us the most useful information about whether or not a specific gene is associated with the cancer in the family.    Results  There are three possible results of genetic testing:    Positive--a harmful mutation was identified in one or more of the genes    Negative--no mutation was identified in any of the genes on this panel    Variant of unknown significance--a variation in one of the genes was identified, but it is unclear how this impacts cancer risk in the family    Advantages and Disadvantages   There are advantages and  disadvantages to genetic testing.    Advantages    May clarify your cancer risk    Can help you make medical decisions    May explain the cancers in your family    May give useful information to your family members (if you share your results)    Disadvantages    Possible negative emotional impact of learning about inherited cancer risk    Uncertainty in interpreting a negative test result in some situations    Possible genetic discrimination concerns (see below)    Genetic Information Nondiscrimination Act (ERIN)  ERIN is a federal law that protects individuals from health insurance or employment discrimination based on a genetic test result alone.  Although rare, there are currently no legal discrimination protections in terms of life insurance, long term care, or disability insurances.  Visit the [employer] [place] website to learn more.    Reducing Cancer Risk  All of the genes described above have nationally recognized cancer screening guidelines that would be recommended for individuals who test positive.  In addition to increased cancer screening, surgeries may be offered or recommended to reduce cancer risk.  Recommendations are based upon an individual s genetic test result as well as their personal and family history of cancer.    Questions to Think About Regarding Genetic Testing:    What effect will the test result have on me and my relationship with my family members if I have an inherited gene mutation?  If I don t have a gene mutation?    Should I share my test results, and how will my family react to this news, which may also affect them?    Are my children ready to learn new information that may one day affect their own health?    Hereditary Cancer Resources    FORCE: Facing Our Risk of Cancer Empowered facingourrisk.org   Bright Pink bebrightpink.org   Li-Fraumeni Syndrome Association lfsassociation.org   PTEN World PTENworld.com   No stomach for cancer, Inc.  nostomachforcancer.org   Stomach cancer relief network Scrnet.org   Collaborative Group of the Americas on Inherited Colorectal Cancer (CGA) cgaicc.com    Cancer Care cancercare.org   American Cancer Society (ACS) cancer.org   National Cancer [place] (NCI) cancer.gov     Please call us if you have any questions or concerns.   Cancer Risk Management Program [phone]-CANCER ([phone])  ? Krystian Mccoy, MS, [facility] [phone]  ? Dori Asher, MS, [facility]  [phone]  ? Viviane Gallardo, MS, [facility]  [phone]  ? Ann Esposito, MS, [facility] [phone]  ? Micheline Greta, MS, [facility] [phone]  ? Eleanor Melara, MS, [facility] [phone]  ? Ave Sarah, MS, [facility]  [phone]    References  1. John CAMP, Jacqueline PDP, Bobbi S, Arden RODRIGUEZ, Jerardo JE, Gail JL, Claudine N, Shirley H, Herrera O, Dada A, Chaddini B, Radiwaqar P, Mannimcokidelfin S, Amber DM, Barrientos N, Chad E, Catherine H, Minor E, Osman J, Grontoney J, Lorie B, Annabelleus H, Thorlacius S, Eerola H, Nevdelfinlinna H, Doug K, Maira OP. Average risks of breast and ovarian cancer associated with BRCA1 or BRCA2 mutations detected in case series unselected for family history: a combined analysis of 222 studies. Am J Hum Nereida. 2003;72:1117-30.  2. Eric N, Erika M, Balderrama G.  BRCA1 and BRCA2 Hereditary Breast and Ovarian Cancer. Gene Reviews online. 2013.  3. Luis Armando YC, Shasta S, Dylon G, Carter S. Breast cancer risk among male BRCA1 and BRCA2 mutation carriers. J Natl Cancer Inst. 2007;99:1811-4.  4. Bora BARNARD, Amanda I, Luc J, Jennifer E, Sierra ER, Noe F. Risk of breast cancer in male BRCA2 carriers. J Med Nereida. 2010;47:710-1.  5. National Comprehensive Cancer Network. Clinical practice guidelines in oncology, colorectal cancer screening. Available online (registration required). 2015.  6. Stanton HUERTA, Govind J, Alejandra J, Cash LA, Jonathan KNOWLES, Kelsi C. Lifetime cancer risks in individuals with germline PTEN mutations. Clin Cancer Res. 2012;18:400-7.  7. Pilarski R. Cowden  Syndrome: A Critical Review of the Clinical Literature. J Nereida . 2009:18:13-27.  8. Marisol A, Nelson D, Dov S, Connie P, Suki T, Jovon M, Demetrius B, Susie H, Pham R, Ana Laura K, Martine L, Bora DG, Amber D, Axel DF, Edilson MR, The Breast Cancer Susceptibility Collaboration (UK) & Sonam ASENCIO. MARV mutations that cause ataxia-telangiectasia are breast cancer susceptibility alleles. Nature Genetics. 2006;38:873-875  9. Panfilo N , Emi Y, Maria Del Rosario J, Eriberto L, Bernardo GM , Johanny ML, Gallinger S, Castro AG, Syngal S, Palmer ML, Jesus J , Leslie R, Barb SZ, Caitlin JR, José Luis VE, Ambar M, Vomark B, Evie N, Perez RH, Carmine KW, and Natalie AP. MARV mutations in patients with hereditary pancreatic cancer. Cancer Discover. 2012;2:41-46  10. John COTTON, et al. Breast-Cancer Risk in Families with Mutations in PALB2. NEJM. 2014; 371(6):497-506.  11. CHEK2 Breast Cancer Case-Control Consortium. CHEK2*1100delC and susceptibility to breast cancer: A collaborative analysis involving 10,860 breast cancer cases and 9,065 controls from 10 studies. Am J Hum Nereida, 74 (2004), pp. 7878-5396  12. Juma T, Shobha S, Wilfrid K, et al. Spectrum of Mutations in BRCA1, BRCA2, CHEK2, and TP53 in Families at High Risk of Breast Cancer. PAOLO. 2006;295(12):9733-4169.   13. Adriana C, Thad D, Ana A, et al. Risk of breast cancer in women with a CHEK2 mutation with and without a family history of breast cancer. J Clin Oncol. 2011;29:5037-0759.  14. Tyler H, Brenton E, Levon SJ, et al. Contribution of germline mutations in the RAD51B, RAD51C, and RAD51D genes to ovarian cancer in the population. J Clin Oncol. 2015;33(26):9263-0891. Doi:10.1200/JCO.2015.61.2408.  15. Damien T, Jermain GILMORE, Khushbu P, et al. Mutations in BRIP1 confer high risk of ovarian cancer. Ashley Nereida. 2011;43(11):1729-4465. doi:10.1038/ng.955.

## 2020-02-27 ENCOUNTER — ONCOLOGY VISIT (OUTPATIENT)
Dept: ONCOLOGY | Facility: CLINIC | Age: 75
End: 2020-02-27
Attending: OBSTETRICS & GYNECOLOGY
Payer: MEDICARE

## 2020-02-27 ENCOUNTER — PRE VISIT (OUTPATIENT)
Dept: ONCOLOGY | Facility: CLINIC | Age: 75
End: 2020-02-27

## 2020-02-27 DIAGNOSIS — Z80.3 FAMILY HISTORY OF MALIGNANT NEOPLASM OF BREAST: ICD-10-CM

## 2020-02-27 DIAGNOSIS — C56.9 OVARIAN CANCER, UNSPECIFIED LATERALITY (H): Primary | ICD-10-CM

## 2020-02-27 PROCEDURE — 96040 ZZH GENETIC COUNSELING, EACH 30 MINUTES: CPT | Performed by: GENETIC COUNSELOR, MS

## 2020-02-27 NOTE — LETTER
2/27/2020         RE: Maximino Simeon  [address] [address] [facility] [address]        Dear Colleague,    Thank you for referring your patient, Maximino Simeon, to the CANCER RISK MANAGEMENT PROGRAM. Please see a copy of my visit note below.    2/27/2020    Referring Provider: Dr. Naz Ascencio     Presenting Information:   I met with Brittni Simeon today for genetic counseling at [facility] to discuss her personal and family history of ovarian and breast cancer.  She is here today to review this history, cancer screening recommendations, and available genetic testing options.     Personal History:  Brittni is a 75-year-old female. 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 She also completed chemotherapy with carboplatin/Taxol.      She had her first menstrual period at age 9, her first child at age 23, and is postmenopausal  (age 48).  She that she has a history of oral contraceptive use of about 30 years and she has not had hormone replacement therapy.   She had her right ovary out previously due to endometriosis.    She has had clinical breast exams and mammograms; her most recent mammogram on 7/26/2018 was normal; her breast tissue was categorized as heterogeneously dense. Her most recent colonoscopy on 6/17/2013 was normal except for collagenous colitis. Brittni was unsure when she was supposed to have follow-up.     She has a history of epidermoid cysts on her neck and upper chest.     Brittni does not regularly do any other cancer screening at this time. She reported no tobacco use and occasional alcohol use.     Family History: (Please see scanned pedigree for detailed family history information)     Her sister had breast cancer at 65 that has metastasized to her bones; she is currently 88.     Her  "maternal aunt had breast cancer in her 50's; she passed away soon after.    Her mother  at 56 of an aggressive \"acute lymphomatic disease\" that Brittni reported was cancerous.     Her paternal aunt had lung cancer and passed away in her 70's; it was reported that she was exposed to second-hand smoke.    Her maternal ethnicity is Austrian and [place]ian. Her paternal ethnicity is [place]ian and English. There is no known Ashkenazi Jain ancestry on either side of her family. There is no reported consanguinity.   Discussion:     Brittni personal history of ovarian cancer and family history of breast cancer is suggestive of a possible cancer susceptibility gene in the family. We reviewed the features of sporadic, familial, and hereditary cancers. In looking at Brittni family history, it is possible that a cancer susceptibility gene is present due to her personal history of ovarian cancer and her sister and maternal aunt's breast cancer.     We discussed the natural history and genetics of Hereditary Breast and Ovarian Cancer (HBOC), which is caused by a mutation in the BRCA1 or BRCA2 gene. HBOC typically presents with multiple family members diagnosed with breast cancer before age 50 and/or ovarian cancer. Other cancer risks associated with HBOC include male breast cancer, prostate cancer, pancreatic cancer, and melanoma.     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 It is caused by a mutation in one of five genes: MLH1, MSH2, MSH6, PMS2, and EPCAM. A single mutation in one of the Pagan Syndrome genes increases the risk for several cancers, such as colon, endometrial, ovarian, and stomach. Other cancers that can be seen in some families with Pagan Syndrome include pancreatic, bladder, biliary tract, urothelial, small bowel, prostate, breast and brain cancers.     A detailed handout regarding hereditary gynecologic cancer risk genes and the information we discussed was provided to Brittni at the end of our " appointment today and can be found in the after visit summary. Topics included: inheritance pattern, cancer risks, cancer screening recommendations, and also risks, benefits and limitations of testing. Based on her personal history, Brittni meets current National Comprehensive Cancer Network (NCCN) criteria for genetic testing of BRCA1 and BRCA2.     We discussed that there are additional genes that could cause increased risk for ovarian and breast cancer. As many of these genes present with overlapping features in a family and accurate cancer risk cannot always be established based upon the pedigree analysis alone, it would be reasonable for Brittni to consider panel genetic testing to analyze multiple genes at once.     We reviewed genetic testing options for the cancers seen in her family history that suggest a hereditary cause: an actionable high/moderate risk gene custom panel and an expanded high and moderate risk custom panel. Brittni expressed an interest in learning as much information as possible from the testing. She opted for the expanded panel.     Brittni asked that a pre-verification for CancerNext via [employer] be run so that she can approve the out-of-pocket cost for genetic testing. Once [employer] has run a benefits investigation, I will call Brittni in a few weeks to discuss that cost. If she approves the cost, I will have her come back to clinic to have a blood draw and sign consent forms.     Medical Management: For Brittni, we reviewed that the information from genetic testing may determine:     additional cancer screening for which Brittni may qualify (i.e. mammogram and breast MRI, more frequent colonoscopies, more frequent dermatologic exams, etc.),     options for risk reducing surgeries Brittni could consider (i.e. bilateral mastectomy, etc.),     and targeted chemotherapies if she were to develop certain cancers in the future (i.e. immunotherapy for individuals with Pagan syndrome, PARP inhibitors,  etc.).     These recommendations and possible targeted chemotherapies will be discussed in detail once genetic testing is completed.   Plan:   1) Today Brittni elected to proceed with a pre-verification for the CancerNext test via [employer].   2) This information should be available in about 2-3 weeks.  3)  I will call Brittni with this estimated out-of-pocket cost.   4) If she approves the cost, Brittni will return to clinic to have a blood draw and sign consent forms.      Face to face time: 45 minutes      Eleanor Melara MS, [facility]   Licensed genetic counselor   [phone]         Again, thank you for allowing me to participate in the care of your patient.        Sincerely,        FLYNN Melara, GC

## 2020-02-27 NOTE — LETTER
Cancer Risk Management  Program Locations    [facility]  [facility]  Mailing Address  Cancer Risk Management Program  [facility]  [address]  [place] [address]    New patient appointments  [phone]  February 27, 2020    Maximino Simeon  [address] [address] [address]      Dear Maximino,    It was a pleasure meeting with you at the [facility] on 2/27/2020. Here is a copy of the progress note from your recent genetic counseling visit to the Cancer Risk Management Program. If you have any additional questions, please feel free to call.    Referring Provider: Dr. Naz Ascencio     Presenting Information:   I met with Brittni Simeon today for genetic counseling at [facility] to discuss her personal and family history of ovarian and breast cancer.  She is here today to review this history, cancer screening recommendations, and available genetic testing options.     Personal History:  Brittni is a 75-year-old female. 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 She also completed chemotherapy with carboplatin/Taxol.      She had her first menstrual period at age 9, her first child at age 23, and is postmenopausal (age 48).  She that she has a history of oral contraceptive use of about 30 years and she has not had hormone replacement therapy.  She had her right ovary out previously due to endometriosis.    She has had clinical breast exams and mammograms; her most recent mammogram on 7/26/2018 was normal; her breast tissue was categorized as heterogeneously dense. Her most  "recent colonoscopy on 2013 was normal except for collagenous colitis. Brittni was unsure when she was supposed to have follow-up.     She has a history of epidermoid cysts on her neck and upper chest.     Brittni does not regularly do any other cancer screening at this time. She reported no tobacco use and occasional alcohol use.     Family History: (Please see scanned pedigree for detailed family history information)     Her sister had breast cancer at 65 that has metastasized to her bones; she is currently 88.     Her maternal aunt had breast cancer in her 50's; she passed away soon after.    Her mother  at 56 of an aggressive \"acute lymphomatic disease\" that Brittni reported was cancerous.     Her paternal aunt had lung cancer and passed away in her 70's; it was reported that she was exposed to second-hand smoke.    Her maternal ethnicity is Kenyan and Turkish. Her paternal ethnicity is Hebrew and English. There is no known Ashkenazi Temple ancestry on either side of her family. There is no reported consanguinity.   Discussion:     Brittni personal history of ovarian cancer and family history of breast cancer is suggestive of a possible cancer susceptibility gene in the family. We reviewed the features of sporadic, familial, and hereditary cancers. In looking at Brittni family history, it is possible that a cancer susceptibility gene is present due to her personal history of ovarian cancer and her sister and maternal aunt's breast cancer.     We discussed the natural history and genetics of Hereditary Breast and Ovarian Cancer (HBOC), which is caused by a mutation in the BRCA1 or BRCA2 gene. HBOC typically presents with multiple family members diagnosed with breast cancer before age 50 and/or ovarian cancer. Other cancer risks associated with HBOC include male breast cancer, prostate cancer, pancreatic cancer, and melanoma.     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 It is caused by a " mutation in one of five genes: MLH1, MSH2, MSH6, PMS2, and EPCAM. A single mutation in one of the Pagan Syndrome genes increases the risk for several cancers, such as colon, endometrial, ovarian, and stomach. Other cancers that can be seen in some families with Pagan Syndrome include pancreatic, bladder, biliary tract, urothelial, small bowel, prostate, breast and brain cancers.     A detailed handout regarding hereditary gynecologic cancer risk genes and the information we discussed was provided to Brittni at the end of our appointment today and can be found in the after visit summary. Topics included: inheritance pattern, cancer risks, cancer screening recommendations, and also risks, benefits and limitations of testing. Based on her personal history, Brittni meets current National Comprehensive Cancer Network (NCCN) criteria for genetic testing of BRCA1 and BRCA2.     We discussed that there are additional genes that could cause increased risk for ovarian and breast cancer. As many of these genes present with overlapping features in a family and accurate cancer risk cannot always be established based upon the pedigree analysis alone, it would be reasonable for Brittni to consider panel genetic testing to analyze multiple genes at once.     We reviewed genetic testing options for the cancers seen in her family history that suggest a hereditary cause: an actionable high/moderate risk gene custom panel and an expanded high and moderate risk custom panel. Brittni expressed an interest in learning as much information as possible from the testing. She opted for the expanded panel.     Brittni asked that a pre-verification for CancerNext via [employer] be run so that she can approve the out-of-pocket cost for genetic testing. Once [employer] has run a benefits investigation, I will call Brittni in a few weeks to discuss that cost. If she approves the cost, I will have her come back to clinic to have a blood draw and sign consent  forms.     Medical Management: For Brittni, we reviewed that the information from genetic testing may determine:     additional cancer screening for which Brittni may qualify (i.e. mammogram and breast MRI, more frequent colonoscopies, more frequent dermatologic exams, etc.),     options for risk reducing surgeries Brittni could consider (i.e. bilateral mastectomy, etc.),     and targeted chemotherapies if she were to develop certain cancers in the future (i.e. immunotherapy for individuals with Pagan syndrome, PARP inhibitors, etc.).     These recommendations and possible targeted chemotherapies will be discussed in detail once genetic testing is completed.   Plan:   1) Today Brittni elected to proceed with a pre-verification for the CancerNext test via [employer].   2) This information should be available in about 2-3 weeks.  3) I will call Brittni with this estimated out-of-pocket cost.   4) If she approves the cost, Brittni will return to clinic to have a blood draw and sign consent forms.      Eleanor Melara MS, [facility]   Licensed genetic counselor   [phone]

## 2020-03-02 ENCOUNTER — HEALTH MAINTENANCE LETTER (OUTPATIENT)
Age: 75
End: 2020-03-02

## 2020-03-04 ENCOUNTER — ONCOLOGY VISIT (OUTPATIENT)
Dept: ONCOLOGY | Facility: CLINIC | Age: 75
End: 2020-03-04
Attending: NURSE PRACTITIONER
Payer: MEDICARE

## 2020-03-04 ENCOUNTER — INFUSION THERAPY VISIT (OUTPATIENT)
Dept: INFUSION THERAPY | Facility: CLINIC | Age: 75
End: 2020-03-04
Attending: OBSTETRICS & GYNECOLOGY
Payer: MEDICARE

## 2020-03-04 VITALS
HEIGHT: 58 IN | SYSTOLIC BLOOD PRESSURE: 175 MMHG | TEMPERATURE: 97.5 F | BODY MASS INDEX: 19.86 KG/M2 | OXYGEN SATURATION: 98 % | HEART RATE: 89 BPM | RESPIRATION RATE: 16 BRPM | WEIGHT: 94.6 LBS | DIASTOLIC BLOOD PRESSURE: 106 MMHG

## 2020-03-04 VITALS
OXYGEN SATURATION: 98 % | RESPIRATION RATE: 16 BRPM | TEMPERATURE: 97.5 F | SYSTOLIC BLOOD PRESSURE: 147 MMHG | HEART RATE: 90 BPM | DIASTOLIC BLOOD PRESSURE: 87 MMHG

## 2020-03-04 DIAGNOSIS — Z95.828 PORT-A-CATH IN PLACE: Primary | ICD-10-CM

## 2020-03-04 DIAGNOSIS — C56.9 OVARIAN CANCER, UNSPECIFIED LATERALITY (H): Primary | ICD-10-CM

## 2020-03-04 PROCEDURE — G0463 HOSPITAL OUTPT CLINIC VISIT: HCPCS

## 2020-03-04 PROCEDURE — 99214 OFFICE O/P EST MOD 30 MIN: CPT | Performed by: NURSE PRACTITIONER

## 2020-03-04 PROCEDURE — 25000128 H RX IP 250 OP 636: Performed by: OBSTETRICS & GYNECOLOGY

## 2020-03-04 RX ORDER — HEPARIN SODIUM (PORCINE) LOCK FLUSH IV SOLN 100 UNIT/ML 100 UNIT/ML
5 SOLUTION INTRAVENOUS
Status: CANCELLED | OUTPATIENT
Start: 2020-03-04

## 2020-03-04 RX ORDER — HEPARIN SODIUM (PORCINE) LOCK FLUSH IV SOLN 100 UNIT/ML 100 UNIT/ML
5 SOLUTION INTRAVENOUS
Status: DISCONTINUED | OUTPATIENT
Start: 2020-03-04 | End: 2020-03-04 | Stop reason: HOSPADM

## 2020-03-04 RX ADMIN — HEPARIN SODIUM (PORCINE) LOCK FLUSH IV SOLN 100 UNIT/ML 5 ML: 100 SOLUTION at 13:27

## 2020-03-04 ASSESSMENT — MIFFLIN-ST. JEOR: SCORE: 803.85

## 2020-03-04 ASSESSMENT — PAIN SCALES - GENERAL: PAINLEVEL: NO PAIN (0)

## 2020-03-04 NOTE — PROGRESS NOTES
"Oncology Rooming Note    March 4, 2020 1:43 PM   Maximino Simeon is a 75 year old female who presents for:    Chief Complaint   Patient presents with     Oncology Clinic Visit     Initial Vitals: BP (!) 175/106   Pulse 89   Temp 97.5  F (36.4  C) (Oral)   Resp 16   Ht 1.473 m (4' 10\")   Wt 42.9 kg (94 lb 9.6 oz)   LMP  (LMP Unknown)   SpO2 98%   BMI 19.77 kg/m   Estimated body mass index is 19.77 kg/m  as calculated from the following:    Height as of this encounter: 1.473 m (4' 10\").    Weight as of this encounter: 42.9 kg (94 lb 9.6 oz). Body surface area is 1.32 meters squared.  No Pain (0) Comment: Data Unavailable   No LMP recorded (lmp unknown). Patient has had a hysterectomy.  Allergies reviewed: Yes  Medications reviewed: Yes    Medications: Medication refills not needed today.  Pharmacy name entered into [facility]:    [facility][id number] - [place], [place] - [address] [place] [address] DRUG STORE #[id number] - [place], [place] - [address] AT [facility] OF [address]    Clinical concerns: no      Soraya Beltrán CMA            "

## 2020-03-04 NOTE — LETTER
3/4/2020         RE: Maximino Simeon  [address] [address] [facility] [id number]        Dear Colleague,    Thank you for referring your patient, Maximino Simeon, to the [facility]. Please see a copy of my visit note below.    Cancer Survivorship Clinic Visit  March 4, 2020     Oncologist:   Dr. 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 Ascencio recommended chemotherapy Taxol carbo x6 cycles  Patient completed the 6 cycles of the chemo on 01/03/2020    Interval History:  Rest of comprehensive ROS is negative    Current Outpatient Medications   Medication     amLODIPine (NORVASC) 5 MG tablet     B Complex Vitamins (B COMPLEX PO)     Calcium Carbonate-Vitamin D (CALCIUM + D PO)     Coenzyme Q10 (COQ10 PO)     DULoxetine (CYMBALTA) 20 MG capsule     levothyroxine (SYNTHROID/LEVOTHROID) 50 MCG tablet     liothyronine (CYTOMEL) 5 MCG tablet     loperamide (IMODIUM) 1 MG/5ML liquid     Multiple Vitamins-Minerals (MULTIVITAL PO)     order for DME     Probiotic Product (PROBIOTIC DAILY PO)     No current facility-administered medications for this visit.      Facility-Administered Medications Ordered in Other Visits   Medication     heparin 100 UNIT/ML injection 5 mL     sodium chloride (PF) 0.9% PF flush 3-20 mL     Allergies   Allergen Reactions     Adhesive Tape      bandaids     Liquid Adhesive Rash     Penicillins Rash     Family History   Problem Relation Age of Onset     Heart Disease Father      Cerebrovascular Disease Father      Cerebrovascular Disease Sister      Thyroid Disease Sister      Breast Cancer Sister        Physical Exam:    Constitutional: Alert, healthy, and in no distress.   ENT: Eyes bright , No mouth sores  Neck: Supple, No adenopathy.Thyroid symmetric  Cardiac: Heart rate and rhythm  is regular and strong without murmur  Respiratory: Breathing easy. Lung sounds clear to auscultation  Abdomen: Soft, non-tender, BS normal. No masses or organomegaly  MS: Muscle tone normal, extremities normal with no edema.   Skin: No suspicious lesions or rashes  Neuro: Sensory grossly WNL, gait normal.   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    Cognitive changes- She denies any short term memory loss since recovery from chemotherapy.   Urinary   There is improvement in incontinence.  Patient was seen by urologist in the past.  She will make appointment and see her urologist again  should she develop hematuria, dysuria, urinary urgency or frequency, she is to contact clinic.  Cancer screening-  She should continue to undergo cancer screening for women of her age group.  She will continue to see gynecology and continue with surveillance visit pelvic exams every 3 months  Patient is scheduled with Dr. Ascencio's COLLEEN for pelvic exam and labs  in May 2020  Healthy lifestyle-    She should have an exercise program of 150 minutes of cardiovascular exercise per week.   She should see her primary care provider for screening and, if needed, treatment of her cholesterol, blood pressure and glucose.  She should receive the annual influenza vaccine.  When age appropriate, she should receive the pneumococcal vaccine.  She should see her eye doctor and dentist routinely. She should limit her sun exposure and use  "sunscreens.       LEX Salgado CNP      Oncology Rooming Note    March 4, 2020 1:43 PM   Maximino Simeon is a 75 year old female who presents for:    Chief Complaint   Patient presents with     Oncology Clinic Visit     Initial Vitals: BP (!) 175/106   Pulse 89   Temp 97.5  F (36.4  C) (Oral)   Resp 16   Ht 1.473 m (4' 10\")   Wt 42.9 kg (94 lb 9.6 oz)   LMP  (LMP Unknown)   SpO2 98%   BMI 19.77 kg/m    Estimated body mass index is 19.77 kg/m  as calculated from the following:    Height as of this encounter: 1.473 m (4' 10\").    Weight as of this encounter: 42.9 kg (94 lb 9.6 oz). Body surface area is 1.32 meters squared.  No Pain (0) Comment: Data Unavailable   No LMP recorded (lmp unknown). Patient has had a hysterectomy.  Allergies reviewed: Yes  Medications reviewed: Yes    Medications: Medication refills not needed today.  Pharmacy name entered into [facility]:    [facility][id number] - [facility] [address] [facility]UG [facility]ORE #[id number] - [facility] [address] AT [facility] [address]    Clinical concerns: no      Soraya Beltrán, [facility]              Again, thank you for allowing me to participate in the care of your patient.        Sincerely,        LEX Salgado CNP    "

## 2020-03-04 NOTE — PROGRESS NOTES
Cancer Survivorship Clinic Visit  March 4, 2020     Oncologist:   Dr. 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 Ascencio recommended chemotherapy Taxol carbo x6 cycles  Patient completed the 6 cycles of the chemo on 01/03/2020    Interval History:  Rest of comprehensive ROS is negative    Current Outpatient Medications   Medication     amLODIPine (NORVASC) 5 MG tablet     B Complex Vitamins (B COMPLEX PO)     Calcium Carbonate-Vitamin D (CALCIUM + D PO)     Coenzyme Q10 (COQ10 PO)     DULoxetine (CYMBALTA) 20 MG capsule     levothyroxine (SYNTHROID/LEVOTHROID) 50 MCG tablet     liothyronine (CYTOMEL) 5 MCG tablet     loperamide (IMODIUM) 1 MG/5ML liquid     Multiple Vitamins-Minerals (MULTIVITAL PO)     order for DME     Probiotic Product (PROBIOTIC DAILY PO)     No current facility-administered medications for this visit.      Facility-Administered Medications Ordered in Other Visits   Medication     heparin 100 UNIT/ML injection 5 mL     sodium chloride (PF) 0.9% PF flush 3-20 mL     Allergies   Allergen Reactions     Adhesive Tape      bandaids     Liquid Adhesive Rash     Penicillins Rash     Family History   Problem Relation Age of Onset     Heart Disease Father      Cerebrovascular Disease Father      Cerebrovascular Disease Sister      Thyroid Disease Sister      Breast Cancer Sister        Physical Exam:    Constitutional: Alert, healthy, and in no distress.   ENT: Eyes bright , No mouth sores  Neck: Supple, No adenopathy.Thyroid symmetric  Cardiac: Heart rate and rhythm is regular and strong without murmur  Respiratory: Breathing easy. Lung sounds clear to auscultation  Abdomen: Soft, non-tender, BS normal. No masses or organomegaly  MS: Muscle tone normal, extremities normal with no edema.   Skin: No  suspicious lesions or rashes  Neuro: Sensory grossly WNL, gait normal.   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    Cognitive changes- She denies any short term memory loss since recovery from chemotherapy.   Urinary   There is improvement in incontinence.  Patient was seen by urologist in the past.  She will make appointment and see her urologist again  should she develop hematuria, dysuria, urinary urgency or frequency, she is to contact clinic.  Cancer screening-  She should continue to undergo cancer screening for women of her age group.  She will continue to see gynecology and continue with surveillance visit pelvic exams every 3 months  Patient is scheduled with Dr. Ascencio's COLLEEN for pelvic exam and labs  in May 2020  Healthy lifestyle-    She should have an exercise program of 150 minutes of cardiovascular exercise per week.   She should see her primary care provider for screening and, if needed, treatment of her cholesterol, blood pressure and glucose.  She should receive the annual influenza vaccine.  When age appropriate, she should receive the pneumococcal vaccine.  She should see her eye doctor and dentist routinely. She should limit her sun exposure and use sunscreens.     We will do port flushes every 6 weeks    LEX Salgado CNP

## 2020-03-09 ENCOUNTER — TELEPHONE (OUTPATIENT)
Dept: ONCOLOGY | Facility: CLINIC | Age: 75
End: 2020-03-09

## 2020-03-09 NOTE — TELEPHONE ENCOUNTER
Ileft a brief voicemail Maximino Simeon today regarding insurance updates. I saw Maximino at the [facility] on 2/27/2020 because of a personal and family history of ovarian and breast cancer.    A pre-verification was submitted for CancerNext test through [employer].      I left a brief, general voicemail today that I received an update about her insurance. I encouraged Maximino to contact me with any further questions at [phone].    Eleanor Melara MS, [facility] [place] [facility]  Licensed genetic counselor  [phone]

## 2020-03-11 ENCOUNTER — TELEPHONE (OUTPATIENT)
Dept: ONCOLOGY | Facility: CLINIC | Age: 75
End: 2020-03-11

## 2020-03-11 NOTE — TELEPHONE ENCOUNTER
I returned a call Brittni Blanco today regarding insurance updates. I saw Brittni at the [facility] on 2/27/2020 because of a personal and family history of ovarian and breast cancer.     A pre-verification was submitted for CancerNext test through [employer].     I left a brief, general voicemail today that I had received her voicemail on Monday and to call me back at her earliest convenience at [phone].    Eleanor Melara MS, [facility]  Licensed genetic counselor  [phone]

## 2020-04-23 ENCOUNTER — PATIENT OUTREACH (OUTPATIENT)
Dept: ONCOLOGY | Facility: CLINIC | Age: 75
End: 2020-04-23

## 2020-04-30 NOTE — PROGRESS NOTES
Spoke with pt regarding concerns on appt  Reassured pt that labs can still be done at [facility]  Upcoming visit will be either phone/video so not in person  If something comes of phone visit then appt for exam will be scheduled   Discussed covid19 and restrictions for clinic with pt explained that this is for  safety of pt, family, other patients and staff    Pt reports understanding    appts will remain as it for now  Will reassess after telephone visit

## 2020-05-19 ENCOUNTER — TELEPHONE (OUTPATIENT)
Dept: ONCOLOGY | Facility: CLINIC | Age: 75
End: 2020-05-19

## 2020-05-19 NOTE — TELEPHONE ENCOUNTER
Patient is currently scheduled for an appointment at [facility] in [place].  Called patient to review current visitor restrictions and complete COVID-19 Patient Infection (Travel) Screening Tool.    No answer, voicemail message left.  Instructed patient to call the clinic to complete pre-screening.    Shari Schoenberger, CMA

## 2020-05-20 ENCOUNTER — ALLIED HEALTH/NURSE VISIT (OUTPATIENT)
Dept: INFUSION THERAPY | Facility: CLINIC | Age: 75
End: 2020-05-20
Attending: OBSTETRICS & GYNECOLOGY
Payer: MEDICARE

## 2020-05-20 ENCOUNTER — HOSPITAL ENCOUNTER (OUTPATIENT)
Facility: CLINIC | Age: 75
Setting detail: SPECIMEN
Discharge: HOME | End: 2020-05-20
Attending: OBSTETRICS & GYNECOLOGY | Admitting: OBSTETRICS & GYNECOLOGY
Payer: MEDICARE

## 2020-05-20 DIAGNOSIS — Z95.828 PORT-A-CATH IN PLACE: ICD-10-CM

## 2020-05-20 DIAGNOSIS — C56.9 OVARIAN CANCER, UNSPECIFIED LATERALITY (H): Primary | ICD-10-CM

## 2020-05-20 LAB
ALBUMIN SERPL-MCNC: 3.6 G/DL (ref 3.4–5)
ALP SERPL-CCNC: 46 U/L (ref 40–150)
ALT SERPL W P-5'-P-CCNC: 20 U/L (ref 0–50)
ANION GAP SERPL CALCULATED.3IONS-SCNC: 5 MMOL/L (ref 3–14)
AST SERPL W P-5'-P-CCNC: 18 U/L (ref 0–45)
BASOPHILS # BLD AUTO: 0 10E9/L (ref 0–0.2)
BASOPHILS NFR BLD AUTO: 0.5 %
BILIRUB SERPL-MCNC: 0.4 MG/DL (ref 0.2–1.3)
BUN SERPL-MCNC: 14 MG/DL (ref 7–30)
CALCIUM SERPL-MCNC: 9.7 MG/DL (ref 8.5–10.1)
CANCER AG125 SERPL-ACNC: 10 U/ML (ref 0–30)
CHLORIDE SERPL-SCNC: 104 MMOL/L (ref 94–109)
CO2 SERPL-SCNC: 28 MMOL/L (ref 20–32)
CREAT SERPL-MCNC: 0.57 MG/DL (ref 0.52–1.04)
DIFFERENTIAL METHOD BLD: NORMAL
EOSINOPHIL # BLD AUTO: 0.1 10E9/L (ref 0–0.7)
EOSINOPHIL NFR BLD AUTO: 2.7 %
ERYTHROCYTE [DISTWIDTH] IN BLOOD BY AUTOMATED COUNT: 14.3 % (ref 10–15)
GFR SERPL CREATININE-BSD FRML MDRD: 89 ML/MIN/{1.73_M2}
GLUCOSE SERPL-MCNC: 88 MG/DL (ref 70–99)
HCT VFR BLD AUTO: 40 % (ref 35–47)
HGB BLD-MCNC: 13.2 G/DL (ref 11.7–15.7)
IMM GRANULOCYTES # BLD: 0 10E9/L (ref 0–0.4)
IMM GRANULOCYTES NFR BLD: 0.2 %
LYMPHOCYTES # BLD AUTO: 1.2 10E9/L (ref 0.8–5.3)
LYMPHOCYTES NFR BLD AUTO: 27.7 %
MCH RBC QN AUTO: 32.3 PG (ref 26.5–33)
MCHC RBC AUTO-ENTMCNC: 33 G/DL (ref 31.5–36.5)
MCV RBC AUTO: 98 FL (ref 78–100)
MONOCYTES # BLD AUTO: 0.3 10E9/L (ref 0–1.3)
MONOCYTES NFR BLD AUTO: 5.9 %
NEUTROPHILS # BLD AUTO: 2.8 10E9/L (ref 1.6–8.3)
NEUTROPHILS NFR BLD AUTO: 63 %
NRBC # BLD AUTO: 0 10*3/UL
NRBC BLD AUTO-RTO: 0 /100
PLATELET # BLD AUTO: 234 10E9/L (ref 150–450)
POTASSIUM SERPL-SCNC: 3.9 MMOL/L (ref 3.4–5.3)
PROT SERPL-MCNC: 7.5 G/DL (ref 6.8–8.8)
RBC # BLD AUTO: 4.09 10E12/L (ref 3.8–5.2)
SODIUM SERPL-SCNC: 137 MMOL/L (ref 133–144)
WBC # BLD AUTO: 4.4 10E9/L (ref 4–11)

## 2020-05-20 PROCEDURE — 86304 IMMUNOASSAY TUMOR CA 125: CPT | Performed by: OBSTETRICS & GYNECOLOGY

## 2020-05-20 PROCEDURE — 36591 DRAW BLOOD OFF VENOUS DEVICE: CPT

## 2020-05-20 PROCEDURE — 80053 COMPREHEN METABOLIC PANEL: CPT | Performed by: OBSTETRICS & GYNECOLOGY

## 2020-05-20 PROCEDURE — 25000128 H RX IP 250 OP 636: Performed by: OBSTETRICS & GYNECOLOGY

## 2020-05-20 PROCEDURE — 85025 COMPLETE CBC W/AUTO DIFF WBC: CPT | Performed by: OBSTETRICS & GYNECOLOGY

## 2020-05-20 RX ORDER — HEPARIN SODIUM (PORCINE) LOCK FLUSH IV SOLN 100 UNIT/ML 100 UNIT/ML
5 SOLUTION INTRAVENOUS
Status: CANCELLED | OUTPATIENT
Start: 2020-05-20

## 2020-05-20 RX ORDER — HEPARIN SODIUM (PORCINE) LOCK FLUSH IV SOLN 100 UNIT/ML 100 UNIT/ML
5 SOLUTION INTRAVENOUS
Status: DISCONTINUED | OUTPATIENT
Start: 2020-05-20 | End: 2020-05-20 | Stop reason: HOSPADM

## 2020-05-20 RX ADMIN — HEPARIN SODIUM (PORCINE) LOCK FLUSH IV SOLN 100 UNIT/ML 5 ML: 100 SOLUTION at 10:30

## 2020-05-20 NOTE — PROGRESS NOTES
Nursing Note:  Maximino Simeon presents today for port labs.    Patient seen by provider today: No   present during visit today: Not Applicable.    Note: N/A.    Intravenous Access:  Implanted Port.    Discharge Plan:   Patient was discharged.    Justyna Dave RN

## 2020-05-21 ENCOUNTER — VIRTUAL VISIT (OUTPATIENT)
Dept: ONCOLOGY | Facility: CLINIC | Age: 75
End: 2020-05-21
Attending: OBSTETRICS & GYNECOLOGY
Payer: MEDICARE

## 2020-05-21 VITALS — WEIGHT: 92 LBS | HEIGHT: 58 IN | BODY MASS INDEX: 19.31 KG/M2

## 2020-05-21 DIAGNOSIS — C56.9 OVARIAN CANCER, UNSPECIFIED LATERALITY (H): Primary | ICD-10-CM

## 2020-05-21 PROCEDURE — 40000114 ZZH STATISTIC NO CHARGE CLINIC VISIT

## 2020-05-21 PROCEDURE — 99358 PROLONG SERVICE W/O CONTACT: CPT | Performed by: OBSTETRICS & GYNECOLOGY

## 2020-05-21 PROCEDURE — 99443: CPT | Performed by: OBSTETRICS & GYNECOLOGY

## 2020-05-21 ASSESSMENT — PAIN SCALES - GENERAL: PAINLEVEL: NO PAIN (0)

## 2020-05-21 ASSESSMENT — MIFFLIN-ST. JEOR: SCORE: 792.06

## 2020-05-21 NOTE — PROGRESS NOTES
"Maximino Simeon is a 75 year old female who is being evaluated via a billable telephone visit.      The patient has been notified of following:     \"This telephone visit will be conducted via a call between you and your physician/provider. We have found that certain health care needs can be provided without the need for a physical exam.  This service lets us provide the care you need with a short phone conversation.  If a prescription is necessary we can send it directly to your pharmacy.  If lab work is needed we can place an order for that and you can then stop by our lab to have the test done at a later time.    Telephone visits are billed at different rates depending on your insurance coverage. During this emergency period, for some insurers they may be billed the same as an in-person visit.  Please reach out to your insurance provider with any questions.    If during the course of the call the physician/provider feels a telephone visit is not appropriate, you will not be charged for this service.\"    Patient has given verbal consent for Telephone visit?  Yes    What phone number would you like to be contacted at? [phone]     How would you like to obtain your AVS? Anaihart     I have reviewed and updated the patient's allergies and medication list.    Concerns: No new concerns  Refills: No refills needed.        Cecelia Garrison CMA      Phone call duration: 40 minutes                            Consult Notes on Referred Patient    Date: May 21, 2020        Dr. [facility]  [address] [place] [address]       RE: Maximino Simeon  : 1945  LORNA: May 21, 2020          Dear Dr. [facility] *:    I had the pleasure of seeing your patient Maximino Simeon here at the Gynecologic Cancer Clinic at the [facility] on May 21, 2020.  As you know she is a very pleasant 74 year old woman with a recent diagnosis of  Pelvic mass.  Now s/p surgical " staging procedure.    Today: Sister has terminal cancer. Has been helping to take care of her in [place], [place]. Breast cancer with mets to bones. Has Dementia. Not on hospice care yet. Complicated. Has stressed patient out significantly.  When in [place] was sick with cough, got fever was quarantined but COVID negative.  Very anxious all around. Eating and drinking ok but having trouble with diarrhea and colitis. On Cholestyramine to regulate bowels.Had colitis since 2013.         Brittni also had a list of questions, all of which were answered during this visit.       Date Value Ref Range Status   05/20/2020 10 0 - 30 U/mL Final     Comment:     Assay Method:  Chemiluminescence using Siemens Centaur XP   01/23/2020 8 0 - 30 U/mL Final     Comment:     Assay Method:  Chemiluminescence using Siemens Centaur XP         HPI:    Saw patient at [facility] during my call week was to follow up with outpatient GYN ONC and general surgery due to appendicits and enlarged ovary.      54  CA 19-9 15  CEA 2.8    6/29/2019: CT abd pelvis: IMPRESSION:      1. Dilated, fluid-filled appendix with mild increased enhancement and slight stranding at its tip is worrisome for acute appendicitis.  2. Large complex cystic pelvic mass with soft tissue nodularity with adjacent prominent left adnexal vessels is worrisome for neoplasm of ovarian origin. No mesenteric nodularity, lymph node enlargement, or other findings that are suspicious for metastatic disease in the abdomen or pelvis.    7/2/2019: CT abd pelvis REPRODUCTIVE ORGANS with IV and oral contrast: Again noted is a large complex cystic mass in the pelvis measuring approximately 13.3 x 8.1 x 9.7 cm in size which has areas of mural nodularity and soft tissue density.    1. Again noted is a thick-walled dilated appendix compatible with appendicitis. There is a questionable new small area of appendiceal wall discontinuity and adjacent 1.2 cm fluid pocket on axial  image 57 and sagittal image 23. Findings raise question of a small area of appendiceal wall perforation.    2. Large complex pelvic mass again concerning for ovarian neoplasm.    3. Marked bladder distention. Mild prominence of the urinary collecting systems likely secondary to this.    7/2/2019-7/7/2019: General surgery consulted for perforated appendicitis.  Not septic as no fever.Treated with Cefuroxime and metronidazole (PCN allergy). Surgery recommending postponing appendectomy for about 6 weeks or so. Presenting leukocytosis and fever resolved by discharge.  Will finish oral course of ceftin and flagyl as outpatient.     Maximino Simeon is a 74 y.o. female with history of colitis with recent admission for acute appendicitis and incidental pelvic mass who was treated conservatively with antibiotics who returns with worsening pain and CT with evidence of perforation and abscess.     Per general surgery note:  - No indication for surgery at this time  - Recommend admission to medicine service  - Start IV antibiotics  - Will again need to discuss the timing of appendectomy with Gyn/Onc to allow for evaluation of her pelvic mass simultaneously, this will likely have to be in 4-6 weeks following possible appendix perforation  - Surgery will continue to follow  Samuel Eller MD       SPECIMEN(S):   A: Ovary and fallopian tube, left   B: Uterus, cervix   C: Appendix   D: Lymph nodes, left pelvic   E: Lymph nodes, left para aortic   F: Lymph nodes, right pelvic   G: Lymph nodes, right para aortic   H: Right nick colic gutter   I: Left nick colic gutter   J: Left pelvic peritoneum   K: Right pelvic peritoneum   L: Bladder peritoneum   M: Omentum   N: Cul de sac     FINAL DIAGNOSIS:   A. 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 UTERUS,  CERVIX:   - Inactive/atrophic endometrium   - Leiomyomas with hyaline degeneration   - Cervix with no significant histopathologic abnormality     C. APPENDIX:   - Low grade appendiceal mucinous neoplasm (LAMN)     D. LYMPH NODES, LEFT PELVIC:   - Eight reactive lymph nodes, negative for malignancy (0/8)     E. LYMPH NODES, LEFT PARA AORTIC:   - Four reactive lymph nodes, negative for malignancy (0/4)     F. LYMPH NODES, RIGHT PELVIC:   - Nine reactive lymph nodes, negative for malignancy (0/9)     G. LYMPH NODES, RIGHT PARA AORTIC:   - Four reactive lymph nodes, negative for malignancy (0/4)     H. RIGHT PEDRO COLIC GUTTER:   -Fibroadipose tissue, negative for malignancy.     I. LEFT PEDRO COLIC GUTTER:   -Fibroadipose tissue, negative for malignancy.     J. LEFT PELVIC PERITONEUM:   -Fibroadipose tissue, negative for malignancy.     K. RIGHT PELVIC PERITONEUM:   -Fibroadipose tissue, negative for malignancy.     L. BLADDER PERITONEUM:   -Fibrous tissue, negative for malignancy.     M. OMENTUM:   - Fibroadipose tissue, negative for malignancy.   - One reactive lymph node, negative for malignancy (0/1)     N. CUL DE SAC:   - Hemorrhagic fibrous tissue, negative for malignancy.     Pelvic Washing:   - Negative for malignancy   Specimen Adequacy: Satisfactory for evaluation.     PATHOLOGIC STAGE CLASSIFICATION (PTNM, AJCC 8TH EDITION)     Primary Tumor (pT):         - pT1a     Regional Lymph Nodes (pN):         - pN0     FIGO STAGE     FIGO Stage:         - IA     ADDITIONAL FINDINGS     Additional Pathologic Findings:         appendix with low grade appendiceal mucinous neoplasm (LAMN)  Proximal Margin:         - Uninvolved by invasive carcinoma       Status of Mucinous Neoplasm at Proximal Margin:           - Uninvolved by appendiceal mucinous neoplasm     Mesenteric Margin:         - Uninvolved by invasive carcinoma     1/3/2020-Finished last cycle of carbo/taxol #6 completed at [facility].           Date Value  Ref Range Status   05/20/2020 10 0 - 30 U/mL Final     Comment:     Assay Method:  Chemiluminescence using Siemens Centaur XP   01/23/2020 8 0 - 30 U/mL Final     Comment:     Assay Method:  Chemiluminescence using Siemens Centaur XP         Review of Systems:    Systemic           no weight changes; no fever; no chills; no night sweats; no appetite changes  Skin           no rashes, or lesions  Eye           no irritation; no changes in vision  Jarred-Laryngeal           no dysphagia; no hoarseness   Pulmonary    no cough; no shortness of breath  Cardiovascular    no chest pain; no palpitations  Gastrointestinal    no diarrhea;+ constipation; no abdominal pain; no changes in bowel  habits; no blood in stool  Genitourinary   no urinary frequency; no urinary urgency; no dysuria; no pain; no abnormal vaginal discharge; no abnormal vaginal bleeding  Breast   no breast discharge; no breast changes; no breast pain  Musculoskeletal    no myalgias; no arthralgias; no back pain  Psychiatric           no depressed mood; no anxiety    Hematologic           no tender lymph nodes; no noticeable swellings or lumps   Endocrine    no hot flashes; no heat/cold intolerance         Neurological   no tremor; no numbness and tingling; no headaches; no difficulty  sleeping      Past Medical History:    Asymptomatic CAD  Collagenous colitis   Major depressive disorder, recurrent episode, moderate (HC)    Anxiety state, unspecified      Past Surgical History:    One ovary removed for endometriosis, still has uterus and other ovary.    Health Maintenance:  Health Maintenance Due   Topic Date Due     DEXA  1945     ADVANCE CARE PLANNING  1945     DEPRESSION ACTION PLAN  1945     LIPID  02/01/1988     MEDICARE ANNUAL WELLNESS VISIT  02/01/2008     ZOSTER IMMUNIZATION (2 of 3) 09/21/2015     PHQ-9  01/12/2020       Last Pap Smear: 10 years ago            Result: normal  She has not had a history of abnormal Pap smears.    Last  Mammogram: Due 9/2019             Result: normal         Last Colonoscopy: 2013           Result: microscopic colitis                           Current Medications:     has a current medication list which includes the following prescription(s): amlodipine, b complex vitamins, calcium citrate-vitamin d, coenzyme q10, duloxetine, levothyroxine, liothyronine, loperamide, multiple vitamins-minerals, order for dme, and probiotic product.       Allergies:     Allergies   Allergen Reactions     Adhesive Tape      bandaids     Liquid Adhesive Rash     Penicillins Rash            Social History:     Social History     Tobacco Use     Smoking status: Never Smoker     Smokeless tobacco: Never Used   Substance Use Topics     Alcohol use: Yes     Comment: socially       History   Drug Use Unknown         Family History:     The patient's family history is notable for the following.    Breast cancer sister in her 50's, maternal aunt breast cancer >50 years, no ovarian cancer    Physical Exam:     No physical exam or VS done due to virtual visit because of COVID 19.    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 =10 stable overall.  Hearing loss since chemo that patient has noted.  Numbness in toes and fingers.  Skin itching      Plan:     1.)    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 Completed chemotherapy x 6 cycles taxol/carbo. Patient will have surveillance visits pelvic exams every 3 months. NP visit in 3 mos. If wants the port out in 3 mos could have that done.-If no improvement with urge incontinence now that chemo done will send to urogyn for work up in 3 mos.  -Multiple questions today regarding prognosis from disease, anxiety, follow up.    2.) Had GC Visit scheduled for 2/28/2020-gave patient Heewon's number to follow up regarding testing which I encouraged patient to do.    3.) Labs and/or  tests ordered include:  10, exam in 3 mos      Thank you for allowing us to participate in the care of your patient.         Sincerely,      Naz Ascencio MD    Department of Ob/Gyn and Women's Health  Division of Gynecologic Oncology  [facility]  [phone]    Of a 40 minute appointment, the entire time was spent counseling the patient via telephone visit due to COVID 19 to decrease the patient and provider risk to exposure.    CC  Patient Care Team:  Cecelia Manzo MD as PCP - General  Naz Ascencio MD as MD (Oncology)  Parvez Abreu MD as MD (Surgery)  Micheline Soler MD as MD (Emergency Medicine)  [facility], [facility] ([facility]  CLINIC, [facility]

## 2020-05-26 ENCOUNTER — TELEPHONE (OUTPATIENT)
Dept: ONCOLOGY | Facility: CLINIC | Age: 75
End: 2020-05-26

## 2020-05-26 DIAGNOSIS — Z80.3 FAMILY HISTORY OF MALIGNANT NEOPLASM OF BREAST: ICD-10-CM

## 2020-05-26 DIAGNOSIS — C56.9 OVARIAN CANCER, UNSPECIFIED LATERALITY (H): Primary | ICD-10-CM

## 2020-05-26 NOTE — TELEPHONE ENCOUNTER
I spoke with Maximino Simeon today regarding her insurance estimate and genetic testing. She had previously been seen on 2/27/2020 for a genetic counseling visit because of a personal and family history of ovarian and breast cancer. I had submitted a preverification for CancerNext test through [employer]. The estimated out-of-pocket cost is $0. I had called in March and returned a call but had not heard back.     We discussed today the cost of the test and she approved proceeding with testing. However, during the consent process, she asked me to call her back tomorrow because she was tired and needed to rest.     I said I would call her back tomorrow and we will finish the consent process. She asked me to add a blood draw to her three-month appointment with Dr. Ascencio and I said I would put in a future order.     Eleanor Melara MS, [facility]  Licensed, certified genetic counselor  [phone]

## 2020-05-27 ENCOUNTER — TELEPHONE (OUTPATIENT)
Dept: ONCOLOGY | Facility: CLINIC | Age: 75
End: 2020-05-27

## 2020-05-27 NOTE — TELEPHONE ENCOUNTER
5/27/2020    Referring Provider: Dr. Naz Ascencio    Presenting Information:   I spoke with Brittni Simeon today over the phone for a follow-up visit. She had previously been seen on 2/27/2020 for a genetic counseling visit because of a personal and family history of ovarian and breast cancer. We spoke yesterday and Brittni asked me to call her today because she was tired yesterday.     At this call, we discussed the insurance estimate of $0 and I said [employer] should honor the estimate of $0. In rare cases, if insurance plans change, the estimated cost can change, however, Brittni's insurance is the same.    Brittni was concerned that her insurance would raise the rates of her plan based on testing results. We then discussed that the federal law, ERIN, prevents insurance companies from raising her rates based on the results of her genetic test.     We also discussed the utility of testing and I mentioned medical management: if someone has a pathogenic variant, additional screening, interventions and targeted chemotherapies can be offered based on knowing that genetic information.      Brittni expressed that she was feeling anxious about testing and was not certain if she wanted to proceed. She said when she had talked with her children they were not interested in knowing about their risk and therefore, she was not as interested.       At the end of the call, Brittni said she did not want to go through with testing today and she would like more time to consider it. I supported her choice to wait and suggested that I could call her closer to her three-month check up with Dr. Ascencio and she said that would be better. If she decides to have testing in the future, I said I could try to get her lab appointment on the same day she sees Dr. Ascencio. Brittni verbalized understanding of this plan.     Plan:  1) Today Brittni decided to wait to do genetic testing.   2) She gave me permission to call near her three-month check-up with   Sonu to discuss testing at that time.   3) I encouraged her to contact me at [phone] if she has questions or concerns.     Eleanor Melara MS, [facility]  Licensed, certified genetic counselor  [facility]  Cancer Risk Management [facility]  [phone]

## 2020-06-02 ENCOUNTER — TELEPHONE (OUTPATIENT)
Dept: ONCOLOGY | Facility: CLINIC | Age: 75
End: 2020-06-02

## 2020-06-02 NOTE — TELEPHONE ENCOUNTER
----- Message from ARIELLE Melara GC sent at 5/26/2020  2:07 PM CDT -----  Regarding: RE: Follow up re genetic counseling?  Al! I spoke with Brittni today and she would like a blood draw (lab appt) on the same day she comes in to see you (she mentioned three months).     I put in a future blood draw order for genetic testing but I need help setting up a lab appointment because I am not sure when she will [facility].     Can you help with coordinating her blood draw with your appoinment? I will drop off a kit and paperwork before she comes in three months.     Thank you!  Eleanor Melara MS, CGC  Licensed, certified genetic counselor  [facility]  Cancer Risk Management Program  [phone]          ----- Message -----  From: Naz Ascencio MD  Sent: 5/21/2020  11:42 AM CDT  To: ARIELLE Melara GC, Lauryn Diallo RN  Subject: Follow up re genetic counseling?                 Eleanor!  Hi did you ever see this patient re GC for clear cell ovarian cancer? Can you see her regarding testing. She mentioned your name but said she wasn't sure if insurance would cover testing. Thanks,  I hope you are well.  Naz Ramos

## 2020-06-02 NOTE — TELEPHONE ENCOUNTER
----- Message from ARIELLE Melara GC sent at 5/27/2020 11:13 AM CDT -----  Regarding: genetic testing on hold  Al, I spoke with Brittni again today and she was very anxious and uncertain about genetic testing so she decided to wait. She said her children weren't interested and thus, she wasn't as interested.     We went over the benefits of testing but she had a lot of concerns. She asked to have more time to think things over.     She said I could call her closer to her three-month check in with Dr. Ascencio to see if she wanted to proceed or not.     Do you know when her three month will be? I can call her a week ahead and then try to add a lab appointment at that time if she's more on board.     Thanks! Feel free to call my cell if you need to chat: [phone].   Eleanor

## 2020-07-13 ENCOUNTER — INFUSION THERAPY VISIT (OUTPATIENT)
Dept: INFUSION THERAPY | Facility: CLINIC | Age: 75
End: 2020-07-13
Attending: OBSTETRICS & GYNECOLOGY
Payer: MEDICARE

## 2020-07-13 DIAGNOSIS — Z95.828 PORT-A-CATH IN PLACE: Primary | ICD-10-CM

## 2020-07-13 PROCEDURE — 96523 IRRIG DRUG DELIVERY DEVICE: CPT

## 2020-07-13 PROCEDURE — 25000128 H RX IP 250 OP 636: Performed by: OBSTETRICS & GYNECOLOGY

## 2020-07-13 RX ORDER — HEPARIN SODIUM (PORCINE) LOCK FLUSH IV SOLN 100 UNIT/ML 100 UNIT/ML
5 SOLUTION INTRAVENOUS
Status: DISCONTINUED | OUTPATIENT
Start: 2020-07-13 | End: 2020-07-13 | Stop reason: HOSPADM

## 2020-07-13 RX ORDER — HEPARIN SODIUM (PORCINE) LOCK FLUSH IV SOLN 100 UNIT/ML 100 UNIT/ML
5 SOLUTION INTRAVENOUS
Status: CANCELLED | OUTPATIENT
Start: 2020-07-13

## 2020-07-13 RX ADMIN — Medication 5 ML: at 12:05

## 2020-07-13 RX ADMIN — ALTEPLASE 2 MG: 2.2 INJECTION, POWDER, LYOPHILIZED, FOR SOLUTION INTRAVENOUS at 10:56

## 2020-07-13 NOTE — PROGRESS NOTES
Nursing Note:  Elisaclaude Simeon presents today for Port Flush.    Patient seen by provider today: No   present during visit today: Not Applicable.    Note: N/A.    Intravenous Access:  Implanted Port.  No blood return noted to port, flushes well.  TPA instilled per protocol.  TPA removed at 1200 with excellent blood return noted.    Discharge Plan:   Patient was sent home.    Prasanna Rubalcava RN

## 2020-07-15 ENCOUNTER — TELEPHONE (OUTPATIENT)
Dept: ONCOLOGY | Facility: CLINIC | Age: 75
End: 2020-07-15

## 2020-07-15 NOTE — TELEPHONE ENCOUNTER
TC to pt who confirmed documented report per Erin as accurate. Denies symptoms since. Bladder function normal, no pain, denies LE edema, vaginal bleeding/discharge but states she's been struggling with diarrhea. Pt states her PCP advised her to go back to her GI doc for evaluation of diarrhea. Writer advised this as well. Also plan made to reschedule 3 month follow-up appt to 08/20 as she is currently scheduled one month late. Inbasket scheduling request sent to pool for labs on 08/19 and to tara Matamoros on 08/20 and to call pt to confirm.

## 2020-07-15 NOTE — TELEPHONE ENCOUNTER
Last week she had an episode of sharp shooting pain that started in her pelvic floor and wrapped around her back. She lay down and that provided some relief. She did get up to go to the bathroom and she vomited x 1. Resolved after vomiting but is wanting to make sure not related to cancer.  Does have colitis and she had some onion in a carrot soup so could be that too.    Routed to [facility]

## 2020-07-20 ENCOUNTER — PATIENT OUTREACH (OUTPATIENT)
Dept: ONCOLOGY | Facility: CLINIC | Age: 75
End: 2020-07-20

## 2020-07-20 NOTE — PROGRESS NOTES
TC to pt per scheduling team:    Jaymie Rodriguez Diana RN               Giacomo Ferreira,     Appdavid made.  I spoke to pt.     Hey, also pt was asking about genetic testing.  Are you able to call pt to answer any questions she has regarding that?  I guess Dr. Ascencio wanted her to be seen by a genetic counselor at [facility] but pt has questions before scheduling.       Thank you, KG     No answer. [facility] writer to discuss genetic testing. Await response.

## 2020-08-11 ENCOUNTER — PATIENT OUTREACH (OUTPATIENT)
Dept: ONCOLOGY | Facility: CLINIC | Age: 75
End: 2020-08-11

## 2020-08-11 ENCOUNTER — TELEPHONE (OUTPATIENT)
Dept: ONCOLOGY | Facility: CLINIC | Age: 75
End: 2020-08-11

## 2020-08-11 DIAGNOSIS — C56.9 OVARIAN CANCER, UNSPECIFIED LATERALITY (H): Primary | ICD-10-CM

## 2020-08-11 DIAGNOSIS — Z80.3 FAMILY HISTORY OF MALIGNANT NEOPLASM OF BREAST: ICD-10-CM

## 2020-08-11 NOTE — TELEPHONE ENCOUNTER
8/11/2020    Referring Provider: Dr. Naz Ascencio    Presenting Information:   I received a voicemail from Brittni Simeon today regarding interest in genetic testing.     She had previously been seen on 2/27/2020 for a genetic counseling visit because of a personal and family history of ovarian and breast cancer. I had submitted a preverification for CancerNext test through [employer]. The estimated out-of-pocket cost is $0. When I had last spoken with her in May 2020, she had wanted to wait to consider genetic testing and talk again closer to her next appointment with Dr. Ascencio.     She had requested that I contact her before her next lab appointment on August 19. I called her back today and we proceeded with the consent process for genetic testing.     Discussion:    We previously reviewed the details of Brittni's family and personal history. Please see the clinic note from our previous appointment (2/27/2020) for details.   Genetic testing is available for 36 genes associated with hereditary cancer: CancerNext (APC, MARV, AXIN2, BARD1, BMPR1A, BRCA1, BRCA2, BRIP1, CDH1, CDK4, CDKN2A, CHEK2, DICER1, EPCAM, GREM1, HOXB13, MLH1, MSH2, MSH3, MSH6, MUTYH, NBN, NF1, NTHL1, PALB2, PMS2, POLD1, POLE, PTEN, RAD51C, RAD51D, RECQL, SMAD4, SMARCA4, STK11, and TP53).  We discussed that many of the genes in the CancerNext panel are associated with specific hereditary cancer syndromes and published management guidelines: Hereditary Breast and Ovarian Cancer syndrome (BRCA1, BRCA2), Pagan syndrome (MLH1, MSH2, MSH6, PMS2, EPCAM), Familial Adenomatous Polyposis (APC), Hereditary Diffuse Gastric Cancer (CDH1), Familial Atypical Multiple Mole Melanoma syndrome (CDK4, CDKN2A), Juvenile Polyposis syndrome (BMPR1A, SMAD4), Cowden syndrome (PTEN), Li Fraumeni syndrome (TP53), Peutz-Jeghers syndrome (STK11), MUTYH Associated Polyposis (MUTYH), and Neurofibromatosis type 1 (NF1).   The MARV, AXIN2, BRIP1, CHEK2, GREM1, MSH3, NBN, NTHL1,  PALB2, POLD1, POLE, RAD51C, and RAD51D genes are associated with increased cancer risk and have published management guidelines for certain cancers.    The remaining genes (BARD1, DICER1, HOXB13, RECQL, and SMARCA4) are associated with increased cancer risk and may allow us to make medical recommendations when mutations are identified.    Consent was obtained over the phone today due to COVID-[employer] restrictions and genetic testing for CancerNext was sent to [facility]. Turn around time: 5 weeks.  Of note, Brittni will be having labs drawn on August 19 at 11:00. We discussed that additional labs will be added for genetics that day. Brittni verbalized understanding of this. She will also need to sign the TRF at that lab appointment.     Medical Management: For Brittni, we reviewed that the information from genetic testing may determine:    additional cancer screening for which Brittni may qualify (i.e. mammogram and breast MRI, more frequent colonoscopies, more frequent dermatologic exams, etc.),    options for risk reducing surgeries Brittni could consider (i.e. bilateral mastectomy, etc.),      and targeted chemotherapies for Brittni's  active cancer, or if she were to develop certain cancers in the future (i.e. immunotherapy for individuals with Pagan syndrome, PARP inhibitors, etc.).     These recommendations and possible targeted chemotherapies will be discussed in detail once genetic testing is completed.     Plan:  1)  Today Brittni elected to proceed with CancerNext via [facility].  2)  All of Brittni's questions were answered to her satisfaction.   3)  She will have her blood drawn on August 19, 2020.   4)  I will call Maximino with the results once they become available. Turn around time: 4-6 weeks     Time spent on the phone: 15 minutes    Eleanor Melara MS, [facility]  Licensed, certified genetic counselor  [facility]  Cancer Risk Management Program  [phone]

## 2020-08-11 NOTE — PROGRESS NOTES
This pt called regarding Genetic counseling and would like a call from her care team to discuss the benefits of this service.

## 2020-08-17 NOTE — PROGRESS NOTES
"Maximino Simeon is a 75 year old female who is being evaluated via a billable telephone visit.      The patient has been notified of following:     \"This telephone visit will be conducted via a call between you and your physician/provider. We have found that certain health care needs can be provided without the need for a physical exam.  This service lets us provide the care you need with a short phone conversation.  If a prescription is necessary we can send it directly to your pharmacy.  If lab work is needed we can place an order for that and you can then stop by our lab to have the test done at a later time.    Telephone visits are billed at different rates depending on your insurance coverage. During this emergency period, for some insurers they may be billed the same as an in-person visit.  Please reach out to your insurance provider with any questions.    If during the course of the call the physician/provider feels a telephone visit is not appropriate, you will not be charged for this service.\"    Patient has given verbal consent for Telephone visit?  Yes    What phone number would you like to be contacted at? [phone]    How would you like to obtain your AVS? Virgie    I have reviewed and updated the patient's allergies and medication list.    Concerns: No new concerns.   Refills: None needed.     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 She completed chemotherapy 2020. She is here today for a surveillance visit. In light of the recent COVID19 pandemic, and in accordance with our group and " national guidelines this patients care has been reviewed and it is felt that presenting for her visit would be more likely to increase rather than decrease her relative risks.  Therefore this visit was conducted by phone.      Oncology history:   Originally seen by Dr. Ascencio at [facility] during her call week; was to follow up with outpatient GYN ONC and general surgery due to appendicits and enlarged ovary.       54  CA 19-9 15  CEA 2.8     6/29/2019: CT abd pelvis: IMPRESSION:      1. Dilated, fluid-filled appendix with mild increased enhancement and slight stranding at its tip is worrisome for acute appendicitis.  2. Large complex cystic pelvic mass with soft tissue nodularity with adjacent prominent left adnexal vessels is worrisome for neoplasm of ovarian origin. No mesenteric nodularity, lymph node enlargement, or other findings that are suspicious for metastatic disease in the abdomen or pelvis.     7/2/2019: CT abd pelvis REPRODUCTIVE ORGANS with IV and oral contrast: Again noted is a large complex cystic mass in the pelvis measuring approximately 13.3 x 8.1 x 9.7 cm in size which has areas of mural nodularity and soft tissue density.    1. Again noted is a thick-walled dilated appendix compatible with appendicitis. There is a questionable new small area of appendiceal wall discontinuity and adjacent 1.2 cm fluid pocket on axial image 57 and sagittal image 23. Findings raise question of a small area of appendiceal wall perforation.    2. Large complex pelvic mass again concerning for ovarian neoplasm.    3. Marked bladder distention. Mild prominence of the urinary collecting systems likely secondary to this.     7/2/2019-7/7/2019: General surgery consulted for perforated appendicitis.  Not septic as no fever.Treated with Cefuroxime and metronidazole (PCN allergy). Surgery recommending postponing appendectomy for about 6 weeks or so. Presenting leukocytosis and fever resolved by discharge.  Will finish  "oral course of ceftin and flagyl as outpatient.      Per general surgery note:  - No indication for surgery at this time  - Recommend admission to medicine service  - Start IV antibiotics  - Will again need to discuss the timing of appendectomy with Gyn/Onc to allow for evaluation of her pelvic mass simultaneously, this will likely have to be in 4-6 weeks following possible appendix perforation  - Surgery will continue to follow     8/9/19: Exploratory Laparotomy, Total Abdominal Hysterectomy, Left Salpingo-Oopherectomy, Bilateral Pelvic and Para-Aortic Lymph Node Dissection, Peritoneal Biopsy, Diaphraghm Pap Smears.     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    5/20/2020: 10.   8/19/2020:  12.      Today she reports she is doing ok. She denies any vaginal bleeding, no changes in her bowel or bladder habits, no nausea/emesis, no lower extremity edema, and no difficulties eating or sleeping. She denies any abdominal discomfort/bloating, no fevers or chills, and no chest pain or shortness of breath. She is very \"stressed\" with sister who is \"actively dying with cancer through her entire body\" and has dementia so she is not speaking. She states her family and children are focusing on her sister now and she feels like \"they still expect me to do the same things I did years ago\". She states she has FM and chronic fatigue as well as " "vitiligo; she had seen derm for her vitiligo a long time ago but not recently. She also read up on ovarian cancer and that is adding to her stress. She states a friend told her she read her cancer has a 92% survival rate at 5 years so she states \"then I am going to die\". She has recently had her annual labs done and is due for her mammogram. She has had her chest port flushed and will be having it flushed next month at [facility] when she has her Prolia. She has a therapist she has had for several years and states she has been doing virtual visits about every 6 weeks; she continues on Cymbalta.         Review of Systems:    Systemic           no weight changes; no fever; no chills; no night sweats; no appetite changes  Skin           no rashes, or lesions  Eye           no irritation; no changes in vision  Jarred-Laryngeal           no dysphagia; no hoarseness   Pulmonary    no cough; no shortness of breath  Cardiovascular    no chest pain; no palpitations  Gastrointestinal    no diarrhea; no constipation; no abdominal pain; no changes in bowel habits; no blood in stool  Genitourinary   no urinary frequency; no urinary urgency; no dysuria; no pain; no abnormal vaginal discharge; no abnormal vaginal bleeding  Breast    no breast discharge; no breast changes; no breast pain  Musculoskeletal    no myalgias; no arthralgias; no back pain  Psychiatric           + depressed mood; + anxiety    Hematologic               no tender lymph nodes; no noticeable swellings or lumps   Endocrine    no hot flashes; no heat/cold intolerance         Neurological   no tremor; no numbness and tingling; no headaches; no difficulty sleeping      Past Medical History:    Past Medical History:   Diagnosis Date     Anxiety 2014     CAD (coronary artery disease) 2011     Cancer (H)     ovarian     Colitis      Fibromyalgia      Gastroesophageal reflux disease      Hypothyroidism 2011     Insomnia 2017     Mixed hyperlipidemia 2011     PONV " (postoperative nausea and vomiting)      Recurrent major depressive disorder (H) 2010     Urinary retention          Past Surgical History:    Past Surgical History:   Procedure Laterality Date     APPENDECTOMY OPEN N/A 8/9/2019    Procedure: Open Appendectomy;  Surgeon: Parvez Abreu MD;  Location: [facility] OR     BREAST SURGERY      biopsy     GYN SURGERY       HYSTERECTOMY TOTAL ABDOMINAL, BILATERAL SALPINGO-OOPHORECTOMY, NODE DISSECTION, COMBINED Bilateral 8/9/2019    Procedure: Exploratory Laparotomy, Total Abdominal Hysterectomy, Left Salpingo-Oopherectomy, Bilateral Pelvic and Para-Aortic Lymph Node Dissection, Peritoneal Biopsy, Diaphraghm Pap Smears.;  Surgeon: Naz Ascencio MD;  Location: [facility] OR     IR CHEST PORT PLACEMENT > 5 YRS OF AGE  8/30/2019     OOPHORECTOMY  1992         Health Maintenance Due   Topic Date Due     DEXA  1945     ADVANCE CARE PLANNING  1945     DEPRESSION ACTION PLAN  1945     LIPID  02/01/1988     MEDICARE ANNUAL WELLNESS VISIT  02/01/2008     ZOSTER IMMUNIZATION (2 of 3) 09/21/2015     PHQ-9  01/12/2020     FALL RISK ASSESSMENT  07/16/2020       Current Medications:     Current Outpatient Medications   Medication Sig Dispense Refill     amLODIPine (NORVASC) 5 MG tablet Take 1 tablet (5 mg) by mouth daily 30 tablet 0     B Complex Vitamins (B COMPLEX PO) Take 1 tablet by mouth daily (with lunch)        Calcium Carbonate-Vitamin D (CALCIUM + D PO) Take 6 tablets by mouth daily.       Coenzyme Q10 (COQ10 PO) Take 200 mg by mouth 2 times daily       DULoxetine (CYMBALTA) 20 MG capsule Take 1 capsule (20 mg) by mouth daily Follow up with your PCP for refills 30 capsule 0     levothyroxine (SYNTHROID/LEVOTHROID) 50 MCG tablet Take 50 mcg by mouth every morning        liothyronine (CYTOMEL) 5 MCG tablet Take 5 mcg by mouth every morning        loperamide (IMODIUM) 1 MG/5ML liquid Take 1 mg by mouth 6 times daily        Multiple Vitamins-Minerals (MULTIVITAL PO)  Take 1 tablet by mouth daily (with lunch)        order for DME Cranial prothesis 1 each 0     Probiotic Product (PROBIOTIC DAILY PO) 1 tablet daily at lunchtime by mouth           Allergies:        Allergies   Allergen Reactions     Adhesive Tape      bandaids     Liquid Adhesive Rash     Penicillins Rash        Social History:     Social History     Tobacco Use     Smoking status: Never Smoker     Smokeless tobacco: Never Used   Substance Use Topics     Alcohol use: Yes     Comment: socially       History   Drug Use Unknown         Family History:     The patient's family history is notable for:    Family History   Problem Relation Age of Onset     Heart Disease Father      Cerebrovascular Disease Father      Cerebrovascular Disease Sister      Thyroid Disease Sister      Breast Cancer Sister          Physical Exam:     LMP  (LMP Unknown)   There is no height or weight on file to calculate BMI.    Respiratory: No audible wheeze, cough.      Psychiatric: appropriate mood and affect. 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 She completed chemotherapy 1/2020. She is here today for a surveillance visit. In light of the recent COVID19 pandemic, and in accordance with our group and national guidelines this patients care has been reviewed and it is felt that presenting for her visit would be more likely to increase rather than decrease her relative risks.  Therefore this visit was conducted by phone.       36 minutes were spent with this patient by phone. Over 50% of that time was spent in symptom management, treatment planning and in counseling and coordination of care. See rooming note for consent.         Plan:     1.)        Patient to RTC in 3 months for her next surveillance visit and . Will have her see Connie Martinez NP, at  [facility]. Reviewed signs and symptoms for when she should contact the clinic or seek additional care. Patient to contact the clinic with any questions or concerns in the interim. Answered all of her questions to the best of my ability. She was appreciative of the time spent.      2.) Genetic risk factors were assessed and she has been seen by GC and testing was done yesterday.     3.) Labs and/or tests ordered include:  .      4.) Health maintenance issues addressed today include annual health maintenance and non-gynecologic issues with PCP. Encouraged to reach out to her therapist to have virtual visits more frequently and to remember her sister's dementia is a disease and not to take her outbursts or silence personally.     5.)       Chest port: per Dr. Rodriguez 5/2020 note, ok to have port removed now if patient desires. Will plan on keeping the port in for now and rediscuss removal at her next appointment per patient preference.     LEX Casas, WHNP-BC, ANP-BC  Women's Health Nurse Practitioner  Adult Nurse Pracitioner  Division of Gynecologic Oncology          CC  Patient Care Team:  Cecelia Manzo MD as PCP - Naz Peterson MD as MD (Oncology)  Parvez Abreu MD as MD (Surgery)  Micheline Soler MD as MD (Emergency Medicine)  [facility] ([facility] ([facility] ([place]  SELF, REFERRED

## 2020-08-19 ENCOUNTER — HOSPITAL ENCOUNTER (OUTPATIENT)
Facility: CLINIC | Age: 75
Setting detail: SPECIMEN
Discharge: HOME | End: 2020-08-19
Attending: NURSE PRACTITIONER | Admitting: OBSTETRICS & GYNECOLOGY
Payer: MEDICARE

## 2020-08-19 ENCOUNTER — INFUSION THERAPY VISIT (OUTPATIENT)
Dept: INFUSION THERAPY | Facility: CLINIC | Age: 75
End: 2020-08-19
Attending: NURSE PRACTITIONER
Payer: MEDICARE

## 2020-08-19 DIAGNOSIS — Z80.3 FAMILY HISTORY OF MALIGNANT NEOPLASM OF BREAST: ICD-10-CM

## 2020-08-19 DIAGNOSIS — C56.9 OVARIAN CANCER, UNSPECIFIED LATERALITY (H): ICD-10-CM

## 2020-08-19 DIAGNOSIS — Z95.828 PORT-A-CATH IN PLACE: Primary | ICD-10-CM

## 2020-08-19 LAB
CANCER AG125 SERPL-ACNC: 12 U/ML (ref 0–30)
MISCELLANEOUS TEST: NORMAL

## 2020-08-19 PROCEDURE — 25000128 H RX IP 250 OP 636: Performed by: OBSTETRICS & GYNECOLOGY

## 2020-08-19 PROCEDURE — 86304 IMMUNOASSAY TUMOR CA 125: CPT | Performed by: OBSTETRICS & GYNECOLOGY

## 2020-08-19 PROCEDURE — 36591 DRAW BLOOD OFF VENOUS DEVICE: CPT

## 2020-08-19 RX ORDER — HEPARIN SODIUM (PORCINE) LOCK FLUSH IV SOLN 100 UNIT/ML 100 UNIT/ML
5 SOLUTION INTRAVENOUS
Status: CANCELLED | OUTPATIENT
Start: 2020-08-19

## 2020-08-19 RX ORDER — HEPARIN SODIUM (PORCINE) LOCK FLUSH IV SOLN 100 UNIT/ML 100 UNIT/ML
5 SOLUTION INTRAVENOUS
Status: DISCONTINUED | OUTPATIENT
Start: 2020-08-19 | End: 2020-08-19 | Stop reason: HOSPADM

## 2020-08-19 RX ADMIN — Medication 5 ML: at 11:31

## 2020-08-19 NOTE — PROGRESS NOTES
Nursing Note:  Maximino Simeon presents today for port labs.    Patient seen by provider today: No   present during visit today: Not Applicable.    Note: N/A.    Intravenous Access:  Implanted Port.    Discharge Plan:   Patient was discharged to home.    Monique Rincon RN

## 2020-08-20 ENCOUNTER — VIRTUAL VISIT (OUTPATIENT)
Dept: ONCOLOGY | Facility: CLINIC | Age: 75
End: 2020-08-20
Attending: NURSE PRACTITIONER
Payer: MEDICARE

## 2020-08-20 DIAGNOSIS — C56.9 OVARIAN CANCER, UNSPECIFIED LATERALITY (H): Primary | ICD-10-CM

## 2020-08-20 PROCEDURE — 40001009 ZZH VIDEO/TELEPHONE VISIT; NO CHARGE

## 2020-08-20 PROCEDURE — 99443 ZZC PHYSICIAN TELEPHONE EVALUATION 21-30 MIN: CPT | Performed by: NURSE PRACTITIONER

## 2020-08-20 NOTE — LETTER
2020         RE: Maximino Simeon  [address] [facility] [address]        Dear Colleague,    Thank you for referring your patient, Maximino Simeon, to the [facility]. Please see a copy of my visit note below.                    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 She completed chemotherapy 2020. She is here today for a surveillance visit. In light of the recent COVID19 pandemic, and in accordance with our group and national guidelines this patients care has been reviewed and it is felt that presenting for her visit would be more likely to increase rather than decrease her relative risks.  Therefore this visit was conducted by phone.      Oncology history:   Originally seen by Dr. Ascencio at [facility] during her call week; was to follow up with outpatient GYN ONC and general surgery due to appendicits and enlarged ovary.       54  CA 19-9 15  CEA 2.8     2019: CT abd pelvis: IMPRESSION:      1. Dilated, fluid-filled appendix with mild increased enhancement and slight stranding at its tip is worrisome for acute appendicitis.  2. Large complex cystic pelvic mass with soft tissue nodularity with adjacent prominent left adnexal vessels is worrisome for neoplasm of ovarian origin. No mesenteric nodularity, lymph node enlargement, or other findings that are suspicious for metastatic disease in the abdomen or pelvis.     2019: CT abd pelvis REPRODUCTIVE ORGANS with IV and oral contrast: Again noted is a large complex cystic mass in the pelvis measuring approximately 13.3 x 8.1 x 9.7 cm in size which has areas of mural nodularity and soft tissue density.    1. Again noted is a thick-walled dilated appendix compatible with appendicitis. There is a questionable new small area of  appendiceal wall discontinuity and adjacent 1.2 cm fluid pocket on axial image 57 and sagittal image 23. Findings raise question of a small area of appendiceal wall perforation.    2. Large complex pelvic mass again concerning for ovarian neoplasm.    3. Marked bladder distention. Mild prominence of the urinary collecting systems likely secondary to this.     7/2/2019-7/7/2019: General surgery consulted for perforated appendicitis.  Not septic as no fever.Treated with Cefuroxime and metronidazole (PCN allergy). Surgery recommending postponing appendectomy for about 6 weeks or so. Presenting leukocytosis and fever resolved by discharge.  Will finish oral course of ceftin and flagyl as outpatient.      Per general surgery note:  - No indication for surgery at this time  - Recommend admission to medicine service  - Start IV antibiotics  - Will again need to discuss the timing of appendectomy with Gyn/Onc to allow for evaluation of her pelvic mass simultaneously, this will likely have to be in 4-6 weeks following possible appendix perforation  - Surgery will continue to follow     8/9/19: Exploratory Laparotomy, Total Abdominal Hysterectomy, Left Salpingo-Oopherectomy, Bilateral Pelvic and Para-Aortic Lymph Node Dissection, Peritoneal Biopsy, Diaphraghm Pap Smears.     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    5/20/2020: 10.   8/19/2020:  " 12.      Today she reports she is doing ok. She denies any vaginal bleeding, no changes in her bowel or bladder habits, no nausea/emesis, no lower extremity edema, and no difficulties eating or sleeping. She denies any abdominal discomfort/bloating, no fevers or chills, and no chest pain or shortness of breath. She is very \"stressed\" with sister who is \"actively dying with cancer through her entire body\" and has dementia so she is not speaking. She states her family and children are focusing on her sister now and she feels like \"they still expect me to do the same things I did years ago\". She states she has FM and chronic fatigue as well as vitiligo; she had seen derm for her vitiligo a long time ago but not recently. She also read up on ovarian cancer and that is adding to her stress. She states a friend told her she read her cancer has a 92% survival rate at 5 years so she states \"then I am going to die\". She has recently had her annual labs done and is due for her mammogram. She has had her chest port flushed and will be having it flushed next month at [place] when she has her Prolia. She has a therapist she has had for several years and states she has been doing virtual visits about every 6 weeks; she continues on Cymbalta.         Review of Systems:    Systemic           no weight changes; no fever; no chills; no night sweats; no appetite changes  Skin           no rashes, or lesions  Eye           no irritation; no changes in vision  Jarred-Laryngeal           no dysphagia; no hoarseness   Pulmonary    no cough; no shortness of breath  Cardiovascular    no chest pain; no palpitations  Gastrointestinal    no diarrhea; no constipation; no abdominal pain; no changes in bowel habits; no blood in stool  Genitourinary   no urinary frequency; no urinary urgency; no dysuria; no pain; no abnormal vaginal discharge; no abnormal vaginal bleeding  Breast    no breast discharge; no breast changes; no breast " pain  Musculoskeletal    no myalgias; no arthralgias; no back pain  Psychiatric           + depressed mood; + anxiety    Hematologic               no tender lymph nodes; no noticeable swellings or lumps   Endocrine    no hot flashes; no heat/cold intolerance         Neurological   no tremor; no numbness and tingling; no headaches; no difficulty sleeping      Past Medical History:    Past Medical History:   Diagnosis Date     Anxiety 2014     CAD (coronary artery disease) 2011     Cancer (H)     ovarian     Colitis      Fibromyalgia      Gastroesophageal reflux disease      Hypothyroidism 2011     Insomnia 2017     Mixed hyperlipidemia 2011     PONV (postoperative nausea and vomiting)      Recurrent major depressive disorder (H) 2010     Urinary retention          Past Surgical History:    Past Surgical History:   Procedure Laterality Date     APPENDECTOMY OPEN N/A 8/9/2019    Procedure: Open Appendectomy;  Surgeon: Parvez Abreu MD;  Location: [facility] OR     BREAST SURGERY      biopsy     GYN SURGERY       HYSTERECTOMY TOTAL ABDOMINAL, BILATERAL SALPINGO-OOPHORECTOMY, NODE DISSECTION, COMBINED Bilateral 8/9/2019    Procedure: Exploratory Laparotomy, Total Abdominal Hysterectomy, Left Salpingo-Oopherectomy, Bilateral Pelvic and Para-Aortic Lymph Node Dissection, Peritoneal Biopsy, Diaphraghm Pap Smears.;  Surgeon: Naz Ascencio MD;  Location: [facility] OR     IR CHEST PORT PLACEMENT > 5 YRS OF AGE  8/30/2019     OOPHORECTOMY  1992         Health Maintenance Due   Topic Date Due     DEXA  1945     ADVANCE CARE PLANNING  1945     DEPRESSION ACTION PLAN  1945     LIPID  02/01/1988     MEDICARE ANNUAL WELLNESS VISIT  02/01/2008     ZOSTER IMMUNIZATION (2 of 3) 09/21/2015     PHQ-9  01/12/2020     FALL RISK ASSESSMENT  07/16/2020       Current Medications:     Current Outpatient Medications   Medication Sig Dispense Refill     amLODIPine (NORVASC) 5 MG tablet Take 1 tablet (5 mg) by mouth daily 30  tablet 0     B Complex Vitamins (B COMPLEX PO) Take 1 tablet by mouth daily (with lunch)        Calcium Carbonate-Vitamin D (CALCIUM + D PO) Take 6 tablets by mouth daily.       Coenzyme Q10 (COQ10 PO) Take 200 mg by mouth 2 times daily       DULoxetine (CYMBALTA) 20 MG capsule Take 1 capsule (20 mg) by mouth daily Follow up with your PCP for refills 30 capsule 0     levothyroxine (SYNTHROID/LEVOTHROID) 50 MCG tablet Take 50 mcg by mouth every morning        liothyronine (CYTOMEL) 5 MCG tablet Take 5 mcg by mouth every morning        loperamide (IMODIUM) 1 MG/5ML liquid Take 1 mg by mouth 6 times daily        Multiple Vitamins-Minerals (MULTIVITAL PO) Take 1 tablet by mouth daily (with lunch)        order for DME Cranial prothesis 1 each 0     Probiotic Product (PROBIOTIC DAILY PO) 1 tablet daily at lunchtime by mouth           Allergies:        Allergies   Allergen Reactions     Adhesive Tape      bandaids     Liquid Adhesive Rash     Penicillins Rash        Social History:     Social History     Tobacco Use     Smoking status: Never Smoker     Smokeless tobacco: Never Used   Substance Use Topics     Alcohol use: Yes     Comment: socially       History   Drug Use Unknown         Family History:     The patient's family history is notable for:    Family History   Problem Relation Age of Onset     Heart Disease Father      Cerebrovascular Disease Father      Cerebrovascular Disease Sister      Thyroid Disease Sister      Breast Cancer Sister          Physical Exam:     LMP  (LMP Unknown)   There is no height or weight on file to calculate BMI.    Respiratory: No audible wheeze, cough.      Psychiatric: appropriate mood and affect. 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 She completed chemotherapy 1/2020. She is here today for a  surveillance visit. In light of the recent COVID19 pandemic, and in accordance with our group and national guidelines this patients care has been reviewed and it is felt that presenting for her visit would be more likely to increase rather than decrease her relative risks.  Therefore this visit was conducted by phone.       36 minutes were spent with this patient by phone. Over 50% of that time was spent in symptom management, treatment planning and in counseling and coordination of care. See rooming note for consent.         Plan:     1.)        Patient to RTC in 3 months for her next surveillance visit and . Will have her see Connie Martinez NP, at [facility]. Reviewed signs and symptoms for when she should contact the clinic or seek additional care. Patient to contact the clinic with any questions or concerns in the interim. Answered all of her questions to the best of my ability. She was appreciative of the time spent.      2.) Genetic risk factors were assessed and she has been seen by  and testing was done yesterday.     3.) Labs and/or tests ordered include:  .      4.) Health maintenance issues addressed today include annual health maintenance and non-gynecologic issues with PCP. Encouraged to reach out to her therapist to have virtual visits more frequently and to remember her sister's dementia is a disease and not to take her outbursts or silence personally.     5.)       Chest port: per Dr. Rodriguez 5/2020 note, ok to have port removed now if patient desires. Will plan on keeping the port in for now and rediscuss removal at her next appointment per patient preference.     LEX Casas, WHNP-BC, ANP-BC  Women's Health Nurse Practitioner  Adult Nurse Pracitioner  Division of Gynecologic Oncology      CC  Patient Care Team:  Cecelia Manzo MD as PCP - General  Naz Ascencio MD as MD (Oncology)  Parvez Abreu MD as MD (Surgery)  Micheline Soler MD as MD (Emergency  Medicine)  Care, [facility] ([facility] HEALTH AGENCY ([facility]), ([place]))

## 2020-08-21 ENCOUNTER — TELEPHONE (OUTPATIENT)
Dept: ONCOLOGY | Facility: CLINIC | Age: 75
End: 2020-08-21

## 2020-08-21 NOTE — TELEPHONE ENCOUNTER
Left voicemail message for patient requesting a return call regarding scheduling port flush due in October and lab/ return visit with Connie Martinez due in November.

## 2020-08-24 ENCOUNTER — PATIENT OUTREACH (OUTPATIENT)
Dept: CARE COORDINATION | Facility: CLINIC | Age: 75
End: 2020-08-24

## 2020-08-24 NOTE — PROGRESS NOTES
Oncology Distress Screening Follow-up  Clinical Social Work  [facility]    Identified Concern and Score From Distress Screening:   3. How concerned are you about feeling depressed or very sad?   8Abnormal              4. How concerned are you about feeling anxious or very scared?   8Abnormal              5. Do you struggle with the loss of meaning and aisha in your life?       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 She completed chemotherapy 2020. She is here today for a surveillance visit.      Intervention/Education Provided::  spoke with patient explaining reason for call and explained their role as a .  checked in with patient about their concerns expressed in their distress screen and asked if there were any supports they could assist patient with. Patient discussed their current circumstances in coping with their sister who is actively dying. Patient expressed how this has brought up concerns for their own health. 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 Patient reported they discussed this with their care team. Patient states they see a therapist and are trying to increase the amount of time they are seeing them for additional support.  validated patient's feelings and provided brief supportive counseling.  was about to inquire what supports they could help with and what additional supports patient may identify they may need; when patient stated they had to take another call. Patient asked if they could call  back.  provided their phone number, which patient confirmed they had and would call back as needed.     Patient called back and discussed further their  concerns.  continued to validate patient and provided brief supportive counseling.  checked in about what supports patient has. Patient reported they had recently seen their therapist and were planning to set up another appointment. Patient talked about ways in which they are coping concerning their sister. Patient was receiving another call and stated they appreciated 's call and support and would reach back out if they had any further concerns or needed any additional support. Patient ended the call with stating they would be scheduling another appointment with their therapist and felt very supported by their services.      Follow-up Required:  will await patient's return call.  will continue to remain available.           Connie HORTON, SWETHA  - Oncology  Phone : [phone]  Pager: [phone]

## 2020-09-08 LAB — LAB SCANNED RESULT: NORMAL

## 2020-09-09 ENCOUNTER — TELEPHONE (OUTPATIENT)
Dept: ONCOLOGY | Facility: CLINIC | Age: 75
End: 2020-09-09

## 2020-09-18 NOTE — PROGRESS NOTES
"9/25/2020    Referring Provider: Dr. Naz Ascencio    Presenting Information:  I spoke to Maximino \"Brittni\" Simeon by phone today to discuss her genetic testing results. Her blood was drawn on 8-. [facility]Next test was ordered from [facility]. This testing was done because of Brittni's personal and family history of ovarian and breast cancer.    Genetic Testing Result: NEGATIVE  Brittni is negative for mutations in the APC, MARV, AXIN2, BARD1, BMPR1A, BRCA1, BRCA2, BRIP1, CDH1, CDK4, CDKN2A, CHEK2, DICER1, EPCAM, GREM1, HOXB13, MLH1, MSH2, MSH3, MSH6, MUTYH, NBN, NF1, NTHL1, PALB2, PMS2, POLD1, POLE, PTEN, RAD51C, RAD51D, RECQL, SMAD4, SMARCA4, STK11, and TP53 genes. No mutations were found in any of the 36 genes analyzed. This test involved sequencing and deletion/duplication analysis of all genes with the exception of EPCAM and GREM1 (deletions only).    Testing did not detect an identifiable mutation associated with Hereditary Breast and Ovarian Cancer syndrome (BRCA1, BRCA2), Pagan syndrome (MLH1, MSH2, MSH6, PMS2, EPCAM), Familial Adenomatous Polyposis (APC), Hereditary Diffuse Gastric Cancer (CDH1), Familial Atypical Multiple Mole Melanoma syndrome (CDK4, CDKN2A), Juvenile Polyposis syndrome (BMPR1A, SMAD4), Cowden syndrome (PTEN), Li Fraumeni syndrome (TP53), Peutz-Jeghers syndrome (STK11), MUTYH Associated Polyposis (MUTYH), or Neurofibromatosis type 1 (NF1).    A copy of the test report can be found in the Laboratory tab, dated 8-, and named \"SEND OUTS MISC TEST\". The report is scanned in as a linked document.    Interpretation:  We discussed several different interpretations of this negative test result.    1. One explanation may be that there is a different gene or combination of genes and environment that are associated with the cancers in this family.  2. It is possible that the cancer in her or her family may be sporadic/random cancers.  3. It is possible that her sister or maternal " relatives did have a mutation in a cancer susceptibility gene and she did not inherit it.  4. There is also a small possibility that there is a mutation in one of these genes, and the testing laboratory could not find it with their current testing methods.       Screening:  Based on this negative test result, it is important for Brittni and her relatives to refer back to the family history for appropriate cancer screening.      Based on her personal and family history, Brittni has a 4.2% lifetime risk of developing breast cancer based on the ERNA 8 model.  Therefore, Brittni does not meet current National Comprehensive Cancer Network (NCCN) guidelines for high risk breast screening, which is offered to women with a 20% lifetime risk or higher. However, it is still important for Maximino to continue with routine breast screening under the care of her physicians. Breast cancer screening is generally recommended to begin approximately 10 years younger than the earliest age of breast cancer diagnosis in the family, or at age 40, whichever comes first.  In this family, screening may begin at age 40.  Maximino is encouraged to discuss breast screening with her physicians.     Due to Brittni's personal history of ovarian cancer, her close female relatives remain at slightly increased risk for ovarian cancer. We discussed available ovarian cancer screening (pelvic exams, CA-125 blood tests, and transvaginal ultrasounds) as well as the significant limitations of this screening. As such, this screening is not typically recommended. That being said, women in this family should discuss this screening and the signs and symptoms of ovarian cancer with their primary OB/GYN provider, as they may have individualized recommendations.    Other population cancer screening options, such as those recommended by the American Cancer Society and the National Comprehensive Cancer Network (NCCN), are also appropriate for Brittni and her family. These  screening recommendations may change if there are changes to Brittni's personal and/or family history of cancer. Final screening recommendations should be made by each individual's managing physician.    Inheritance:  We reviewed the autosomal dominant inheritance of these 36 genes. We discussed that Brittni cannot/did not pass on an identifiable mutation in these genes to her children based on this test result.  Mutations in these genes do not skip generations.      Additional Testing Considerations:  Although Brittni's genetic testing result was negative, other relatives may still carry a gene mutation associated with breast cancer. Genetic counseling may be considered for her brother and/or her maternal relatives to discuss genetic testing options. If any of these relatives do pursue genetic testing, Brittni is encouraged to contact me so that we may review the impact of their test results on her.    Summary:  We do not have an explanation for Brittni's personal or family history of cancer. While no genetic changes were identified, Brittni may still be at risk for certain cancers due to family history, environmental factors, or other genetic causes not identified by this test.  Because of that, it is important that she continue with cancer screening based on her personal and family history as discussed above.    Genetic testing is rapidly advancing, and new cancer susceptibility genes will most likely be identified in the future.  Therefore, I encouraged Brittni to contact me annually or if there are changes in her personal or family history.  This may change how we assess her cancer risk, screening, and the testing we would offer.    Plan:  1. A copy of the test results will be mailed to Brittni.  2. She plans to follow-up with her medical providers.  3. She should contact me annually, or sooner if her family history changes.    If Brittni has any further questions, I encouraged her to contact me at [phone].    Time spent on  the phone: 15 minutes.    Eleanor Melara MS, [facility]  Licensed, certified genetic counselor  [facility]  Cancer Risk Management Program  [phone]                      Negative Genetic Test Result    Genetic Testing  You had a blood test that looked at the genetic information in one or more genes associated with increased cancer risk.  The testing looked for any harmful changes that would stop this particular gene from working like it should. If an individual does not have any harmful changes or variants of unknown significance found from their blood test, their genetic test result is reported as negative.       Results  The genetic test did not identify any pathogenic (harmful) changes in the genes that were tested. There are several possible explanations for a negative test result. Without knowing the gene mutation in your family, the cause of the cancer in you or your relatives is still unknown. Your genetic counselor can help interpret the result for you and your relatives. In this case, there are several reasons that may explain the negative test result:    There may be a gene mutation in the family that you did not inherit.     You may have a gene mutation in a different gene that was not included in the test, or has not yet been discovered.     The cancers in you or your family may be due to a combination of genetic factors and environment (multifactorial/familial).    The cancers in you or your family may be sporadic/random cancers.    There is very small chance that a mutation was not found by current testing methods.  As testing technology evolves over time, it may still be possible to identify a mutation in a gene that was not found on this test.    It is important to note which genes were included in your test. A list of these genes can be found on your test result.    Screening Recommendations  Due to this negative test result, cancer screening recommendations should be based on your personal and  family history. This may include increased cancer screening for you and/or your family members. Your genetic counselor and health care provider can help make appropriate recommendations.      Please call us if you have any questions or concerns.   Cancer Risk Management Program [phone] ([phone])  ? Krystian Mccoy, MS, [facility] [phone]  ? Dori Asher, MS, [facility]  [phone]  ? Viviane Gallardo, MS, [facility]  [phone]  ? Eleanor Melara, MS, [facility] [phone]  ? Ann Esposito, MS, [facility] [phone]  ? Micheline Hernandes, MS, [facility] [phone]  ? Ave Sarah, MS, [facility]  [phone]

## 2020-09-25 ENCOUNTER — VIRTUAL VISIT (OUTPATIENT)
Dept: ONCOLOGY | Facility: CLINIC | Age: 75
End: 2020-09-25
Attending: PHYSICIAN ASSISTANT
Payer: MEDICARE

## 2020-09-25 DIAGNOSIS — C56.9 OVARIAN CANCER, UNSPECIFIED LATERALITY (H): Primary | ICD-10-CM

## 2020-09-25 DIAGNOSIS — Z80.3 FAMILY HISTORY OF MALIGNANT NEOPLASM OF BREAST: ICD-10-CM

## 2020-09-25 PROCEDURE — 40000072 ZZH STATISTIC GENETIC COUNSELING, < 16 MIN: Mod: TEL | Performed by: GENETIC COUNSELOR, MS

## 2020-09-25 NOTE — LETTER
"    Cancer Risk Management  Program [facility] Cancer [facility]  [facility] Cancer [facility] Cancer [facility] Cancer [facility]  Mailing Address  Cancer Risk Management Program  [facility]  [address]  [place] [address]    New patient appointments  [phone]  September 25, 2020    Maximino Simeon  [address] [id number]      Dear Maximino,    It was a pleasure speaking with you on the phone on 9/25/2020. Here is a copy of the progress note from our discussion. If you have any additional questions, please feel free to call.    Referring Provider: Dr. Naz Ascencio    Presenting Information:  I spoke to Maximino \"Brittni\" Blanco by phone today to discuss her genetic testing results. Her blood was drawn on 8-. [facility]Next test was ordered from [facility]. This testing was done because of Brittni's personal and family history of ovarian and breast cancer.    Genetic Testing Result: NEGATIVE  Brittni is negative for mutations in the APC, MARV, AXIN2, BARD1, BMPR1A, BRCA1, BRCA2, BRIP1, CDH1, CDK4, CDKN2A, CHEK2, DICER1, EPCAM, GREM1, HOXB13, MLH1, MSH2, MSH3, MSH6, MUTYH, NBN, NF1, NTHL1, PALB2, PMS2, POLD1, POLE, PTEN, RAD51C, RAD51D, RECQL, SMAD4, SMARCA4, STK11, and TP53 genes. No mutations were found in any of the 36 genes analyzed. This test involved sequencing and deletion/duplication analysis of all genes with the exception of EPCAM and GREM1 (deletions only).    Testing did not detect an identifiable mutation associated with Hereditary Breast and Ovarian Cancer syndrome (BRCA1, BRCA2), Pagan syndrome (MLH1, MSH2, MSH6, PMS2, EPCAM), Familial Adenomatous Polyposis (APC), Hereditary Diffuse Gastric Cancer (CDH1), Familial Atypical Multiple Mole Melanoma syndrome (CDK4, CDKN2A), Juvenile Polyposis syndrome (BMPR1A, SMAD4), Cowden syndrome " (PTEN), Li Fraumeni syndrome (TP53), Peutz-Jeghers syndrome (STK11), MUTYH Associated Polyposis (MUTYH), or Neurofibromatosis type 1 (NF1).    Interpretation:  We discussed several different interpretations of this negative test result.    1. One explanation may be that there is a different gene or combination of genes and environment that are associated with the cancers in this family.  2. It is possible that the cancer in her or her family may be sporadic/random cancers.  3. It is possible that her sister or maternal relatives did have a mutation in a cancer susceptibility gene and she did not inherit it.  4. There is also a small possibility that there is a mutation in one of these genes, and the testing laboratory could not find it with their current testing methods.       Screening:  Based on this negative test result, it is important for Brittni and her relatives to refer back to the family history for appropriate cancer screening.      Based on her personal and family history, Brittni has a 4.2% lifetime risk of developing breast cancer based on the ERNA 8 model.  Therefore, Brittni does not meet current National Comprehensive Cancer Network (NCCN) guidelines for high risk breast screening, which is offered to women with a 20% lifetime risk or higher. However, it is still important for Maximino to continue with routine breast screening under the care of her physicians. Breast cancer screening is generally recommended to begin approximately 10 years younger than the earliest age of breast cancer diagnosis in the family, or at age 40, whichever comes first.  In this family, screening may begin at age 40.  Maximino is encouraged to discuss breast screening with her physicians.     Due to Brittni's personal history of ovarian cancer, her close female relatives remain at slightly increased risk for ovarian cancer. We discussed available ovarian cancer screening (pelvic exams, CA-125 blood tests, and transvaginal  ultrasounds) as well as the significant limitations of this screening. As such, this screening is not typically recommended. That being said, women in this family should discuss this screening and the signs and symptoms of ovarian cancer with their primary OB/GYN provider, as they may have individualized recommendations.    Other population cancer screening options, such as those recommended by the American Cancer Society and the National Comprehensive Cancer Network (NCCN), are also appropriate for Brittni and her family. These screening recommendations may change if there are changes to Brittni's personal and/or family history of cancer. Final screening recommendations should be made by each individual's managing physician.    Inheritance:  We reviewed the autosomal dominant inheritance of these 36 genes. We discussed that Brittni cannot/did not pass on an identifiable mutation in these genes to her children based on this test result.  Mutations in these genes do not skip generations.      Additional Testing Considerations:  Although Brittni's genetic testing result was negative, other relatives may still carry a gene mutation associated with breast cancer. Genetic counseling may be considered for her brother and/or her maternal relatives to discuss genetic testing options. If any of these relatives do pursue genetic testing, Brittni is encouraged to contact me so that we may review the impact of their test results on her.    Summary:  We do not have an explanation for Brittni's personal or family history of cancer. While no genetic changes were identified, Brittni may still be at risk for certain cancers due to family history, environmental factors, or other genetic causes not identified by this test.  Because of that, it is important that she continue with cancer screening based on her personal and family history as discussed above.    Genetic testing is rapidly advancing, and new cancer susceptibility genes will most likely  be identified in the future.  Therefore, I encouraged Brittni to contact me annually or if there are changes in her personal or family history.  This may change how we assess her cancer risk, screening, and the testing we would offer.    Plan:  1. A copy of the test results will be mailed to Brittni.  2. She plans to follow-up with her medical providers.  3. She should contact me annually, or sooner if her family history changes.    If Brittni has any further questions, I encouraged her to contact me at [phone].    Eleanor Melara MS, [employer]  Licensed, certified genetic counselor  [facility]  Cancer Risk Management Program  [phone]                                                                            Negative Genetic Test Result    Genetic Testing  You had a blood test that looked at the genetic information in one or more genes associated with increased cancer risk.  The testing looked for any harmful changes that would stop this particular gene from working like it should. If an individual does not have any harmful changes or variants of unknown significance found from their blood test, their genetic test result is reported as negative.       Results  The genetic test did not identify any pathogenic (harmful) changes in the genes that were tested. There are several possible explanations for a negative test result. Without knowing the gene mutation in your family, the cause of the cancer in you or your relatives is still unknown. Your genetic counselor can help interpret the result for you and your relatives. In this case, there are several reasons that may explain the negative test result:    There may be a gene mutation in the family that you did not inherit.     You may have a gene mutation in a different gene that was not included in the test, or has not yet been discovered.     The cancers in you or your family may be due to a combination of genetic factors and environment  (multifactorial/familial).    The cancers in you or your family may be sporadic/random cancers.    There is very small chance that a mutation was not found by current testing methods.  As testing technology evolves over time, it may still be possible to identify a mutation in a gene that was not found on this test.    It is important to note which genes were included in your test. A list of these genes can be found on your test result.    Screening Recommendations  Due to this negative test result, cancer screening recommendations should be based on your personal and family history. This may include increased cancer screening for you and/or your family members. Your genetic counselor and health care provider can help make appropriate recommendations.      Please call us if you have any questions or concerns.   Cancer Risk Management Program [phone]-[facility]-CANCER ([phone])  ? Krystian Mccoy, MS, [facility] [phone]  ? Dori Asher, MS, [facility]  [phone]  ? Viviane Gallardo, MS, [facility]  [phone]  ? Eleanor Melara, MS, [facility] [phone]  ? Ann Esposito, MS, [facility] [phone]  ? Micheline Hernandes, MS, [facility] [phone]  ? Ave Sarah, MS, [facility]  [phone]

## 2020-09-25 NOTE — LETTER
"    9/25/2020         RE: Maximino Simeon  [address] [address]        Dear Colleague,    Thank you for referring your patient, Maximino Simeon, to the CANCER RISK MANAGEMENT PROGRAM. Please see a copy of my visit note below.    9/25/2020    Referring Provider: Dr. Naz Ascencio    Presenting Information:  I spoke to Maximino \"Brittni\" Blanco by phone today to discuss her genetic testing results. Her blood was drawn on 8-. CancerNext test was ordered from [facility]. This testing was done because of Brittni's personal and family history of ovarian and breast cancer.    Genetic Testing Result: NEGATIVE  Brittni is negative for mutations in the APC, MARV, AXIN2, BARD1, BMPR1A, BRCA1, BRCA2, BRIP1, CDH1, CDK4, CDKN2A, CHEK2, DICER1, EPCAM, GREM1, HOXB13, MLH1, MSH2, MSH3, MSH6, MUTYH, NBN, NF1, NTHL1, PALB2, PMS2, POLD1, POLE, PTEN, RAD51C, RAD51D, RECQL, SMAD4, SMARCA4, STK11, and TP53 genes. No mutations were found in any of the 36 genes analyzed. This test involved sequencing and deletion/duplication analysis of all genes with the exception of EPCAM and GREM1 (deletions only).    Testing did not detect an identifiable mutation associated with Hereditary Breast and Ovarian Cancer syndrome (BRCA1, BRCA2), Pagan syndrome (MLH1, MSH2, MSH6, PMS2, EPCAM), Familial Adenomatous Polyposis (APC), Hereditary Diffuse Gastric Cancer (CDH1), Familial Atypical Multiple Mole Melanoma syndrome (CDK4, CDKN2A), Juvenile Polyposis syndrome (BMPR1A, SMAD4), Cowden syndrome (PTEN), Li Fraumeni syndrome (TP53), Peutz-Jeghers syndrome (STK11), MUTYH Associated Polyposis (MUTYH), or Neurofibromatosis type 1 (NF1).    A copy of the test report can be found in the Laboratory tab, dated 8-, and named \"SEND OUTS MISC TEST\". The report is scanned in as a linked document.    Interpretation:  We discussed several different interpretations of this negative test result.    1. One explanation may be that there is " a different gene or combination of genes and environment that are associated with the cancers in this family.  2. It is possible that the cancer in her or her family may be sporadic/random cancers.  3. It is possible that her sister or maternal relatives did have a mutation in a cancer susceptibility gene and she did not inherit it.  4. There is also a small possibility that there is a mutation in one of these genes, and the testing laboratory could not find it with their current testing methods.       Screening:  Based on this negative test result, it is important for Brittni and her relatives to refer back to the family history for appropriate cancer screening.      Based on her personal and family history, Brittni has a 4.2% lifetime risk of developing breast cancer based on the ERNA 8 model.  Therefore, Brittni does not meet current National Comprehensive Cancer Network (NCCN) guidelines for high risk breast screening, which is offered to women with a 20% lifetime risk or higher. However, it is still important for Maximino to continue with routine breast screening under the care of her physicians. Breast cancer screening is generally recommended to begin approximately 10 years younger than the earliest age of breast cancer diagnosis in the family, or at age 40, whichever comes first.  In this family, screening may begin at age 40.  Maximino is encouraged to discuss breast screening with her physicians.     Due to Brittni's personal history of ovarian cancer, her close female relatives remain at slightly increased risk for ovarian cancer. We discussed available ovarian cancer screening (pelvic exams, CA-125 blood tests, and transvaginal ultrasounds) as well as the significant limitations of this screening. As such, this screening is not typically recommended. That being said, women in this family should discuss this screening and the signs and symptoms of ovarian cancer with their primary OB/GYN provider, as they may  have individualized recommendations.    Other population cancer screening options, such as those recommended by the American Cancer Society and the National Comprehensive Cancer Network (NCCN), are also appropriate for Brittni and her family. These screening recommendations may change if there are changes to Brittni's personal and/or family history of cancer. Final screening recommendations should be made by each individual's managing physician.    Inheritance:  We reviewed the autosomal dominant inheritance of these 36 genes. We discussed that Brittni cannot/did not pass on an identifiable mutation in these genes to her children based on this test result.  Mutations in these genes do not skip generations.      Additional Testing Considerations:  Although Brittni's genetic testing result was negative, other relatives may still carry a gene mutation associated with breast cancer. Genetic counseling may be considered for her brother and/or her maternal relatives to discuss genetic testing options. If any of these relatives do pursue genetic testing, Brittni is encouraged to contact me so that we may review the impact of their test results on her.    Summary:  We do not have an explanation for Brittni's personal or family history of cancer. While no genetic changes were identified, Brittni may still be at risk for certain cancers due to family history, environmental factors, or other genetic causes not identified by this test.  Because of that, it is important that she continue with cancer screening based on her personal and family history as discussed above.    Genetic testing is rapidly advancing, and new cancer susceptibility genes will most likely be identified in the future.  Therefore, I encouraged Brittni to contact me annually or if there are changes in her personal or family history.  This may change how we assess her cancer risk, screening, and the testing we would offer.    Plan:  1. A copy of the test results will be  mailed to Brittni.  2. She plans to follow-up with her medical providers.  3. She should contact me annually, or sooner if her family history changes.    If Brittni has any further questions, I encouraged her to contact me at [phone].    Time spent on the phone: 15 minutes.    Eleanor Melara MS, [facility]  Licensed, certified genetic counselor  [facility]  Cancer Risk Management Program  [phone]                      Negative Genetic Test Result    Genetic Testing  You had a blood test that looked at the genetic information in one or more genes associated with increased cancer risk.  The testing looked for any harmful changes that would stop this particular gene from working like it should. If an individual does not have any harmful changes or variants of unknown significance found from their blood test, their genetic test result is reported as negative.       Results  The genetic test did not identify any pathogenic (harmful) changes in the genes that were tested. There are several possible explanations for a negative test result. Without knowing the gene mutation in your family, the cause of the cancer in you or your relatives is still unknown. Your genetic counselor can help interpret the result for you and your relatives. In this case, there are several reasons that may explain the negative test result:    There may be a gene mutation in the family that you did not inherit.     You may have a gene mutation in a different gene that was not included in the test, or has not yet been discovered.     The cancers in you or your family may be due to a combination of genetic factors and environment (multifactorial/familial).    The cancers in you or your family may be sporadic/random cancers.    There is very small chance that a mutation was not found by current testing methods.  As testing technology evolves over time, it may still be possible to identify a mutation in a gene that was not found on this test.    It  is important to note which genes were included in your test. A list of these genes can be found on your test result.    Screening Recommendations  Due to this negative test result, cancer screening recommendations should be based on your personal and family history. This may include increased cancer screening for you and/or your family members. Your genetic counselor and health care provider can help make appropriate recommendations.      Please call us if you have any questions or concerns.   Cancer Risk Management Program [phone]-[facility]-CANCER ([phone])  ? Krystian Mccoy, MS, [facility] [phone]  ? Dori Asher, MS, [facility]  [phone]  ? Viviane Gallardo, MS, [facility]  [phone]  ? Eleanor Melara, MS, [facility] [phone]  ? Ann Esposito, MS, [facility] [phone]  ? Micheline Hernandes, MS, [facility] [phone]  ? Ave Sarah, MS, [facility]  [phone]          Again, thank you for allowing me to participate in the care of your patient.        Sincerely,        FLYNN Melara GC

## 2020-09-25 NOTE — Clinical Note
Please send a copy of this letter to the patient and include a copy of their genetic testing results: Order: [id number].     Thank you,  Eleanor Melara MS, [facility]  Licensed, certified genetic counselor

## 2020-09-25 NOTE — LETTER
"    9/25/2020         RE: Maximino Simeon  [address] [facility] [address]        Dear Colleague,    Thank you for referring your patient, Maximino Simeon, to the CANCER RISK MANAGEMENT PROGRAM. Please see a copy of my visit note below.    9/25/2020    Referring Provider: Dr. Naz Ascencio    Presenting Information:  I spoke to Maximino \"Brittni\" Blanco by phone today to discuss her genetic testing results. Her blood was drawn on 8-. [facility]Next test was ordered from [facility]. This testing was done because of Brittni's personal and family history of ovarian and breast cancer.    Genetic Testing Result: NEGATIVE  Brittni is negative for mutations in the APC, MARV, AXIN2, BARD1, BMPR1A, BRCA1, BRCA2, BRIP1, CDH1, CDK4, CDKN2A, CHEK2, DICER1, EPCAM, GREM1, HOXB13, MLH1, MSH2, MSH3, MSH6, MUTYH, NBN, NF1, NTHL1, PALB2, PMS2, POLD1, POLE, PTEN, RAD51C, RAD51D, RECQL, SMAD4, SMARCA4, STK11, and TP53 genes. No mutations were found in any of the 36 genes analyzed. This test involved sequencing and deletion/duplication analysis of all genes with the exception of EPCAM and GREM1 (deletions only).    Testing did not detect an identifiable mutation associated with Hereditary Breast and Ovarian Cancer syndrome (BRCA1, BRCA2), Pagan syndrome (MLH1, MSH2, MSH6, PMS2, EPCAM), Familial Adenomatous Polyposis (APC), Hereditary Diffuse Gastric Cancer (CDH1), Familial Atypical Multiple Mole Melanoma syndrome (CDK4, CDKN2A), Juvenile Polyposis syndrome (BMPR1A, SMAD4), Cowden syndrome (PTEN), Li Fraumeni syndrome (TP53), Peutz-Jeghers syndrome (STK11), MUTYH Associated Polyposis (MUTYH), or Neurofibromatosis type 1 (NF1).    A copy of the test report can be found in the Laboratory tab, dated 8-, and named \"SEND OUTS MISC TEST\". The report is scanned in as a linked document.    Interpretation:  We discussed several different interpretations of this negative test result.    1. One explanation may be that there is " a different gene or combination of genes and environment that are associated with the cancers in this family.  2. It is possible that the cancer in her or her family may be sporadic/random cancers.  3. It is possible that her sister or maternal relatives did have a mutation in a cancer susceptibility gene and she did not inherit it.  4. There is also a small possibility that there is a mutation in one of these genes, and the testing laboratory could not find it with their current testing methods.       Screening:  Based on this negative test result, it is important for Brittni and her relatives to refer back to the family history for appropriate cancer screening.      Based on her personal and family history, Brittni has a 4.2% lifetime risk of developing breast cancer based on the ERNA 8 model.  Therefore, Brittni does not meet current National Comprehensive Cancer Network (NCCN) guidelines for high risk breast screening, which is offered to women with a 20% lifetime risk or higher. However, it is still important for Maximino to continue with routine breast screening under the care of her physicians. Breast cancer screening is generally recommended to begin approximately 10 years younger than the earliest age of breast cancer diagnosis in the family, or at age 40, whichever comes first.  In this family, screening may begin at age 40.  Maximino is encouraged to discuss breast screening with her physicians.     Due to Brittni's personal history of ovarian cancer, her close female relatives remain at slightly increased risk for ovarian cancer. We discussed available ovarian cancer screening (pelvic exams, CA-125 blood tests, and transvaginal ultrasounds) as well as the significant limitations of this screening. As such, this screening is not typically recommended. That being said, women in this family should discuss this screening and the signs and symptoms of ovarian cancer with their primary OB/GYN provider, as they may  have individualized recommendations.    Other population cancer screening options, such as those recommended by the American Cancer Society and the National Comprehensive Cancer Network (NCCN), are also appropriate for Brittni and her family. These screening recommendations may change if there are changes to Brittni's personal and/or family history of cancer. Final screening recommendations should be made by each individual's managing physician.    Inheritance:  We reviewed the autosomal dominant inheritance of these 36 genes. We discussed that Brittni cannot/did not pass on an identifiable mutation in these genes to her children based on this test result.  Mutations in these genes do not skip generations.      Additional Testing Considerations:  Although Brittni's genetic testing result was negative, other relatives may still carry a gene mutation associated with breast cancer. Genetic counseling may be considered for her brother and/or her maternal relatives to discuss genetic testing options. If any of these relatives do pursue genetic testing, Brittni is encouraged to contact me so that we may review the impact of their test results on her.    Summary:  We do not have an explanation for Brittni's personal or family history of cancer. While no genetic changes were identified, Brittni may still be at risk for certain cancers due to family history, environmental factors, or other genetic causes not identified by this test.  Because of that, it is important that she continue with cancer screening based on her personal and family history as discussed above.    Genetic testing is rapidly advancing, and new cancer susceptibility genes will most likely be identified in the future.  Therefore, I encouraged Brittni to contact me annually or if there are changes in her personal or family history.  This may change how we assess her cancer risk, screening, and the testing we would offer.    Plan:  1. A copy of the test results will be  mailed to Brittni.  2. She plans to follow-up with her medical providers.  3. She should contact me annually, or sooner if her family history changes.    If Brittni has any further questions, I encouraged her to contact me at [phone].    Time spent on the phone: 15 minutes.    Eleanor Melara MS, [facility]  Licensed, certified genetic counselor  [facility]  Cancer Risk Management Program  [phone]                      Negative Genetic Test Result    Genetic Testing  You had a blood test that looked at the genetic information in one or more genes associated with increased cancer risk.  The testing looked for any harmful changes that would stop this particular gene from working like it should. If an individual does not have any harmful changes or variants of unknown significance found from their blood test, their genetic test result is reported as negative.       Results  The genetic test did not identify any pathogenic (harmful) changes in the genes that were tested. There are several possible explanations for a negative test result. Without knowing the gene mutation in your family, the cause of the cancer in you or your relatives is still unknown. Your genetic counselor can help interpret the result for you and your relatives. In this case, there are several reasons that may explain the negative test result:    There may be a gene mutation in the family that you did not inherit.     You may have a gene mutation in a different gene that was not included in the test, or has not yet been discovered.     The cancers in you or your family may be due to a combination of genetic factors and environment (multifactorial/familial).    The cancers in you or your family may be sporadic/random cancers.    There is very small chance that a mutation was not found by current testing methods.  As testing technology evolves over time, it may still be possible to identify a mutation in a gene that was not found on this test.    It  is important to note which genes were included in your test. A list of these genes can be found on your test result.    Screening Recommendations  Due to this negative test result, cancer screening recommendations should be based on your personal and family history. This may include increased cancer screening for you and/or your family members. Your genetic counselor and health care provider can help make appropriate recommendations.      Please call us if you have any questions or concerns.   Cancer Risk Management Program [phone] ([phone])  ? Krystian Mccoy, MS, [facility] [phone]  ? Dori Asher, MS, [facility]  [phone]  ? Viviane Gallardo, MS, [facility]  [phone]  ? Eleanor Melara, MS, [facility] [phone]  ? Ann Esposito, MS, [facility] [phone]  ? Micheline Hernandes, MS, [facility] [phone]  ? Ave Sarah, MS, [facility]  [phone]          Again, thank you for allowing me to participate in the care of your patient.        Sincerely,        FLYNN Melara GC

## 2020-10-01 ENCOUNTER — TELEPHONE (OUTPATIENT)
Dept: ONCOLOGY | Facility: CLINIC | Age: 75
End: 2020-10-01

## 2020-10-01 NOTE — TELEPHONE ENCOUNTER
Brittni called clinic questioning why she needs port flush in October and labs draw via port in November. Explained that port needs to be flushed at least every 6 weeks to keep it functioning. Message will be forwarded to [facility] for FYI only.

## 2020-10-13 ENCOUNTER — INFUSION THERAPY VISIT (OUTPATIENT)
Dept: INFUSION THERAPY | Facility: CLINIC | Age: 75
End: 2020-10-13
Attending: OBSTETRICS & GYNECOLOGY
Payer: MEDICARE

## 2020-10-13 DIAGNOSIS — Z95.828 PORT-A-CATH IN PLACE: Primary | ICD-10-CM

## 2020-10-13 PROCEDURE — 250N000011 HC RX IP 250 OP 636: Performed by: OBSTETRICS & GYNECOLOGY

## 2020-10-13 PROCEDURE — 96523 IRRIG DRUG DELIVERY DEVICE: CPT

## 2020-10-13 RX ORDER — HEPARIN SODIUM (PORCINE) LOCK FLUSH IV SOLN 100 UNIT/ML 100 UNIT/ML
5 SOLUTION INTRAVENOUS
Status: DISCONTINUED | OUTPATIENT
Start: 2020-10-13 | End: 2020-10-13 | Stop reason: HOSPADM

## 2020-10-13 RX ADMIN — Medication 5 ML: at 11:06

## 2020-10-13 NOTE — PROGRESS NOTES
Infusion Nursing Note:  Maximino Simeon presents today for port flush.    Patient seen by provider today: No   present during visit today: Not Applicable.    Note: N/A.    Intravenous Access:  Implanted Port.    Treatment Conditions:  Not Applicable.      Post Infusion Assessment:  Site patent and intact, free from redness, edema or discomfort.  No evidence of extravasations.  Access discontinued per protocol.       Discharge Plan:   Patient discharged in stable condition accompanied by: self.  Departure Mode: Ambulatory.    Nay Razo RN

## 2020-11-09 DIAGNOSIS — C56.9 OVARIAN CANCER, UNSPECIFIED LATERALITY (H): Primary | ICD-10-CM

## 2020-11-10 ENCOUNTER — INFUSION THERAPY VISIT (OUTPATIENT)
Dept: INFUSION THERAPY | Facility: CLINIC | Age: 75
End: 2020-11-10
Attending: NURSE PRACTITIONER
Payer: MEDICARE

## 2020-11-10 ENCOUNTER — HOSPITAL ENCOUNTER (OUTPATIENT)
Facility: CLINIC | Age: 75
Setting detail: SPECIMEN
Discharge: HOME | End: 2020-11-10
Attending: NURSE PRACTITIONER | Admitting: OBSTETRICS & GYNECOLOGY
Payer: MEDICARE

## 2020-11-10 DIAGNOSIS — C56.9 OVARIAN CANCER, UNSPECIFIED LATERALITY (H): Primary | ICD-10-CM

## 2020-11-10 DIAGNOSIS — Z95.828 PORT-A-CATH IN PLACE: ICD-10-CM

## 2020-11-10 LAB — CANCER AG125 SERPL-ACNC: 10 U/ML (ref 0–30)

## 2020-11-10 PROCEDURE — 86304 IMMUNOASSAY TUMOR CA 125: CPT | Performed by: OBSTETRICS & GYNECOLOGY

## 2020-11-10 PROCEDURE — 36591 DRAW BLOOD OFF VENOUS DEVICE: CPT

## 2020-11-10 PROCEDURE — 250N000011 HC RX IP 250 OP 636: Performed by: NURSE PRACTITIONER

## 2020-11-10 RX ORDER — HEPARIN SODIUM (PORCINE) LOCK FLUSH IV SOLN 100 UNIT/ML 100 UNIT/ML
5 SOLUTION INTRAVENOUS
Status: DISCONTINUED | OUTPATIENT
Start: 2020-11-10 | End: 2020-11-10 | Stop reason: HOSPADM

## 2020-11-10 RX ADMIN — Medication 5 ML: at 12:42

## 2020-11-10 NOTE — PROGRESS NOTES
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 She completed chemotherapy 2020. She is here today for a surveillance visit.     Oncology history:   Originally seen by Dr. Ascencio at [facility] during her call week; was to follow up with outpatient GYN ONC and general surgery due to appendicits and enlarged ovary.       54  CA 19-9 15  CEA 2.8     2019: CT abd pelvis: IMPRESSION:      1. Dilated, fluid-filled appendix with mild increased enhancement and slight stranding at its tip is worrisome for acute appendicitis.  2. Large complex cystic pelvic mass with soft tissue nodularity with adjacent prominent left adnexal vessels is worrisome for neoplasm of ovarian origin. No mesenteric nodularity, lymph node enlargement, or other findings that are suspicious for metastatic disease in the abdomen or pelvis.     2019: CT abd pelvis REPRODUCTIVE ORGANS with IV and oral contrast: Again noted is a large complex cystic mass in the pelvis measuring approximately 13.3 x 8.1 x 9.7 cm in size which has areas of mural nodularity and soft tissue density.    1. Again noted is a thick-walled dilated appendix compatible with appendicitis. There is a questionable new small area of appendiceal wall discontinuity and adjacent 1.2 cm fluid pocket on axial image 57 and sagittal image 23. Findings raise question of a small area of appendiceal wall perforation.    2. Large complex pelvic mass again concerning for ovarian neoplasm.    3. Marked bladder distention. Mild prominence of the urinary collecting systems likely secondary to this.     2019-2019: General surgery consulted for perforated appendicitis.  Not septic as no fever.Treated with Cefuroxime and metronidazole (PCN allergy). Surgery recommending postponing appendectomy  "for about 6 weeks or so. Presenting leukocytosis and fever resolved by discharge.  Will finish oral course of ceftin and flagyl as outpatient.      Per general surgery note:  - No indication for surgery at this time  - Recommend admission to medicine service  - Start IV antibiotics  - Will again need to discuss the timing of appendectomy with Gyn/Onc to allow for evaluation of her pelvic mass simultaneously, this will likely have to be in 4-6 weeks following possible appendix perforation  - Surgery will continue to follow     8/9/19: Exploratory Laparotomy, Total Abdominal Hysterectomy, Left Salpingo-Oopherectomy, Bilateral Pelvic and Para-Aortic Lymph Node Dissection, Peritoneal Biopsy, Diaphraghm Pap Smears.     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     5/20/2020: 10.   8/19/2020:  12.  11/10/2020:  10.    HPI:    Brittni comes to the clinic feeling well from a gynecological standpoint. Her sister is suffering from stage IV breast cancer and dementia. This puts an emotional toll on Brittni. Brittni has been seeing a therapst. She also has a close friend that she talks with daily for support. Her son is actively in her life. She saw Dermatology recently for her ongoing eczema on her lower legs. She has a \"cream\" for this. She still has her Portacath in. She is getting her portacath flushed every six weeks at the [facility]clinic. She wants to think about removal but at this point wants to keep it in. She is still on Prolia at [place] for her osteoporosis. She reports that she has ongoing colitis that causes her to have frequent bowel movements. She states that her bowel movements are formed. She has loperamide at home to help control this. She is still on Cymbalta 30 mg for her anxiety and fibromyalgia. She is not sexually active and denies vulvar/vaginal concerns. She denies any vaginal bleeding, no changes in her bowel or bladder habits, no nausea/emesis, no lower extremity edema, and no difficulties eating or sleeping. She denies any abdominal discomfort/bloating, no fevers or chills, and no chest pain or shortness of breath. She reports that her last mammogram was in 2018 and her colonoscopy was \"a long time ago\".     Review of Systems:    Systemic           no weight changes; no fever; no chills; no night sweats; no appetite changes  Skin           no rashes, or lesions  Eye           no irritation; no changes in vision  Jarred-Laryngeal           no dysphagia; no hoarseness   Pulmonary    no cough; no shortness of breath  Cardiovascular    no chest pain; no palpitations  Gastrointestinal    no diarrhea; no constipation; no abdominal pain; no changes in bowel habits; no blood in stool  Genitourinary   no urinary frequency; no urinary urgency; no dysuria; no pain; no abnormal vaginal discharge; no abnormal vaginal bleeding  Breast    no breast discharge; no breast changes; no breast pain  Musculoskeletal    no myalgias; no arthralgias; no back pain  Psychiatric           no depressed mood; no anxiety    Hematologic           no tender lymph nodes; no noticeable swellings or lumps   Endocrine    no hot flashes; no heat/cold intolerance         Neurological   no tremor; no numbness and tingling; no headaches; no difficulty sleeping      Past Medical History:    Past Medical History:   Diagnosis Date     Anxiety 2014     CAD (coronary " artery disease) 2011     Cancer (H)     ovarian     Colitis      Fibromyalgia      Gastroesophageal reflux disease      Hypothyroidism 2011     Insomnia 2017     Mixed hyperlipidemia 2011     PONV (postoperative nausea and vomiting)      Recurrent major depressive disorder (H) 2010     Urinary retention          Past Surgical History:    Past Surgical History:   Procedure Laterality Date     APPENDECTOMY OPEN N/A 8/9/2019    Procedure: Open Appendectomy;  Surgeon: Parvez Abreu MD;  Location: [facility] OR     BREAST SURGERY      biopsy     GYN SURGERY       HYSTERECTOMY TOTAL ABDOMINAL, BILATERAL SALPINGO-OOPHORECTOMY, NODE DISSECTION, COMBINED Bilateral 8/9/2019    Procedure: Exploratory Laparotomy, Total Abdominal Hysterectomy, Left Salpingo-Oopherectomy, Bilateral Pelvic and Para-Aortic Lymph Node Dissection, Peritoneal Biopsy, Diaphraghm Pap Smears.;  Surgeon: Naz Ascencio MD;  Location: [facility] OR     IR CHEST PORT PLACEMENT > 5 YRS OF AGE  8/30/2019     OOPHORECTOMY  1992         Health Maintenance Due   Topic Date Due     DEXA  1945     ADVANCE CARE PLANNING  1945     DEPRESSION ACTION PLAN  1945     HEPATITIS C SCREENING  02/01/1961     LIPID  02/01/1988     MEDICARE ANNUAL WELLNESS VISIT  02/01/2008     ZOSTER IMMUNIZATION (2 of 3) 09/21/2015     PHQ-9  01/12/2020     FALL RISK ASSESSMENT  07/16/2020       Current Medications:     Current Outpatient Medications   Medication Sig Dispense Refill     Calcium Carbonate-Vitamin D (CALCIUM + D PO) Take 6 tablets by mouth daily.       Coenzyme Q10 (COQ10 PO) Take 200 mg by mouth 2 times daily       DULoxetine (CYMBALTA) 20 MG capsule Take 1 capsule (20 mg) by mouth daily Follow up with your PCP for refills (Patient taking differently: Take 30 mg by mouth daily Follow up with your PCP for refills) 30 capsule 0     levothyroxine (SYNTHROID/LEVOTHROID) 50 MCG tablet Take 50 mcg by mouth every morning        liothyronine (CYTOMEL) 5 MCG tablet  "Take 5 mcg by mouth every morning        loperamide (IMODIUM) 1 MG/5ML liquid Take 1 mg by mouth 6 times daily        Multiple Vitamins-Minerals (MULTIVITAL PO) Take 1 tablet by mouth daily (with lunch)        order for DME Cranial prothesis 1 each 0     Probiotic Product (PROBIOTIC DAILY PO) 1 tablet daily at lunchtime by mouth           Allergies:        Allergies   Allergen Reactions     Adhesive Tape      bandaids     Liquid Adhesive Rash     Penicillins Rash        Social History:     Social History     Tobacco Use     Smoking status: Never Smoker     Smokeless tobacco: Never Used   Substance Use Topics     Alcohol use: Yes     Comment: socially       History   Drug Use Unknown         Family History:     The patient's family history is notable for     Family History   Problem Relation Age of Onset     Heart Disease Father      Cerebrovascular Disease Father      Cerebrovascular Disease Sister      Thyroid Disease Sister      Breast Cancer Sister          Physical Exam:     BP (!) 146/85   Pulse 90   Temp 98.3  F (36.8  C)   Resp 16   Ht 1.473 m (4' 10\")   Wt 47.5 kg (104 lb 12.8 oz)   LMP  (LMP Unknown)   SpO2 96%   BMI 21.90 kg/m    Body mass index is 21.9 kg/m .    General Appearance: healthy and alert, no distress     HEENT: no thyromegaly, no palpable nodules or masses        Cardiovascular: regular rate and rhythm, no gallops, rubs or murmurs, HTN     Respiratory: lungs clear, no rales, rhonchi or wheezes    Musculoskeletal: extremities non tender and without edema    Skin: no lesions; small amount of eczema seen on anterior bilateral lower legs     Neurological: normal gait, no gross defects     Psychiatric: appropriate mood and affect                               Hematological: normal cervical, supraclavicular and inguinal lymph nodes     Gastrointestinal:       abdomen soft, non-tender, non-distended, no organomegaly or masses    Genitourinary: External genitalia and urethral meatus appears " normal. Vagina pale and slightly narrowed consistent with postmenopausal changes, smooth without nodularity or masses. Cervix appears surgically absent. Bimanual exam reveal no masses, nodularity or fullness.  Recto-vaginal exam confirms these findings.      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 She completed chemotherapy 1/2020. She is here today for a surveillance visit.     A total of 30 minutes was spent with the patient, over 50% minutes of which were spent in counseling the patient and/or treatment planning.      Plan:     1.)       AMEE on exam and  stable at 10.  results reviewed with patient during visit. Oncology Treatment Summary provided to patient with all questions answered. Patient will continue to have in person surveillance visits with  lab draws every 3 months for two years (1/2022), and then every six months for three years (1/2025) and then annual visits thereafter. Reviewed signs and symptoms for when she should contact the clinic or seek additional care. Patient to contact the clinic with any questions or concerns in the interim.     2.) Genetic Testing Result: NEGATIVE  Brittni is negative for mutations in the APC, MARV, AXIN2, BARD1, BMPR1A, BRCA1, BRCA2, BRIP1, CDH1, CDK4, CDKN2A, CHEK2, DICER1, EPCAM, GREM1, HOXB13, MLH1, MSH2, MSH3, MSH6, MUTYH, NBN, NF1, NTHL1, PALB2, PMS2, POLD1, POLE, PTEN, RAD51C, RAD51D, RECQL, SMAD4, SMARCA4, STK11, and TP53 genes. No mutations were found in any of the 36 genes analyzed. This test involved sequencing and deletion/duplication analysis of all genes with the exception of EPCAM and GREM1 (deletions only).    3.) Labs and/or tests ordered include:  .     4.) Health maintenance issues addressed today include annual health maintenance and non-gynecologic issues with PCP. Discussed the need to be in close contact with her PCP to keep her colitis  controlled. Encouraged Pedialyte or Gatorade with loose stools. Brittni states that her blood pressure readings at home are usually around 120/80. I encouraged her to recheck her BP later today and to let her PCP know if it stays elevated >140/90. She is due for her mammogram and colonoscopy and will discuss both with her PCP next week.     5.)       Chest port: per Dr. Rodriguez 5/2020 note, ok to have port removed now if patient desires. Patient desires to keep for for now but will continue to discuss at her follow up visits. She is aware to continue to get her port flushes every 6 weeks. Sent request to scheduling for this.       Connie Martinez, MSN, [facility]  Women's Health Nurse Practitioner  Department of Ob/Gyn and Women's Health  Division of Gynecologic Oncology  [facility]  Pager: [phone]        CC  Patient Care Team:  Cecelia Manzo MD as PCP - General  Naz Ascencio MD as MD (Oncology)  Parvez Abreu MD as MD (Surgery)  Micheline Soler MD as MD (Emergency Medicine)  [facility] [facility] ([place]  Parvez Abreu MD as Assigned Surgical Provider  Shiloh Spencer APRN CNP as Assigned OBGYN Provider  Naz Ascencio MD as Assigned Cancer Care Provider  Afshan Mason MD as Assigned Palliative Care Provider

## 2020-11-10 NOTE — PROGRESS NOTES
Nursing Note:  Maximino Simeon presents today for port labs.    Patient seen by provider today: No   present during visit today: Not Applicable.    Note: N/A.    Intravenous Access:  Labs drawn without difficulty.  Implanted Port.    Discharge Plan:   Patient was sent home.    Prasanna Rubalcava RN

## 2020-11-13 ENCOUNTER — ONCOLOGY VISIT (OUTPATIENT)
Dept: ONCOLOGY | Facility: CLINIC | Age: 75
End: 2020-11-13
Attending: OBSTETRICS & GYNECOLOGY
Payer: MEDICARE

## 2020-11-13 VITALS
RESPIRATION RATE: 16 BRPM | TEMPERATURE: 98.3 F | BODY MASS INDEX: 22 KG/M2 | DIASTOLIC BLOOD PRESSURE: 85 MMHG | SYSTOLIC BLOOD PRESSURE: 146 MMHG | HEART RATE: 90 BPM | WEIGHT: 104.8 LBS | HEIGHT: 58 IN | OXYGEN SATURATION: 96 %

## 2020-11-13 DIAGNOSIS — C56.9 OVARIAN CANCER, UNSPECIFIED LATERALITY (H): Primary | ICD-10-CM

## 2020-11-13 PROCEDURE — G0463 HOSPITAL OUTPT CLINIC VISIT: HCPCS

## 2020-11-13 PROCEDURE — 99214 OFFICE O/P EST MOD 30 MIN: CPT | Performed by: OBSTETRICS & GYNECOLOGY

## 2020-11-13 ASSESSMENT — PAIN SCALES - GENERAL: PAINLEVEL: MILD PAIN (2)

## 2020-11-13 ASSESSMENT — MIFFLIN-ST. JEOR: SCORE: 850.12

## 2020-11-13 NOTE — LETTER
2020         RE: Maximino Simeon  [address] [address]        Dear Colleague,    Thank you for referring your patient, Maximino Simeon, to the [facility]. Please see a copy of my visit note below.                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 She completed chemotherapy 2020. She is here today for a surveillance visit.     Oncology history:   Originally seen by Dr. Ascencio at [facility] during her call week; was to follow up with outpatient GYN ONC and general surgery due to appendicits and enlarged ovary.       54  CA 19-9 15  CEA 2.8     2019: CT abd pelvis: IMPRESSION:      1. Dilated, fluid-filled appendix with mild increased enhancement and slight stranding at its tip is worrisome for acute appendicitis.  2. Large complex cystic pelvic mass with soft tissue nodularity with adjacent prominent left adnexal vessels is worrisome for neoplasm of ovarian origin. No mesenteric nodularity, lymph node enlargement, or other findings that are suspicious for metastatic disease in the abdomen or pelvis.     2019: CT abd pelvis REPRODUCTIVE ORGANS with IV and oral contrast: Again noted is a large complex cystic mass in the pelvis measuring approximately 13.3 x 8.1 x 9.7 cm in size which has areas of mural nodularity and soft tissue density.    1. Again noted is a thick-walled dilated appendix compatible with appendicitis. There is a questionable new small area of appendiceal wall discontinuity and adjacent 1.2 cm fluid pocket on axial image 57 and sagittal image 23. Findings raise question of a small area of appendiceal wall perforation.    2. Large complex pelvic mass again concerning for ovarian neoplasm.    3. Marked bladder distention. Mild prominence of  the urinary collecting systems likely secondary to this.     7/2/2019-7/7/2019: General surgery consulted for perforated appendicitis.  Not septic as no fever.Treated with Cefuroxime and metronidazole (PCN allergy). Surgery recommending postponing appendectomy for about 6 weeks or so. Presenting leukocytosis and fever resolved by discharge.  Will finish oral course of ceftin and flagyl as outpatient.      Per general surgery note:  - No indication for surgery at this time  - Recommend admission to medicine service  - Start IV antibiotics  - Will again need to discuss the timing of appendectomy with Gyn/Onc to allow for evaluation of her pelvic mass simultaneously, this will likely have to be in 4-6 weeks following possible appendix perforation  - Surgery will continue to follow     8/9/19: Exploratory Laparotomy, Total Abdominal Hysterectomy, Left Salpingo-Oopherectomy, Bilateral Pelvic and Para-Aortic Lymph Node Dissection, Peritoneal Biopsy, Diaphraghm Pap Smears.     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     5/20/2020: 10.   8/19/2020:  12.  11/10/2020:  10.    HPI:    Brittni comes to the clinic feeling well from a gynecological standpoint. Her sister is suffering from stage IV breast cancer and dementia. This puts an emotional toll on Brittni. Brittni has been seeing a therapst. She also has a close friend that she talks  "with daily for support. Her son is actively in her life. She saw Dermatology recently for her ongoing eczema on her lower legs. She has a \"cream\" for this. She still has her Portacath in. She is getting her portac[facility] flushed every six weeks at the [facility]. She wants to think about removal but at this point wants to keep it in. She is still on Prolia at [facility] for her osteoporosis. She reports that she has ongoing colitis that causes her to have frequent bowel movements. She states that her bowel movements are formed. She has loperamide at home to help control this. She is still on Cymbalta 30 mg for her anxiety and fibromyalgia. She is not sexually active and denies vulvar/vaginal concerns. She denies any vaginal bleeding, no changes in her bowel or bladder habits, no nausea/emesis, no lower extremity edema, and no difficulties eating or sleeping. She denies any abdominal discomfort/bloating, no fevers or chills, and no chest pain or shortness of breath. She reports that her last mammogram was in 2018 and her colonoscopy was \"a long time ago\".     Review of Systems:    Systemic           no weight changes; no fever; no chills; no night sweats; no appetite changes  Skin           no rashes, or lesions  Eye           no irritation; no changes in vision  Jarred-Laryngeal           no dysphagia; no hoarseness   Pulmonary    no cough; no shortness of breath  Cardiovascular    no chest pain; no palpitations  Gastrointestinal    no diarrhea; no constipation; no abdominal pain; no changes in bowel habits; no blood in stool  Genitourinary   no urinary frequency; no urinary urgency; no dysuria; no pain; no abnormal vaginal discharge; no abnormal vaginal bleeding  Breast    no breast discharge; no breast changes; no breast pain  Musculoskeletal    no myalgias; no arthralgias; no back pain  Psychiatric           no depressed mood; no anxiety    Hematologic           no tender lymph nodes; no noticeable swellings or lumps "   Endocrine    no hot flashes; no heat/cold intolerance         Neurological   no tremor; no numbness and tingling; no headaches; no difficulty sleeping      Past Medical History:    Past Medical History:   Diagnosis Date     Anxiety 2014     CAD (coronary artery disease) 2011     Cancer (H)     ovarian     Colitis      Fibromyalgia      Gastroesophageal reflux disease      Hypothyroidism 2011     Insomnia 2017     Mixed hyperlipidemia 2011     PONV (postoperative nausea and vomiting)      Recurrent major depressive disorder (H) 2010     Urinary retention          Past Surgical History:    Past Surgical History:   Procedure Laterality Date     APPENDECTOMY OPEN N/A 8/9/2019    Procedure: Open Appendectomy;  Surgeon: Parvez Abreu MD;  Location: [facility] OR     BREAST SURGERY      biopsy     GYN SURGERY       HYSTERECTOMY TOTAL ABDOMINAL, BILATERAL SALPINGO-OOPHORECTOMY, NODE DISSECTION, COMBINED Bilateral 8/9/2019    Procedure: Exploratory Laparotomy, Total Abdominal Hysterectomy, Left Salpingo-Oopherectomy, Bilateral Pelvic and Para-Aortic Lymph Node Dissection, Peritoneal Biopsy, Diaphraghm Pap Smears.;  Surgeon: Naz Ascencio MD;  Location: [facility] OR     IR CHEST PORT PLACEMENT > 5 YRS OF AGE  8/30/2019     OOPHORECTOMY  1992         Health Maintenance Due   Topic Date Due     DEXA  1945     ADVANCE CARE PLANNING  1945     DEPRESSION ACTION PLAN  1945     HEPATITIS C SCREENING  02/01/1961     LIPID  02/01/1988     MEDICARE ANNUAL WELLNESS VISIT  02/01/2008     ZOSTER IMMUNIZATION (2 of 3) 09/21/2015     PHQ-9  01/12/2020     FALL RISK ASSESSMENT  07/16/2020       Current Medications:     Current Outpatient Medications   Medication Sig Dispense Refill     Calcium Carbonate-Vitamin D (CALCIUM + D PO) Take 6 tablets by mouth daily.       Coenzyme Q10 (COQ10 PO) Take 200 mg by mouth 2 times daily       DULoxetine (CYMBALTA) 20 MG capsule Take 1 capsule (20 mg) by mouth daily Follow up with  "your PCP for refills (Patient taking differently: Take 30 mg by mouth daily Follow up with your PCP for refills) 30 capsule 0     levothyroxine (SYNTHROID/LEVOTHROID) 50 MCG tablet Take 50 mcg by mouth every morning        liothyronine (CYTOMEL) 5 MCG tablet Take 5 mcg by mouth every morning        loperamide (IMODIUM) 1 MG/5ML liquid Take 1 mg by mouth 6 times daily        Multiple Vitamins-Minerals (MULTIVITAL PO) Take 1 tablet by mouth daily (with lunch)        order for DME Cranial prothesis 1 each 0     Probiotic Product (PROBIOTIC DAILY PO) 1 tablet daily at lunchtime by mouth           Allergies:        Allergies   Allergen Reactions     Adhesive Tape      bandaids     Liquid Adhesive Rash     Penicillins Rash        Social History:     Social History     Tobacco Use     Smoking status: Never Smoker     Smokeless tobacco: Never Used   Substance Use Topics     Alcohol use: Yes     Comment: socially       History   Drug Use Unknown         Family History:     The patient's family history is notable for     Family History   Problem Relation Age of Onset     Heart Disease Father      Cerebrovascular Disease Father      Cerebrovascular Disease Sister      Thyroid Disease Sister      Breast Cancer Sister          Physical Exam:     BP (!) 146/85   Pulse 90   Temp 98.3  F (36.8  C)   Resp 16   Ht 1.473 m (4' 10\")   Wt 47.5 kg (104 lb 12.8 oz)   LMP  (LMP Unknown)   SpO2 96%   BMI 21.90 kg/m    Body mass index is 21.9 kg/m .    General Appearance: healthy and alert, no distress     HEENT: no thyromegaly, no palpable nodules or masses        Cardiovascular: regular rate and rhythm, no gallops, rubs or murmurs, HTN     Respiratory: lungs clear, no rales, rhonchi or wheezes    Musculoskeletal: extremities non tender and without edema    Skin: no lesions; small amount of eczema seen on anterior bilateral lower legs     Neurological: normal gait, no gross defects     Psychiatric: appropriate mood and affect        "                        Hematological: normal cervical, supraclavicular and inguinal lymph nodes     Gastrointestinal:       abdomen soft, non-tender, non-distended, no organomegaly or masses    Genitourinary: External genitalia and urethral meatus appears normal. Vagina pale and slightly narrowed consistent with postmenopausal changes, smooth without nodularity or masses. Cervix appears surgically absent. Bimanual exam reveal no masses, nodularity or fullness.  Recto-vaginal exam confirms these findings.      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 She completed chemotherapy 1/2020. She is here today for a surveillance visit.     A total of 30 minutes was spent with the patient, over 50% minutes of which were spent in counseling the patient and/or treatment planning.      Plan:     1.)       AMEE on exam and  stable at 10.  results reviewed with patient during visit. Oncology Treatment Summary provided to patient with all questions answered. Patient will continue to have in person surveillance visits with  lab draws every 3 months for two years (1/2022), and then every six months for three years (1/2025) and then annual visits thereafter. Reviewed signs and symptoms for when she should contact the clinic or seek additional care. Patient to contact the clinic with any questions or concerns in the interim.     2.) Genetic Testing Result: NEGATIVE  Brittni is negative for mutations in the APC, MARV, AXIN2, BARD1, BMPR1A, BRCA1, BRCA2, BRIP1, CDH1, CDK4, CDKN2A, CHEK2, DICER1, EPCAM, GREM1, HOXB13, MLH1, MSH2, MSH3, MSH6, MUTYH, NBN, NF1, NTHL1, PALB2, PMS2, POLD1, POLE, PTEN, RAD51C, RAD51D, RECQL, SMAD4, SMARCA4, STK11, and TP53 genes. No mutations were found in any of the 36 genes analyzed. This test involved sequencing and deletion/duplication analysis of all genes with the exception of EPCAM and GREM1 (deletions  "only).    3.) Labs and/or tests ordered include:  .     4.) Health maintenance issues addressed today include annual health maintenance and non-gynecologic issues with PCP. Discussed the need to be in close contact with her PCP to keep her colitis controlled. Encouraged Pedialyte or Gatorade with loose stools. Brittni states that her blood pressure readings at home are usually around 120/80. I encouraged her to recheck her BP later today and to let her PCP know if it stays elevated >140/90. She is due for her mammogram and colonoscopy and will discuss both with her PCP next week.     5.)       Chest port: per Dr. Rodriguez 5/2020 note, ok to have port removed now if patient desires. Patient desires to keep for for now but will continue to discuss at her follow up visits. She is aware to continue to get her port flushes every 6 weeks. Sent request to scheduling for this.       Connie Martinez, MSN, [facility]  Women's Health Nurse Practitioner  Department of Ob/Gyn and Women's Health  Division of Gynecologic Oncology  [facility]  Pager: [phone]        CC  Patient Care Team:  Cecelia Manzo MD as PCP - General  Naz Ascencio MD as MD (Oncology)  Parvez Abreu MD as MD (Surgery)  Micheline Soler MD as MD (Emergency Medicine)  [facility] [facility]  Parvez Abreu MD as Assigned Surgical Provider  Shiloh Spencer APRN CNP as Assigned OBGYN Provider  Naz Ascencio MD as Assigned Cancer Care Provider  Afshan Mason MD as Assigned Palliative Care Provider      Oncology Rooming Note    November 13, 2020 12:26 PM   Maximino Simeon is a 75 year old female who presents for:    Chief Complaint   Patient presents with     Oncology Clinic Visit     Initial Vitals: LMP  (LMP Unknown)  Estimated body mass index is 19.23 kg/m  as calculated from the following:    Height as of 5/21/20: 1.473 m (4' 10\").    Weight " as of 5/21/20: 41.7 kg (92 lb). There is no height or weight on file to calculate BSA.  Data Unavailable Comment: Data Unavailable   No LMP recorded (lmp unknown). Patient has had a hysterectomy.  Allergies reviewed: Yes  Medications reviewed: Yes    Medications: Medication refills not needed today.  Pharmacy name entered into [employer]:    [facility] #[id number] - [place], [place] - [address] [place] [address] [facility] #[id number] - [place], [place] - [address] AT [facility] [address]    Clinical concerns:  NP was notified.      Cris Bautista MA                Again, thank you for allowing me to participate in the care of your patient.        Sincerely,        LEX Larson CNP

## 2020-11-13 NOTE — PROGRESS NOTES
"Oncology Rooming Note    November 13, 2020 12:26 PM   Maximino Simeon is a 75 year old female who presents for:    Chief Complaint   Patient presents with     Oncology Clinic Visit     Initial Vitals: LMP  (LMP Unknown)  Estimated body mass index is 19.23 kg/m  as calculated from the following:    Height as of 5/21/20: 1.473 m (4' 10\").    Weight as of 5/21/20: 41.7 kg (92 lb). There is no height or weight on file to calculate BSA.  Data Unavailable Comment: Data Unavailable   No LMP recorded (lmp unknown). Patient has had a hysterectomy.  Allergies reviewed: Yes  Medications reviewed: Yes    Medications: Medication refills not needed today.  Pharmacy name entered into [facility]:    [facility] #[id number] - [place], [place] - [address] [place] [address] [facility] STORE #[id number] - [place], [place] - [address] AT [facility] [address]    Clinical concerns:  NP was notified.      Cris Bautista MA            "

## 2020-12-29 ENCOUNTER — INFUSION THERAPY VISIT (OUTPATIENT)
Dept: INFUSION THERAPY | Facility: CLINIC | Age: 75
End: 2020-12-29
Attending: OBSTETRICS & GYNECOLOGY
Payer: MEDICARE

## 2020-12-29 DIAGNOSIS — Z95.828 PORT-A-CATH IN PLACE: Primary | ICD-10-CM

## 2020-12-29 PROCEDURE — 96523 IRRIG DRUG DELIVERY DEVICE: CPT

## 2020-12-29 PROCEDURE — 250N000011 HC RX IP 250 OP 636: Performed by: OBSTETRICS & GYNECOLOGY

## 2020-12-29 RX ORDER — HEPARIN SODIUM,PORCINE 10 UNIT/ML
5 VIAL (ML) INTRAVENOUS
Status: CANCELLED | OUTPATIENT
Start: 2020-12-29

## 2020-12-29 RX ORDER — HEPARIN SODIUM (PORCINE) LOCK FLUSH IV SOLN 100 UNIT/ML 100 UNIT/ML
5 SOLUTION INTRAVENOUS
Status: CANCELLED | OUTPATIENT
Start: 2020-12-29

## 2020-12-29 RX ORDER — HEPARIN SODIUM (PORCINE) LOCK FLUSH IV SOLN 100 UNIT/ML 100 UNIT/ML
5 SOLUTION INTRAVENOUS
Status: DISCONTINUED | OUTPATIENT
Start: 2020-12-29 | End: 2020-12-29 | Stop reason: HOSPADM

## 2020-12-29 RX ADMIN — Medication 5 ML: at 13:33

## 2020-12-29 NOTE — PROGRESS NOTES
Nursing Note:  Elisaclaude Simeon presents today for Port Flush.    Patient seen by provider today: No   present during visit today: Not Applicable.    Note: N/A.    Intravenous Access:  Implanted Port.    Discharge Plan:   Patient was Discharged.    Roshan Noriega RN

## 2021-02-08 NOTE — PROGRESS NOTES
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 She completed chemotherapy 2020. She is here today for a surveillance visit.     Oncology history:   Originally seen by Dr. Ascencio at [facility] during her call week; was to follow up with outpatient GYN ONC and general surgery due to appendicits and enlarged ovary.       54  CA 19-9 15  CEA 2.8     2019: CT abd pelvis: IMPRESSION:      1. Dilated, fluid-filled appendix with mild increased enhancement and slight stranding at its tip is worrisome for acute appendicitis.  2. Large complex cystic pelvic mass with soft tissue nodularity with adjacent prominent left adnexal vessels is worrisome for neoplasm of ovarian origin. No mesenteric nodularity, lymph node enlargement, or other findings that are suspicious for metastatic disease in the abdomen or pelvis.     2019: CT abd pelvis REPRODUCTIVE ORGANS with IV and oral contrast: Again noted is a large complex cystic mass in the pelvis measuring approximately 13.3 x 8.1 x 9.7 cm in size which has areas of mural nodularity and soft tissue density.    1. Again noted is a thick-walled dilated appendix compatible with appendicitis. There is a questionable new small area of appendiceal wall discontinuity and adjacent 1.2 cm fluid pocket on axial image 57 and sagittal image 23. Findings raise question of a small area of appendiceal wall perforation.    2. Large complex pelvic mass again concerning for ovarian neoplasm.    3. Marked bladder distention. Mild prominence of the urinary collecting systems likely secondary to this.     2019-2019: General surgery consulted for perforated appendicitis.  Not septic as no fever.Treated with Cefuroxime and metronidazole (PCN allergy). Surgery recommending postponing appendectomy  "for about 6 weeks or so. Presenting leukocytosis and fever resolved by discharge.  Will finish oral course of ceftin and flagyl as outpatient.      Per general surgery note:  - No indication for surgery at this time  - Recommend admission to medicine service  - Start IV antibiotics  - Will again need to discuss the timing of appendectomy with Gyn/Onc to allow for evaluation of her pelvic mass simultaneously, this will likely have to be in 4-6 weeks following possible appendix perforation  - Surgery will continue to follow     8/9/19: Exploratory Laparotomy, Total Abdominal Hysterectomy, Left Salpingo-Oopherectomy, Bilateral Pelvic and Para-Aortic Lymph Node Dissection, Peritoneal Biopsy, Diaphraghm Pap Smears.     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     5/20/2020: 10.   8/19/2020:  12.  11/10/2020:  10.  2/10/2021:  8          Brittni comes to the clinic feeling well from a gynecological standpoint. Her sister just passed away in December from stage IV breast cancer and COVID. This puts a daily emotional toll on Brittni. Brittni has been seeing a therapst. She also has a close friend that she talks with daily for support. Her son is actively in her life.     She sees Dermatology for her ongoing eczema on her lower legs. She has a \"cream\" for this. She still has her Portacath in. She is getting her portacath flushed " "every six weeks at the [facility]. She wants to think about removal but at this point wants to keep it in. She has a blood pressure machine at home and she states that her Bps are 120/77s. She is still on Prolia at [facility] for her osteoporosis. She reports that she has ongoing colitis that causes her to have frequent bowel movements. She states that her bowel movements are formed. She has loperamide at home to help control this. She is still on Cymbalta 60 mg for her anxiety and fibromyalgia. She is not sexually active and denies vulvar/vaginal concerns. She denies any vaginal bleeding, no nausea/emesis, no lower extremity edema, and no difficulties eating or sleeping. She denies any abdominal discomfort/bloating, no fevers or chills, and no chest pain or shortness of breath. She reports that her last mammogram was in 2018 and her colonoscopy was \"a long time ago\". She does state that she has ongoing fatigue since her treatments.       Health Maintenance  Colonoscopy-   Mammogram- 2018  DEXA- 9/1/2020  Annual physical- 11/18/2020        Review of Systems:    Systemic           no weight changes; no fever; no chills; no night sweats; no appetite changes, +fatigue  Skin           no rashes, or lesions  Eye           no irritation; no changes in vision  Jarred-Laryngeal           no dysphagia; no hoarseness   Pulmonary    no cough; no shortness of breath  Cardiovascular    no chest pain; no palpitations  Gastrointestinal    no diarrhea; no constipation; no abdominal pain; no changes in bowel habits; no blood in stool  Genitourinary   no urinary frequency; no urinary urgency; no dysuria; no pain; no abnormal vaginal discharge; no abnormal vaginal bleeding  Breast    no breast discharge; no breast changes; no breast pain  Musculoskeletal    no myalgias; no arthralgias; no back pain  Psychiatric           no depressed mood; + anxiety    Hematologic           no tender lymph nodes; no noticeable swellings or lumps "   Endocrine    no hot flashes; no heat/cold intolerance         Neurological   no tremor; no numbness and tingling; no headaches; no difficulty sleeping      Past Medical History:    Past Medical History:   Diagnosis Date     Anxiety 2014     CAD (coronary artery disease) 2011     Cancer (H)     ovarian     Colitis      Fibromyalgia      Gastroesophageal reflux disease      Hypothyroidism 2011     Insomnia 2017     Mixed hyperlipidemia 2011     PONV (postoperative nausea and vomiting)      Recurrent major depressive disorder (H) 2010     Urinary retention          Past Surgical History:    Past Surgical History:   Procedure Laterality Date     APPENDECTOMY OPEN N/A 8/9/2019    Procedure: Open Appendectomy;  Surgeon: Parvez Abreu MD;  Location: [facility] OR     BREAST SURGERY      biopsy     GYN SURGERY       HYSTERECTOMY TOTAL ABDOMINAL, BILATERAL SALPINGO-OOPHORECTOMY, NODE DISSECTION, COMBINED Bilateral 8/9/2019    Procedure: Exploratory Laparotomy, Total Abdominal Hysterectomy, Left Salpingo-Oopherectomy, Bilateral Pelvic and Para-Aortic Lymph Node Dissection, Peritoneal Biopsy, Diaphraghm Pap Smears.;  Surgeon: Naz Ascencio MD;  Location: [facility] OR     IR CHEST PORT PLACEMENT > 5 YRS OF AGE  8/30/2019     OOPHORECTOMY  1992         Health Maintenance Due   Topic Date Due     DEXA  1945     ADVANCE CARE PLANNING  1945     DEPRESSION ACTION PLAN  1945     COVID-19 Vaccine (1 of 2) 02/01/1959     HEPATITIS C SCREENING  02/01/1961     LIPID  02/01/1988     MEDICARE ANNUAL WELLNESS VISIT  02/01/2008     PHQ-9  01/12/2020     FALL RISK ASSESSMENT  07/16/2020     ZOSTER IMMUNIZATION (3 of 3) 01/15/2021       Current Medications:     Current Outpatient Medications   Medication Sig Dispense Refill     Calcium Carbonate-Vitamin D (CALCIUM + D PO) Take 6 tablets by mouth daily.       Coenzyme Q10 (COQ10 PO) Take 200 mg by mouth 2 times daily       DULoxetine (CYMBALTA) 20 MG capsule Take 1 capsule  (20 mg) by mouth daily Follow up with your PCP for refills (Patient taking differently: Take 30 mg by mouth daily Follow up with your PCP for refills) 30 capsule 0     levothyroxine (SYNTHROID/LEVOTHROID) 50 MCG tablet Take 50 mcg by mouth every morning        liothyronine (CYTOMEL) 5 MCG tablet Take 5 mcg by mouth every morning        loperamide (IMODIUM) 1 MG/5ML liquid Take 1 mg by mouth 6 times daily        Multiple Vitamins-Minerals (MULTIVITAL PO) Take 1 tablet by mouth daily (with lunch)        order for DME Cranial prothesis 1 each 0     Probiotic Product (PROBIOTIC DAILY PO) 1 tablet daily at lunchtime by mouth           Allergies:        Allergies   Allergen Reactions     Adhesive Tape      bandaids     Liquid Adhesive Rash     Penicillins Rash        Social History:     Social History     Tobacco Use     Smoking status: Never Smoker     Smokeless tobacco: Never Used   Substance Use Topics     Alcohol use: Yes     Comment: socially       History   Drug Use Unknown         Family History:     The patient's family history is notable for     Family History   Problem Relation Age of Onset     Heart Disease Father      Cerebrovascular Disease Father      Cerebrovascular Disease Sister      Thyroid Disease Sister      Breast Cancer Sister          Physical Exam:     BP (!) 146/89   Pulse 94   Temp 97.9  F (36.6  C) (Oral)   Resp 16   Wt 48.5 kg (107 lb)   LMP  (LMP Unknown)   SpO2 98%   BMI 22.36 kg/m    Body mass index is 22.36 kg/m .    General Appearance: healthy and alert, no distress     HEENT: no thyromegaly, no palpable nodules or masses        Cardiovascular: regular rate and rhythm, no gallops, rubs or murmurs, HTN     Respiratory: lungs clear, no rales, rhonchi or wheezes    Musculoskeletal: extremities non tender and without edema    Skin: no lesions; small amount of eczema seen on anterior bilateral lower legs     Neurological: normal gait, no gross defects     Psychiatric: appropriate mood  and affect                               Hematological: normal cervical, supraclavicular and inguinal lymph nodes     Gastrointestinal:       abdomen soft, non-tender, non-distended, no organomegaly or masses    Genitourinary: External genitalia and urethral meatus appears normal. Vagina pale and slightly narrowed consistent with postmenopausal changes, smooth without nodularity or masses. Cervix appears surgically absent. Bimanual exam reveal no masses, nodularity or fullness.  Recto-vaginal exam confirms these findings.      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 She completed chemotherapy 1/2020. She is here today for a surveillance visit.     A total of 35 minutes was spent with the patient, over 50% minutes of which were spent in counseling the patient and/or treatment planning.      Plan:     1.)       AMEE on exam and  stable at 8.  results reviewed with patient during visit. Patient will continue to have in person surveillance visits with  lab draws every 3 months for two years (1/2022), and then every six months for three years (1/2025) and then annual visits thereafter. She will continue to have her portacath flushed every 6 weeks as she does not feel comfortable with removal at this time due to COVID. Encouraged mental health therapy visits to continue to process and grieve her sister's death. Offered time and emotional support for patient. Reviewed signs and symptoms for when she should contact the clinic or seek additional care. Patient to contact the clinic with any questions or concerns in the interim.     2.) Genetic Testing Result: NEGATIVE  Brittni is negative for mutations in the APC, MARV, AXIN2, BARD1, BMPR1A, BRCA1, BRCA2, BRIP1, CDH1, CDK4, CDKN2A, CHEK2, DICER1, EPCAM, GREM1, HOXB13, MLH1, MSH2, MSH3, MSH6, MUTYH, NBN, NF1, NTHL1, PALB2, PMS2, POLD1, POLE, PTEN, RAD51C, RAD51D, RECQL, SMAD4,  SMARCA4, STK11, and TP53 genes. No mutations were found in any of the 36 genes analyzed. This test involved sequencing and deletion/duplication analysis of all genes with the exception of EPCAM and GREM1 (deletions only).    3.) Labs and/or tests ordered include:  .     4.) Health maintenance issues addressed today include annual health maintenance and non-gynecologic issues with PCP. Discussed the need to be in close contact with her PCP to keep her colitis controlled. Encouraged Pedialyte or Gatorade with loose stools. Brittni states that her blood pressure readings at home are usually around 120/80. I encouraged her to recheck her BP later today and to let her PCP know if it stays elevated >140/90. COVID vaccine:  Discussed that [facility] is currently making appointments for patients >75 years old to receive the COVID vaccine. It is recommended for everyone to receive the vaccine when its available to them. Discussed with patient how to schedule this through [employer]. Coleman RNCC to provide patient contact numbers for [email or web address] if she has any difficulties scheduling thiss.     5.)       Chest port: per Dr. Rodriguez 5/2020 note, ok to have port removed now if patient desires. Patient desires to keep for for now but will continue to discuss at her follow up visits. She is aware to continue to get her port flushes every 6 weeks. Sent request to scheduling for this.     6.)        Referrals: Cancer Rehab for ongoing fatigue and deconditioning.          Connie Martinez, MSN, [facility]-BC  Women's Health Nurse Practitioner  Department of Ob/Gyn and Women's Health  Division of Gynecologic Oncology  [facility]  Pager: [phone]        CC  Patient Care Team:  Cecelia Manzo MD as PCP - General  Naz Ascencio MD as MD (Oncology)  Parvez Abreu MD as MD (Surgery)  Micheline Soler MD as MD (Emergency Medicine)  [facility] [place], [facility] ([facility] ([facility] ([place]))  Sonu  Naz Khan MD as Assigned Cancer Care Provider  Isabella, Afshan Ramos MD as Assigned Palliative Care Provider  Connie Martinez APRN CNP as Assigned OBGYN Provider

## 2021-02-10 ENCOUNTER — OFFICE VISIT (OUTPATIENT)
Dept: INFUSION THERAPY | Facility: CLINIC | Age: 76
End: 2021-02-10
Attending: OBSTETRICS & GYNECOLOGY
Payer: MEDICARE

## 2021-02-10 ENCOUNTER — HOSPITAL ENCOUNTER (OUTPATIENT)
Facility: CLINIC | Age: 76
Setting detail: SPECIMEN
Discharge: HOME | End: 2021-02-10
Attending: OBSTETRICS & GYNECOLOGY | Admitting: OBSTETRICS & GYNECOLOGY
Payer: MEDICARE

## 2021-02-10 DIAGNOSIS — Z95.828 PORT-A-CATH IN PLACE: ICD-10-CM

## 2021-02-10 DIAGNOSIS — C56.9 OVARIAN CANCER, UNSPECIFIED LATERALITY (H): Primary | ICD-10-CM

## 2021-02-10 LAB — CANCER AG125 SERPL-ACNC: 8 U/ML (ref 0–30)

## 2021-02-10 PROCEDURE — 86304 IMMUNOASSAY TUMOR CA 125: CPT | Performed by: OBSTETRICS & GYNECOLOGY

## 2021-02-10 PROCEDURE — 250N000011 HC RX IP 250 OP 636: Performed by: OBSTETRICS & GYNECOLOGY

## 2021-02-10 PROCEDURE — 36591 DRAW BLOOD OFF VENOUS DEVICE: CPT

## 2021-02-10 RX ORDER — HEPARIN SODIUM (PORCINE) LOCK FLUSH IV SOLN 100 UNIT/ML 100 UNIT/ML
5 SOLUTION INTRAVENOUS
Status: CANCELLED | OUTPATIENT
Start: 2021-02-10

## 2021-02-10 RX ORDER — HEPARIN SODIUM,PORCINE 10 UNIT/ML
5 VIAL (ML) INTRAVENOUS
Status: CANCELLED | OUTPATIENT
Start: 2021-02-10

## 2021-02-10 RX ORDER — HEPARIN SODIUM (PORCINE) LOCK FLUSH IV SOLN 100 UNIT/ML 100 UNIT/ML
5 SOLUTION INTRAVENOUS
Status: DISCONTINUED | OUTPATIENT
Start: 2021-02-10 | End: 2021-02-10 | Stop reason: HOSPADM

## 2021-02-10 RX ADMIN — Medication 5 ML: at 13:29

## 2021-02-10 NOTE — LETTER
2/10/2021         RE: Maximino Simeon  [id number] [address] [id number]        Dear Colleague,    Thank you for referring your patient, Maximino Simeon, to the [facility]. Please see a copy of my visit note below.    Nursing Note:  Maximino Simeon presents today for port labs.    Patient seen by provider today: No   present during visit today: Not Applicable.    Note: N/A.    Intravenous Access:  Labs drawn without difficulty.  Implanted Port.    Discharge Plan:   Patient was sent home.    Prasanna Rubalcava RN                Again, thank you for allowing me to participate in the care of your patient.        Sincerely,         [facility]

## 2021-02-11 NOTE — RESULT ENCOUNTER NOTE
reviewed. Will discuss with patient during visit 2/12/2021.     Connie Martinez, MSN, [facility]-BC  Women's Health Nurse Practitioner  Division of Gynecologic Oncology  [facility]

## 2021-02-12 ENCOUNTER — ONCOLOGY VISIT (OUTPATIENT)
Dept: ONCOLOGY | Facility: CLINIC | Age: 76
End: 2021-02-12
Attending: OBSTETRICS & GYNECOLOGY
Payer: MEDICARE

## 2021-02-12 VITALS
TEMPERATURE: 97.9 F | DIASTOLIC BLOOD PRESSURE: 89 MMHG | WEIGHT: 107 LBS | OXYGEN SATURATION: 98 % | SYSTOLIC BLOOD PRESSURE: 146 MMHG | BODY MASS INDEX: 22.36 KG/M2 | RESPIRATION RATE: 16 BRPM | HEART RATE: 94 BPM

## 2021-02-12 DIAGNOSIS — C56.9 OVARIAN CANCER, UNSPECIFIED LATERALITY (H): Primary | ICD-10-CM

## 2021-02-12 PROCEDURE — G0463 HOSPITAL OUTPT CLINIC VISIT: HCPCS

## 2021-02-12 PROCEDURE — 99214 OFFICE O/P EST MOD 30 MIN: CPT | Performed by: OBSTETRICS & GYNECOLOGY

## 2021-02-12 ASSESSMENT — PAIN SCALES - GENERAL: PAINLEVEL: NO PAIN (0)

## 2021-02-12 NOTE — LETTER
2021         RE: Maximino Simeon  [address] [facility] [id number]        Dear Colleague,    Thank you for referring your patient, Maximino Simeon, to the [facility]. Please see a copy of my visit note below.                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 She completed chemotherapy 2020. She is here today for a surveillance visit.     Oncology history:   Originally seen by Dr. Ascencio at [facility] during her call week; was to follow up with outpatient GYN ONC and general surgery due to appendicits and enlarged ovary.       54  CA 19-9 15  CEA 2.8     2019: CT abd pelvis: IMPRESSION:      1. Dilated, fluid-filled appendix with mild increased enhancement and slight stranding at its tip is worrisome for acute appendicitis.  2. Large complex cystic pelvic mass with soft tissue nodularity with adjacent prominent left adnexal vessels is worrisome for neoplasm of ovarian origin. No mesenteric nodularity, lymph node enlargement, or other findings that are suspicious for metastatic disease in the abdomen or pelvis.     2019: CT abd pelvis REPRODUCTIVE ORGANS with IV and oral contrast: Again noted is a large complex cystic mass in the pelvis measuring approximately 13.3 x 8.1 x 9.7 cm in size which has areas of mural nodularity and soft tissue density.    1. Again noted is a thick-walled dilated appendix compatible with appendicitis. There is a questionable new small area of appendiceal wall discontinuity and adjacent 1.2 cm fluid pocket on axial image 57 and sagittal image 23. Findings raise question of a small area of appendiceal wall perforation.    2. Large complex pelvic mass again concerning for ovarian neoplasm.    3. Marked bladder distention. Mild prominence of  the urinary collecting systems likely secondary to this.     7/2/2019-7/7/2019: General surgery consulted for perforated appendicitis.  Not septic as no fever.Treated with Cefuroxime and metronidazole (PCN allergy). Surgery recommending postponing appendectomy for about 6 weeks or so. Presenting leukocytosis and fever resolved by discharge.  Will finish oral course of ceftin and flagyl as outpatient.      Per general surgery note:  - No indication for surgery at this time  - Recommend admission to medicine service  - Start IV antibiotics  - Will again need to discuss the timing of appendectomy with Gyn/Onc to allow for evaluation of her pelvic mass simultaneously, this will likely have to be in 4-6 weeks following possible appendix perforation  - Surgery will continue to follow     8/9/19: Exploratory Laparotomy, Total Abdominal Hysterectomy, Left Salpingo-Oopherectomy, Bilateral Pelvic and Para-Aortic Lymph Node Dissection, Peritoneal Biopsy, Diaphraghm Pap Smears.     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     5/20/2020: 10.   8/19/2020:  12.  11/10/2020:  10.  2/10/2021:  8          Brittni comes to the clinic feeling well from a gynecological standpoint. Her sister just passed away in December from stage IV breast cancer and COVID. This puts a daily emotional toll on Brittni. Brittni has been seeing a therapst. She  "also has a close friend that she talks with daily for support. Her son is actively in her life.     She sees Dermatology for her ongoing eczema on her lower legs. She has a \"cream\" for this. She still has her Portacath in. She is getting her portacath flushed every six weeks at the [facility]. She wants to think about removal but at this point wants to keep it in. She has a blood pressure machine at home and she states that her Bps are 120/77s. She is still on Prolia at [facility] for her osteoporosis. She reports that she has ongoing colitis that causes her to have frequent bowel movements. She states that her bowel movements are formed. She has loperamide at home to help control this. She is still on Cymbalta 60 mg for her anxiety and fibromyalgia. She is not sexually active and denies vulvar/vaginal concerns. She denies any vaginal bleeding, no nausea/emesis, no lower extremity edema, and no difficulties eating or sleeping. She denies any abdominal discomfort/bloating, no fevers or chills, and no chest pain or shortness of breath. She reports that her last mammogram was in 2018 and her colonoscopy was \"a long time ago\". She does state that she has ongoing fatigue since her treatments.       Health Maintenance  Colonoscopy-   Mammogram- 2018  DEXA- 9/1/2020  Annual physical- 11/18/2020        Review of Systems:    Systemic           no weight changes; no fever; no chills; no night sweats; no appetite changes, +fatigue  Skin           no rashes, or lesions  Eye           no irritation; no changes in vision  Jarred-Laryngeal           no dysphagia; no hoarseness   Pulmonary    no cough; no shortness of breath  Cardiovascular    no chest pain; no palpitations  Gastrointestinal    no diarrhea; no constipation; no abdominal pain; no changes in bowel habits; no blood in stool  Genitourinary   no urinary frequency; no urinary urgency; no dysuria; no pain; no abnormal vaginal discharge; no abnormal vaginal bleeding  Breast "    no breast discharge; no breast changes; no breast pain  Musculoskeletal    no myalgias; no arthralgias; no back pain  Psychiatric           no depressed mood; + anxiety    Hematologic           no tender lymph nodes; no noticeable swellings or lumps   Endocrine    no hot flashes; no heat/cold intolerance         Neurological   no tremor; no numbness and tingling; no headaches; no difficulty sleeping      Past Medical History:    Past Medical History:   Diagnosis Date     Anxiety 2014     CAD (coronary artery disease) 2011     Cancer (H)     ovarian     Colitis      Fibromyalgia      Gastroesophageal reflux disease      Hypothyroidism 2011     Insomnia 2017     Mixed hyperlipidemia 2011     PONV (postoperative nausea and vomiting)      Recurrent major depressive disorder (H) 2010     Urinary retention          Past Surgical History:    Past Surgical History:   Procedure Laterality Date     APPENDECTOMY OPEN N/A 8/9/2019    Procedure: Open Appendectomy;  Surgeon: Parvez Abreu MD;  Location: [facility] OR     BREAST SURGERY      biopsy     GYN SURGERY       HYSTERECTOMY TOTAL ABDOMINAL, BILATERAL SALPINGO-OOPHORECTOMY, NODE DISSECTION, COMBINED Bilateral 8/9/2019    Procedure: Exploratory Laparotomy, Total Abdominal Hysterectomy, Left Salpingo-Oopherectomy, Bilateral Pelvic and Para-Aortic Lymph Node Dissection, Peritoneal Biopsy, Diaphraghm Pap Smears.;  Surgeon: Naz Ascencio MD;  Location: [facility] OR     IR CHEST PORT PLACEMENT > 5 YRS OF AGE  8/30/2019     OOPHORECTOMY  1992         Health Maintenance Due   Topic Date Due     DEXA  1945     ADVANCE CARE PLANNING  1945     DEPRESSION ACTION PLAN  1945     COVID-19 Vaccine (1 of 2) 02/01/1959     HEPATITIS C SCREENING  02/01/1961     LIPID  02/01/1988     MEDICARE ANNUAL WELLNESS VISIT  02/01/2008     PHQ-9  01/12/2020     FALL RISK ASSESSMENT  07/16/2020     ZOSTER IMMUNIZATION (3 of 3) 01/15/2021       Current Medications:     Current  Outpatient Medications   Medication Sig Dispense Refill     Calcium Carbonate-Vitamin D (CALCIUM + D PO) Take 6 tablets by mouth daily.       Coenzyme Q10 (COQ10 PO) Take 200 mg by mouth 2 times daily       DULoxetine (CYMBALTA) 20 MG capsule Take 1 capsule (20 mg) by mouth daily Follow up with your PCP for refills (Patient taking differently: Take 30 mg by mouth daily Follow up with your PCP for refills) 30 capsule 0     levothyroxine (SYNTHROID/LEVOTHROID) 50 MCG tablet Take 50 mcg by mouth every morning        liothyronine (CYTOMEL) 5 MCG tablet Take 5 mcg by mouth every morning        loperamide (IMODIUM) 1 MG/5ML liquid Take 1 mg by mouth 6 times daily        Multiple Vitamins-Minerals (MULTIVITAL PO) Take 1 tablet by mouth daily (with lunch)        order for DME Cranial prothesis 1 each 0     Probiotic Product (PROBIOTIC DAILY PO) 1 tablet daily at lunchtime by mouth           Allergies:        Allergies   Allergen Reactions     Adhesive Tape      bandaids     Liquid Adhesive Rash     Penicillins Rash        Social History:     Social History     Tobacco Use     Smoking status: Never Smoker     Smokeless tobacco: Never Used   Substance Use Topics     Alcohol use: Yes     Comment: socially       History   Drug Use Unknown         Family History:     The patient's family history is notable for     Family History   Problem Relation Age of Onset     Heart Disease Father      Cerebrovascular Disease Father      Cerebrovascular Disease Sister      Thyroid Disease Sister      Breast Cancer Sister          Physical Exam:     BP (!) 146/89   Pulse 94   Temp 97.9  F (36.6  C) (Oral)   Resp 16   Wt 48.5 kg (107 lb)   LMP  (LMP Unknown)   SpO2 98%   BMI 22.36 kg/m    Body mass index is 22.36 kg/m .    General Appearance: healthy and alert, no distress     HEENT: no thyromegaly, no palpable nodules or masses        Cardiovascular: regular rate and rhythm, no gallops, rubs or murmurs, HTN     Respiratory: lungs  clear, no rales, rhonchi or wheezes    Musculoskeletal: extremities non tender and without edema    Skin: no lesions; small amount of eczema seen on anterior bilateral lower legs     Neurological: normal gait, no gross defects     Psychiatric: appropriate mood and affect                               Hematological: normal cervical, supraclavicular and inguinal lymph nodes     Gastrointestinal:       abdomen soft, non-tender, non-distended, no organomegaly or masses    Genitourinary: External genitalia and urethral meatus appears normal. Vagina pale and slightly narrowed consistent with postmenopausal changes, smooth without nodularity or masses. Cervix appears surgically absent. Bimanual exam reveal no masses, nodularity or fullness.  Recto-vaginal exam confirms these findings.      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 She completed chemotherapy 1/2020. She is here today for a surveillance visit.     A total of 35 minutes was spent with the patient, over 50% minutes of which were spent in counseling the patient and/or treatment planning.      Plan:     1.)       AMEE on exam and  stable at 8.  results reviewed with patient during visit. Patient will continue to have in person surveillance visits with  lab draws every 3 months for two years (1/2022), and then every six months for three years (1/2025) and then annual visits thereafter. She will continue to have her portacath flushed every 6 weeks as she does not feel comfortable with removal at this time due to COVID. Encouraged mental health therapy visits to continue to process and grieve her sister's death. Offered time and emotional support for patient. Reviewed signs and symptoms for when she should contact the clinic or seek additional care. Patient to contact the clinic with any questions or concerns in the interim.     2.) Genetic Testing Result:  NEGATIVE  Brittni is negative for mutations in the APC, MARV, AXIN2, BARD1, BMPR1A, BRCA1, BRCA2, BRIP1, CDH1, CDK4, CDKN2A, CHEK2, DICER1, EPCAM, GREM1, HOXB13, MLH1, MSH2, MSH3, MSH6, MUTYH, NBN, NF1, NTHL1, PALB2, PMS2, POLD1, POLE, PTEN, RAD51C, RAD51D, RECQL, SMAD4, SMARCA4, STK11, and TP53 genes. No mutations were found in any of the 36 genes analyzed. This test involved sequencing and deletion/duplication analysis of all genes with the exception of EPCAM and GREM1 (deletions only).    3.) Labs and/or tests ordered include:  .     4.) Health maintenance issues addressed today include annual health maintenance and non-gynecologic issues with PCP. Discussed the need to be in close contact with her PCP to keep her colitis controlled. Encouraged Pedialyte or Gatorade with loose stools. Brittni states that her blood pressure readings at home are usually around 120/80. I encouraged her to recheck her BP later today and to let her PCP know if it stays elevated >140/90. COVID vaccine:  Discussed that [place] is currently making appointments for patients >75 years old to receive the COVID vaccine. It is recommended for everyone to receive the vaccine when its available to them. Discussed with patient how to schedule this through [employer]. Coleman, RNCC to provide patient contact numbers for [facility] if she has any difficulties scheduling thiss.     5.)       Chest port: per Dr. Rodriguez 5/2020 note, ok to have port removed now if patient desires. Patient desires to keep for for now but will continue to discuss at her follow up visits. She is aware to continue to get her port flushes every 6 weeks. Sent request to scheduling for this.     6.)        Referrals: Cancer Rehab for ongoing fatigue and deconditioning.          Cnonie Martinez, MSN, [facility]  Women's Health Nurse Practitioner  Department of Ob/Gyn and Women's Health  Division of Gynecologic Oncology  [facility]  Pager:  "[phone]          Patient Care Team:  Cecelia Manzo MD as PCP - General  Naz Ascencio MD as MD (Oncology)  Parvez Abreu MD as MD (Surgery)  Micheline Soler MD as MD (Emergency Medicine)  [place] ([facility]  Naz Ascencio MD as Assigned Cancer Care Provider  Afshan Mason MD as Assigned Palliative Care Provider  Connie Martinez APRN CNP as Assigned OBGYN Provider      Oncology Rooming Note    February 12, 2021 1:24 PM   Maximino Simeon is a 76 year old female who presents for:    Chief Complaint   Patient presents with     Oncology Clinic Visit     Initial Vitals: BP (!) 146/89   Pulse 94   Temp 97.9  F (36.6  C) (Oral)   Resp 16   Wt 48.5 kg (107 lb)   LMP  (LMP Unknown)   SpO2 98%   BMI 22.36 kg/m   Estimated body mass index is 22.36 kg/m  as calculated from the following:    Height as of 11/13/20: 1.473 m (4' 10\").    Weight as of this encounter: 48.5 kg (107 lb). Body surface area is 1.41 meters squared.  No Pain (0) Comment: Data Unavailable   No LMP recorded (lmp unknown). Patient has had a hysterectomy.  Allergies reviewed: Yes  Medications reviewed: Yes    Medications: Medication refills not needed today.  Pharmacy name entered into ITYZ:    [facility] #[id number] - [facility] [address] [place] [address] DRUG STORE #[id number] [facility] [address] [place]  [address] [facility] [address] [place]  [facility] #[id number] [place], [place] - [address] [facility] #[id number] - [place] [place] [facility] [address] [facility] [facility]    Clinical concerns: no      Soraya Beltrán [facility]                Again, thank you for allowing me to participate in the care of your patient.        Sincerely,        LEX Larson CNP    "

## 2021-02-12 NOTE — PROGRESS NOTES
"Oncology Rooming Note    February 12, 2021 1:24 PM   Maximino Simeon is a 76 year old female who presents for:    Chief Complaint   Patient presents with     Oncology Clinic Visit     Initial Vitals: BP (!) 146/89   Pulse 94   Temp 97.9  F (36.6  C) (Oral)   Resp 16   Wt 48.5 kg (107 lb)   LMP  (LMP Unknown)   SpO2 98%   BMI 22.36 kg/m   Estimated body mass index is 22.36 kg/m  as calculated from the following:    Height as of 11/13/20: 1.473 m (4' 10\").    Weight as of this encounter: 48.5 kg (107 lb). Body surface area is 1.41 meters squared.  No Pain (0) Comment: Data Unavailable   No LMP recorded (lmp unknown). Patient has had a hysterectomy.  Allergies reviewed: Yes  Medications reviewed: Yes    Medications: Medication refills not needed today.  Pharmacy name entered into [employer]:    [facility] #[id number] - [facility] [address] DRUG STORE #[id number] - [facility] [address] [place]  [address] [facility] [address] [place]  [facility]ACY #[id number] [facility] [address] [facility]ACY #[id number] - [facility] [address] [facility] [facility]    Clinical concerns: no      Soraya Beltrán [facility]            "

## 2021-02-15 ENCOUNTER — TELEPHONE (OUTPATIENT)
Dept: ONCOLOGY | Facility: CLINIC | Age: 76
End: 2021-02-15

## 2021-02-16 ENCOUNTER — TELEPHONE (OUTPATIENT)
Dept: ONCOLOGY | Facility: CLINIC | Age: 76
End: 2021-02-16

## 2021-02-16 NOTE — TELEPHONE ENCOUNTER
RECORDS STATUS - ALL OTHER DIAGNOSIS      RECORDS RECEIVED FROM: [facility]   DATE RECEIVED: 2/23/2021   NOTES STATUS DETAILS   OFFICE NOTE from referring provider Complete Connie Martinez, LEX CNP   OFFICE NOTE from medical oncologist Complete 2/12/2021 Oncology Visit -Ovarian Cancer    11/13/2020 Oncology Visit - Ovarian Cancer    9/25/2020 Virtual Visit - Ovarian Cancer     8/20/2020 Virtual Visit - Ovarian Cancer    More in EPIC   DISCHARGE SUMMARY from hospital Complete [facility]    1/20/2020 8/30/2019 Ovarian Cancer     8/9/2019 S/P Hysterectomy (Primary Dx)    DISCHARGE REPORT from the ER     OPERATIVE REPORT Complete See Ovarian Biopsy in [facility] 8/9/2019    MEDICATION LIST Complete [facility]   CLINICAL TRIAL TREATMENTS TO DATE     LABS     PATHOLOGY REPORTS Complete-internal Biopsy  8/9/2019   OVARY AND FALLOPIAN TUBE, LEFT:   - Clear cell carcinoma of the ovary, 513.5 grams, 12.5 cm, see comment   - Foci suggestive of residual endometriosis   - Fallopian tube with no significant histopathologic abnormality    ANYTHING RELATED TO DIAGNOSIS Complete Labs last updated on 2/12/2021    [facility] 8/19/2020, 5/26/2020    GENONOMIC TESTING     TYPE:     IMAGING (NEED IMAGES & REPORT)     Xray Chest Complete 11/6/2019    IR Chest Complete 8/30/2019    CT SCANS Complete CT Chest Abdomen Pelvis 1/20/2020    CT Head 11/6/2019     CT Lumbar Spine 11/6/2019    MRI     MAMMO     ULTRASOUND     PET       Action    Action Taken 2/16/2021 9:25am     I called pt Brittni - both of her phones went to .

## 2021-02-19 ENCOUNTER — IMMUNIZATION (OUTPATIENT)
Dept: NURSING | Facility: CLINIC | Age: 76
End: 2021-02-19
Payer: MEDICARE

## 2021-02-19 PROCEDURE — 0001A PR COVID VAC PFIZER DIL RECON 30 MCG/0.3 ML IM: CPT

## 2021-02-19 PROCEDURE — 91300 PR COVID VAC PFIZER DIL RECON 30 MCG/0.3 ML IM: CPT

## 2021-02-22 NOTE — PROGRESS NOTES
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    Oncology History:    2019- CT Abdomen/Pelvis demonstrated a large cystic pelvic mass with soft tissue nodularity with adjacent prominent left adnexal vessels worrisome for neoplasm of ovarian origin. No findings suspicious for metastatic disease in the abdomen or pelvis.  Biopsy revealed high grade clear cell carcinoma  2019- Underwent exploratory laparotomy, total abdominal hysterectomy, removal of her ovary and the appendix  2019- Started chemotherapy with Taxol/Carbo x 6 Cycles, completed chemotherapy 2020.  2020- Follow up with  stable at 10. Plan to continue in person surveillance visits with  every 3 months until 2 years, every 6 months for 3 years and annual visits after this  2021- Follow up surveillance visit with  stable at 8. Greiving her sister's death, encouraged to continue mental health visits. Complained of ongoing fatigue and deconditioning      Symptoms,  Patient was seen for a virtual visit this afternoon.  She complains of ongoing fatigue that is generalized and has been affecting her quality of life.  She also specifically complains of cognitive fatigue in the form of short-term memory loss, difficulty processing and attention deficits that she has noticed after her chemotherapy.  She also has a history of fibromyalgia, and indicates that she feels discomfort all the time throughout her body.  She can especially feel arthritis in her hands and recently received steroid injection to her thumb which helped with her  "symptoms.  She indicates that she is able to function independently at home in terms of her mobility and ADLs, but definitely does not have as much energy as she used to and feels generally weak as a result of her surgery and treatments for cancer.  She states that she was very active and used to work out at [employer] very regularly during the week.  However, in the setting of Covid, she has not been able to do this more recently.  She also misses the socialization and sense of community that she had when at [employer].    She states that she constantly continues to experience abdominal discomfort, and complains that \"baby is in they are kicking me\" at times as a result of her colitis.  She takes 1 loperamide daily to help with multiple episodes of bowel movements including diarrhea at times.  She states that her bowel movements are usually well formed, and she more recently has not been having diarrhea but goes at least 5 times per day.  She feels that the loperamide is helping the symptoms, and to decrease the frequency of bowel movements.  In the past she has had to use cholestyramine to help with her bowel movements, but has not had to use this for a while.    She also complains of struggles with incontinence in the past, and wears always pads to help prevent leaks.  She ensures that she empties her bladder frequently, and this has helped prevent incontinent episodes.    She states that she has also been having a really difficult time with her mental health, as she recently lost her sister after a long illness.  She has been seeing a cognitive behavioral therapist in the community for many years, and recently saw this provider again.  However, she states that her insurance no longer covers services with this provider so she has had to pay out-of-pocket which may be difficult moving forward.        Therapies/HEP,  Patient has not done any formal outpatient PT or OT.  She indicates that prior to Covid she " used to go to [employer] very regularly 4-5 times per week prior to her diagnosis.  She did a combination of weight training, aerobics and stretching.  Currently, at home she does do some stretching and tries to get some aerobic activity and walking around her home and doing stairs.      Functionally,   Patient has been ambulating without an assistive device.  She has not experienced any loss of balance or falls.  She is independent with all mobility and ADLs.             Past Medical and Surgical History:     Past Medical History:   Diagnosis Date     Anxiety 2014     CAD (coronary artery disease) 2011     Cancer (H)     ovarian     Colitis      Fibromyalgia      Gastroesophageal reflux disease      Hypothyroidism 2011     Insomnia 2017     Mixed hyperlipidemia 2011     PONV (postoperative nausea and vomiting)      Recurrent major depressive disorder (H) 2010     Urinary retention      Past Surgical History:   Procedure Laterality Date     APPENDECTOMY OPEN N/A 8/9/2019    Procedure: Open Appendectomy;  Surgeon: Parvez Abreu MD;  Location: [facility] OR     BREAST SURGERY      biopsy     GYN SURGERY       HYSTERECTOMY TOTAL ABDOMINAL, BILATERAL SALPINGO-OOPHORECTOMY, NODE DISSECTION, COMBINED Bilateral 8/9/2019    Procedure: Exploratory Laparotomy, Total Abdominal Hysterectomy, Left Salpingo-Oopherectomy, Bilateral Pelvic and Para-Aortic Lymph Node Dissection, Peritoneal Biopsy, Diaphraghm Pap Smears.;  Surgeon: Naz Ascencio MD;  Location: [facility] OR     IR CHEST PORT PLACEMENT > 5 YRS OF AGE  8/30/2019     OOPHORECTOMY  1992            Social History:     Social History     Tobacco Use     Smoking status: Never Smoker     Smokeless tobacco: Never Used   Substance Use Topics     Alcohol use: Yes     Comment: socially       Marital Status: Live alone  Living situation: [place], in a town home with 4 levels and 35 steps across all levels.  Kitchen, bathroom, main living area are on the main level, her  bedroom is upstairs with 2 bathrooms  Family support: Patient has multiple friends, including a good support network of friends from [employer].  She also has a close friend in [place].   Vocational History: Patient is a , has a bachelor of science in elementary education.  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  Tobacco use: Denies  Alcohol use: Occasional use           Functional history:     Maximino Simeon is independent with all aspects of her life.    ADLs: Independent  Assistive devices: None  iADLs (medication management and finances): Independent  Hand dominance: left handed  Driving: yes           Family History:     Family History   Problem Relation Age of Onset     Heart Disease Father      Cerebrovascular Disease Father      Cerebrovascular Disease Sister      Thyroid Disease Sister      Breast Cancer Sister             Medications:     Current Outpatient Medications   Medication Sig Dispense Refill     Calcium Carbonate-Vitamin D (CALCIUM + D PO) Take 6 tablets by mouth daily.       Coenzyme Q10 (COQ10 PO) Take 200 mg by mouth 2 times daily       DULoxetine (CYMBALTA) 20 MG capsule Take 1 capsule (20 mg) by mouth daily Follow up with your PCP for refills (Patient taking differently: Take 30 mg by mouth daily Follow up with your PCP for refills) 30 capsule 0     levothyroxine (SYNTHROID/LEVOTHROID) 50 MCG tablet Take 50 mcg by mouth every morning        liothyronine (CYTOMEL) 5 MCG tablet Take 5 mcg by mouth every morning        loperamide (IMODIUM) 1 MG/5ML liquid Take 1 mg by mouth 6 times daily        Multiple Vitamins-Minerals (MULTIVITAL PO) Take 1 tablet by mouth daily (with lunch)        order for DME Cranial prothesis 1 each 0     Probiotic Product (PROBIOTIC DAILY PO) 1 tablet daily at lunchtime by mouth              Allergies:     Allergies   Allergen Reactions     Adhesive Tape      bandaids     Liquid Adhesive Rash      "Penicillins Rash              ROS:     A 12 point focused ROS is negative other than the symptoms noted above in the HPI.             Physical Examiniation:     VITAL SIGNS: LMP  (LMP Unknown)   BMI: Estimated body mass index is 22.36 kg/m  as calculated from the following:    Height as of 11/13/20: 1.473 m (4' 10\").    Weight as of 2/12/21: 48.5 kg (107 lb).    Gen: NAD, pleasant and cooperative   HEENT: Normocephalic, atraumatic.  Extraocular movements appear intact.  Pulm: non-labored breathing in room air  Neuro/MSK:   Motor: Patient observed moving upper his extremities actively against gravity during visit           Laboratory/Imaging:     CT Chest/Abdomen/Pelvis (1/20/2020):  IMPRESSION: No evidence of recurrent or metastatic disease in the  chest, abdomen, or pelvis.    No recent imaging.           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  As discussed with the patient, recommendation is that she start outpatient cancer rehab PT and OT to help with strengthening in the setting of generalized weakness and deconditioning.  In addition, the recommendation is that patient start cognitive rehab for her short-term memory difficulties, and difficulty processing.  We discussed her bowel management at length, and possibility to alter bowel medications in the future.  As patient's bowel regimen has been manageable with her daily loperamide, we will keep this for now but can readdress this in the future.  Also told patient that we could place a referral to psychology to help with her mental health in the setting of her sister's recent passing, and symptoms of depression and anxiety that she has been experiencing with her disease process.  Patient will continue with her cognitive behavioral therapist for now as she " has been seeing this provider for years, but in the setting of difficulties with insurance coverage and visits in the setting of Covid, she will contact us if she would like a referral placed to psychology within our system.      1. Patient education: In depth discussion and education was provided about the assessment and implications of each of the below recommendations for management. Patient indicated readiness to learn, all questions were answered and understanding of material presented was confirmed.  2. Therapy/equipment/braces:  1. Referral placed to outpatient cancer rehab PT to help with strengthening in the setting of generalized weakness and deconditioning.  Referral also placed to cancer rehab OT to help with cognitive rehab in the setting of chemotherapy related short-term memory difficulties and processing.  2. Recommended that when patient feels difficulty with her balance that she use an assistive device such as a cane for mobility within her home.  She indicates that she has rarely had balance difficulties with ambulation at this time.  3. Medications:  1. We will continue current bowel regimen with daily loperamide in the setting of colitis since it has been manageable.  We discussed that we can readdress this and alter her medication regimen to regularize her bowel movements if needed in the future.  4. Referral / follow up with other providers:  1. Discussed referral to psychology to help with mental health difficulties in the setting of her sisters recent passing and anxiety/depression with her disease process.  Patient will contact our clinic if she would like this referral placed.  She will continue to see her cognitive behavioral therapist.  5. Follow up: 1 month for virtual visit    Paola Soto MD  Physical Medicine & Rehabilitation

## 2021-02-23 ENCOUNTER — VIRTUAL VISIT (OUTPATIENT)
Dept: ONCOLOGY | Facility: CLINIC | Age: 76
End: 2021-02-23
Attending: STUDENT IN AN ORGANIZED HEALTH CARE EDUCATION/TRAINING PROGRAM
Payer: MEDICARE

## 2021-02-23 ENCOUNTER — PRE VISIT (OUTPATIENT)
Dept: ONCOLOGY | Facility: CLINIC | Age: 76
End: 2021-02-23

## 2021-02-23 DIAGNOSIS — K52.831 COLLAGENOUS COLITIS: ICD-10-CM

## 2021-02-23 DIAGNOSIS — M79.7 FIBROMYALGIA: ICD-10-CM

## 2021-02-23 DIAGNOSIS — C56.9 OVARIAN CANCER, UNSPECIFIED LATERALITY (H): ICD-10-CM

## 2021-02-23 DIAGNOSIS — F33.1 MAJOR DEPRESSIVE DISORDER, RECURRENT EPISODE, MODERATE (H): ICD-10-CM

## 2021-02-23 DIAGNOSIS — R53.81 PHYSICAL DECONDITIONING: Primary | ICD-10-CM

## 2021-02-23 DIAGNOSIS — R41.89 COGNITIVE IMPAIRMENT: ICD-10-CM

## 2021-02-23 PROCEDURE — 999N001193 HC VIDEO/TELEPHONE VISIT; NO CHARGE

## 2021-02-23 PROCEDURE — 99205 OFFICE O/P NEW HI 60 MIN: CPT | Mod: 95 | Performed by: STUDENT IN AN ORGANIZED HEALTH CARE EDUCATION/TRAINING PROGRAM

## 2021-02-23 RX ORDER — TRIAMCINOLONE ACETONIDE 1 MG/G
CREAM TOPICAL
COMMUNITY
Start: 2020-06-02 | End: 2022-01-17

## 2021-02-23 NOTE — LETTER
2021         RE: Maximino Simeon  [address] [facility] [id number]        Dear Colleague,    Thank you for referring your patient, Maximino Simeon, to the [facility]. Please see a copy of my visit note below.           [facility] Note     Patient Name: Maximino Simeon : 1945 Medical Record: [id number]     Requesting Physician/clinician: Connie Martinez APRN CNP           History of Present Illness:     Maximino Smieon is a 76 year old female with history of Stage IA Clear Cell Carcinoma of the left ovary who presents to [facility] for evaluation of her rehabilitation needs and weakness, deconditioning and fatigue.    Oncology History:    2019- CT Abdomen/Pelvis demonstrated a large cystic pelvic mass with soft tissue nodularity with adjacent prominent left adnexal vessels worrisome for neoplasm of ovarian origin. No findings suspicious for metastatic disease in the abdomen or pelvis.  Biopsy revealed high grade clear cell carcinoma  2019- Underwent exploratory laparotomy, total abdominal hysterectomy, removal of her ovary and the appendix  2019- Started chemotherapy with Taxol/Carbo x 6 Cycles, completed chemotherapy 2020.  2020- Follow up with  stable at 10. Plan to continue in person surveillance visits with  every 3 months until 2 years, every 6 months for 3 years and annual visits after this  2021- Follow up surveillance visit with  stable at 8. Greiving her sister's death, encouraged to continue mental health visits. Complained of ongoing fatigue and deconditioning      Symptoms,  Patient was seen for a virtual visit this afternoon.  She complains of ongoing fatigue that is generalized and has been affecting her quality of life.  She also specifically complains of cognitive fatigue in the form of short-term memory loss, difficulty processing and attention deficits that she has noticed after  "her chemotherapy.  She also has a history of fibromyalgia, and indicates that she feels discomfort all the time throughout her body.  She can especially feel arthritis in her hands and recently received steroid injection to her thumb which helped with her symptoms.  She indicates that she is able to function independently at home in terms of her mobility and ADLs, but definitely does not have as much energy as she used to and feels generally weak as a result of her surgery and treatments for cancer.  She states that she was very active and used to work out at [employer] very regularly during the week.  However, in the setting of Covid, she has not been able to do this more recently.  She also misses the socialization and sense of community that she had when at [employer].    She states that she constantly continues to experience abdominal discomfort, and complains that \"baby is in they are kicking me\" at times as a result of her colitis.  She takes 1 loperamide daily to help with multiple episodes of bowel movements including diarrhea at times.  She states that her bowel movements are usually well formed, and she more recently has not been having diarrhea but goes at least 5 times per day.  She feels that the loperamide is helping the symptoms, and to decrease the frequency of bowel movements.  In the past she has had to use cholestyramine to help with her bowel movements, but has not had to use this for a while.    She also complains of struggles with incontinence in the past, and wears always pads to help prevent leaks.  She ensures that she empties her bladder frequently, and this has helped prevent incontinent episodes.    She states that she has also been having a really difficult time with her mental health, as she recently lost her sister after a long illness.  She has been seeing a cognitive behavioral therapist in the community for many years, and recently saw this provider again.  However, she states " that her insurance no longer covers services with this provider so she has had to pay out-of-pocket which may be difficult moving forward.        Therapies/HEP,  Patient has not done any formal outpatient PT or OT.  She indicates that prior to Covid she used to go to [employer] very regularly 4-5 times per week prior to her diagnosis.  She did a combination of weight training, aerobics and stretching.  Currently, at home she does do some stretching and tries to get some aerobic activity and walking around her home and doing stairs.      Functionally,   Patient has been ambulating without an assistive device.  She has not experienced any loss of balance or falls.  She is independent with all mobility and ADLs.             Past Medical and Surgical History:     Past Medical History:   Diagnosis Date     Anxiety 2014     CAD (coronary artery disease) 2011     Cancer (H)     ovarian     Colitis      Fibromyalgia      Gastroesophageal reflux disease      Hypothyroidism 2011     Insomnia 2017     Mixed hyperlipidemia 2011     PONV (postoperative nausea and vomiting)      Recurrent major depressive disorder (H) 2010     Urinary retention      Past Surgical History:   Procedure Laterality Date     APPENDECTOMY OPEN N/A 8/9/2019    Procedure: Open Appendectomy;  Surgeon: Parvez Abreu MD;  Location: [facility] OR     BREAST SURGERY      biopsy     GYN SURGERY       HYSTERECTOMY TOTAL ABDOMINAL, BILATERAL SALPINGO-OOPHORECTOMY, NODE DISSECTION, COMBINED Bilateral 8/9/2019    Procedure: Exploratory Laparotomy, Total Abdominal Hysterectomy, Left Salpingo-Oopherectomy, Bilateral Pelvic and Para-Aortic Lymph Node Dissection, Peritoneal Biopsy, Diaphraghm Pap Smears.;  Surgeon: Naz Ascencio MD;  Location: [facility] OR     IR CHEST PORT PLACEMENT > 5 YRS OF AGE  8/30/2019     OOPHORECTOMY  1992            Social History:     Social History     Tobacco Use     Smoking status: Never Smoker     Smokeless tobacco: Never Used    Substance Use Topics     Alcohol use: Yes     Comment: socially       Marital Status: Live alone  Living situation: [place], in a town home with 4 levels and 35 steps across all levels.  Kitchen, bathroom, main living area are on the main level, her bedroom is upstairs with 2 bathrooms  Family support: Patient has multiple friends, including a good support network of friends from [employer].  She also has a close friend in [place].   Vocational History: Patient is a , has a bachelor of science in elementary education.  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  Tobacco use: Denies  Alcohol use: Occasional use           Functional history:     Maximino Simeon is independent with all aspects of her life.    ADLs: Independent  Assistive devices: None  iADLs (medication management and finances): Independent  Hand dominance: left handed  Driving: yes           Family History:     Family History   Problem Relation Age of Onset     Heart Disease Father      Cerebrovascular Disease Father      Cerebrovascular Disease Sister      Thyroid Disease Sister      Breast Cancer Sister             Medications:     Current Outpatient Medications   Medication Sig Dispense Refill     Calcium Carbonate-Vitamin D (CALCIUM + D PO) Take 6 tablets by mouth daily.       Coenzyme Q10 (COQ10 PO) Take 200 mg by mouth 2 times daily       DULoxetine (CYMBALTA) 20 MG capsule Take 1 capsule (20 mg) by mouth daily Follow up with your PCP for refills (Patient taking differently: Take 30 mg by mouth daily Follow up with your PCP for refills) 30 capsule 0     levothyroxine (SYNTHROID/LEVOTHROID) 50 MCG tablet Take 50 mcg by mouth every morning        liothyronine (CYTOMEL) 5 MCG tablet Take 5 mcg by mouth every morning        loperamide (IMODIUM) 1 MG/5ML liquid Take 1 mg by mouth 6 times daily        Multiple Vitamins-Minerals (MULTIVITAL PO) Take 1 tablet by mouth daily (with lunch)    "     order for DME Cranial prothesis 1 each 0     Probiotic Product (PROBIOTIC DAILY PO) 1 tablet daily at lunchtime by mouth              Allergies:     Allergies   Allergen Reactions     Adhesive Tape      bandaids     Liquid Adhesive Rash     Penicillins Rash              ROS:     A 12 point focused ROS is negative other than the symptoms noted above in the HPI.             Physical Examiniation:     VITAL SIGNS: LMP  (LMP Unknown)   BMI: Estimated body mass index is 22.36 kg/m  as calculated from the following:    Height as of 11/13/20: 1.473 m (4' 10\").    Weight as of 2/12/21: 48.5 kg (107 lb).    Gen: NAD, pleasant and cooperative   HEENT: Normocephalic, atraumatic.  Extraocular movements appear intact.  Pulm: non-labored breathing in room air  Neuro/MSK:   Motor: Patient observed moving upper his extremities actively against gravity during visit           Laboratory/Imaging:     CT Chest/Abdomen/Pelvis (1/20/2020):  IMPRESSION: No evidence of recurrent or metastatic disease in the  chest, abdomen, or pelvis.    No recent imaging.           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  As discussed with the patient, recommendation is that she start outpatient cancer rehab PT and OT to help with strengthening in the setting of generalized weakness and deconditioning.  In addition, the recommendation is that patient start cognitive rehab for her short-term memory difficulties, and difficulty processing.  We discussed her bowel management at length, and possibility to alter bowel medications in the future.  As patient's bowel regimen has been manageable with her daily loperamide, we will keep this for now but can readdress this in the future.  Also told patient that we could place a referral to psychology to " help with her mental health in the setting of her sister's recent passing, and symptoms of depression and anxiety that she has been experiencing with her disease process.  Patient will continue with her cognitive behavioral therapist for now as she has been seeing this provider for years, but in the setting of difficulties with insurance coverage and visits in the setting of Covid, she will contact us if she would like a referral placed to psychology within our system.      1. Patient education: In depth discussion and education was provided about the assessment and implications of each of the below recommendations for management. Patient indicated readiness to learn, all questions were answered and understanding of material presented was confirmed.  2. Therapy/equipment/braces:  1. Referral placed to outpatient cancer rehab PT to help with strengthening in the setting of generalized weakness and deconditioning.  Referral also placed to cancer rehab OT to help with cognitive rehab in the setting of chemotherapy related short-term memory difficulties and processing.  2. Recommended that when patient feels difficulty with her balance that she use an assistive device such as a cane for mobility within her home.  She indicates that she has rarely had balance difficulties with ambulation at this time.  3. Medications:  1. We will continue current bowel regimen with daily loperamide in the setting of colitis since it has been manageable.  We discussed that we can readdress this and alter her medication regimen to regularize her bowel movements if needed in the future.  4. Referral / follow up with other providers:  1. Discussed referral to psychology to help with mental health difficulties in the setting of her sisters recent passing and anxiety/depression with her disease process.  Patient will contact our clinic if she would like this referral placed.  She will continue to see her cognitive behavioral  "therapist.  5. Follow up: 1 month for virtual visit    Paola Soto MD  Physical Medicine & Rehabilitation                  Brittni is a 76 year old who is being evaluated via a billable video visit.      How would you like to obtain your AVS? MyChart  If the video visit is dropped, the invitation should be resent by: Send to e-mail at: [email or web address]  Will anyone else be joining your video visit? No    Vitals - Patient Reported  Weight (Patient Reported): 47.6 kg (105 lb)  Height (Patient Reported): 147.3 cm (4' 10\")  BMI (Based on Pt Reported Ht/Wt): 21.94  Pain Score: No Pain (0)      Video Start Time: 1:02 PM  Video-Visit Details    Type of service:  Video Visit    Video End Time:2:04 PM    Originating Location (pt. Location): Home    Distant Location (provider location):  [facility]     Platform used for Video Visit: An[employer]     Josie Ken MA        Again, thank you for allowing me to participate in the care of your patient.        Sincerely,        Paola Soto MD    "

## 2021-02-23 NOTE — PROGRESS NOTES
"Brittni is a 76 year old who is being evaluated via a billable video visit.      How would you like to obtain your AVS? MyChart  If the video visit is dropped, the invitation should be resent by: Send to e-mail at: [email or web address]  Will anyone else be joining your video visit? No    Vitals - Patient Reported  Weight (Patient Reported): 47.6 kg (105 lb)  Height (Patient Reported): 147.3 cm (4' 10\")  BMI (Based on Pt Reported Ht/Wt): 21.94  Pain Score: No Pain (0)      Video Start Time: 1:02 PM  Video-Visit Details    Type of service:  Video Visit    Video End Time:2:04 PM    Originating Location (pt. Location): Home    Distant Location (provider location):  [facility]     Platform used for Video Visit: Jones Ken MA    "

## 2021-02-23 NOTE — PATIENT INSTRUCTIONS
1. A referral has been placed to cancer rehab physical and occupational therapy to help with your fatigue and weakness as well as the cognitive difficulties you've been experiencing like the short term memory loss in the setting of chemotherapy.  2. Please send a message by [employer] if you would like a referral to Psychology as we discussed.  3. We will keep your bowel regimen the same since you have been tolerating this so far. We can readdress this in the future if needed.  4. Return to clinic for a virtual visit with Dr. Soto in 1 month.

## 2021-02-23 NOTE — LETTER
2021         RE: Maximino Simeon  [address] [facility] [address]        Dear Colleague,    Thank you for referring your patient, Maximino Simeon, to the [facility]. Please see a copy of my visit note below.           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    Oncology History:    2019- CT Abdomen/Pelvis demonstrated a large cystic pelvic mass with soft tissue nodularity with adjacent prominent left adnexal vessels worrisome for neoplasm of ovarian origin. No findings suspicious for metastatic disease in the abdomen or pelvis.  Biopsy revealed high grade clear cell carcinoma  2019- Underwent exploratory laparotomy, total abdominal hysterectomy, removal of her ovary and the appendix  2019- Started chemotherapy with Taxol/Carbo x 6 Cycles, completed chemotherapy 2020.  2020- Follow up with  stable at 10. Plan to continue in person surveillance visits with  every 3 months until 2 years, every 6 months for 3 years and annual visits after this  2021- Follow up surveillance visit with  stable at 8. Greiving her sister's death, encouraged to continue mental health visits. Complained of ongoing fatigue and deconditioning      Symptoms,  Patient was seen for a virtual visit this afternoon.  She complains of ongoing fatigue that is generalized and has been affecting her quality of life.  She also specifically complains of cognitive fatigue in the form of short-term memory loss, difficulty processing and attention deficits that she has noticed after  "her chemotherapy.  She also has a history of fibromyalgia, and indicates that she feels discomfort all the time throughout her body.  She can especially feel arthritis in her hands and recently received steroid injection to her thumb which helped with her symptoms.  She indicates that she is able to function independently at home in terms of her mobility and ADLs, but definitely does not have as much energy as she used to and feels generally weak as a result of her surgery and treatments for cancer.  She states that she was very active and used to work out at [employer] very regularly during the week.  However, in the setting of Covid, she has not been able to do this more recently.  She also misses the socialization and sense of community that she had when at [employer].    She states that she constantly continues to experience abdominal discomfort, and complains that \"baby is in they are kicking me\" at times as a result of her colitis.  She takes 1 loperamide daily to help with multiple episodes of bowel movements including diarrhea at times.  She states that her bowel movements are usually well formed, and she more recently has not been having diarrhea but goes at least 5 times per day.  She feels that the loperamide is helping the symptoms, and to decrease the frequency of bowel movements.  In the past she has had to use cholestyramine to help with her bowel movements, but has not had to use this for a while.    She also complains of struggles with incontinence in the past, and wears always pads to help prevent leaks.  She ensures that she empties her bladder frequently, and this has helped prevent incontinent episodes.    She states that she has also been having a really difficult time with her mental health, as she recently lost her sister after a long illness.  She has been seeing a cognitive behavioral therapist in the community for many years, and recently saw this provider again.  However, she states " that her insurance no longer covers services with this provider so she has had to pay out-of-pocket which may be difficult moving forward.        Therapies/HEP,  Patient has not done any formal outpatient PT or OT.  She indicates that prior to Covid she used to go to [employer] very regularly 4-5 times per week prior to her diagnosis.  She did a combination of weight training, aerobics and stretching.  Currently, at home she does do some stretching and tries to get some aerobic activity and walking around her home and doing stairs.      Functionally,   Patient has been ambulating without an assistive device.  She has not experienced any loss of balance or falls.  She is independent with all mobility and ADLs.             Past Medical and Surgical History:     Past Medical History:   Diagnosis Date     Anxiety 2014     CAD (coronary artery disease) 2011     Cancer (H)     ovarian     Colitis      Fibromyalgia      Gastroesophageal reflux disease      Hypothyroidism 2011     Insomnia 2017     Mixed hyperlipidemia 2011     PONV (postoperative nausea and vomiting)      Recurrent major depressive disorder (H) 2010     Urinary retention      Past Surgical History:   Procedure Laterality Date     APPENDECTOMY OPEN N/A 8/9/2019    Procedure: Open Appendectomy;  Surgeon: Parvez Abreu MD;  Location: [facility] OR     BREAST SURGERY      biopsy     GYN SURGERY       HYSTERECTOMY TOTAL ABDOMINAL, BILATERAL SALPINGO-OOPHORECTOMY, NODE DISSECTION, COMBINED Bilateral 8/9/2019    Procedure: Exploratory Laparotomy, Total Abdominal Hysterectomy, Left Salpingo-Oopherectomy, Bilateral Pelvic and Para-Aortic Lymph Node Dissection, Peritoneal Biopsy, Diaphraghm Pap Smears.;  Surgeon: Naz Ascencio MD;  Location: [facility] OR     IR CHEST PORT PLACEMENT > 5 YRS OF AGE  8/30/2019     OOPHORECTOMY  1992            Social History:     Social History     Tobacco Use     Smoking status: Never Smoker     Smokeless tobacco: Never Used    Substance Use Topics     Alcohol use: Yes     Comment: socially       Marital Status: Live alone  Living situation: [place], in a town home with 4 levels and 35 steps across all levels.  Kitchen, bathroom, main living area are on the main level, her bedroom is upstairs with 2 bathrooms  Family support: Patient has multiple friends, including a good support network of friends from [employer].  She also has a close friend in [place].   Vocational History: Patient is a , has a bachelor of science in elementary education.  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  Tobacco use: Denies  Alcohol use: Occasional use           Functional history:     Maximino Simeon is independent with all aspects of her life.    ADLs: Independent  Assistive devices: None  iADLs (medication management and finances): Independent  Hand dominance: left handed  Driving: yes           Family History:     Family History   Problem Relation Age of Onset     Heart Disease Father      Cerebrovascular Disease Father      Cerebrovascular Disease Sister      Thyroid Disease Sister      Breast Cancer Sister             Medications:     Current Outpatient Medications   Medication Sig Dispense Refill     Calcium Carbonate-Vitamin D (CALCIUM + D PO) Take 6 tablets by mouth daily.       Coenzyme Q10 (COQ10 PO) Take 200 mg by mouth 2 times daily       DULoxetine (CYMBALTA) 20 MG capsule Take 1 capsule (20 mg) by mouth daily Follow up with your PCP for refills (Patient taking differently: Take 30 mg by mouth daily Follow up with your PCP for refills) 30 capsule 0     levothyroxine (SYNTHROID/LEVOTHROID) 50 MCG tablet Take 50 mcg by mouth every morning        liothyronine (CYTOMEL) 5 MCG tablet Take 5 mcg by mouth every morning        loperamide (IMODIUM) 1 MG/5ML liquid Take 1 mg by mouth 6 times daily        Multiple Vitamins-Minerals (MULTIVITAL PO) Take 1 tablet by mouth daily (with lunch)    "     order for DME Cranial prothesis 1 each 0     Probiotic Product (PROBIOTIC DAILY PO) 1 tablet daily at lunchtime by mouth              Allergies:     Allergies   Allergen Reactions     Adhesive Tape      bandaids     Liquid Adhesive Rash     Penicillins Rash              ROS:     A 12 point focused ROS is negative other than the symptoms noted above in the HPI.             Physical Examiniation:     VITAL SIGNS: LMP  (LMP Unknown)   BMI: Estimated body mass index is 22.36 kg/m  as calculated from the following:    Height as of 11/13/20: 1.473 m (4' 10\").    Weight as of 2/12/21: 48.5 kg (107 lb).    Gen: NAD, pleasant and cooperative   HEENT: Normocephalic, atraumatic.  Extraocular movements appear intact.  Pulm: non-labored breathing in room air  Neuro/MSK:   Motor: Patient observed moving upper his extremities actively against gravity during visit           Laboratory/Imaging:     CT Chest/Abdomen/Pelvis (1/20/2020):  IMPRESSION: No evidence of recurrent or metastatic disease in the  chest, abdomen, or pelvis.    No recent imaging.           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  As discussed with the patient, recommendation is that she start outpatient cancer rehab PT and OT to help with strengthening in the setting of generalized weakness and deconditioning.  In addition, the recommendation is that patient start cognitive rehab for her short-term memory difficulties, and difficulty processing.  We discussed her bowel management at length, and possibility to alter bowel medications in the future.  As patient's bowel regimen has been manageable with her daily loperamide, we will keep this for now but can readdress this in the future.  Also told patient that we could place a referral to psychology to " help with her mental health in the setting of her sister's recent passing, and symptoms of depression and anxiety that she has been experiencing with her disease process.  Patient will continue with her cognitive behavioral therapist for now as she has been seeing this provider for years, but in the setting of difficulties with insurance coverage and visits in the setting of Covid, she will contact us if she would like a referral placed to psychology within our system.      1. Patient education: In depth discussion and education was provided about the assessment and implications of each of the below recommendations for management. Patient indicated readiness to learn, all questions were answered and understanding of material presented was confirmed.  2. Therapy/equipment/braces:  1. Referral placed to outpatient cancer rehab PT to help with strengthening in the setting of generalized weakness and deconditioning.  Referral also placed to cancer rehab OT to help with cognitive rehab in the setting of chemotherapy related short-term memory difficulties and processing.  2. Recommended that when patient feels difficulty with her balance that she use an assistive device such as a cane for mobility within her home.  She indicates that she has rarely had balance difficulties with ambulation at this time.  3. Medications:  1. We will continue current bowel regimen with daily loperamide in the setting of colitis since it has been manageable.  We discussed that we can readdress this and alter her medication regimen to regularize her bowel movements if needed in the future.  4. Referral / follow up with other providers:  1. Discussed referral to psychology to help with mental health difficulties in the setting of her sisters recent passing and anxiety/depression with her disease process.  Patient will contact our clinic if she would like this referral placed.  She will continue to see her cognitive behavioral  "therapist.  5. Follow up: 1 month for virtual visit    Paola Soto MD  Physical Medicine & Rehabilitation                  Brittni is a 76 year old who is being evaluated via a billable video visit.      How would you like to obtain your AVS? MyChart  If the video visit is dropped, the invitation should be resent by: Send to e-mail at: [email or web address]  Will anyone else be joining your video visit? No    Vitals - Patient Reported  Weight (Patient Reported): 47.6 kg (105 lb)  Height (Patient Reported): 147.3 cm (4' 10\")  BMI (Based on Pt Reported Ht/Wt): 21.94  Pain Score: No Pain (0)      Video Start Time: 1:02 PM  Video-Visit Details    Type of service:  Video Visit    Video End Time:2:04 PM    Originating Location (pt. Location): Home    Distant Location (provider location):  [facility]     Platform used for Video Visit: Linksify     Josie Ken MA        Again, thank you for allowing me to participate in the care of your patient.        Sincerely,        Paola Soto MD    "

## 2021-02-26 ENCOUNTER — HOSPITAL ENCOUNTER (OUTPATIENT)
Dept: PHYSICAL THERAPY | Facility: CLINIC | Age: 76
Setting detail: THERAPIES SERIES
End: 2021-02-26
Attending: STUDENT IN AN ORGANIZED HEALTH CARE EDUCATION/TRAINING PROGRAM
Payer: MEDICARE

## 2021-02-26 DIAGNOSIS — K52.831 COLLAGENOUS COLITIS: ICD-10-CM

## 2021-02-26 DIAGNOSIS — R41.89 COGNITIVE IMPAIRMENT: ICD-10-CM

## 2021-02-26 DIAGNOSIS — C56.9 OVARIAN CANCER, UNSPECIFIED LATERALITY (H): ICD-10-CM

## 2021-02-26 DIAGNOSIS — M79.7 FIBROMYALGIA: ICD-10-CM

## 2021-02-26 DIAGNOSIS — R53.81 PHYSICAL DECONDITIONING: ICD-10-CM

## 2021-02-26 DIAGNOSIS — F33.1 MAJOR DEPRESSIVE DISORDER, RECURRENT EPISODE, MODERATE (H): ICD-10-CM

## 2021-02-26 PROCEDURE — 97110 THERAPEUTIC EXERCISES: CPT | Mod: GP | Performed by: PHYSICAL THERAPIST

## 2021-02-26 PROCEDURE — 97161 PT EVAL LOW COMPLEX 20 MIN: CPT | Mod: GP | Performed by: PHYSICAL THERAPIST

## 2021-02-26 ASSESSMENT — 6 MINUTE WALK TEST (6MWT)
TOTAL DISTANCE WALKED (FT): 1225
TOTAL DISTANCE WALKED (METERS): 373

## 2021-02-26 NOTE — PROGRESS NOTES
02/26/21 1300   Quick Adds   Quick Adds Certification   Type of Visit Initial OP PT Evaluation   General Information   Start of Care Date 02/26/21   Referring Physician Paola Soto MD   Orders Evaluate and Treat as Indicated   Order Date 02/23/21   Medical Diagnosis Ovarian Cancer C56.9, physical deconditioning R53.81   Onset of illness/injury or Date of Surgery 09/26/19   Surgical/Medical history reviewed Yes   Pertinent history of current problem (include personal factors and/or comorbidities that impact the POC)  Pt is a 76 y.o. female with hx of ovarian cancer diagnosised 6/29/19 who complains of ongoing fatigue that is generalized and has been affecting her quality of life.  She also specifically complains of cognitive fatigue in the form of short-term memory loss, difficulty processing and attention deficits that she has noticed after her chemotherapy.She indicates that she is able to function independently at home in terms of her mobility and ADLs, but definitely does not have as much energy as she used to and feels generally weak as a result of her surgery and treatments for cancer.  She states that she was very active and used to work out at [employer] very regularly during the week.  However, in the setting of Covid, she has not been able to do this more recently.  She also misses the socialization and sense of community that she had when at [employer].   Pertinent Visual History    (glasses)   Prior level of function comment Pt IND with all household tasks & functional mobility. Pt previously was attending [employer] classes 4x/wk with 2 aeorbic classes and 2 strengthening classes. Was able to walk a 1mile loop regulary at home. Has many levels of stairs at home; IND.  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    Current Community Support Other/Comments  (Daughter lives in  [place]; son lives [place])   Patient role/Employment history Retired  (volunteering, teaches piano)   Living environment [facility]   Home/Community Accessibility Comments 4 level split ~35steps total   Assistive Devices Comments None; IND   Patient/Family Goals Statement return to gym classes (weight training 2x/wk, aerobics 2x/wk).    General Information Comments Pt not sure if she needs PT; states her MD thought it might be a good idea.    Fall Risk Screen   Fall screen completed by PT   Have you fallen 2 or more times in the past year? No   Have you fallen and had an injury in the past year? No   Is patient a fall risk? No   Abuse Screen (yes response referral indicated)   Feels Unsafe at Home or Work/School no   Feels Threatened by Someone no   Does Anyone Try to Keep You From Having Contact with Others or Doing Things Outside Your Home? no   Physical Signs of Abuse Present no   System Outcome Measures   Outcome Measures Cancer Rehab   FACIT Fatigue Subscale (score out of 52). The higher the score, the better the QOL. 27   Six Minute Walk (meters). An increase of 70 or more meters indicates statistically significant change. 373   Pain   Patient currently in pain No   Cognitive Status Examination   Orientation orientation to person, place and time   Level of Consciousness alert   Follows Commands and Answers Questions 100% of the time;able to follow multistep instructions   Personal Safety and Judgment intact   Memory intact   Observation   Observation Pt arrives today with no AD; was late to therapy and got lost.    Integumentary   Integumentary No deficits were identified   Posture   Posture Forward head position   Range of Motion (ROM)   ROM Comment WFL   Strength   Strength Comments functional strength intact; sit<>stand no UEs   Bed Mobility   Bed Mobility Comments IND   Transfer Skills   Transfer Comments IND   Gait   Gait Comments IND; stairs: IND   Gait Special Tests   Gait Special Tests SIX  "MINUTE WALK TEST   Gait Special Tests Six Minute Walk Test   Feet 1225 Feet   Comments pt appears to be ambulating slow but steady; states it is fast for her. Rats 2/10 exertion   Balance   Balance Comments safe static balance   Balance Special Tests   Balance Special Tests Single leg stance right;Single leg stance left;Sit to stand reps   Balance Special Tests Single Leg Stance Right,   Comments 8   Balance Special Tests Single Leg Stance Left   Comments 5   Balance Special Tests Sit to Stand Reps in 30 Seconds   Reps in 30 seconds 12   Height 18\"   Comments no UEs; 2/10 exertion   Sensory Examination   Sensory Perception other (describe)   Sensory Perception Comments pins and needles bilat UE hands;tingling & LEs.    Planned Therapy Interventions   Planned Therapy Interventions balance training;gait training;neuromuscular re-education;motor coordination training;strengthening;transfer training   Clinical Impression   Criteria for Skilled Therapeutic Interventions Met yes, treatment indicated   PT Diagnosis decreased functional endurance/muscular endurance   Influenced by the following impairments decreased functional endurance/muscular endurance   Functional limitations due to impairments not walking in neighborhood   Clinical Presentation Stable/Uncomplicated   Clinical Presentation Rationale stable; minimal functional deficits   Clinical Decision Making (Complexity) Low complexity   Therapy Frequency 1 time/week   Predicted Duration of Therapy Intervention (days/wks) 4 weeks   Risk & Benefits of therapy have been explained Yes   Patient, Family & other staff in agreement with plan of care Yes   Clinical Impression Comments Pt would benefit from skilled PT intervention to address low functional endurance and mild dynamic balance impairemments.    GOALS   PT Eval Goals 1;2;3   Goal 1   Goal Identifier HEP   Goal Description Pt will demonstrate IND with progressive HEP in order to maintain gains made in home " environment.   Target Date 03/28/21   Goal 2   Goal Identifier 6MWT   Goal Description Pt will travel >1600ft during 6MWT with <5/10 exertion in order to demonstrate return to age related norms with no AD.    Target Date 03/28/21   Goal 3   Goal Identifier FGA   Goal Description Pt will score >24/30 during FGA dymaic balance test in order to reduce falls risk and idnicate increasing SLB/stability.    Target Date 03/28/21   Total Evaluation Time   PT Eval, Low Complexity Minutes (27934) 45   Therapy Certification   Certification date from 02/26/21   Certification date to 04/17/21   Medical Diagnosis ovarian cancer C56.9, physical deconditioning R53.81

## 2021-02-26 NOTE — PROGRESS NOTES
[facility]        OUTPATIENT PHYSICAL THERAPY FUNCTIONAL EVALUATION  PLAN OF TREATMENT FOR OUTPATIENT REHABILITATION  (COMPLETE FOR INITIAL CLAIMS ONLY)  Patient's Last Name, First Name, M.I.  YOB: 1945  Maximino Simeon        Provider's Name   [facility]   Medical Record No.  [id number]     Start of Care Date:  02/26/21   Onset Date:  09/26/19   Type:     _X__PT   ____OT  ____SLP Medical Diagnosis:  ovarian cancer C56.9, physical deconditioning R53.81     PT Diagnosis:  decreased functional endurance/muscular endurance Visits from SOC:  1                              __________________________________________________________________________________  Plan of Treatment/Functional Goals:  balance training, gait training, neuromuscular re-education, motor coordination training, strengthening, transfer training           GOALS  HEP  Pt will demonstrate IND with progressive HEP in order to maintain gains made in home environment.  03/28/21    6MWT  Pt will travel >1600ft during 6MWT with <5/10 exertion in order to demonstrate return to age related norms with no AD.   03/28/21    FGA  Pt will score >24/30 during FGA dymaic balance test in order to reduce falls risk and idnicate increasing SLB/stability.   03/28/21                                                           Therapy Frequency:  1 time/week   Predicted Duration of Therapy Intervention:  4 weeks    Sammi Henderson, PT                                    I CERTIFY THE NEED FOR THESE SERVICES FURNISHED UNDER        THIS PLAN OF TREATMENT AND WHILE UNDER MY CARE     (Physician co-signature of this document indicates review and certification of the therapy plan).                Certification Date From:  02/26/21   Certification Date To:  04/17/21    Referring Provider:  Paola Soto MD    Initial  Assessment  See Epic Evaluation- Start of Care Date: 02/26/21

## 2021-03-10 ENCOUNTER — HOSPITAL ENCOUNTER (OUTPATIENT)
Dept: PHYSICAL THERAPY | Facility: CLINIC | Age: 76
Setting detail: THERAPIES SERIES
End: 2021-03-10
Attending: STUDENT IN AN ORGANIZED HEALTH CARE EDUCATION/TRAINING PROGRAM
Payer: MEDICARE

## 2021-03-10 PROCEDURE — 97112 NEUROMUSCULAR REEDUCATION: CPT | Mod: GP | Performed by: PHYSICAL THERAPIST

## 2021-03-10 PROCEDURE — 97110 THERAPEUTIC EXERCISES: CPT | Mod: GP | Performed by: PHYSICAL THERAPIST

## 2021-03-12 ENCOUNTER — IMMUNIZATION (OUTPATIENT)
Dept: NURSING | Facility: CLINIC | Age: 76
End: 2021-03-12
Attending: INTERNAL MEDICINE
Payer: MEDICARE

## 2021-03-12 PROCEDURE — 0002A PR COVID VAC PFIZER DIL RECON 30 MCG/0.3 ML IM: CPT

## 2021-03-12 PROCEDURE — 91300 PR COVID VAC PFIZER DIL RECON 30 MCG/0.3 ML IM: CPT

## 2021-03-16 ENCOUNTER — HOSPITAL ENCOUNTER (OUTPATIENT)
Dept: OCCUPATIONAL THERAPY | Facility: CLINIC | Age: 76
Setting detail: THERAPIES SERIES
End: 2021-03-16
Attending: STUDENT IN AN ORGANIZED HEALTH CARE EDUCATION/TRAINING PROGRAM
Payer: MEDICARE

## 2021-03-16 PROCEDURE — 97110 THERAPEUTIC EXERCISES: CPT | Mod: GO | Performed by: OCCUPATIONAL THERAPIST

## 2021-03-16 PROCEDURE — 97165 OT EVAL LOW COMPLEX 30 MIN: CPT | Mod: GO | Performed by: OCCUPATIONAL THERAPIST

## 2021-03-22 NOTE — PROGRESS NOTES
[unfilled]                                                                               [facility]          OUTPATIENT OCCUPATIONAL THERAPY  EVALUATION  PLAN OF TREATMENT FOR OUTPATIENT REHABILITATION  (COMPLETE FOR INITIAL CLAIMS ONLY)  Patient's Last Name, First Name, M.I.  YOB: 1945  Maximino Simeon                           Provider's Name  [facility] Medical Record No.  [id number]                               Onset Date:     02/23/21   Start of Care Date:     03/16/21   Type:     ___PT   _X_OT   ___SLP Medical Diagnosis:     Ovarian cancer, physical deconditioning, cognitive impairment, major depressive disorder, fibromyalgia, collagenous colitis                          OT Diagnosis:     decreased ADLs/IADLs Visits from SOC:  1   _________________________________________________________________________________  Plan of Treatment/Functional Goals:  ADL training, IADL training, Cognitive performance testing, Self care/Home management, Strengthening, Stretching                    Goals  Goal Identifier: Energy conservation/Fatigue management strategies  Goal Description: Patient to verbalize and independently utilize 3 strategies for energy conservation/work simplification and fatigue management for improved independence with ADL/IADLs at home and in the community, and demonstrate use of these tasks during session (Errands List, taking breaks prn during session, etc.)  Target Date: 06/12/21     Goal Identifier: HEP for strength  Goal Description: Patient to demonstrate independence with B UE strengthening HEP for increased functional use of UEs and maintenance or increase of  strength despite arthritis  for prolonged independence with ADL/IADLs   Target Date: 06/12/21     Goal Identifier: Memory compensation strategies  Goal Description:  Patient to utilize memory tools (planner, calendar, etc.) and verbalize and demonstrate   independence with problem solving and memory compensation techniques for increased safety and independence with ADLs/IADLs and ability to manage his time, appointments, daily schedule, etc  Target Date: 06/12/21     Goal Identifier: Numerical reasoning/problem solving  Goal Description: Pt will complete numerical reasoning and complex short-term memory tasks (using compensatory strategies prn) in the appropriate amount of time and with 90% accuracy to improve problem solving ability and increase safety and independence with ADL/IADLs at home, and in the community  Target Date: 06/12/21                                                          Therapy Frequency: 1x/week     Predicted Duration of Therapy Intervention (days/wks): 12 weeks  Jennifer Limon OT          I CERTIFY THE NEED FOR THESE SERVICES FURNISHED UNDER        THIS PLAN OF TREATMENT AND WHILE UNDER MY CARE     (Physician co-signature of this document indicates review and certification of the therapy plan).                 ,    Certification date from: 03/16/21, Certification date to: 06/12/21               Referring Physician: Paola Soto MD     Initial Assessment        See Epic Evaluation      Start Of Care Date: 03/16/21

## 2021-03-22 NOTE — PROGRESS NOTES
03/16/21 1300   Quick Adds   Quick Adds Certification   Type of Visit Initial Outpatient Occupational Therapy Evaluation   General Information   Start Of Care Date 03/16/21   Referring Physician Paola Soto MD   Orders Evaluate and treat as indicated   Other Orders Cognitive Rehab for memory difficulties and processing   Orders Date 02/23/21   Medical Diagnosis Ovarian cancer, physical deconditioning, cognitive impairment, major depressive disorder, fibromyalgia, collagenous colitis   Onset of Illness/Injury or Date of Surgery 02/23/21   Surgical/Medical History Reviewed Yes   Additional Occupational Profile Info/Pertinent History of Current Problem Pt is 75 yo female with hx of ovarian cancer diagnosed 6/29/2019 who complains of ongoing fatigue that is generalized and has been affecting her quality of life. She also specifically complains of cognitive fatigue in the form of short-term memory loss, difficulty processing and attention deficits that she has noticed after her chemothereapy.  She indicates she is able to function independently a home in terms of her mobility and ADLs, but definaitely does not have as much energy as she used to and feels generally weak as a resulty of her surgery and treatments for cancer.    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 Pt was previoiusly attending [employer] and also teaching one last student  and volunteering   Patient/family Goals Statement Pt would like to return to her prior level of function and thinking   Pain    Patient currently in pain No   Fall Risk Screen   Fall screen completed by PT   Fall screen comments See PT eval from 2/26   Abuse Screen (yes response referral indicated)   Feels Unsafe at Home or Work/School no   Feels Threatened by Someone no   Does Anyone Try to Keep You From Having Contact with Others or Doing Things Outside Your Home? no   Physical Signs of Abuse Present no   System Outcome Measures   FACIT Fatigue Subscale (score out of 52). The higher the score, the better the QOL. 32   Cognitive Status Examination   Orientation Orientation to person, place and time   Level of Consciousness Alert   Follows Commands and Answers Questions 100% of the time   Memory Impaired   Memory Comments Pt recalled 2/5 novel words after a five minute delay   Cognitive Comment Pt completed [facility] cognitive screen with BNL score of 22/30 where WNL is 26/30 or greater indicating need for further assessment.  Pt demonstrated difficulty with copying cube, accuracy with hands on a clock, word fluency and delayed recall.     Range of Motion (ROM)   ROM Comments WFL   Strength   Strength Comments 4+/5 B sh and elbow flex/ext   Hand Strength   Left Hand  (pounds) 14 pounds   Right Hand  (pounds) 14 pounds   Hand Strength Comments B  impaired d/t arthritis in thumbs and pain   Coordination   Left Hand, [place] Peg Test (seconds) 20   Right Hand, [place] Peg Test (seconds) 21   Coordination Comments fmc WNL   Bathing   Level of [place] - Bathing independent   Bathing Comments required increased time and effort   Upper Body Dressing   Level of [place]: Dress Upper Body independent   Upper Body Dressing Comments required increased time and effort   Lower Body Dressing   Level of [place]: Dress Lower Body independent   Lower Body Dressing Comments required increased time and effort   Toileting   Level of [place]: Toilet independent   Grooming   Level of [place]: Grooming independent    Eating/Self-Feeding   Level of [place]: Eating independent   Activity Tolerance   Activity Tolerance decreased   Planned Therapy Interventions   Planned Therapy Interventions ADL training;IADL training;Cognitive performance testing;Self care/Home management;Strengthening;Stretching    OT Goal 1   Goal Identifier Energy conservation/Fatigue management strategies   Goal Description Patient to verbalize and independently utilize 3 strategies for energy conservation/work simplification and fatigue management for improved independence with ADL/IADLs at home and in the community, and demonstrate use of these tasks during session (Errands List, taking breaks prn during session, etc.)   Target Date 06/12/21    OT Goal 2   Goal Identifier HEP for strength   Goal Description Patient to demonstrate independence with B UE strengthening HEP for increased functional use of UEs and maintenance or increase of  strength despite arthritis  for prolonged independence with ADL/IADLs    Target Date 06/12/21    OT Goal 3   Goal Identifier Memory compensation strategies   Goal Description  Patient to utilize memory tools (planner, calendar, etc.) and verbalize and demonstrate  independence with problem solving and memory compensation techniques for increased safety and independence with ADLs/IADLs and ability to manage his time, appointments, daily schedule, etc   Target Date 06/12/21   OT Goal 4   Goal Identifier Numerical reasoning/problem solving   Goal Description Pt will complete numerical reasoning and complex short-term memory tasks (using compensatory strategies prn) in the appropriate amount of time and with 90% accuracy to improve problem solving ability and increase safety and independence with ADL/IADLs at home, and in the community   Target Date 06/12/21   Clinical Impression   Criteria for Skilled Therapeutic Interventions Met Yes, treatment indicated   OT Diagnosis decreased ADLs/IADLs   Influenced by the  following impairments arthritis, decreased strength, fatigue, decreased memory, decreased activity tolerance, sleep difficulty, hand pain   Assessment of Occupational Performance 3-5 Performance Deficits   Identified Performance Deficits decreased self care performance, forgetfulness, decreased activity tolerance, drops things,  decreased abilty for sleep   Clinical Decision Making (Complexity) Moderate complexity   Therapy Frequency 1x/week   Predicted Duration of Therapy Intervention (days/wks) 12 weeks   Risks and Benefits of Treatment have been explained. Yes   Patient, Family & other staff in agreement with plan of care Yes   Clinical Impression Comments Pt will benefit from OT services to address the above goals   Education Assessment   Barriers To Learning No Barriers   Preferred Learning Style Listening;Reading;Demonstration;Pictures/video   Therapy Certification   Certification date from 03/16/21   Certification date to 06/12/21   Certification I certify the need for these services furnished under this plan of treatment and while under my care.  (Physician co-signature of this document indicates review and certification of the therapy plan)   Total Evaluation Time   OT Yahaira Low Complexity Minutes (32798) 45

## 2021-03-23 ENCOUNTER — INFUSION THERAPY VISIT (OUTPATIENT)
Dept: INFUSION THERAPY | Facility: CLINIC | Age: 76
End: 2021-03-23
Attending: OBSTETRICS & GYNECOLOGY
Payer: MEDICARE

## 2021-03-23 DIAGNOSIS — Z95.828 PORT-A-CATH IN PLACE: Primary | ICD-10-CM

## 2021-03-23 PROCEDURE — 250N000011 HC RX IP 250 OP 636: Performed by: OBSTETRICS & GYNECOLOGY

## 2021-03-23 PROCEDURE — 96523 IRRIG DRUG DELIVERY DEVICE: CPT

## 2021-03-23 RX ORDER — HEPARIN SODIUM (PORCINE) LOCK FLUSH IV SOLN 100 UNIT/ML 100 UNIT/ML
5 SOLUTION INTRAVENOUS
Status: CANCELLED | OUTPATIENT
Start: 2021-03-23

## 2021-03-23 RX ORDER — HEPARIN SODIUM (PORCINE) LOCK FLUSH IV SOLN 100 UNIT/ML 100 UNIT/ML
5 SOLUTION INTRAVENOUS
Status: DISCONTINUED | OUTPATIENT
Start: 2021-03-23 | End: 2021-03-23 | Stop reason: HOSPADM

## 2021-03-23 RX ORDER — HEPARIN SODIUM,PORCINE 10 UNIT/ML
5 VIAL (ML) INTRAVENOUS
Status: CANCELLED | OUTPATIENT
Start: 2021-03-23

## 2021-03-23 RX ADMIN — Medication 5 ML: at 14:06

## 2021-03-23 NOTE — PROGRESS NOTES
Nursing Note:  Maximino Simeon presents today for port flush.    Patient seen by provider today: No   present during visit today: Not Applicable.    Note: N/A.    Intravenous Access:  Implanted Port.    Discharge Plan:   Patient was sent to home for next lab appt on 5/10/21 appointment.    Erin Candelario RN

## 2021-04-06 ENCOUNTER — HOSPITAL ENCOUNTER (OUTPATIENT)
Dept: OCCUPATIONAL THERAPY | Facility: CLINIC | Age: 76
Setting detail: THERAPIES SERIES
End: 2021-04-06
Attending: STUDENT IN AN ORGANIZED HEALTH CARE EDUCATION/TRAINING PROGRAM
Payer: MEDICARE

## 2021-04-06 PROCEDURE — 97535 SELF CARE MNGMENT TRAINING: CPT | Mod: GO | Performed by: OCCUPATIONAL THERAPIST

## 2021-04-18 ENCOUNTER — HEALTH MAINTENANCE LETTER (OUTPATIENT)
Age: 76
End: 2021-04-18

## 2021-05-10 ENCOUNTER — HOSPITAL ENCOUNTER (OUTPATIENT)
Facility: CLINIC | Age: 76
Setting detail: SPECIMEN
Discharge: HOME | End: 2021-05-10
Attending: OBSTETRICS & GYNECOLOGY | Admitting: OBSTETRICS & GYNECOLOGY
Payer: MEDICARE

## 2021-05-10 ENCOUNTER — INFUSION THERAPY VISIT (OUTPATIENT)
Dept: INFUSION THERAPY | Facility: CLINIC | Age: 76
End: 2021-05-10
Attending: OBSTETRICS & GYNECOLOGY
Payer: MEDICARE

## 2021-05-10 DIAGNOSIS — Z95.828 PORT-A-CATH IN PLACE: Primary | ICD-10-CM

## 2021-05-10 DIAGNOSIS — C56.9 OVARIAN CANCER, UNSPECIFIED LATERALITY (H): ICD-10-CM

## 2021-05-10 LAB — CANCER AG125 SERPL-ACNC: 9 U/ML (ref 0–30)

## 2021-05-10 PROCEDURE — 86304 IMMUNOASSAY TUMOR CA 125: CPT | Performed by: OBSTETRICS & GYNECOLOGY

## 2021-05-10 PROCEDURE — 36591 DRAW BLOOD OFF VENOUS DEVICE: CPT

## 2021-05-10 PROCEDURE — 250N000011 HC RX IP 250 OP 636: Performed by: OBSTETRICS & GYNECOLOGY

## 2021-05-10 RX ORDER — HEPARIN SODIUM (PORCINE) LOCK FLUSH IV SOLN 100 UNIT/ML 100 UNIT/ML
5 SOLUTION INTRAVENOUS
Status: CANCELLED | OUTPATIENT
Start: 2021-05-10

## 2021-05-10 RX ORDER — HEPARIN SODIUM (PORCINE) LOCK FLUSH IV SOLN 100 UNIT/ML 100 UNIT/ML
5 SOLUTION INTRAVENOUS
Status: DISCONTINUED | OUTPATIENT
Start: 2021-05-10 | End: 2021-05-10 | Stop reason: HOSPADM

## 2021-05-10 RX ORDER — HEPARIN SODIUM,PORCINE 10 UNIT/ML
5 VIAL (ML) INTRAVENOUS
Status: CANCELLED | OUTPATIENT
Start: 2021-05-10

## 2021-05-10 RX ADMIN — Medication 5 ML: at 14:07

## 2021-05-10 NOTE — PROGRESS NOTES
Nursing Note:  Maximino Simeon presents today for port draw.    Patient seen by provider today: No   present during visit today: Not Applicable.    Note: N/A.    Intravenous Access:  Labs drawn without difficulty.  Implanted Port.    Discharge Plan:   Patient was sent to home.    Mauri Garcia RN

## 2021-05-12 NOTE — PROGRESS NOTES
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 She completed chemotherapy 2020. She is here today for a surveillance visit.     Oncology history:   Originally seen by Dr. Ascencio at [facility] during her call week; was to follow up with outpatient GYN ONC and general surgery due to appendicits and enlarged ovary.       54  CA 19-9 15  CEA 2.8     2019: CT abd pelvis: IMPRESSION:      1. Dilated, fluid-filled appendix with mild increased enhancement and slight stranding at its tip is worrisome for acute appendicitis.  2. Large complex cystic pelvic mass with soft tissue nodularity with adjacent prominent left adnexal vessels is worrisome for neoplasm of ovarian origin. No mesenteric nodularity, lymph node enlargement, or other findings that are suspicious for metastatic disease in the abdomen or pelvis.     2019: CT abd pelvis REPRODUCTIVE ORGANS with IV and oral contrast: Again noted is a large complex cystic mass in the pelvis measuring approximately 13.3 x 8.1 x 9.7 cm in size which has areas of mural nodularity and soft tissue density.    1. Again noted is a thick-walled dilated appendix compatible with appendicitis. There is a questionable new small area of appendiceal wall discontinuity and adjacent 1.2 cm fluid pocket on axial image 57 and sagittal image 23. Findings raise question of a small area of appendiceal wall perforation.    2. Large complex pelvic mass again concerning for ovarian neoplasm.    3. Marked bladder distention. Mild prominence of the urinary collecting systems likely secondary to this.     2019-2019: General surgery consulted for perforated appendicitis.  Not septic as no fever.Treated with Cefuroxime and metronidazole (PCN allergy). Surgery recommending postponing appendectomy  for about 6 weeks or so. Presenting leukocytosis and fever resolved by discharge.  Will finish oral course of ceftin and flagyl as outpatient.      Per general surgery note:  - No indication for surgery at this time  - Recommend admission to medicine service  - Start IV antibiotics  - Will again need to discuss the timing of appendectomy with Gyn/Onc to allow for evaluation of her pelvic mass simultaneously, this will likely have to be in 4-6 weeks following possible appendix perforation  - Surgery will continue to follow     8/9/19: Exploratory Laparotomy, Total Abdominal Hysterectomy, Left Salpingo-Oopherectomy, Bilateral Pelvic and Para-Aortic Lymph Node Dissection, Peritoneal Biopsy, Diaphraghm Pap Smears.     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     5/20/2020: 10.   8/19/2020:  12.  11/10/2020:  10.  2/10/2021:  8  5/10/2021:  9        Brittni comes to the clinic feeling well from a gynecological standpoint. She has been having diarrhea for six weeks and is working with PCP on this. She has a history of colitis. She states that this has slowed down over the last few days but prior was having 6-8 episodes a day. She manages this with Loperamide but is holding this for testing. She will see GI on 5/24/2021. She did PT twice this spring for strength and deconditioning. She said that after a few times of PT  she had pain from right knee up to right hip. Has ongoing issues with knee and hip pain. She did not continue PT after this. She did start OT for cognitive rehab but stopped this due to feeling overwhelmed by appointments. She is working with psychology for mental health difficulties given her sister's recent passing and anxiety/depression. She is meeting with therapist virtually every 3-4 weeks. She denies SI/HI. She feels supported by her daughter.  She is still on Prolia at [place] for her osteoporosis. She is not sexually active and denies vulvar/vaginal concerns. She denies any vaginal bleeding, no nausea/emesis, no lower extremity edema, and no difficulties eating or sleeping. She denies any abdominal discomfort/bloating, no fevers or chills, and no chest pain or shortness of breath. She does have hot flashes a couple times a week that are mild and infrequent. She denies any issues with neuropathy.         Health Maintenance  Colonoscopy- due for this  Mammogram- 5/7/2021  DEXA- 9/1/2020, on Prolia   Annual physical- 4/23/2021        Review of Systems:    Systemic           no weight changes; no fever; no chills; no night sweats; no appetite changes, +fatigue  Skin           no rashes, or lesions  Eye           no irritation; no changes in vision  Jarred-Laryngeal           no dysphagia; no hoarseness   Pulmonary    no cough; no shortness of breath  Cardiovascular    no chest pain; no palpitations  Gastrointestinal    + diarrhea; no constipation; no abdominal pain; no changes in bowel habits; no blood in stool  Genitourinary   no urinary frequency; no urinary urgency; no dysuria; no pain; no abnormal vaginal discharge; no abnormal vaginal bleeding  Breast    no breast discharge; no breast changes; no breast pain  Musculoskeletal    no myalgias; no arthralgias; no back pain  Psychiatric           no depressed mood; + anxiety    Hematologic           no tender lymph nodes; no noticeable swellings or lumps   Endocrine     no hot flashes; no heat/cold intolerance         Neurological   no tremor; no numbness and tingling; no headaches; no difficulty sleeping      Past Medical History:    Past Medical History:   Diagnosis Date     Anxiety 2014     CAD (coronary artery disease) 2011     Cancer (H)     ovarian     Colitis      Fibromyalgia      Gastroesophageal reflux disease      Hypothyroidism 2011     Insomnia 2017     Mixed hyperlipidemia 2011     PONV (postoperative nausea and vomiting)      Recurrent major depressive disorder (H) 2010     Urinary retention          Past Surgical History:    Past Surgical History:   Procedure Laterality Date     APPENDECTOMY OPEN N/A 8/9/2019    Procedure: Open Appendectomy;  Surgeon: Parvez Abreu MD;  Location: [facility] OR     BREAST SURGERY      biopsy     GYN SURGERY       HYSTERECTOMY TOTAL ABDOMINAL, BILATERAL SALPINGO-OOPHORECTOMY, NODE DISSECTION, COMBINED Bilateral 8/9/2019    Procedure: Exploratory Laparotomy, Total Abdominal Hysterectomy, Left Salpingo-Oopherectomy, Bilateral Pelvic and Para-Aortic Lymph Node Dissection, Peritoneal Biopsy, Diaphraghm Pap Smears.;  Surgeon: Naz Ascencio MD;  Location: [facility] OR     IR CHEST PORT PLACEMENT > 5 YRS OF AGE  8/30/2019     OOPHORECTOMY  1992         Health Maintenance Due   Topic Date Due     DEXA  Never done     ADVANCE CARE PLANNING  Never done     DEPRESSION ACTION PLAN  Never done     HEPATITIS C SCREENING  Never done     LIPID  Never done     MEDICARE ANNUAL WELLNESS VISIT  Never done     PHQ-9  01/12/2020     FALL RISK ASSESSMENT  07/16/2020     ZOSTER IMMUNIZATION (3 of 3) 01/15/2021       Current Medications:     Current Outpatient Medications   Medication Sig Dispense Refill     Calcium Carbonate-Vitamin D (CALCIUM + D PO) Take 6 tablets by mouth daily.       Coenzyme Q10 (COQ10 PO) Take 200 mg by mouth 2 times daily       DULoxetine (CYMBALTA) 20 MG capsule Take 1 capsule (20 mg) by mouth daily Follow up with your PCP for  refills (Patient taking differently: Take 30 mg by mouth daily Follow up with your PCP for refills) 30 capsule 0     levothyroxine (SYNTHROID/LEVOTHROID) 50 MCG tablet Take 50 mcg by mouth every morning        liothyronine (CYTOMEL) 5 MCG tablet Take 5 mcg by mouth every morning        loperamide (IMODIUM) 1 MG/5ML liquid Take 1 mg by mouth 6 times daily        Multiple Vitamins-Minerals (MULTIVITAL PO) Take 1 tablet by mouth daily (with lunch)        Probiotic Product (PROBIOTIC DAILY PO) 1 tablet daily at lunchtime by mouth       triamcinolone (KENALOG) 0.1 % external cream        order for DME Cranial prothesis (Patient not taking: Reported on 5/14/2021) 1 each 0         Allergies:        Allergies   Allergen Reactions     Adhesive Tape      bandaids     Liquid Adhesive Rash     Penicillins Rash        Social History:     Social History     Tobacco Use     Smoking status: Never Smoker     Smokeless tobacco: Never Used   Substance Use Topics     Alcohol use: Yes     Comment: socially       History   Drug Use Unknown         Family History:     The patient's family history is notable for     Family History   Problem Relation Age of Onset     Heart Disease Father      Cerebrovascular Disease Father      Cerebrovascular Disease Sister      Thyroid Disease Sister      Breast Cancer Sister          Physical Exam:     /82 (BP Location: Right arm, Patient Position: Sitting, Cuff Size: Adult Regular)   Pulse 94   Temp 99.2  F (37.3  C) (Oral)   Resp 16   Wt 46.7 kg (103 lb)   LMP  (LMP Unknown)   SpO2 98%   BMI 21.53 kg/m    Body mass index is 21.53 kg/m .    General Appearance: healthy and alert, no distress     HEENT: no thyromegaly, no palpable nodules or masses        Cardiovascular: regular rate and rhythm, no gallops, rubs or murmurs    Respiratory: lungs clear, no rales, rhonchi or wheezes    Musculoskeletal: extremities non tender and without edema    Skin: no lesions    Neurological: normal gait, no  gross defects     Psychiatric: appropriate mood and affect                               Hematological: normal cervical, supraclavicular and inguinal lymph nodes     Gastrointestinal:       abdomen soft, non-tender, non-distended, no organomegaly or masses    Genitourinary: External genitalia and urethral meatus appears normal. Vagina pale and slightly narrowed consistent with postmenopausal changes, smooth without nodularity or masses. Cervix surgically absent. Rectum with mild erythema skin intact nontender. Bimanual exam reveal no masses, nodularity or fullness.  Recto-vaginal exam confirms these findings. Medium Bill speculum used.       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 She completed chemotherapy 1/2020. She is here today for a surveillance visit.     40 minutes spent on the date of the encounter doing chart review, history and exam, documentation, and further activities as noted above.        Plan:     1.)       AMEE on exam and  stable at 9. Patient will continue to have in person surveillance visits with  lab draws every 3 months for two years (1/2022), and then every six months for three years (1/2025) and then annual visits thereafter. Encouraged mental health therapy visits to continue to process and grieve her sister's death along with process other life stressors. Offered time and emotional support for patient. Reviewed signs and symptoms for when she should contact the clinic or seek additional care. Patient to contact the clinic with any questions or concerns in the interim. Request for  and in person visit in three months sen to scheduling.      2.) Genetic Testing Result: NEGATIVE  Brittni is negative for mutations in the APC, MARV, AXIN2, BARD1, BMPR1A, BRCA1, BRCA2, BRIP1, CDH1, CDK4, CDKN2A, CHEK2, DICER1, EPCAM, GREM1, HOXB13, MLH1, MSH2, MSH3, MSH6, MUTYH, NBN, NF1, NTHL1, PALB2, PMS2,  POLD1, POLE, PTEN, RAD51C, RAD51D, RECQL, SMAD4, SMARCA4, STK11, and TP53 genes. No mutations were found in any of the 36 genes analyzed. This test involved sequencing and deletion/duplication analysis of all genes with the exception of EPCAM and GREM1 (deletions only).    3.) Labs and/or tests ordered include:   (reviewed and discussed with patient during visit).     4.) Health maintenance issues addressed today include annual health maintenance and non-gynecologic issues with PCP. Discussed the need to be in close contact with her PCP to keep her colitis controlled. Encouraged Pedialyte or Gatorade with loose stools. Discussed barrier creams such as Desitin or Zinc Oxide for erythema around rectum. She has follow up with GI this month.     5.)       Chest port: per Dr. Rodriguez 5/2020 note, ok to have port removed now if patient desires. Patient desires port removal now. Orders placed today and sent to scheduling to have this set up at [facility] per patient's request.     6.)        Cancer Rehab for ongoing fatigue and deconditioning: PT/OT. Patient stated that she started both and then stopped due to feeling overwhelmed. She would like to continue to work on mental health therapy for now and will reach out to us if she desires PT in the future.       FRANCESCO Anaya-BC  Women's Health Nurse Practitioner  Division of Gynecologic Oncology  [facility]          CC  Patient Care Team:  Cecelia Manzo MD as PCP - General  Naz Ascencio MD as MD (Oncology)  Parvez Abreu MD as MD (Surgery)  Micheline Soler MD as MD (Emergency Medicine)  [facility] ([place]  Naz Ascencio MD as Assigned Cancer Care Provider  Connie Martinez, APRN CNP as Assigned OBGYN Provider  Paola Soto MD as Assigned Neuroscience Provider

## 2021-05-14 ENCOUNTER — ONCOLOGY VISIT (OUTPATIENT)
Dept: ONCOLOGY | Facility: CLINIC | Age: 76
End: 2021-05-14
Attending: OBSTETRICS & GYNECOLOGY
Payer: MEDICARE

## 2021-05-14 VITALS
DIASTOLIC BLOOD PRESSURE: 82 MMHG | SYSTOLIC BLOOD PRESSURE: 128 MMHG | RESPIRATION RATE: 16 BRPM | HEART RATE: 94 BPM | TEMPERATURE: 99.2 F | BODY MASS INDEX: 21.53 KG/M2 | OXYGEN SATURATION: 98 % | WEIGHT: 103 LBS

## 2021-05-14 DIAGNOSIS — Z45.2 ENCOUNTER FOR CARE RELATED TO PORT-A-CATH: ICD-10-CM

## 2021-05-14 DIAGNOSIS — C56.9 OVARIAN CANCER, UNSPECIFIED LATERALITY (H): Primary | ICD-10-CM

## 2021-05-14 PROCEDURE — G0463 HOSPITAL OUTPT CLINIC VISIT: HCPCS

## 2021-05-14 PROCEDURE — 99215 OFFICE O/P EST HI 40 MIN: CPT | Performed by: OBSTETRICS & GYNECOLOGY

## 2021-05-14 ASSESSMENT — PAIN SCALES - GENERAL: PAINLEVEL: NO PAIN (0)

## 2021-05-14 NOTE — LETTER
2021         RE: Maximino Simeon  [address] [facility] [address]        Dear Colleague,    Thank you for referring your patient, Maximino Simeon, to the [facility]. Please see a copy of my visit note below.                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 She completed chemotherapy 2020. She is here today for a surveillance visit.     Oncology history:   Originally seen by Dr. Ascencio at [facility] during her call week; was to follow up with outpatient GYN ONC and general surgery due to appendicits and enlarged ovary.       54  CA 19-9 15  CEA 2.8     2019: CT abd pelvis: IMPRESSION:      1. Dilated, fluid-filled appendix with mild increased enhancement and slight stranding at its tip is worrisome for acute appendicitis.  2. Large complex cystic pelvic mass with soft tissue nodularity with adjacent prominent left adnexal vessels is worrisome for neoplasm of ovarian origin. No mesenteric nodularity, lymph node enlargement, or other findings that are suspicious for metastatic disease in the abdomen or pelvis.     2019: CT abd pelvis REPRODUCTIVE ORGANS with IV and oral contrast: Again noted is a large complex cystic mass in the pelvis measuring approximately 13.3 x 8.1 x 9.7 cm in size which has areas of mural nodularity and soft tissue density.    1. Again noted is a thick-walled dilated appendix compatible with appendicitis. There is a questionable new small area of appendiceal wall discontinuity and adjacent 1.2 cm fluid pocket on axial image 57 and sagittal image 23. Findings raise question of a small area of appendiceal wall perforation.    2. Large complex pelvic mass again concerning for ovarian neoplasm.    3. Marked bladder distention. Mild prominence of  the urinary collecting systems likely secondary to this.     7/2/2019-7/7/2019: General surgery consulted for perforated appendicitis.  Not septic as no fever.Treated with Cefuroxime and metronidazole (PCN allergy). Surgery recommending postponing appendectomy for about 6 weeks or so. Presenting leukocytosis and fever resolved by discharge.  Will finish oral course of ceftin and flagyl as outpatient.      Per general surgery note:  - No indication for surgery at this time  - Recommend admission to medicine service  - Start IV antibiotics  - Will again need to discuss the timing of appendectomy with Gyn/Onc to allow for evaluation of her pelvic mass simultaneously, this will likely have to be in 4-6 weeks following possible appendix perforation  - Surgery will continue to follow     8/9/19: Exploratory Laparotomy, Total Abdominal Hysterectomy, Left Salpingo-Oopherectomy, Bilateral Pelvic and Para-Aortic Lymph Node Dissection, Peritoneal Biopsy, Diaphraghm Pap Smears.     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     5/20/2020: 10.   8/19/2020:  12.  11/10/2020:  10.  2/10/2021:  8  5/10/2021:  9        Brittni comes to the clinic feeling well from a gynecological standpoint. She has been having diarrhea for six weeks and is working with PCP on this. She has a history of colitis. She states that this has slowed down  over the last few days but prior was having 6-8 episodes a day. She manages this with Loperamide but is holding this for testing. She will see GI on 5/24/2021. She did PT twice this spring for strength and deconditioning. She said that after a few times of PT she had pain from right knee up to right hip. Has ongoing issues with knee and hip pain. She did not continue PT after this. She did start OT for cognitive rehab but stopped this due to feeling overwhelmed by appointments. She is working with psychology for mental health difficulties given her sister's recent passing and anxiety/depression. She is meeting with therapist virtually every 3-4 weeks. She denies SI/HI. She feels supported by her daughter.  She is still on Prolia at [place] for her osteoporosis. She is not sexually active and denies vulvar/vaginal concerns. She denies any vaginal bleeding, no nausea/emesis, no lower extremity edema, and no difficulties eating or sleeping. She denies any abdominal discomfort/bloating, no fevers or chills, and no chest pain or shortness of breath. She does have hot flashes a couple times a week that are mild and infrequent. She denies any issues with neuropathy.         Health Maintenance  Colonoscopy- due for this  Mammogram- 5/7/2021  DEXA- 9/1/2020, on Prolia   Annual physical- 4/23/2021        Review of Systems:    Systemic           no weight changes; no fever; no chills; no night sweats; no appetite changes, +fatigue  Skin           no rashes, or lesions  Eye           no irritation; no changes in vision  Jarred-Laryngeal           no dysphagia; no hoarseness   Pulmonary    no cough; no shortness of breath  Cardiovascular    no chest pain; no palpitations  Gastrointestinal    + diarrhea; no constipation; no abdominal pain; no changes in bowel habits; no blood in stool  Genitourinary   no urinary frequency; no urinary urgency; no dysuria; no pain; no abnormal vaginal discharge; no abnormal vaginal bleeding  Breast    no  breast discharge; no breast changes; no breast pain  Musculoskeletal    no myalgias; no arthralgias; no back pain  Psychiatric           no depressed mood; + anxiety    Hematologic           no tender lymph nodes; no noticeable swellings or lumps   Endocrine    no hot flashes; no heat/cold intolerance         Neurological   no tremor; no numbness and tingling; no headaches; no difficulty sleeping      Past Medical History:    Past Medical History:   Diagnosis Date     Anxiety 2014     CAD (coronary artery disease) 2011     Cancer (H)     ovarian     Colitis      Fibromyalgia      Gastroesophageal reflux disease      Hypothyroidism 2011     Insomnia 2017     Mixed hyperlipidemia 2011     PONV (postoperative nausea and vomiting)      Recurrent major depressive disorder (H) 2010     Urinary retention          Past Surgical History:    Past Surgical History:   Procedure Laterality Date     APPENDECTOMY OPEN N/A 8/9/2019    Procedure: Open Appendectomy;  Surgeon: Parvez Abreu MD;  Location: [facility] OR     BREAST SURGERY      biopsy     GYN SURGERY       HYSTERECTOMY TOTAL ABDOMINAL, BILATERAL SALPINGO-OOPHORECTOMY, NODE DISSECTION, COMBINED Bilateral 8/9/2019    Procedure: Exploratory Laparotomy, Total Abdominal Hysterectomy, Left Salpingo-Oopherectomy, Bilateral Pelvic and Para-Aortic Lymph Node Dissection, Peritoneal Biopsy, Diaphraghm Pap Smears.;  Surgeon: Naz Ascencio MD;  Location: [facility] OR     IR CHEST PORT PLACEMENT > 5 YRS OF AGE  8/30/2019     OOPHORECTOMY  1992         Health Maintenance Due   Topic Date Due     DEXA  Never done     ADVANCE CARE PLANNING  Never done     DEPRESSION ACTION PLAN  Never done     HEPATITIS C SCREENING  Never done     LIPID  Never done     MEDICARE ANNUAL WELLNESS VISIT  Never done     PHQ-9  01/12/2020     FALL RISK ASSESSMENT  07/16/2020     ZOSTER IMMUNIZATION (3 of 3) 01/15/2021       Current Medications:     Current Outpatient Medications   Medication Sig Dispense  Refill     Calcium Carbonate-Vitamin D (CALCIUM + D PO) Take 6 tablets by mouth daily.       Coenzyme Q10 (COQ10 PO) Take 200 mg by mouth 2 times daily       DULoxetine (CYMBALTA) 20 MG capsule Take 1 capsule (20 mg) by mouth daily Follow up with your PCP for refills (Patient taking differently: Take 30 mg by mouth daily Follow up with your PCP for refills) 30 capsule 0     levothyroxine (SYNTHROID/LEVOTHROID) 50 MCG tablet Take 50 mcg by mouth every morning        liothyronine (CYTOMEL) 5 MCG tablet Take 5 mcg by mouth every morning        loperamide (IMODIUM) 1 MG/5ML liquid Take 1 mg by mouth 6 times daily        Multiple Vitamins-Minerals (MULTIVITAL PO) Take 1 tablet by mouth daily (with lunch)        Probiotic Product (PROBIOTIC DAILY PO) 1 tablet daily at lunchtime by mouth       triamcinolone (KENALOG) 0.1 % external cream        order for DME Cranial prothesis (Patient not taking: Reported on 5/14/2021) 1 each 0         Allergies:        Allergies   Allergen Reactions     Adhesive Tape      bandaids     Liquid Adhesive Rash     Penicillins Rash        Social History:     Social History     Tobacco Use     Smoking status: Never Smoker     Smokeless tobacco: Never Used   Substance Use Topics     Alcohol use: Yes     Comment: socially       History   Drug Use Unknown         Family History:     The patient's family history is notable for     Family History   Problem Relation Age of Onset     Heart Disease Father      Cerebrovascular Disease Father      Cerebrovascular Disease Sister      Thyroid Disease Sister      Breast Cancer Sister          Physical Exam:     /82 (BP Location: Right arm, Patient Position: Sitting, Cuff Size: Adult Regular)   Pulse 94   Temp 99.2  F (37.3  C) (Oral)   Resp 16   Wt 46.7 kg (103 lb)   LMP  (LMP Unknown)   SpO2 98%   BMI 21.53 kg/m    Body mass index is 21.53 kg/m .    General Appearance: healthy and alert, no distress     HEENT: no thyromegaly, no palpable  nodules or masses        Cardiovascular: regular rate and rhythm, no gallops, rubs or murmurs    Respiratory: lungs clear, no rales, rhonchi or wheezes    Musculoskeletal: extremities non tender and without edema    Skin: no lesions    Neurological: normal gait, no gross defects     Psychiatric: appropriate mood and affect                               Hematological: normal cervical, supraclavicular and inguinal lymph nodes     Gastrointestinal:       abdomen soft, non-tender, non-distended, no organomegaly or masses    Genitourinary: External genitalia and urethral meatus appears normal. Vagina pale and slightly narrowed consistent with postmenopausal changes, smooth without nodularity or masses. Cervix surgically absent. Rectum with mild erythema skin intact nontender. Bimanual exam reveal no masses, nodularity or fullness.  Recto-vaginal exam confirms these findings. Medium Bill speculum used.       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 She completed chemotherapy 1/2020. She is here today for a surveillance visit.     40 minutes spent on the date of the encounter doing chart review, history and exam, documentation, and further activities as noted above.        Plan:     1.)       AMEE on exam and  stable at 9. Patient will continue to have in person surveillance visits with  lab draws every 3 months for two years (1/2022), and then every six months for three years (1/2025) and then annual visits thereafter. Encouraged mental health therapy visits to continue to process and grieve her sister's death along with process other life stressors. Offered time and emotional support for patient. Reviewed signs and symptoms for when she should contact the clinic or seek additional care. Patient to contact the clinic with any questions or concerns in the interim. Request for  and in person visit in three months sen to  scheduling.      2.) Genetic Testing Result: NEGATIVE  Brittni is negative for mutations in the APC, MARV, AXIN2, BARD1, BMPR1A, BRCA1, BRCA2, BRIP1, CDH1, CDK4, CDKN2A, CHEK2, DICER1, EPCAM, GREM1, HOXB13, MLH1, MSH2, MSH3, MSH6, MUTYH, NBN, NF1, NTHL1, PALB2, PMS2, POLD1, POLE, PTEN, RAD51C, RAD51D, RECQL, SMAD4, SMARCA4, STK11, and TP53 genes. No mutations were found in any of the 36 genes analyzed. This test involved sequencing and deletion/duplication analysis of all genes with the exception of EPCAM and GREM1 (deletions only).    3.) Labs and/or tests ordered include:   (reviewed and discussed with patient during visit).     4.) Health maintenance issues addressed today include annual health maintenance and non-gynecologic issues with PCP. Discussed the need to be in close contact with her PCP to keep her colitis controlled. Encouraged Pedialyte or Gatorade with loose stools. Discussed barrier creams such as Desitin or Zinc Oxide for erythema around rectum. She has follow up with GI this month.     5.)       Chest port: per Dr. Rodriguez 5/2020 note, ok to have port removed now if patient desires. Patient desires port removal now. Orders placed today and sent to scheduling to have this set up at [facility] per patient's request.     6.)        Cancer Rehab for ongoing fatigue and deconditioning: PT/OT. Patient stated that she started both and then stopped due to feeling overwhelmed. She would like to continue to work on mental health therapy for now and will reach out to us if she desires PT in the future.       FRANCESCO Anaya-BC  Women's Health Nurse Practitioner  Division of Gynecologic Oncology  [facility]          CC  Patient Care Team:  Cecelia Manzo MD as PCP - General  Naz Ascencio MD as MD (Oncology)  Parvez Abreu MD as MD (Surgery)  Micheline Soler MD as MD (Emergency Medicine)  [facility]  "([facility]))  Naz Ascencio MD as Assigned Cancer Care Provider  Connie Martinez APRN CNP as Assigned OBGYN Provider  Paola Soto MD as Assigned Neuroscience Provider      Oncology Rooming Note    May 14, 2021 12:57 PM   Maximino Simeon is a 76 year old female who presents for:    Chief Complaint   Patient presents with     Oncology Clinic Visit     Ovarian cancer, unspecified laterality (H)     Initial Vitals: /82 (BP Location: Right arm, Patient Position: Sitting, Cuff Size: Adult Regular)   Pulse 94   Temp 99.2  F (37.3  C) (Oral)   Resp 16   Wt 46.7 kg (103 lb)   LMP  (LMP Unknown)   SpO2 98%   BMI 21.53 kg/m   Estimated body mass index is 21.53 kg/m  as calculated from the following:    Height as of 11/13/20: 1.473 m (4' 10\").    Weight as of this encounter: 46.7 kg (103 lb). Body surface area is 1.38 meters squared.  No Pain (0) Comment: Data Unavailable   No LMP recorded (lmp unknown). Patient has had a hysterectomy.  Allergies reviewed: Yes  Medications reviewed: Yes    Medications: Medication refills not needed today.  Pharmacy name entered into [facility]:    [facility] #[id number] - [facility] [address] DRUG STORE #[id number] [facility] [address] [facility]  AT [facility] [address] [place]  [facility]Y #[id number] [facility] [address] [facility]ARMACY #[id number] [facility] [address] [facility]    Clinical concerns: no     Martine Abebe, [facility]                  Again, thank you for allowing me to participate in the care of your patient.        Sincerely,        LEX Larson CNP    "

## 2021-05-14 NOTE — PROGRESS NOTES
"Oncology Rooming Note    May 14, 2021 12:57 PM   Maximino Simeon is a 76 year old female who presents for:    Chief Complaint   Patient presents with     Oncology Clinic Visit     Ovarian cancer, unspecified laterality (H)     Initial Vitals: /82 (BP Location: Right arm, Patient Position: Sitting, Cuff Size: Adult Regular)   Pulse 94   Temp 99.2  F (37.3  C) (Oral)   Resp 16   Wt 46.7 kg (103 lb)   LMP  (LMP Unknown)   SpO2 98%   BMI 21.53 kg/m   Estimated body mass index is 21.53 kg/m  as calculated from the following:    Height as of 11/13/20: 1.473 m (4' 10\").    Weight as of this encounter: 46.7 kg (103 lb). Body surface area is 1.38 meters squared.  No Pain (0) Comment: Data Unavailable   No LMP recorded (lmp unknown). Patient has had a hysterectomy.  Allergies reviewed: Yes  Medications reviewed: Yes    Medications: Medication refills not needed today.  Pharmacy name entered into [facility]:    [facility] #[id number] - [facility] [address] DRUG STORE #[id number] [facility] [address] [facility] [address]  [facility] PHARMACY #[id number] [place], [place] - [address] [facility]ARMACY #[id number] [facility] [address] [address] [facility] [address] [facility]    Clinical concerns: no     Martine Abebe CMA              "

## 2021-06-02 DIAGNOSIS — Z11.59 ENCOUNTER FOR SCREENING FOR OTHER VIRAL DISEASES: ICD-10-CM

## 2021-06-11 ENCOUNTER — TELEPHONE (OUTPATIENT)
Dept: INTERVENTIONAL RADIOLOGY/VASCULAR | Facility: CLINIC | Age: 76
End: 2021-06-11

## 2021-06-11 NOTE — PROVIDER NOTIFICATION
Pre-call completed on 6/11/21. NPO at 0400. Will arrive at 0630 for a 0800 case. COVID test scheduled on 6/15. Current blood thinners: None. Friend, Shelby, will provide transportation. No further questions or concerns.     Kathy Loo RN

## 2021-06-15 DIAGNOSIS — Z11.59 ENCOUNTER FOR SCREENING FOR OTHER VIRAL DISEASES: ICD-10-CM

## 2021-06-15 LAB
LABORATORY COMMENT REPORT: NORMAL
SARS-COV-2 RNA RESP QL NAA+PROBE: NEGATIVE
SARS-COV-2 RNA RESP QL NAA+PROBE: NORMAL
SPECIMEN SOURCE: NORMAL
SPECIMEN SOURCE: NORMAL

## 2021-06-15 PROCEDURE — U0003 INFECTIOUS AGENT DETECTION BY NUCLEIC ACID (DNA OR RNA); SEVERE ACUTE RESPIRATORY SYNDROME CORONAVIRUS 2 (SARS-COV-2) (CORONAVIRUS DISEASE [COVID-19]), AMPLIFIED PROBE TECHNIQUE, MAKING USE OF HIGH THROUGHPUT TECHNOLOGIES AS DESCRIBED BY CMS-2020-01-R: HCPCS | Performed by: OBSTETRICS & GYNECOLOGY

## 2021-06-15 PROCEDURE — U0005 INFEC AGEN DETEC AMPLI PROBE: HCPCS | Performed by: OBSTETRICS & GYNECOLOGY

## 2021-06-18 ENCOUNTER — HOSPITAL ENCOUNTER (OUTPATIENT)
Facility: CLINIC | Age: 76
Discharge: HOME | End: 2021-06-18
Attending: RADIOLOGY | Admitting: RADIOLOGY
Payer: MEDICARE

## 2021-06-18 ENCOUNTER — APPOINTMENT (OUTPATIENT)
Dept: INTERVENTIONAL RADIOLOGY/VASCULAR | Facility: CLINIC | Age: 76
End: 2021-06-18
Attending: OBSTETRICS & GYNECOLOGY
Payer: MEDICARE

## 2021-06-18 VITALS
TEMPERATURE: 98.3 F | DIASTOLIC BLOOD PRESSURE: 70 MMHG | RESPIRATION RATE: 16 BRPM | SYSTOLIC BLOOD PRESSURE: 124 MMHG | OXYGEN SATURATION: 96 % | HEART RATE: 88 BPM

## 2021-06-18 DIAGNOSIS — C56.9 OVARIAN CANCER, UNSPECIFIED LATERALITY (H): ICD-10-CM

## 2021-06-18 DIAGNOSIS — Z45.2 ENCOUNTER FOR CARE RELATED TO PORT-A-CATH: ICD-10-CM

## 2021-06-18 PROCEDURE — 250N000009 HC RX 250: Performed by: NURSE PRACTITIONER

## 2021-06-18 PROCEDURE — 36590 REMOVAL TUNNELED CV CATH: CPT

## 2021-06-18 PROCEDURE — 250N000009 HC RX 250: Performed by: RADIOLOGY

## 2021-06-18 PROCEDURE — 999N000163 HC STATISTIC SIMPLE TUBE INSERTION/CHARGE, PORT, CATH, FISTULOGRAM

## 2021-06-18 PROCEDURE — 272N000602 HC WOUND GLUE CR1

## 2021-06-18 PROCEDURE — 99152 MOD SED SAME PHYS/QHP 5/>YRS: CPT

## 2021-06-18 PROCEDURE — 250N000011 HC RX IP 250 OP 636: Performed by: NURSE PRACTITIONER

## 2021-06-18 RX ORDER — NALOXONE HYDROCHLORIDE 0.4 MG/ML
0.2 INJECTION, SOLUTION INTRAMUSCULAR; INTRAVENOUS; SUBCUTANEOUS
Status: DISCONTINUED | OUTPATIENT
Start: 2021-06-18 | End: 2021-06-18 | Stop reason: HOSPADM

## 2021-06-18 RX ORDER — FENTANYL CITRATE 50 UG/ML
25-50 INJECTION, SOLUTION INTRAMUSCULAR; INTRAVENOUS EVERY 5 MIN PRN
Status: DISCONTINUED | OUTPATIENT
Start: 2021-06-18 | End: 2021-06-18 | Stop reason: HOSPADM

## 2021-06-18 RX ORDER — NALOXONE HYDROCHLORIDE 0.4 MG/ML
0.4 INJECTION, SOLUTION INTRAMUSCULAR; INTRAVENOUS; SUBCUTANEOUS
Status: DISCONTINUED | OUTPATIENT
Start: 2021-06-18 | End: 2021-06-18 | Stop reason: HOSPADM

## 2021-06-18 RX ORDER — ACETAMINOPHEN 325 MG/1
650-975 TABLET ORAL
Status: DISCONTINUED | OUTPATIENT
Start: 2021-06-18 | End: 2021-06-18 | Stop reason: HOSPADM

## 2021-06-18 RX ORDER — FLUMAZENIL 0.1 MG/ML
0.2 INJECTION, SOLUTION INTRAVENOUS
Status: DISCONTINUED | OUTPATIENT
Start: 2021-06-18 | End: 2021-06-18 | Stop reason: HOSPADM

## 2021-06-18 RX ORDER — LIDOCAINE 40 MG/G
CREAM TOPICAL
Status: DISCONTINUED | OUTPATIENT
Start: 2021-06-18 | End: 2021-06-18 | Stop reason: HOSPADM

## 2021-06-18 RX ADMIN — MIDAZOLAM HYDROCHLORIDE 0.5 MG: 1 INJECTION, SOLUTION INTRAMUSCULAR; INTRAVENOUS at 08:05

## 2021-06-18 RX ADMIN — FENTANYL CITRATE 25 MCG: 50 INJECTION INTRAMUSCULAR; INTRAVENOUS at 08:05

## 2021-06-18 RX ADMIN — LIDOCAINE HYDROCHLORIDE 10 ML: 10; .005 INJECTION, SOLUTION EPIDURAL; INFILTRATION; INTRACAUDAL; PERINEURAL at 08:05

## 2021-06-18 RX ADMIN — MIDAZOLAM HYDROCHLORIDE 1 MG: 1 INJECTION, SOLUTION INTRAMUSCULAR; INTRAVENOUS at 07:59

## 2021-06-18 RX ADMIN — FENTANYL CITRATE 50 MCG: 50 INJECTION INTRAMUSCULAR; INTRAVENOUS at 07:59

## 2021-06-18 RX ADMIN — LIDOCAINE HYDROCHLORIDE 10 ML: 10 INJECTION, SOLUTION INFILTRATION; PERINEURAL at 08:05

## 2021-06-18 NOTE — PROGRESS NOTES
PATIENT/VISITOR WELLNESS SCREENING    Step 1 Patient Screening    1. In the last month, have you been in contact with someone who was confirmed or suspected to have Coronavirus/COVID-19? No    2. Do you have the following symptoms?  Fever/Chills? No   Cough? No   Shortness of breath? No   New loss of taste or smell? No  Sore throat? No  Muscle or body aches? No  Headaches? No  Fatigue? No  Vomiting or diarrhea? No           Step 3 Refer to logic grid below for actions    NO SYMPTOM(S)    ACTIONS:  1. Standard rooming process  2. Provider to assess per normal protocol  3. Implement precautions as needed and per guidelines     POSITIVE SYMPTOM(S)  If positive for ANY of the following symptoms: fever, cough, shortness of breath, rash    ACTION:  1. Continue to have the patient wear a mask   2. Room patient as soon as possible  3. Don appropriate PPE when entering room  4. Provider evaluation

## 2021-06-18 NOTE — PROCEDURES
[facility]    Procedure: Port removal    Date/Time: 6/18/2021 8:20 AM  Performed by: Christiano Pryor MD  Authorized by: Christiano Pryor MD     UNIVERSAL PROTOCOL   Site Marked: Yes  Prior Images Obtained and Reviewed:  Yes  Required items: Required blood products, implants, devices and special equipment available    Patient identity confirmed:  Verbally with patient  Patient was reevaluated immediately before administering moderate or deep sedation or anesthesia  Confirmation Checklist:  Patient's identity using two indicators, relevant allergies, procedure was appropriate and matched the consent or emergent situation and correct equipment/implants were available  Time out: Immediately prior to the procedure a time out was called    Universal Protocol: the Joint Commission Universal Protocol was followed    Preparation: Patient was prepped and draped in usual sterile fashion    ESBL (mL):  10         ANESTHESIA    Anesthesia: See MAR for details  Local Anesthetic:  Lidocaine 1% with epinephrine and lidocaine 1% without epinephrine  Anesthetic Total (mL):  20      SEDATION    Patient Sedated: Yes    Sedation Type:  Moderate (conscious) sedation  Sedation:  Fentanyl and midazolam  Vital signs: Vital signs monitored during sedation    See dictated procedure note for full details.  PROCEDURE   Patient Tolerance:  Patient tolerated the procedure well with no immediate complications  Describe Procedure:     Successful port removal  Length of time physician/provider present for 1:1 monitoring during sedation: 15

## 2021-06-18 NOTE — DISCHARGE INSTRUCTIONS
Port Removal Discharge Instructions     After you go home:      Have an adult stay with you for the first 6 hours    You may resume your normal diet       For 24 hours - due to the sedation you received:    Relax and take it easy    Do NOT make any important or legal decisions    Do NOT drive or operate machines at home or at work    Do NOT drink alcohol    Care of Puncture Site:      Keep the dressings on your site clean & dry for 3 days. Change it only if it gets wet or dirty.    You may shower after the dressing comes off in 3 days    Do not remove the small white strips of tape, if present. Allow them to fall off on their own.     You may cover the wound with a bandaid after the dressing is removed if needed for comfort      Activity       Avoid heavy lifting (greater than 10 pounds) or the overuse of your shoulder for 3 days    Bleeding:      If you start bleeding from the incision site in your chest or have swelling in your neck, sit down and press on the site for 5-10 minutes.     If bleeding has not stopped after 10 minutes, call your provider.        Call 911 right away if you have heavy bleeding or bleeding that does not stop.      Medicines:      You may resume all medications    Resume your Warfarin/Coumadin at your regular dose today. Follow up with your provider to have your INR rechecked    Resume your Platelet Inhibitors and Aspirin tomorrow at your regular dose    For minor pain, you may take Acetaminophen (Tylenol) or Ibuprofen (Advil)          Call the provider who ordered this procedure if:      You have swelling in your neck or over your port site    The incision area is red, swollen, hot or tender    You have chills or a fever greater than 101 F (38 C)    Any questions or concerns    Call  911 or go to the Emergency Room if you have:      Severe chest pain or trouble breathing    Bleeding that you cannot control    If you have questions call:          [facility] @  [phone]    ____.

## 2021-06-18 NOTE — PROGRESS NOTES
[facility] Post Procedure Note    Patient Information  Name: Maximino Simeon  Age: 76 year old    Post Procedure  Time patient returned to [facility]: 0820  Concerns/abnormal assessment: none. VSS. Denies pain. Right Port removal site D/I with steri strips. Taking sips of water. Drowsy  If abnormal assessment, provider notified: N/A  Plan/Other: Monitor and discharge when recovered from sedation    Sandra Lafleur RN

## 2021-06-18 NOTE — PRE-PROCEDURE
GENERAL PRE-PROCEDURE:   Procedure:  Port a catheter removal with intravenous moderate sedation   Date/Time:  2021 7:30 AM    Written consent obtained?: Yes    Risks and benefits: Risks, benefits and alternatives were discussed    Consent given by:  Patient  Patient states understanding of procedure being performed: Yes    Patient's understanding of procedure matches consent: Yes    Procedure consent matches procedure scheduled: Yes    Expected level of sedation:  Moderate  Appropriately NPO:  Yes  ASA Class:  Class 3- Severe systemic disease, definite functional limitations  Mallampati  :  Grade 2- soft palate, base of uvula, tonsillar pillars, and portion of posterior pharyngeal wall visible  Lungs:  Lungs clear with good breath sounds bilaterally  Heart:  Normal heart sounds and rate  History & Physical reviewed:  History and physical reviewed and no updates needed  Statement of review:  I have reviewed the lab findings, diagnostic data, medications, and the plan for sedation    Patient Name: Maximino Simeon  Patient MRN: [id number]  Patient : 1945    Abbreviated H&P and Pre-sedation Assessment for a right port removal with sedation    Chief complaint and/or reason for Procedure: Port not needed anymore    Planned level of sedation: Minimal - moderate sedation    History of problems with sedation: (patient or family hx): No    ASA Assessment Category: 3 - Severe systemic disease, but not incapacitating    History of sleep apnea: No    History of blood thinners: No     Appropriate NPO status: Yes    Current tobacco use: No    Any recent fever, cough, chest or sinus congestion, SOB, weight loss, chest pain, diarrhea or constipation. No      Medications   Currently Scheduled Medications       Home Med List)  Medications Prior to Admission   Medication Sig Dispense Refill Last Dose     Calcium Carbonate-Vitamin D (CALCIUM + D PO) Take 6 tablets by mouth daily.   2021 at Unknown time     Coenzyme Q10  (COQ10 PO) Take 200 mg by mouth 2 times daily   6/17/2021 at Unknown time     DULoxetine (CYMBALTA) 20 MG capsule Take 1 capsule (20 mg) by mouth daily Follow up with your PCP for refills (Patient taking differently: Take 30 mg by mouth daily Follow up with your PCP for refills) 30 capsule 0 6/17/2021 at Unknown time     levothyroxine (SYNTHROID/LEVOTHROID) 50 MCG tablet Take 50 mcg by mouth every morning    6/17/2021 at Unknown time     liothyronine (CYTOMEL) 5 MCG tablet Take 5 mcg by mouth every morning    6/17/2021 at Unknown time     loperamide (IMODIUM) 1 MG/5ML liquid Take 1 mg by mouth 6 times daily    Past Week at Unknown time     Multiple Vitamins-Minerals (MULTIVITAL PO) Take 1 tablet by mouth daily (with lunch)    6/17/2021 at Unknown time     Probiotic Product (PROBIOTIC DAILY PO) 1 tablet daily at lunchtime by mouth   6/17/2021 at Unknown time     triamcinolone (KENALOG) 0.1 % external cream    6/17/2021 at Unknown time     order for DME Cranial prothesis (Patient not taking: Reported on 5/14/2021) 1 each 0        Allergies  Adhesive tape, Liquid adhesive, and Penicillins    PMH:  Past Medical History:   Diagnosis Date     Anxiety 2014     CAD (coronary artery disease) 2011     Cancer (H)     ovarian     Colitis      Fibromyalgia      Gastroesophageal reflux disease      Hypothyroidism 2011     Insomnia 2017     Mixed hyperlipidemia 2011     PONV (postoperative nausea and vomiting)      Recurrent major depressive disorder (H) 2010     Urinary retention        Past Surgical History:   Procedure Laterality Date     APPENDECTOMY OPEN N/A 8/9/2019    Procedure: Open Appendectomy;  Surgeon: Parvez Abreu MD;  Location: [facility] OR     BREAST SURGERY      biopsy     GYN SURGERY       HYSTERECTOMY TOTAL ABDOMINAL, BILATERAL SALPINGO-OOPHORECTOMY, NODE DISSECTION, COMBINED Bilateral 8/9/2019    Procedure: Exploratory Laparotomy, Total Abdominal Hysterectomy, Left Salpingo-Oopherectomy, Bilateral Pelvic and  Para-Aortic Lymph Node Dissection, Peritoneal Biopsy, Diaphraghm Pap Smears.;  Surgeon: Naz Ascencio MD;  Location: [facility] OR     IR CHEST PORT PLACEMENT > 5 YRS OF AGE  8/30/2019     OOPHORECTOMY  1992       Focused Physical exam pertinent to procedure:          (Details of heart, lung, ASA assessment and mallampati assessment in pre procedure assessment flowsheet)  General- Alert, oriented, pleasant woman in NAD  Recent vital signs-  Temp:  [98.3  F (36.8  C)] 98.3  F (36.8  C)  Pulse:  [98] 98  Resp:  [18] 18  BP: (140)/(98) 140/98  SpO2:  [99 %] 99 %    A/P:Reviewed history, medications, allergies, clinical information and pre procedure assessment. The patient was informed of the risks and benefits of the procedure.  They would like to proceed.  Maximino Simeon is approved for use of sedation during their procedure as noted above.      Signature  [facility] Interventional Radiology CNP ([phone]) (phone [phone])

## 2021-06-18 NOTE — PROGRESS NOTES
patient Name:  Maximino Simeon    Medical Record Number: [id number]  Today's Date:  June 18, 2021  Procedure: Right chest wall port removal  Start Time: 0759  End of procedure time: 0815    Report given to: care suite RN  Time pt departs:  0825       Notes:       Pt transferred to IR table. Prepped and draped appropriately. Monitoring equipment applied. NC applied. No complaints of pain at this time. Timeout recorded.       VSS. Pt remains on RA. No c/o pain at this time. Right chest wall site closed with sutures.  CDI, soft. No further questions from RN or pt.      Versed 1.5mg, Fentanyl 75mcg.

## 2021-06-18 NOTE — DISCHARGE SUMMARY
Care Suites Discharge Nursing Note    Patient Information  Name: Maximino Simeon  Age: 76 year old    Discharge Education:  Discharge instructions reviewed: Yes  Additional education/resources provided: no  Patient/patient representative verbalizes understanding: Yes  Patient discharging on new medications: No  Medication education completed: N/A    Discharge Plans:   Discharge location: home  Discharge ride contacted: Yes  Approximate discharge time: 1000    Discharge Criteria:  Discharge criteria met and vital signs stable: Yes    Patient Belongs:  Patient belongings returned to patient: Yes    Jose Billy RN

## 2021-06-18 NOTE — PROGRESS NOTES
Care Suites Admission Nursing Note    Patient Information  Name: Maximino Simeon  Age: 76 year old  Reason for admission: port removal  Care Suites arrival time: 0630      Patient Admission/Assessment   Pre-procedure assessment complete: Yes  If abnormal assessment/labs, provider notified: N/A  NPO: Yes  Medications held per instructions/orders: N/A  Consent: obtained  If applicable, pregnancy test status: deferred  Patient oriented to room: Yes  Education/questions answered: Yes  Plan/other: proceed with port removao    Discharge Planning  Discharge name/phone number:Shelby   Overnight post sedation caregiver: laura  Discharge location: home    Sandra Lafleur RN

## 2021-08-10 ENCOUNTER — ONCOLOGY VISIT (OUTPATIENT)
Dept: ONCOLOGY | Facility: CLINIC | Age: 76
End: 2021-08-10
Attending: OBSTETRICS & GYNECOLOGY
Payer: MEDICARE

## 2021-08-10 ENCOUNTER — LAB (OUTPATIENT)
Dept: INFUSION THERAPY | Facility: CLINIC | Age: 76
End: 2021-08-10
Attending: OBSTETRICS & GYNECOLOGY
Payer: MEDICARE

## 2021-08-10 VITALS
DIASTOLIC BLOOD PRESSURE: 95 MMHG | WEIGHT: 97.8 LBS | HEART RATE: 93 BPM | RESPIRATION RATE: 16 BRPM | SYSTOLIC BLOOD PRESSURE: 145 MMHG | OXYGEN SATURATION: 97 % | BODY MASS INDEX: 20.44 KG/M2

## 2021-08-10 DIAGNOSIS — R53.81 PHYSICAL DECONDITIONING: ICD-10-CM

## 2021-08-10 DIAGNOSIS — C56.9 OVARIAN CANCER, UNSPECIFIED LATERALITY (H): ICD-10-CM

## 2021-08-10 DIAGNOSIS — C56.9 OVARIAN CANCER, UNSPECIFIED LATERALITY (H): Primary | ICD-10-CM

## 2021-08-10 LAB — CANCER AG125 SERPL-ACNC: 8 U/ML (ref 0–30)

## 2021-08-10 PROCEDURE — 99215 OFFICE O/P EST HI 40 MIN: CPT | Performed by: OBSTETRICS & GYNECOLOGY

## 2021-08-10 PROCEDURE — G0463 HOSPITAL OUTPT CLINIC VISIT: HCPCS

## 2021-08-10 PROCEDURE — 36415 COLL VENOUS BLD VENIPUNCTURE: CPT

## 2021-08-10 PROCEDURE — 86304 IMMUNOASSAY TUMOR CA 125: CPT | Performed by: OBSTETRICS & GYNECOLOGY

## 2021-08-10 RX ORDER — LORAZEPAM 0.5 MG/1
TABLET ORAL
COMMUNITY
Start: 2021-06-25 | End: 2022-01-17

## 2021-08-10 RX ORDER — VITAMIN B COMPLEX
1 TABLET ORAL
COMMUNITY
End: 2022-01-17

## 2021-08-10 RX ORDER — CHOLESTYRAMINE 4 G/9G
POWDER, FOR SUSPENSION ORAL
COMMUNITY
Start: 2020-12-29 | End: 2022-01-17

## 2021-08-10 ASSESSMENT — PAIN SCALES - GENERAL: PAINLEVEL: SEVERE PAIN (6)

## 2021-08-10 NOTE — PROGRESS NOTES
"Oncology Rooming Note    August 10, 2021 10:39 AM   Maximino Simeon is a 76 year old female who presents for:    No chief complaint on file.    Initial Vitals: BP (!) 145/95   Pulse 93   Resp 16   Wt 44.4 kg (97 lb 12.8 oz)   LMP  (LMP Unknown)   SpO2 97%   BMI 20.44 kg/m   Estimated body mass index is 20.44 kg/m  as calculated from the following:    Height as of 11/13/20: 1.473 m (4' 10\").    Weight as of this encounter: 44.4 kg (97 lb 12.8 oz). Body surface area is 1.35 meters squared.  Severe Pain (6) Comment: Data Unavailable   No LMP recorded (lmp unknown). Patient has had a hysterectomy.  Allergies reviewed: Yes  Medications reviewed: Yes    Medications: Medication refills not needed today.  Pharmacy name entered into [employer]:    [facility] #[id number] - [facility] [address] DR[facility] #[id number] [facility] [address] [facility] [address]  [facility] #[id number] [facility] [address] [facility] #[id number] [facility] [address] [address]    Clinical concerns: no      Soraya Beltrán CMA              "

## 2021-08-10 NOTE — PROGRESS NOTES
Medical Assistant Note:  Maximino Simeon presents today for blood draw.    Patient seen by provider today: Yes: marisol.   present during visit today: Not Applicable.    Concerns: No Concerns.    Procedure:  Lab draw site: rac, Needle type: bf, Gauge: 23.    Post Assessment:  Labs drawn without difficulty: Yes.    Discharge Plan:  Departure Mode: Ambulatory.    Face to Face Time: 5min.    Liza Traore, CMA

## 2021-08-13 NOTE — PROGRESS NOTES
RECORDS STATUS - ALL OTHER DIAGNOSIS      RECORDS RECEIVED FROM: [facility]   DATE RECEIVED: 8/17/2021   NOTES STATUS DETAILS   OFFICE NOTE from referring provider Connie Camacho APRN CNP   OFFICE NOTE from medical oncologist Complete 8/10/2021 Ovarian Cancer     5/14/2021 Ovarian Cancer     More visit notes in [facility]   DISCHARGE SUMMARY from hospital Complete Epic 6/18/2021 Ovarian Cancer     8/30/2019 Ovarian Cancer    DISCHARGE REPORT from the ER     OPERATIVE REPORT N/A  Complex pelvic mass, elevated CA-125 and appendicitis.    MEDICATION LIST Complete [facility]   CLINICAL TRIAL TREATMENTS TO DATE     LABS     PATHOLOGY REPORTS     ANYTHING RELATED TO DIAGNOSIS Complete Labs last updated on 8/10/2021   GENONOMIC TESTING     TYPE:     IMAGING (NEED IMAGES & REPORT)     CT SCANS Complete CT Abdomen Pelvis 1/20/2020   MRI     MAMMO     ULTRASOUND     PET

## 2021-08-17 ENCOUNTER — PRE VISIT (OUTPATIENT)
Dept: ONCOLOGY | Facility: CLINIC | Age: 76
End: 2021-08-17

## 2021-10-02 ENCOUNTER — HEALTH MAINTENANCE LETTER (OUTPATIENT)
Age: 76
End: 2021-10-02

## 2021-11-29 NOTE — PROGRESS NOTES
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 She completed chemotherapy 2020. She is here today for a surveillance visit.     Oncology history:   Originally seen by Dr. Ascencio at [facility] during her call week; was to follow up with outpatient GYN ONC and general surgery due to appendicits and enlarged ovary.       54  CA 19-9 15  CEA 2.8     2019: CT abd pelvis: IMPRESSION:      1. Dilated, fluid-filled appendix with mild increased enhancement and slight stranding at its tip is worrisome for acute appendicitis.  2. Large complex cystic pelvic mass with soft tissue nodularity with adjacent prominent left adnexal vessels is worrisome for neoplasm of ovarian origin. No mesenteric nodularity, lymph node enlargement, or other findings that are suspicious for metastatic disease in the abdomen or pelvis.     2019: CT abd pelvis REPRODUCTIVE ORGANS with IV and oral contrast: Again noted is a large complex cystic mass in the pelvis measuring approximately 13.3 x 8.1 x 9.7 cm in size which has areas of mural nodularity and soft tissue density.    1. Again noted is a thick-walled dilated appendix compatible with appendicitis. There is a questionable new small area of appendiceal wall discontinuity and adjacent 1.2 cm fluid pocket on axial image 57 and sagittal image 23. Findings raise question of a small area of appendiceal wall perforation.    2. Large complex pelvic mass again concerning for ovarian neoplasm.    3. Marked bladder distention. Mild prominence of the urinary collecting systems likely secondary to this.     2019-2019: General surgery consulted for perforated appendicitis.  Not septic as no fever.Treated with Cefuroxime and metronidazole (PCN allergy). Surgery recommending postponing  appendectomy for about 6 weeks or so. Presenting leukocytosis and fever resolved by discharge.  Will finish oral course of ceftin and flagyl as outpatient.      Per general surgery note:  - No indication for surgery at this time  - Recommend admission to medicine service  - Start IV antibiotics  - Will again need to discuss the timing of appendectomy with Gyn/Onc to allow for evaluation of her pelvic mass simultaneously, this will likely have to be in 4-6 weeks following possible appendix perforation  - Surgery will continue to follow     8/9/19: Exploratory Laparotomy, Total Abdominal Hysterectomy, Left Salpingo-Oopherectomy, Bilateral Pelvic and Para-Aortic Lymph Node Dissection, Peritoneal Biopsy, Diaphraghm Pap Smears.     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     5/20/2020: 10.   8/19/2020:  12.  11/10/2020:  10.  2/10/2021:  8  5/10/2021:  9.    6/18/2021: portacath removal     8/10/2021:  8  11/30/2021:  pending         Brittni comes to the clinic feeling well from a gynecological standpoint. Colonoscopy and endoscopy coming up for ongoing colitis issues. She continues imodium for diarrhea. She is seeing a specialist at [facility]. She has been working with mental health therapy. She denies SI/HI. She is still on Prolia at [facility] for her osteoporosis. She is not sexually active and denies  vulvar/vaginal concerns. She denies any vaginal bleeding, no nausea/emesis, no lower extremity edema, and no difficulties eating or sleeping. She denies any abdominal discomfort/bloating, no fevers or chills, and no chest pain or shortness of breath. She does have hot flashes a couple times a week that are mild and infrequent. She denies any issues with neuropathy.       Health Maintenance  Colonoscopy- due for this  Mammogram- 5/7/2021  DEXA- 9/1/2020, on Prolia   Annual physical- 4/23/2021        Review of Systems:    Systemic           no weight changes; no fever; no chills; no night sweats; no appetite changes, +fatigue  Skin           no rashes, or lesions  Eye           no irritation; no changes in vision  Jarred-Laryngeal           no dysphagia; no hoarseness   Pulmonary    no cough; no shortness of breath  Cardiovascular    no chest pain; no palpitations  Gastrointestinal    + diarrhea; no constipation; no abdominal pain; no changes in bowel habits; no blood in stool  Genitourinary   no urinary frequency; no urinary urgency; no dysuria; no pain; no abnormal vaginal discharge; no abnormal vaginal bleeding  Breast    no breast discharge; no breast changes; no breast pain  Musculoskeletal    no myalgias; no arthralgias; no back pain  Psychiatric           no depressed mood; + anxiety    Hematologic           no tender lymph nodes; no noticeable swellings or lumps   Endocrine    no hot flashes; no heat/cold intolerance         Neurological   no tremor; no numbness and tingling; no headaches; no difficulty sleeping      Past Medical History:    Past Medical History:   Diagnosis Date     Anxiety 2014     CAD (coronary artery disease) 2011     Cancer (H)     ovarian     Colitis      Fibromyalgia      Gastroesophageal reflux disease      Hypothyroidism 2011     Insomnia 2017     Mixed hyperlipidemia 2011     PONV (postoperative nausea and vomiting)      Recurrent major depressive disorder (H) 2010     Urinary retention           Past Surgical History:    Past Surgical History:   Procedure Laterality Date     APPENDECTOMY OPEN N/A 8/9/2019    Procedure: Open Appendectomy;  Surgeon: Parvez Abreu MD;  Location: [facility] OR     BREAST SURGERY      biopsy     GYN SURGERY       HYSTERECTOMY TOTAL ABDOMINAL, BILATERAL SALPINGO-OOPHORECTOMY, NODE DISSECTION, COMBINED Bilateral 8/9/2019    Procedure: Exploratory Laparotomy, Total Abdominal Hysterectomy, Left Salpingo-Oopherectomy, Bilateral Pelvic and Para-Aortic Lymph Node Dissection, Peritoneal Biopsy, Diaphraghm Pap Smears.;  Surgeon: Naz Ascencio MD;  Location: [facility] OR     IR CHEST PORT PLACEMENT > 5 YRS OF AGE  8/30/2019     IR PORT REMOVAL RIGHT  6/18/2021     OOPHORECTOMY  1992         Health Maintenance Due   Topic Date Due     DEXA  Never done     ADVANCE CARE PLANNING  Never done     DEPRESSION ACTION PLAN  Never done     HEPATITIS C SCREENING  Never done     LIPID  Never done     MEDICARE ANNUAL WELLNESS VISIT  Never done     PHQ-9  01/12/2020     FALL RISK ASSESSMENT  07/16/2020     ZOSTER IMMUNIZATION (3 of 3) 01/15/2021       Current Medications:     Current Outpatient Medications   Medication Sig Dispense Refill     B Complex Vitamins (VITAMIN B-COMPLEX) TABS Take 1 tablet by mouth       Calcium Carbonate-Vitamin D (CALCIUM + D PO) Take 4 tablets by mouth daily        Coenzyme Q10 (COQ10 PO) Take 200 mg by mouth 2 times daily       denosumab (PROLIA) 60 MG/ML SOSY injection Inject 60 mg Subcutaneous       DULoxetine (CYMBALTA) 20 MG capsule Take 1 capsule (20 mg) by mouth daily Follow up with your PCP for refills (Patient taking differently: Take 60 mg by mouth daily Follow up with your PCP for refills) 30 capsule 0     levothyroxine (SYNTHROID/LEVOTHROID) 50 MCG tablet Take 50 mcg by mouth every morning        liothyronine (CYTOMEL) 5 MCG tablet Take 5 mcg by mouth every morning        loperamide (IMODIUM) 1 MG/5ML liquid Take 1 mg by mouth 6 times daily         Multiple Vitamins-Minerals (MULTIVITAL PO) Take 1 tablet by mouth daily (with lunch)        Probiotic Product (PROBIOTIC DAILY PO) 1 tablet daily at lunchtime by mouth       triamcinolone (KENALOG) 0.1 % external cream        cholestyramine (QUESTRAN) 4 GM/DOSE powder Take 4 g by mouth daily as needed (diarrhea). (Patient not taking: Reported on 11/30/2021)       LORazepam (ATIVAN) 0.5 MG tablet Take 0.5-1 Tablets by mouth every 8 hours as needed for Anxiety. (Patient not taking: Reported on 11/30/2021)           Allergies:        Allergies   Allergen Reactions     Adhesive Tape      bandaids     Liquid Adhesive Rash     Penicillins Rash        Social History:     Social History     Tobacco Use     Smoking status: Never Smoker     Smokeless tobacco: Never Used   Substance Use Topics     Alcohol use: Yes     Comment: socially       History   Drug Use Unknown         Family History:     The patient's family history is notable for     Family History   Problem Relation Age of Onset     Heart Disease Father      Cerebrovascular Disease Father      Cerebrovascular Disease Sister      Thyroid Disease Sister      Breast Cancer Sister          Physical Exam:     BP (!) 148/90   Pulse 88   Temp 97.4  F (36.3  C) (Oral)   Resp 18   Wt 45.8 kg (101 lb)   LMP  (LMP Unknown)   SpO2 100%   BMI 21.11 kg/m    Body mass index is 21.11 kg/m .    General Appearance: healthy and alert, no distress     HEENT: no thyromegaly, no palpable nodules or masses        Cardiovascular: regular rate and rhythm, no gallops, rubs or murmurs    Respiratory: lungs clear, no rales, rhonchi or wheezes    Musculoskeletal: extremities non tender and without edema    Skin: no lesions; portacath removed and site well healed with mild scarring, no edema or erythema     Neurological: normal gait, no gross defects     Psychiatric: appropriate mood and affect                               Hematological: normal cervical, supraclavicular and inguinal lymph  nodes     Gastrointestinal:       abdomen soft, non-tender, non-distended, no organomegaly or masses    Genitourinary: External genitalia and urethral meatus appears normal. Vagina pale and slightly narrowed consistent with postmenopausal changes, smooth without nodularity or masses. Cervix surgically absent.  Bimanual exam reveal no masses, nodularity or fullness.  Recto-vaginal exam confirms these findings. Medium Bill speculum used.       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 She completed chemotherapy 1/2020. She is here today for a surveillance visit.     20 minutes spent on the date of the encounter doing chart review, history and exam, documentation, and further activities as noted above.        Plan:     1.)       AMEE on exam and  pending. Patient will continue to have in person surveillance visits with  lab draws every 3 months for two years (1/2022), and then every six months for three years (1/2025) and then annual visits thereafter. Reviewed signs and symptoms for when she should contact the clinic or seek additional care. Patient to contact the clinic with any questions or concerns in the interim. Request for  and in person visit in three months sent to scheduling.      2.) Genetic Testing Result: NEGATIVE  Brittni is negative for mutations in the APC, MARV, AXIN2, BARD1, BMPR1A, BRCA1, BRCA2, BRIP1, CDH1, CDK4, CDKN2A, CHEK2, DICER1, EPCAM, GREM1, HOXB13, MLH1, MSH2, MSH3, MSH6, MUTYH, NBN, NF1, NTHL1, PALB2, PMS2, POLD1, POLE, PTEN, RAD51C, RAD51D, RECQL, SMAD4, SMARCA4, STK11, and TP53 genes. No mutations were found in any of the 36 genes analyzed. This test involved sequencing and deletion/duplication analysis of all genes with the exception of EPCAM and GREM1 (deletions only).    3.) Labs and/or tests ordered include:   pending      4.) Health maintenance issues addressed today include  annual health maintenance and non-gynecologic issues with PCP. Discussed the need to be in close contact with her PCP and GI specialists to keep her colitis controlled.         LEX Anaya, NP-BC  Women's Health Nurse Practitioner  Division of Gynecologic Oncology  [facility]          CC  Patient Care Team:  Cecelia Manzo MD as PCP - General  Naz Ascencio MD as MD (Oncology)  Parvez Abreu MD as MD (Surgery)  Micheline Soler MD as MD (Emergency Medicine)  [facility] [facility] ([facility]  Paola Soto MD as Assigned Neuroscience Provider  Connie Martinez APRN CNP as Assigned Cancer Care Provider  Paola Soto MD as MD (Physical Medicine and Rehabilitation)  ARIELLE Melara GC as Assigned OBGYN Provider

## 2021-11-30 ENCOUNTER — LAB (OUTPATIENT)
Dept: INFUSION THERAPY | Facility: CLINIC | Age: 76
End: 2021-11-30
Attending: OBSTETRICS & GYNECOLOGY
Payer: MEDICARE

## 2021-11-30 ENCOUNTER — ONCOLOGY VISIT (OUTPATIENT)
Dept: ONCOLOGY | Facility: CLINIC | Age: 76
End: 2021-11-30
Attending: OBSTETRICS & GYNECOLOGY
Payer: MEDICARE

## 2021-11-30 VITALS
TEMPERATURE: 97.4 F | HEART RATE: 88 BPM | WEIGHT: 101 LBS | RESPIRATION RATE: 18 BRPM | SYSTOLIC BLOOD PRESSURE: 148 MMHG | BODY MASS INDEX: 21.11 KG/M2 | DIASTOLIC BLOOD PRESSURE: 90 MMHG | OXYGEN SATURATION: 100 %

## 2021-11-30 DIAGNOSIS — C56.9 OVARIAN CANCER, UNSPECIFIED LATERALITY (H): Primary | ICD-10-CM

## 2021-11-30 DIAGNOSIS — C56.9 OVARIAN CANCER, UNSPECIFIED LATERALITY (H): ICD-10-CM

## 2021-11-30 DIAGNOSIS — R53.81 PHYSICAL DECONDITIONING: ICD-10-CM

## 2021-11-30 LAB — CANCER AG125 SERPL-ACNC: 9 U/ML (ref 0–30)

## 2021-11-30 PROCEDURE — G0463 HOSPITAL OUTPT CLINIC VISIT: HCPCS

## 2021-11-30 PROCEDURE — 86304 IMMUNOASSAY TUMOR CA 125: CPT | Performed by: OBSTETRICS & GYNECOLOGY

## 2021-11-30 PROCEDURE — 36415 COLL VENOUS BLD VENIPUNCTURE: CPT

## 2021-11-30 PROCEDURE — 99213 OFFICE O/P EST LOW 20 MIN: CPT | Performed by: OBSTETRICS & GYNECOLOGY

## 2021-11-30 ASSESSMENT — PAIN SCALES - GENERAL: PAINLEVEL: MILD PAIN (3)

## 2021-11-30 NOTE — LETTER
2021         RE: Maximino Simeon  [address] [facility] [address]        Dear Colleague,    Thank you for referring your patient, Maximino Simeon, to the [facility]. Please see a copy of my visit note below.                   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 She completed chemotherapy 2020. She is here today for a surveillance visit.     Oncology history:   Originally seen by Dr. Ascencio at [facility] during her call week; was to follow up with outpatient GYN ONC and general surgery due to appendicits and enlarged ovary.       54  CA 19-9 15  CEA 2.8     2019: CT abd pelvis: IMPRESSION:      1. Dilated, fluid-filled appendix with mild increased enhancement and slight stranding at its tip is worrisome for acute appendicitis.  2. Large complex cystic pelvic mass with soft tissue nodularity with adjacent prominent left adnexal vessels is worrisome for neoplasm of ovarian origin. No mesenteric nodularity, lymph node enlargement, or other findings that are suspicious for metastatic disease in the abdomen or pelvis.     2019: CT abd pelvis REPRODUCTIVE ORGANS with IV and oral contrast: Again noted is a large complex cystic mass in the pelvis measuring approximately 13.3 x 8.1 x 9.7 cm in size which has areas of mural nodularity and soft tissue density.    1. Again noted is a thick-walled dilated appendix compatible with appendicitis. There is a questionable new small area of appendiceal wall discontinuity and adjacent 1.2 cm fluid pocket on axial image 57 and sagittal image 23. Findings raise question of a small area of appendiceal wall perforation.    2. Large complex pelvic mass again concerning for ovarian neoplasm.    3. Marked bladder distention. Mild prominence  of the urinary collecting systems likely secondary to this.     7/2/2019-7/7/2019: General surgery consulted for perforated appendicitis.  Not septic as no fever.Treated with Cefuroxime and metronidazole (PCN allergy). Surgery recommending postponing appendectomy for about 6 weeks or so. Presenting leukocytosis and fever resolved by discharge.  Will finish oral course of ceftin and flagyl as outpatient.      Per general surgery note:  - No indication for surgery at this time  - Recommend admission to medicine service  - Start IV antibiotics  - Will again need to discuss the timing of appendectomy with Gyn/Onc to allow for evaluation of her pelvic mass simultaneously, this will likely have to be in 4-6 weeks following possible appendix perforation  - Surgery will continue to follow     8/9/19: Exploratory Laparotomy, Total Abdominal Hysterectomy, Left Salpingo-Oopherectomy, Bilateral Pelvic and Para-Aortic Lymph Node Dissection, Peritoneal Biopsy, Diaphraghm Pap Smears.     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.     5/20/2020: 10.   8/19/2020:  12.  11/10/2020:  10.  2/10/2021:  8  5/10/2021:  9.    6/18/2021: portacath removal     8/10/2021:  8  11/30/2021:  pending         Brittni comes to the clinic feeling well from a gynecological standpoint. Colonoscopy and endoscopy coming up for ongoing  colitis issues. She continues imodium for diarrhea. She is seeing a specialist at [place] [facility]. She has been working with mental health therapy. She denies SI/HI. She is still on Prolia at [facility] [facility] for her osteoporosis. She is not sexually active and denies vulvar/vaginal concerns. She denies any vaginal bleeding, no nausea/emesis, no lower extremity edema, and no difficulties eating or sleeping. She denies any abdominal discomfort/bloating, no fevers or chills, and no chest pain or shortness of breath. She does have hot flashes a couple times a week that are mild and infrequent. She denies any issues with neuropathy.       Health Maintenance  Colonoscopy- due for this  Mammogram- 5/7/2021  DEXA- 9/1/2020, on Prolia   Annual physical- 4/23/2021        Review of Systems:    Systemic           no weight changes; no fever; no chills; no night sweats; no appetite changes, +fatigue  Skin           no rashes, or lesions  Eye           no irritation; no changes in vision  Jarred-Laryngeal           no dysphagia; no hoarseness   Pulmonary    no cough; no shortness of breath  Cardiovascular    no chest pain; no palpitations  Gastrointestinal    + diarrhea; no constipation; no abdominal pain; no changes in bowel habits; no blood in stool  Genitourinary   no urinary frequency; no urinary urgency; no dysuria; no pain; no abnormal vaginal discharge; no abnormal vaginal bleeding  Breast    no breast discharge; no breast changes; no breast pain  Musculoskeletal    no myalgias; no arthralgias; no back pain  Psychiatric           no depressed mood; + anxiety    Hematologic           no tender lymph nodes; no noticeable swellings or lumps   Endocrine    no hot flashes; no heat/cold intolerance         Neurological   no tremor; no numbness and tingling; no headaches; no difficulty sleeping      Past Medical History:    Past Medical History:   Diagnosis Date     Anxiety 2014     CAD (coronary artery disease) 2011     Cancer (H)     ovarian      Colitis      Fibromyalgia      Gastroesophageal reflux disease      Hypothyroidism 2011     Insomnia 2017     Mixed hyperlipidemia 2011     PONV (postoperative nausea and vomiting)      Recurrent major depressive disorder (H) 2010     Urinary retention          Past Surgical History:    Past Surgical History:   Procedure Laterality Date     APPENDECTOMY OPEN N/A 8/9/2019    Procedure: Open Appendectomy;  Surgeon: Parvez Abreu MD;  Location: [facility] OR     BREAST SURGERY      biopsy     GYN SURGERY       HYSTERECTOMY TOTAL ABDOMINAL, BILATERAL SALPINGO-OOPHORECTOMY, NODE DISSECTION, COMBINED Bilateral 8/9/2019    Procedure: Exploratory Laparotomy, Total Abdominal Hysterectomy, Left Salpingo-Oopherectomy, Bilateral Pelvic and Para-Aortic Lymph Node Dissection, Peritoneal Biopsy, Diaphraghm Pap Smears.;  Surgeon: Naz Ascencio MD;  Location: [facility] OR     IR CHEST PORT PLACEMENT > 5 YRS OF AGE  8/30/2019     IR PORT REMOVAL RIGHT  6/18/2021     OOPHORECTOMY  1992         Health Maintenance Due   Topic Date Due     DEXA  Never done     ADVANCE CARE PLANNING  Never done     DEPRESSION ACTION PLAN  Never done     HEPATITIS C SCREENING  Never done     LIPID  Never done     MEDICARE ANNUAL WELLNESS VISIT  Never done     PHQ-9  01/12/2020     FALL RISK ASSESSMENT  07/16/2020     ZOSTER IMMUNIZATION (3 of 3) 01/15/2021       Current Medications:     Current Outpatient Medications   Medication Sig Dispense Refill     B Complex Vitamins (VITAMIN B-COMPLEX) TABS Take 1 tablet by mouth       Calcium Carbonate-Vitamin D (CALCIUM + D PO) Take 4 tablets by mouth daily        Coenzyme Q10 (COQ10 PO) Take 200 mg by mouth 2 times daily       denosumab (PROLIA) 60 MG/ML SOSY injection Inject 60 mg Subcutaneous       DULoxetine (CYMBALTA) 20 MG capsule Take 1 capsule (20 mg) by mouth daily Follow up with your PCP for refills (Patient taking differently: Take 60 mg by mouth daily Follow up with your PCP for refills) 30 capsule 0      levothyroxine (SYNTHROID/LEVOTHROID) 50 MCG tablet Take 50 mcg by mouth every morning        liothyronine (CYTOMEL) 5 MCG tablet Take 5 mcg by mouth every morning        loperamide (IMODIUM) 1 MG/5ML liquid Take 1 mg by mouth 6 times daily        Multiple Vitamins-Minerals (MULTIVITAL PO) Take 1 tablet by mouth daily (with lunch)        Probiotic Product (PROBIOTIC DAILY PO) 1 tablet daily at lunchtime by mouth       triamcinolone (KENALOG) 0.1 % external cream        cholestyramine (QUESTRAN) 4 GM/DOSE powder Take 4 g by mouth daily as needed (diarrhea). (Patient not taking: Reported on 11/30/2021)       LORazepam (ATIVAN) 0.5 MG tablet Take 0.5-1 Tablets by mouth every 8 hours as needed for Anxiety. (Patient not taking: Reported on 11/30/2021)           Allergies:        Allergies   Allergen Reactions     Adhesive Tape      bandaids     Liquid Adhesive Rash     Penicillins Rash        Social History:     Social History     Tobacco Use     Smoking status: Never Smoker     Smokeless tobacco: Never Used   Substance Use Topics     Alcohol use: Yes     Comment: socially       History   Drug Use Unknown         Family History:     The patient's family history is notable for     Family History   Problem Relation Age of Onset     Heart Disease Father      Cerebrovascular Disease Father      Cerebrovascular Disease Sister      Thyroid Disease Sister      Breast Cancer Sister          Physical Exam:     BP (!) 148/90   Pulse 88   Temp 97.4  F (36.3  C) (Oral)   Resp 18   Wt 45.8 kg (101 lb)   LMP  (LMP Unknown)   SpO2 100%   BMI 21.11 kg/m    Body mass index is 21.11 kg/m .    General Appearance: healthy and alert, no distress     HEENT: no thyromegaly, no palpable nodules or masses        Cardiovascular: regular rate and rhythm, no gallops, rubs or murmurs    Respiratory: lungs clear, no rales, rhonchi or wheezes    Musculoskeletal: extremities non tender and without edema    Skin: no lesions; portacath removed  and site well healed with mild scarring, no edema or erythema     Neurological: normal gait, no gross defects     Psychiatric: appropriate mood and affect                               Hematological: normal cervical, supraclavicular and inguinal lymph nodes     Gastrointestinal:       abdomen soft, non-tender, non-distended, no organomegaly or masses    Genitourinary: External genitalia and urethral meatus appears normal. Vagina pale and slightly narrowed consistent with postmenopausal changes, smooth without nodularity or masses. Cervix surgically absent.  Bimanual exam reveal no masses, nodularity or fullness.  Recto-vaginal exam confirms these findings. Medium Bill speculum used.       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 She completed chemotherapy 1/2020. She is here today for a surveillance visit.     20 minutes spent on the date of the encounter doing chart review, history and exam, documentation, and further activities as noted above.        Plan:     1.)       AMEE on exam and  pending. Patient will continue to have in person surveillance visits with  lab draws every 3 months for two years (1/2022), and then every six months for three years (1/2025) and then annual visits thereafter. Reviewed signs and symptoms for when she should contact the clinic or seek additional care. Patient to contact the clinic with any questions or concerns in the interim. Request for  and in person visit in three months sent to scheduling.      2.) Genetic Testing Result: NEGATIVE  Brittni is negative for mutations in the APC, MARV, AXIN2, BARD1, BMPR1A, BRCA1, BRCA2, BRIP1, CDH1, CDK4, CDKN2A, CHEK2, DICER1, EPCAM, GREM1, HOXB13, MLH1, MSH2, MSH3, MSH6, MUTYH, NBN, NF1, NTHL1, PALB2, PMS2, POLD1, POLE, PTEN, RAD51C, RAD51D, RECQL, SMAD4, SMARCA4, STK11, and TP53 genes. No mutations were found in any of the 36 genes analyzed.  "This test involved sequencing and deletion/duplication analysis of all genes with the exception of EPCAM and GREM1 (deletions only).    3.) Labs and/or tests ordered include:   pending      4.) Health maintenance issues addressed today include annual health maintenance and non-gynecologic issues with PCP. Discussed the need to be in close contact with her PCP and GI specialists to keep her colitis controlled.         LEX Anaya, NP-BC  Women's Health Nurse Practitioner  Division of Gynecologic Oncology  [facility]          CC  Patient Care Team:  Cecelia Manzo MD as PCP - General  Naz Ascencio MD as MD (Oncology)  Parvez Abreu MD as MD (Surgery)  Micheline Soler MD as MD (Emergency Medicine)  [facility] ([facility] ([place]  Paola Soto MD as Assigned Neuroscience Provider  Connie Martinez APRN CNP as Assigned Cancer Care Provider  Paola Soto MD as MD (Physical Medicine and Rehabilitation)  ARIELLE Melara GC as Assigned OBGYN Provider      Oncology Rooming Note    November 30, 2021 1:13 PM   Maximino Simeon is a 76 year old female who presents for:    Chief Complaint   Patient presents with     Oncology Clinic Visit     Initial Vitals: LMP  (LMP Unknown)  Estimated body mass index is 20.44 kg/m  as calculated from the following:    Height as of 11/13/20: 1.473 m (4' 10\").    Weight as of 8/10/21: 44.4 kg (97 lb 12.8 oz). There is no height or weight on file to calculate BSA.  Data Unavailable Comment: Data Unavailable   No LMP recorded (lmp unknown). Patient has had a hysterectomy.  Allergies reviewed: Yes  Medications reviewed: Yes    Medications: Medication refills not needed today.  Pharmacy name entered into [employer]:    [facility] #[id number] - [place], [place] - [address] [place] [address] DRUG STORE #[id number] - [place] - [address] [facility] [address] " PHARMACY #[id number] [place], [place] - [address] [facility]Y #[id number] [facility] [address] [facility]    Clinical concerns: no   Shari J. Schoenberger, [facility]                Again, thank you for allowing me to participate in the care of your patient.        Sincerely,        LEX Larson CNP

## 2021-11-30 NOTE — PROGRESS NOTES
Medical Assistant Note:  Maximino Simeon presents today for blood draw.    Patient seen by provider today: Yes: marisol.   present during visit today: Not Applicable.    Concerns: No Concerns.    Procedure:  Lab draw site: lac, Needle type: bf, Gauge: 23.    Post Assessment:  Labs drawn without difficulty: Yes.    Discharge Plan:  Departure Mode: Ambulatory.    Face to Face Time: 5 min  .    Liza Traore, CMA

## 2021-11-30 NOTE — PROGRESS NOTES
"Oncology Rooming Note    November 30, 2021 1:13 PM   Maximino Simeon is a 76 year old female who presents for:    Chief Complaint   Patient presents with     Oncology Clinic Visit     Initial Vitals: LMP  (LMP Unknown)  Estimated body mass index is 20.44 kg/m  as calculated from the following:    Height as of 11/13/20: 1.473 m (4' 10\").    Weight as of 8/10/21: 44.4 kg (97 lb 12.8 oz). There is no height or weight on file to calculate BSA.  Data Unavailable Comment: Data Unavailable   No LMP recorded (lmp unknown). Patient has had a hysterectomy.  Allergies reviewed: Yes  Medications reviewed: Yes    Medications: Medication refills not needed today.  Pharmacy name entered into [employer]:    [facility] #[id number] - [facility] [address] [place] [address] DRUG STORE #[id number] [facility] [address] [place]  [address] [facility] [address] [place]  [facility] #[id number] [place], [place] - [address] [facility] #[id number] [place], [place] [facility] [address] [address] [facility]    Clinical concerns: no   Shari J. Schoenberger, CMA            "

## 2021-12-12 NOTE — PROGRESS NOTES
Nursing Note:  Nirmihirclaude Simeon presents today for port flush.    Patient seen by provider today: No   present during visit today: Not Applicable.    Note: N/A.    Intravenous Access:  Implanted Port.    Discharge Plan:   Patient was sent to [facility] for clinic appointment.    Mauri Garcia RN           2

## 2022-01-17 ENCOUNTER — APPOINTMENT (OUTPATIENT)
Dept: GENERAL RADIOLOGY | Facility: CLINIC | Age: 77
DRG: 390 | End: 2022-01-17
Attending: EMERGENCY MEDICINE
Payer: MEDICARE

## 2022-01-17 ENCOUNTER — HOSPITAL ENCOUNTER (INPATIENT)
Facility: CLINIC | Age: 77
LOS: 4 days | Discharge: HOME | DRG: 390 | End: 2022-01-21
Attending: EMERGENCY MEDICINE | Admitting: INTERNAL MEDICINE
Payer: MEDICARE

## 2022-01-17 ENCOUNTER — APPOINTMENT (OUTPATIENT)
Dept: CT IMAGING | Facility: CLINIC | Age: 77
DRG: 390 | End: 2022-01-17
Attending: EMERGENCY MEDICINE
Payer: MEDICARE

## 2022-01-17 DIAGNOSIS — K56.609 SMALL BOWEL OBSTRUCTION (H): ICD-10-CM

## 2022-01-17 LAB
ABO/RH(D): NORMAL
ALBUMIN SERPL-MCNC: 4.3 G/DL (ref 3.4–5)
ALP SERPL-CCNC: 47 U/L (ref 40–150)
ALT SERPL W P-5'-P-CCNC: 29 U/L (ref 0–50)
ANION GAP SERPL CALCULATED.3IONS-SCNC: 3 MMOL/L (ref 3–14)
ANTIBODY SCREEN: NEGATIVE
AST SERPL W P-5'-P-CCNC: 19 U/L (ref 0–45)
BASOPHILS # BLD AUTO: 0.1 10E3/UL (ref 0–0.2)
BASOPHILS NFR BLD AUTO: 0 %
BILIRUB SERPL-MCNC: 0.6 MG/DL (ref 0.2–1.3)
BUN SERPL-MCNC: 23 MG/DL (ref 7–30)
CALCIUM SERPL-MCNC: 10.7 MG/DL (ref 8.5–10.1)
CHLORIDE BLD-SCNC: 100 MMOL/L (ref 94–109)
CO2 SERPL-SCNC: 33 MMOL/L (ref 20–32)
CREAT SERPL-MCNC: 0.7 MG/DL (ref 0.52–1.04)
EOSINOPHIL # BLD AUTO: 0.1 10E3/UL (ref 0–0.7)
EOSINOPHIL NFR BLD AUTO: 1 %
ERYTHROCYTE [DISTWIDTH] IN BLOOD BY AUTOMATED COUNT: 13.6 % (ref 10–15)
GFR SERPL CREATININE-BSD FRML MDRD: 88 ML/MIN/1.73M2
GLUCOSE BLD-MCNC: 165 MG/DL (ref 70–99)
HCT VFR BLD AUTO: 48.8 % (ref 35–47)
HGB BLD-MCNC: 15.8 G/DL (ref 11.7–15.7)
IMM GRANULOCYTES # BLD: 0.1 10E3/UL
IMM GRANULOCYTES NFR BLD: 1 %
INR PPP: 0.93 (ref 0.85–1.15)
LACTATE SERPL-SCNC: 2.4 MMOL/L (ref 0.7–2)
LIPASE SERPL-CCNC: 165 U/L (ref 73–393)
LYMPHOCYTES # BLD AUTO: 1.1 10E3/UL (ref 0.8–5.3)
LYMPHOCYTES NFR BLD AUTO: 8 %
MCH RBC QN AUTO: 32.3 PG (ref 26.5–33)
MCHC RBC AUTO-ENTMCNC: 32.4 G/DL (ref 31.5–36.5)
MCV RBC AUTO: 100 FL (ref 78–100)
MONOCYTES # BLD AUTO: 0.6 10E3/UL (ref 0–1.3)
MONOCYTES NFR BLD AUTO: 4 %
NEUTROPHILS # BLD AUTO: 11.8 10E3/UL (ref 1.6–8.3)
NEUTROPHILS NFR BLD AUTO: 86 %
NRBC # BLD AUTO: 0 10E3/UL
NRBC BLD AUTO-RTO: 0 /100
PLATELET # BLD AUTO: 266 10E3/UL (ref 150–450)
POTASSIUM BLD-SCNC: 4.7 MMOL/L (ref 3.4–5.3)
PROT SERPL-MCNC: 8.3 G/DL (ref 6.8–8.8)
RBC # BLD AUTO: 4.89 10E6/UL (ref 3.8–5.2)
SARS-COV-2 RNA RESP QL NAA+PROBE: NEGATIVE
SODIUM SERPL-SCNC: 136 MMOL/L (ref 133–144)
SPECIMEN EXPIRATION DATE: NORMAL
WBC # BLD AUTO: 13.8 10E3/UL (ref 4–11)

## 2022-01-17 PROCEDURE — 250N000009 HC RX 250: Performed by: EMERGENCY MEDICINE

## 2022-01-17 PROCEDURE — 250N000011 HC RX IP 250 OP 636: Performed by: EMERGENCY MEDICINE

## 2022-01-17 PROCEDURE — 80053 COMPREHEN METABOLIC PANEL: CPT | Performed by: EMERGENCY MEDICINE

## 2022-01-17 PROCEDURE — 74177 CT ABD & PELVIS W/CONTRAST: CPT | Mod: ME

## 2022-01-17 PROCEDURE — 87635 SARS-COV-2 COVID-19 AMP PRB: CPT | Performed by: EMERGENCY MEDICINE

## 2022-01-17 PROCEDURE — 99285 EMERGENCY DEPT VISIT HI MDM: CPT | Mod: 25

## 2022-01-17 PROCEDURE — C9803 HOPD COVID-19 SPEC COLLECT: HCPCS

## 2022-01-17 PROCEDURE — 86901 BLOOD TYPING SEROLOGIC RH(D): CPT | Performed by: EMERGENCY MEDICINE

## 2022-01-17 PROCEDURE — 83690 ASSAY OF LIPASE: CPT | Performed by: EMERGENCY MEDICINE

## 2022-01-17 PROCEDURE — 999N000065 XR ABDOMEN PORT 1 VIEWS

## 2022-01-17 PROCEDURE — 96361 HYDRATE IV INFUSION ADD-ON: CPT

## 2022-01-17 PROCEDURE — 96374 THER/PROPH/DIAG INJ IV PUSH: CPT | Mod: 59

## 2022-01-17 PROCEDURE — 85025 COMPLETE CBC W/AUTO DIFF WBC: CPT | Performed by: EMERGENCY MEDICINE

## 2022-01-17 PROCEDURE — 96375 TX/PRO/DX INJ NEW DRUG ADDON: CPT

## 2022-01-17 PROCEDURE — 36415 COLL VENOUS BLD VENIPUNCTURE: CPT | Performed by: EMERGENCY MEDICINE

## 2022-01-17 PROCEDURE — 99222 1ST HOSP IP/OBS MODERATE 55: CPT | Mod: AI | Performed by: INTERNAL MEDICINE

## 2022-01-17 PROCEDURE — 83605 ASSAY OF LACTIC ACID: CPT | Performed by: EMERGENCY MEDICINE

## 2022-01-17 PROCEDURE — 120N000001 HC R&B MED SURG/OB

## 2022-01-17 PROCEDURE — 85610 PROTHROMBIN TIME: CPT | Performed by: EMERGENCY MEDICINE

## 2022-01-17 PROCEDURE — 258N000003 HC RX IP 258 OP 636: Performed by: EMERGENCY MEDICINE

## 2022-01-17 RX ORDER — IOPAMIDOL 755 MG/ML
50 INJECTION, SOLUTION INTRAVASCULAR ONCE
Status: COMPLETED | OUTPATIENT
Start: 2022-01-17 | End: 2022-01-17

## 2022-01-17 RX ORDER — DULOXETIN HYDROCHLORIDE 20 MG/1
CAPSULE, DELAYED RELEASE ORAL DAILY
COMMUNITY

## 2022-01-17 RX ORDER — MIRTAZAPINE 7.5 MG/1
7.5 TABLET, FILM COATED ORAL AT BEDTIME
COMMUNITY

## 2022-01-17 RX ORDER — HYDROMORPHONE HYDROCHLORIDE 1 MG/ML
0.5 INJECTION, SOLUTION INTRAMUSCULAR; INTRAVENOUS; SUBCUTANEOUS
Status: DISCONTINUED | OUTPATIENT
Start: 2022-01-17 | End: 2022-01-18

## 2022-01-17 RX ORDER — LACTOBACILLUS RHAMNOSUS GG 10B CELL
1 CAPSULE ORAL DAILY
COMMUNITY

## 2022-01-17 RX ORDER — LOPERAMIDE HCL 2 MG
2 CAPSULE ORAL DAILY PRN
COMMUNITY

## 2022-01-17 RX ORDER — ONDANSETRON 2 MG/ML
4 INJECTION INTRAMUSCULAR; INTRAVENOUS EVERY 30 MIN PRN
Status: DISCONTINUED | OUTPATIENT
Start: 2022-01-17 | End: 2022-01-18

## 2022-01-17 RX ORDER — MULTIPLE VITAMINS W/ MINERALS TAB 9MG-400MCG
1 TAB ORAL DAILY
COMMUNITY

## 2022-01-17 RX ORDER — LORAZEPAM 0.5 MG/1
.25-.5 TABLET ORAL EVERY 8 HOURS PRN
COMMUNITY

## 2022-01-17 RX ORDER — TRIAMCINOLONE ACETONIDE 1 MG/G
CREAM TOPICAL 2 TIMES DAILY
COMMUNITY

## 2022-01-17 RX ORDER — FLUOXETINE 10 MG/1
10 CAPSULE ORAL DAILY
COMMUNITY

## 2022-01-17 RX ORDER — BUDESONIDE 3 MG/1
CAPSULE, COATED PELLETS ORAL DAILY
COMMUNITY

## 2022-01-17 RX ADMIN — ONDANSETRON 4 MG: 2 INJECTION INTRAMUSCULAR; INTRAVENOUS at 18:00

## 2022-01-17 RX ADMIN — HYDROMORPHONE HYDROCHLORIDE 0.5 MG: 1 INJECTION, SOLUTION INTRAMUSCULAR; INTRAVENOUS; SUBCUTANEOUS at 18:05

## 2022-01-17 RX ADMIN — SODIUM CHLORIDE, POTASSIUM CHLORIDE, SODIUM LACTATE AND CALCIUM CHLORIDE 1000 ML: 600; 310; 30; 20 INJECTION, SOLUTION INTRAVENOUS at 20:19

## 2022-01-17 RX ADMIN — IOPAMIDOL 50 ML: 755 INJECTION, SOLUTION INTRAVENOUS at 19:18

## 2022-01-17 RX ADMIN — SODIUM CHLORIDE 53 ML: 9 INJECTION, SOLUTION INTRAVENOUS at 19:18

## 2022-01-17 ASSESSMENT — ENCOUNTER SYMPTOMS
BACK PAIN: 1
BLOOD IN STOOL: 0
ABDOMINAL PAIN: 1
FEVER: 0
CONSTIPATION: 1
VOMITING: 1
SHORTNESS OF BREATH: 0

## 2022-01-17 ASSESSMENT — ACTIVITIES OF DAILY LIVING (ADL)
ADLS_ACUITY_SCORE: 12

## 2022-01-17 ASSESSMENT — MIFFLIN-ST. JEOR: SCORE: 802.94

## 2022-01-17 NOTE — ED TRIAGE NOTES
Presents with bilateral lower abdominal pain that started at 2pm today. Reports history of ovarian cancer and colitis. Started budesonide 8 days ago. Vomited 3x today. Decreased appetite.

## 2022-01-17 NOTE — ED PROVIDER NOTES
"  History   Chief Complaint:  Abdominal Pain    The history is provided by the patient.      Maximino Simeon is a 76 year old female with history of CAD and hyperlipidemia who presents with intermittent lower abdominal pain persisting since onset earlier today. The patient reports laying down earlier this afternoon, at which point she experienced sudden onset of diffuse pain to her lower abdomen radiating to her back. She describes this as intermittent and \"severe\" during her episodes of pain. In addition, she reports 3 episodes of emesis, denying any obvious hematemesis but reporting \"some pink\" within her vomit. Her abdomen is tender on palpation and worsened with movement. The patient has attempted repositioning and other measures for symptom relief but reports no improvement of pain. She does report recent constipation over the past several weeks but notes having recently started on budesonide so wonders if this may be attributing. Overall, persisting pain and discomfort without relief prompted her concern and presentation to the ED at this time. Of note, the patient did undergo recent colonoscopy last month, which showed unremarkable. She denies recent bloody stool, fever, shortness of breath, urinary changes, abnormal vaginal bleeding, or other symptoms.    Review of Systems   Constitutional: Negative for fever.   Respiratory: Negative for shortness of breath.    Gastrointestinal: Positive for abdominal pain, constipation and vomiting (no hematemesis). Negative for blood in stool.   Genitourinary: Negative.  Negative for vaginal bleeding.   Musculoskeletal: Positive for back pain.   All other systems reviewed and are negative.      Allergies:  Adhesive Tape  Liquid Adhesive  Penicillins  Lotion soft body    Medications:  Vitamin B  Calcium carbonate  Cholestyramine  Coenzyme Q10  Duloxetine  Budesonide  Levothyroxine  Liothyronine  Loperamide  Lorazepam  Trolamine salicylate  Fluocinolone  Hyaluronic " "acid  Ubidecarenone  Cholecalciferol  Turmeric  Liothyronine  Amlodipine  Dronabinol  Prochlorperazine  Denosumab    Past Medical History:     Anxiety  CAD  Colitis  Fibromyalgia  GERD  Hypothyroidism  Insomnia  Hyperlipidemia  Recurrent MDD  Vitiligo  Psychophysiological insomnia  Perforated appendicitis  Hashimoto's disease  Coronary atherosclerosis  Collagenous colitis  Pelvic mass  Osteoporosis  Ovarian cancer  Port-A-Cath in place  Chemotherapy-induced neutropenia  Cataracts  Antineoplastic chemotherapy    Past Surgical History:    Hysterectomy  Breast biopsy  Bilateral salpingo-oophorectomy, node dissection, combined  Chest port placement  Chest port removal, right  Cataract extraction by phacoemulsification, bilateral  Gingival flap surgery  Wound debridement    Family History:    Heart problem  Cerebrovascular disease x2  Thyroid disease  Breast cancer  CAD    Social History:  The patient presented alone.  The patient arrived via EMS.    Physical Exam     Patient Vitals for the past 24 hrs:   BP Temp Temp src Pulse Resp SpO2 Height Weight   01/17/22 2300 (!) 162/89 -- -- 93 13 91 % -- --   01/17/22 2230 (!) 156/85 -- -- 91 10 92 % -- --   01/17/22 2130 (!) 143/78 -- -- 96 18 93 % -- --   01/17/22 2100 126/82 -- -- 96 10 91 % -- --   01/17/22 2030 (!) 147/106 -- -- 95 11 93 % -- --   01/17/22 2000 (!) 181/101 -- -- 92 18 91 % -- --   01/17/22 1930 (!) 160/80 -- -- 87 16 94 % -- --   01/17/22 1900 (!) 153/89 -- -- 85 16 95 % -- --   01/17/22 1830 (!) 163/88 -- -- 75 12 95 % -- --   01/17/22 1810 (!) 149/85 -- -- 85 12 97 % -- --   01/17/22 1809 (!) 149/85 -- -- 90 18 97 % -- --   01/17/22 1720 (!) 194/96 -- -- -- 16 97 % -- --   01/17/22 1713 (!) 194/99 97.5  F (36.4  C) Oral 68 14 97 % 1.448 m (4' 9\") 44.9 kg (99 lb)   01/17/22 1710 (!) 192/93 -- -- 73 12 95 % -- --     Physical Exam    General: Nontoxic.  Appears uncomfortable.   Head:  Scalp, face, and head appear normal  Eyes:  Pupils are equal, " round    Conjunctivae non-injected and sclerae white  ENT:    The external nose is normal    Pinnae are normal  Neck:  Normal range of motion    There is no rigidity noted    Trachea is in the midline  CV:  Regular rate and rhythm     Normal S1/S2, no S3/S4    No murmur or rub. Radial pulses 2+ bilaterally.  Resp:  Lungs are clear and equal bilaterally  There is no tachypnea    No increased work of breathing    No rales, wheezing, or rhonchi  GI:  Abdomen is soft, no rigidity or guarding    No distension, or mass    Significant diffuse tenderness. No rebound tenderness   MS:  Normal muscular tone    Symmetric motor strength    No lower extremity edema  Skin:  No rash or acute skin lesions noted  Neuro: Awake and alert  Speech is normal and fluent  Moves all extremities spontaneously  Psych:  Normal affect. Appropriate interactions.    Emergency Department Course     Imaging:  XR Abdomen Port 1 View   Final Result   IMPRESSION: NG tube in the left upper quadrant in expected location of the stomach.       CT Abdomen Pelvis w Contrast   Final Result   IMPRESSION:    1.  Mechanical small bowel obstruction, transition felt likely in the right lower quadrant.   2.  No perforation or abscess.        Report per radiology    Laboratory:  Labs Ordered and Resulted from Time of ED Arrival to Time of ED Departure   COMPREHENSIVE METABOLIC PANEL - Abnormal       Result Value    Sodium 136      Potassium 4.7      Chloride 100      Carbon Dioxide (CO2) 33 (*)     Anion Gap 3      Urea Nitrogen 23      Creatinine 0.70      Calcium 10.7 (*)     Glucose 165 (*)     Alkaline Phosphatase 47      AST 19      ALT 29      Protein Total 8.3      Albumin 4.3      Bilirubin Total 0.6      GFR Estimate 88     LACTIC ACID WHOLE BLOOD - Abnormal    Lactic Acid 2.4 (*)    CBC WITH PLATELETS AND DIFFERENTIAL - Abnormal    WBC Count 13.8 (*)     RBC Count 4.89      Hemoglobin 15.8 (*)     Hematocrit 48.8 (*)           MCH 32.3      MCHC  32.4      RDW 13.6      Platelet Count 266      % Neutrophils 86      % Lymphocytes 8      % Monocytes 4      % Eosinophils 1      % Basophils 0      % Immature Granulocytes 1      NRBCs per 100 WBC 0      Absolute Neutrophils 11.8 (*)     Absolute Lymphocytes 1.1      Absolute Monocytes 0.6      Absolute Eosinophils 0.1      Absolute Basophils 0.1      Absolute Immature Granulocytes 0.1      Absolute NRBCs 0.0     INR - Normal    INR 0.93     LIPASE - Normal    Lipase 165     COVID-19 VIRUS (CORONAVIRUS) BY PCR - Normal    SARS CoV2 PCR Negative     ROUTINE UA WITH MICROSCOPIC REFLEX TO CULTURE   TYPE AND SCREEN, ADULT    ABO/RH(D) A POS      Antibody Screen Negative      SPECIMEN EXPIRATION DATE [id number]     ABO/RH TYPE AND SCREEN     Emergency Department Course:    Reviewed:  I reviewed nursing notes, vitals, past medical history and Care Everywhere.    Assessments:  1737 I obtained history and examined the patient as noted above.   1954 I rechecked the patient and explained findings. I discussed plan for NG tube placement and she is in agreement with this.     Consults:  2014 I consulted with Dr. Douglas, hospitalist, regarding the patient's history and presentation here in the emergency department who accepted the patient for admission.    Interventions:  1800 Zofran 4 mg IV  1805 Dilaudid 0.5 mg IV  2019 LR 1 L IV  Medications   ondansetron (ZOFRAN) injection 4 mg (4 mg Intravenous Given 1/17/22 1800)   HYDROmorphone (PF) (DILAUDID) injection 0.5 mg (0.5 mg Intravenous Given 1/17/22 1805)   iopamidol (ISOVUE-370) solution 50 mL (50 mLs Intravenous Given 1/17/22 1918)   100mL Saline Flush (53 mLs Intravenous Given 1/17/22 1918)   lactated ringers BOLUS 1,000 mL (0 mLs Intravenous Stopped 1/17/22 2305)        Disposition:  The patient was admitted to the hospital under the care of Dr. Douglas.     Impression & Plan     CMS Diagnoses: The Lactic acid level is elevated due to small bowel obstruction and  dehydration, at this time there is no sign of severe sepsis or septic shock. and None    Medical Decision Making:     Maximino Simeon is a 76 year old female who presents with sudden onset lower abdominal pain which started today.  She notes recent constipation.  No melena or bloody stools.  On my evaluation she is hemodynamically stable and afebrile.  Abdomen exam is concerning, but not frankly peritoneal.  Broad differential diagnosis was considered.  Patient has history of prior ovarian cancer status post total abdominal hysterectomy and lymph node dissection.  Work-up in the emergency department reveals evidence of small bowel obstruction on CT scan.  NG tube was placed.  Laboratory studies showed initially elevated lactic acid due to small bowel obstruction and dehydration.  No evidence of sepsis at this time.  CMP otherwise reassuring.  CBC with mild leukocytosis.  Patient was treated with analgesics, antiemetics and IV fluids and remained stable during her ED course.  The case was discussed with the hospitalist and the patient was admitted for ongoing monitoring evaluation and treatment.  She was admitted in stable condition    Diagnosis:    ICD-10-CM    1. Small bowel obstruction (H)  K56.609      Scribe Disclosure:  I, Hollie Neely, am serving as a scribe at 5:22 PM on 1/17/2022 to document services personally performed by Kumar Sen MD based on my observations and the provider's statements to me.     Kumar Sen MD  01/17/22 8896

## 2022-01-17 NOTE — ED NOTES
Bed: ED26  Expected date: 1/17/22  Expected time: 4:42 PM  Means of arrival: Ambulance  Comments:  535 68f abd pain ETA [id number]

## 2022-01-18 ENCOUNTER — APPOINTMENT (OUTPATIENT)
Dept: GENERAL RADIOLOGY | Facility: CLINIC | Age: 77
DRG: 390 | End: 2022-01-18
Attending: INTERNAL MEDICINE
Payer: MEDICARE

## 2022-01-18 PROBLEM — K56.609 SBO (SMALL BOWEL OBSTRUCTION) (H): Status: ACTIVE | Noted: 2022-01-18

## 2022-01-18 LAB
ANION GAP SERPL CALCULATED.3IONS-SCNC: 3 MMOL/L (ref 3–14)
BUN SERPL-MCNC: 23 MG/DL (ref 7–30)
CALCIUM SERPL-MCNC: 9.2 MG/DL (ref 8.5–10.1)
CHLORIDE BLD-SCNC: 102 MMOL/L (ref 94–109)
CO2 SERPL-SCNC: 31 MMOL/L (ref 20–32)
CREAT SERPL-MCNC: 0.65 MG/DL (ref 0.52–1.04)
ERYTHROCYTE [DISTWIDTH] IN BLOOD BY AUTOMATED COUNT: 13.8 % (ref 10–15)
GFR SERPL CREATININE-BSD FRML MDRD: 90 ML/MIN/1.73M2
GLUCOSE BLD-MCNC: 124 MG/DL (ref 70–99)
HCT VFR BLD AUTO: 44.1 % (ref 35–47)
HGB BLD-MCNC: 14.7 G/DL (ref 11.7–15.7)
MCH RBC QN AUTO: 32.5 PG (ref 26.5–33)
MCHC RBC AUTO-ENTMCNC: 33.3 G/DL (ref 31.5–36.5)
MCV RBC AUTO: 97 FL (ref 78–100)
PLATELET # BLD AUTO: 263 10E3/UL (ref 150–450)
POTASSIUM BLD-SCNC: 4 MMOL/L (ref 3.4–5.3)
RBC # BLD AUTO: 4.53 10E6/UL (ref 3.8–5.2)
SODIUM SERPL-SCNC: 136 MMOL/L (ref 133–144)
WBC # BLD AUTO: 9.5 10E3/UL (ref 4–11)

## 2022-01-18 PROCEDURE — 85027 COMPLETE CBC AUTOMATED: CPT | Performed by: INTERNAL MEDICINE

## 2022-01-18 PROCEDURE — 99222 1ST HOSP IP/OBS MODERATE 55: CPT | Performed by: SURGERY

## 2022-01-18 PROCEDURE — 74018 RADEX ABDOMEN 1 VIEW: CPT

## 2022-01-18 PROCEDURE — 36415 COLL VENOUS BLD VENIPUNCTURE: CPT | Performed by: INTERNAL MEDICINE

## 2022-01-18 PROCEDURE — 250N000009 HC RX 250: Performed by: PHYSICIAN ASSISTANT

## 2022-01-18 PROCEDURE — 258N000003 HC RX IP 258 OP 636: Performed by: INTERNAL MEDICINE

## 2022-01-18 PROCEDURE — 80048 BASIC METABOLIC PNL TOTAL CA: CPT | Performed by: INTERNAL MEDICINE

## 2022-01-18 PROCEDURE — 99232 SBSQ HOSP IP/OBS MODERATE 35: CPT | Performed by: INTERNAL MEDICINE

## 2022-01-18 PROCEDURE — 250N000011 HC RX IP 250 OP 636: Performed by: INTERNAL MEDICINE

## 2022-01-18 PROCEDURE — 120N000001 HC R&B MED SURG/OB

## 2022-01-18 RX ORDER — NALOXONE HYDROCHLORIDE 0.4 MG/ML
0.4 INJECTION, SOLUTION INTRAMUSCULAR; INTRAVENOUS; SUBCUTANEOUS
Status: DISCONTINUED | OUTPATIENT
Start: 2022-01-18 | End: 2022-01-21 | Stop reason: HOSPADM

## 2022-01-18 RX ORDER — SODIUM CHLORIDE, SODIUM LACTATE, POTASSIUM CHLORIDE, CALCIUM CHLORIDE 600; 310; 30; 20 MG/100ML; MG/100ML; MG/100ML; MG/100ML
INJECTION, SOLUTION INTRAVENOUS CONTINUOUS
Status: DISCONTINUED | OUTPATIENT
Start: 2022-01-18 | End: 2022-01-20

## 2022-01-18 RX ORDER — LIDOCAINE 40 MG/G
CREAM TOPICAL
Status: DISCONTINUED | OUTPATIENT
Start: 2022-01-18 | End: 2022-01-21 | Stop reason: HOSPADM

## 2022-01-18 RX ORDER — HYDROMORPHONE HCL IN WATER/PF 6 MG/30 ML
0.2 PATIENT CONTROLLED ANALGESIA SYRINGE INTRAVENOUS
Status: DISCONTINUED | OUTPATIENT
Start: 2022-01-18 | End: 2022-01-21 | Stop reason: HOSPADM

## 2022-01-18 RX ORDER — PROCHLORPERAZINE MALEATE 5 MG
5 TABLET ORAL EVERY 6 HOURS PRN
Status: DISCONTINUED | OUTPATIENT
Start: 2022-01-18 | End: 2022-01-21 | Stop reason: HOSPADM

## 2022-01-18 RX ORDER — ONDANSETRON 2 MG/ML
4 INJECTION INTRAMUSCULAR; INTRAVENOUS EVERY 6 HOURS PRN
Status: DISCONTINUED | OUTPATIENT
Start: 2022-01-18 | End: 2022-01-21 | Stop reason: HOSPADM

## 2022-01-18 RX ORDER — NALOXONE HYDROCHLORIDE 0.4 MG/ML
0.2 INJECTION, SOLUTION INTRAMUSCULAR; INTRAVENOUS; SUBCUTANEOUS
Status: DISCONTINUED | OUTPATIENT
Start: 2022-01-18 | End: 2022-01-21 | Stop reason: HOSPADM

## 2022-01-18 RX ORDER — ONDANSETRON 4 MG/1
4 TABLET, ORALLY DISINTEGRATING ORAL EVERY 6 HOURS PRN
Status: DISCONTINUED | OUTPATIENT
Start: 2022-01-18 | End: 2022-01-21 | Stop reason: HOSPADM

## 2022-01-18 RX ORDER — PROCHLORPERAZINE 25 MG
12.5 SUPPOSITORY, RECTAL RECTAL EVERY 12 HOURS PRN
Status: DISCONTINUED | OUTPATIENT
Start: 2022-01-18 | End: 2022-01-21 | Stop reason: HOSPADM

## 2022-01-18 RX ORDER — LORAZEPAM 2 MG/ML
0.5 INJECTION INTRAMUSCULAR EVERY 6 HOURS PRN
Status: DISCONTINUED | OUTPATIENT
Start: 2022-01-18 | End: 2022-01-21 | Stop reason: HOSPADM

## 2022-01-18 RX ADMIN — LORAZEPAM 0.5 MG: 2 INJECTION INTRAMUSCULAR; INTRAVENOUS at 14:00

## 2022-01-18 RX ADMIN — HYDROMORPHONE HYDROCHLORIDE 0.2 MG: 0.2 INJECTION, SOLUTION INTRAMUSCULAR; INTRAVENOUS; SUBCUTANEOUS at 18:21

## 2022-01-18 RX ADMIN — HYDROMORPHONE HYDROCHLORIDE 0.2 MG: 0.2 INJECTION, SOLUTION INTRAMUSCULAR; INTRAVENOUS; SUBCUTANEOUS at 16:15

## 2022-01-18 RX ADMIN — SODIUM CHLORIDE, POTASSIUM CHLORIDE, SODIUM LACTATE AND CALCIUM CHLORIDE: 600; 310; 30; 20 INJECTION, SOLUTION INTRAVENOUS at 00:49

## 2022-01-18 RX ADMIN — FAMOTIDINE 20 MG: 10 INJECTION, SOLUTION INTRAVENOUS at 18:21

## 2022-01-18 RX ADMIN — SODIUM CHLORIDE, POTASSIUM CHLORIDE, SODIUM LACTATE AND CALCIUM CHLORIDE: 600; 310; 30; 20 INJECTION, SOLUTION INTRAVENOUS at 10:15

## 2022-01-18 RX ADMIN — LORAZEPAM 0.5 MG: 2 INJECTION INTRAMUSCULAR; INTRAVENOUS at 22:31

## 2022-01-18 ASSESSMENT — ACTIVITIES OF DAILY LIVING (ADL)
ADLS_ACUITY_SCORE: 12
ADLS_ACUITY_SCORE: 14
ADLS_ACUITY_SCORE: 18
ADLS_ACUITY_SCORE: 14
ADLS_ACUITY_SCORE: 18
ADLS_ACUITY_SCORE: 18
ADLS_ACUITY_SCORE: 14
ADLS_ACUITY_SCORE: 14
ADLS_ACUITY_SCORE: 18
ADLS_ACUITY_SCORE: 14
ADLS_ACUITY_SCORE: 18
ADLS_ACUITY_SCORE: 14
ADLS_ACUITY_SCORE: 18
ADLS_ACUITY_SCORE: 12
ADLS_ACUITY_SCORE: 14
ADLS_ACUITY_SCORE: 14
ADLS_ACUITY_SCORE: 18
ADLS_ACUITY_SCORE: 14

## 2022-01-18 NOTE — H&P
76 year old with hx of ovarian cancer and ENRIKE, presents with abd pain, distention and evidence of SBO.  NGT placed,     Wilfredo Douglas     dicatation # [id number]

## 2022-01-18 NOTE — PROGRESS NOTES
[facility]    HOSPITALIST PROGRESS NOTE :   --------------------------------------------------    Date of Admission:  1/17/2022    Cumulative Summary: Maximino Simeon is a 76 year old female with history of ovarian cancer status post total abdominal hysterectomy, who also has history of heart disease, hyperlipidemia, who presented to [facility] with intermittent lower abdominal pain and was found to have mechanical small-bowel obstruction, her first episode, being admitted for further treatment    Assessment & Plan     Mechanical small-bowel obstruction, suspected due to adhesions from prior surgery:  Patient with past medical history significant for ovarian cancer and extensive surgeries in the past including total abdominal hysterectomy presented to the hospital with abdominal discomfort with signs and symptoms of mechanical small bowel obstruction associated with abdominal pain and distention.  NG tube was placed and patient was admitted for further evaluation and management    --Patient care was assumed this morning, patient was seen and examined later her son also joined, plan of care was also discussed in detail with bedside nursing, patient is very hard of hearing.  --Patient did have a small bowel movement this morning.  -- will repeat abdominal Xray   -- Clamp NG tube and see if patient tolerates it well   -- will order Ativan 0.5 mg IV every 6 hours for anxiety as she seemed ey anxious on examination   --We will decrease IV fluids to 50 mL/h as patient blood pressure is slightly high might be secondary to anxiety.  -- Repeated x-ray is still showing dilated bowel in the left abdomen which is unchanged small to moderate amount of the stool is present as patient is still has dilation on the x-ray, will ask general surgery to evaluate before removing NG tube and starting patient on diet.  -- Symptomatic pain management.    History of microscopic colitis:  The patient  had a colonoscopy about a month ago and presumably was found to have microscopic colitis.  She was started on budesonide, which will be held due to her n.p.o. status.  -- Continue to hold at this point, restart once patient is able to take oral.    History of Hashimoto's hypothyroidism:  The patient liothyronine and levothyroxine will be on hold.  -- Patient is very anxious about her levothyroxine being held if patient is not restarted on oral medications till tomorrow morning then we will switch her to IV.    Depression:    -- Patient's Remeron and SSRI will be on hold.  -- Patient also seemed extremely anxious while I was in the room for quite some time I did have discussion with her regarding her clinical status in detail with his son also.  --We will order Ativan 0.5 mg every 6 hours as needed for anxiety.    Clinically Significant Risk Factors Present on Admission          # Hypercalcemia: Ca = 10.7 mg/dL (Ref range: 8.5 - 10.1 mg/dL) and/or iCa = N/A on admission, will monitor as appropriate           Diet: NPO for Medical/Clinical Reasons Except for: No Exceptions    Crews Catheter: Not present  DVT Prophylaxis: Pneumatic Compression Devices  Code Status: Full Code    The patient's care was discussed with the Bedside Nurse, Patient and Patient's Family.    Disposition Plan   Expected Discharge: 01/20/2022     Anticipated discharge location:  Awaiting care coordination huddle, tentative discharge tomorrow morning if patient is making clinical improvement and once patient is able to tolerate the diet.    Entered: Shamika Verdugo MD 01/18/2022, 8:13 AM       Shamika Verduog MD, FACP  Text Page (7am - 6pm)    ----------------------------------------------------------------------------------------------------------------------    Interval History   Patient care was assumed this morning, patient was seen and examined.  Patient sitting in bed, still has NG tube in place, looks anxious, her abdominal distention is better  as compared to yesterday but she still is having some headache.    -Data reviewed today: I reviewed all new labs and imaging results over the last 24 hours.    I personally reviewed no images or EKG's today.    Physical Exam   Temp: 97.9  F (36.6  C) Temp src: Oral BP: (!) 167/109 Pulse: 90   Resp: 16 SpO2: 93 % O2 Device: None (Room air)    Vitals:    01/17/22 1713   Weight: 44.9 kg (99 lb)     Vital Signs with Ranges  Temp:  [97.4  F (36.3  C)-98.1  F (36.7  C)] 97.9  F (36.6  C)  Pulse:  [68-96] 90  Resp:  [10-18] 16  BP: (126-194)/() 167/109  SpO2:  [91 %-97 %] 93 %  I/O last 3 completed shifts:  In: 610 [I.V.:610]  Out: 861 [Emesis/NG output:861]    GENERAL: Alert , awake and oriented. NAD. Conversational, appropriate.  NG in place  HEENT: Normocephalic. EOMI. No icterus or injection. Nares normal.   LUNGS: Clear to auscultation. No dyspnea at rest.   HEART: Regular rate. Extremities perfused.   ABDOMEN: Soft, nontender, and slight distended. Hypoactive bowel sounds  EXTREMITIES: No LE edema noted.   NEUROLOGIC: Moves extremities x4 on command. No acute focal neurologic abnormalities noted.     Medications     lactated ringers 100 mL/hr at 01/18/22 0049       sodium chloride (PF)  3 mL Intracatheter Q8H       Data   Recent Labs   Lab 01/18/22  0722 01/17/22  1813 01/17/22  1758   WBC 9.5  --  13.8*   HGB 14.7  --  15.8*   MCV 97  --  100     --  266   INR  --  0.93  --      --  136   POTASSIUM 4.0  --  4.7   CHLORIDE 102  --  100   CO2  --   --  33*   BUN  --   --  23   CR  --   --  0.70   ANIONGAP  --   --  3   LUCRECIA  --   --  10.7*   GLC  --   --  165*   ALBUMIN  --   --  4.3   PROTTOTAL  --   --  8.3   BILITOTAL  --   --  0.6   ALKPHOS  --   --  47   ALT  --   --  29   AST  --   --  19   LIPASE  --   --  165       Imaging:   Recent Results (from the past 24 hour(s))   CT Abdomen Pelvis w Contrast    Narrative    EXAM: CT ABDOMEN PELVIS W CONTRAST  LOCATION: [facility]  HOSPITAL  DATE/TIME: 1/17/2022 7:11 PM    INDICATION: Abdominal pain, acute, nonlocalized.  COMPARISON: 07/10/2019.  TECHNIQUE: CT scan of the abdomen and pelvis was performed following injection of IV contrast. Multiplanar reformats were obtained. Dose reduction techniques were used.  CONTRAST: 50 mL Isovue-370.    FINDINGS:   LOWER CHEST: Minimal atelectasis and/or fibrosis.    HEPATOBILIARY: No significant mass or bile duct dilatation. No calcified gallstones.     PANCREAS: No significant mass, duct dilatation, or inflammatory change.    SPLEEN: Normal size.    ADRENAL GLANDS: No significant nodules.    KIDNEYS/BLADDER: No significant mass, stone, or hydronephrosis.    BOWEL: Dilated small bowel with a probable transition in the right lower quadrant concerning for mechanical small bowel obstruction.    LYMPH NODES: No adenopathy.    VASCULATURE: There are moderate atherosclerotic changes of the visualized aorta and its branches. There is no evidence of aortic dissection or aneurysm.    PELVIC ORGANS: No pelvic masses.    MUSCULOSKELETAL: No frankly destructive bony lesions.      Impression    IMPRESSION:   1.  Mechanical small bowel obstruction, transition felt likely in the right lower quadrant.  2.  No perforation or abscess.   XR Abdomen Port 1 View    Narrative    EXAM: XR ABDOMEN PORT 1 VIEWS  LOCATION: [facility]  DATE/TIME: 1/17/2022 8:37 PM    INDICATION: SBO, NGT placement.  COMPARISON: None.      Impression    IMPRESSION: NG tube in the left upper quadrant in expected location of the stomach.

## 2022-01-18 NOTE — PLAN OF CARE
Patient admitted from ED on 1/17 at 2345 for SBO. Patient alert and oriented. NGT to LIS. NG clogged overnight, patient with nausea and large emesis, NG irrigated and now functioning properly. No further nausea/vomiting/abd pain. Total 360 ml out of NG overnight.

## 2022-01-18 NOTE — ED NOTES
[facility]  ED Nurse Handoff Report    ED Chief complaint: Abdominal Pain      ED Diagnosis:   Final diagnoses:   Small bowel obstruction (H)       Code Status: Hospitalist to address    Allergies:   Allergies   Allergen Reactions     Adhesive Tape      bandaids     Liquid Adhesive Rash     Penicillins Rash       Patient Story: Presents with nausea, vomiting, abdominal pain since 2pm today. Reports constipation. Last bowel movement today, hard panchito per patient.    Focused Assessment:  A&Ox4, [place], generalized abdominal tenderness to palpation. Intermittent pain. Skin intact. Lives alone. Independent in cares. Hypertensive on arrival, improving BP.    Treatments and/or interventions provided: labs, ct scan, zofran 4mg IV, dialudid 0.5mg IV. NG tube  Patient's response to treatments and/or interventions: improved    To be done/followed up on inpatient unit:  admission orders    Does this patient have any cognitive concerns?: none    Activity level - Baseline/Home:  Independent  Activity Level - Current:   Independent    Patient's Preferred language: English   Needed?: No    Isolation: None  Infection: Not Applicable  Patient tested for COVID 19 prior to admission: YES  Bariatric?: No    Vital Signs:   Vitals:    01/17/22 1810 01/17/22 1830 01/17/22 1900 01/17/22 1930   BP: (!) 149/85 (!) 163/88 (!) 153/89 (!) 160/80   BP Location:       Pulse: 85 75 85 87   Resp: 12 12 16 16   Temp:       TempSrc:       SpO2: 97% 95% 95% 94%   Weight:       Height:         Labs Ordered and Resulted from Time of ED Arrival to Time of ED Departure   COMPREHENSIVE METABOLIC PANEL - Abnormal       Result Value    Sodium 136      Potassium 4.7      Chloride 100      Carbon Dioxide (CO2) 33 (*)     Anion Gap 3      Urea Nitrogen 23      Creatinine 0.70      Calcium 10.7 (*)     Glucose 165 (*)     Alkaline Phosphatase 47      AST 19      ALT 29      Protein Total 8.3      Albumin 4.3      Bilirubin Total 0.6       GFR Estimate 88     LACTIC ACID WHOLE BLOOD - Abnormal    Lactic Acid 2.4 (*)    CBC WITH PLATELETS AND DIFFERENTIAL - Abnormal    WBC Count 13.8 (*)     RBC Count 4.89      Hemoglobin 15.8 (*)     Hematocrit 48.8 (*)           MCH 32.3      MCHC 32.4      RDW 13.6      Platelet Count 266      % Neutrophils 86      % Lymphocytes 8      % Monocytes 4      % Eosinophils 1      % Basophils 0      % Immature Granulocytes 1      NRBCs per 100 WBC 0      Absolute Neutrophils 11.8 (*)     Absolute Lymphocytes 1.1      Absolute Monocytes 0.6      Absolute Eosinophils 0.1      Absolute Basophils 0.1      Absolute Immature Granulocytes 0.1      Absolute NRBCs 0.0     INR - Normal    INR 0.93     LIPASE - Normal    Lipase 165     ROUTINE UA WITH MICROSCOPIC REFLEX TO CULTURE   COVID-19 VIRUS (CORONAVIRUS) BY PCR   TYPE AND SCREEN, ADULT    ABO/RH(D) A POS      Antibody Screen Negative      SPECIMEN EXPIRATION DATE [id number]     ABO/RH TYPE AND SCREEN       Cardiac Rhythm:     Was the PSS-3 completed:   Yes  What interventions are required if any?               Family Comments: none  OBS brochure/video discussed/provided to patient/family: N/A              Name of person given brochure if not patient: n/a              Relationship to patient: n/a    For the majority of the shift this patient's behavior was [place].   Behavioral interventions performed were none.    ED NURSE PHONE NUMBER: *[phone]

## 2022-01-18 NOTE — PLAN OF CARE
SBO. Aox4. [place]. HTN otherwise VSS on RA. NG to low intermittent suction. Clamped for almost 2 hours then started to develop a lot of discomfort and had one emesis - hooked back up. NPO except ice chips. SBA. LR @ 50mL/hr. Other PIV SL. incont of bladder at times. +BM. Hypoactive BS. PRN dilaudid x2 and ativan x1. General surgery following. discharge pending pt improvement/plan of care.

## 2022-01-18 NOTE — PHARMACY-ADMISSION MEDICATION HISTORY
Pharmacy Medication History  Admission medication history interview status for the 1/17/2022  admission is complete. See EPIC admission navigator for prior to admission medications     Location of Interview: Patient room  Medication history sources: Patient, Surescripts and Care Everywhere    In the past week, patient estimated taking medication this percent of the time: 50-90% due to running out and illness    Medication reconciliation completed by provider prior to medication history? No    Time spent in this activity: 25 minutes    Prior to Admission medications    Medication Sig Last Dose Taking? Auth Provider   budesonide (ENTOCORT EC) 3 MG EC capsule Take by mouth daily 1/16/2022 - 9 mg daily x 56 days, then 6 mg daily x 14 days, then 3 mg daily. 1/16/2022 at Unknown time Yes Unknown, Entered By History   calcium carbonate (OS-LUCRECIA) 1500 (600 Ca) MG tablet Take 1,200 mg by mouth 2 times daily (with meals) Past Week at Unknown time Yes Unknown, Entered By History   denosumab (PROLIA) 60 MG/ML SOSY injection Inject 60 mg Subcutaneous every 6 months More than a month at Unknown time Yes Unknown, Entered By History   DULoxetine (CYMBALTA) 20 MG capsule Take by mouth daily 1/8/2022 - Take 40 mg daily for 2 weeks, then 20 mg daily x 2 weeks. 1/17/2022 at Unknown time Yes Unknown, Entered By History   FLUoxetine (PROZAC) 10 MG capsule Take 10 mg by mouth daily hasn't started Yes Unknown, Entered By History   Glutathione 50 MG TABS Take 50 mg by mouth daily Past Week at Unknown time Yes Unknown, Entered By History   hypromellose-dextran (ARTIFICAL TEARS) 0.1-0.3 % ophthalmic solution Place 1 drop into both eyes 4 times daily as needed for dry eyes as needed Yes Unknown, Entered By History   lactobacillus rhamnosus, GG, (CULTURELL) capsule Take 1 capsule by mouth daily Past Week at Unknown time Yes Unknown, Entered By History   levothyroxine (SYNTHROID/LEVOTHROID) 50 MCG tablet Take 50 mcg by mouth every morning   1/17/2022 at Unknown time Yes Reported, Patient   liothyronine (CYTOMEL) 5 MCG tablet Take 5 mcg by mouth every morning  1/17/2022 at Unknown time Yes Reported, Patient   loperamide (IMODIUM) 2 MG capsule Take 2 mg by mouth daily as needed for diarrhea as needed Yes Unknown, Entered By History   LORazepam (ATIVAN) 0.5 MG tablet Take 0.25-0.5 mg by mouth every 8 hours as needed for anxiety as needed Yes Unknown, Entered By History   mirtazapine (REMERON) 7.5 MG tablet Take 7.5 mg by mouth At Bedtime hasn't started Yes Unknown, Entered By History   multivitamin w/minerals (THERA-VIT-M) tablet Take 1 tablet by mouth daily Past Week at Unknown time Yes Unknown, Entered By History   triamcinolone (KENALOG) 0.1 % external cream Apply topically 2 times daily  Yes Unknown, Entered By History   Vitamin D3 (CHOLECALCIFEROL) 125 MCG (5000 UT) tablet Take 125 mcg by mouth daily 1/17/2022 at Unknown time Yes Unknown, Entered By History       The information provided in this note is only as accurate as the sources available at the time of update(s)

## 2022-01-18 NOTE — H&P
Admitted: 01/17/2022    PRIMARY CARE PHYSICIAN:  Dr. Cecelia Manzo    CHIEF COMPLAINT:  Abdominal pain.    HISTORY OF PRESENT ILLNESS:  Maximino Morgan is with history of ovarian cancer status post total abdominal hysterectomy, who also has history of heart disease, hyperlipidemia, who presents to [facility] with intermittent lower abdominal pain that started earlier today.  The patient was resting comfortably this afternoon, when she had sudden onset of lower abdominal pain radiating to her back.  The pain was quite severe and intermittent spasms of pain.  She had 3 episodes of emesis without any blood.  Pain was worse with movement.  She has had recent constipation after starting budesonide and thought that maybe this was due to that.  Overall, the patient's pain was not improved, and so the patient came into [facility] for further assessment.    In the Emergency Department, the patient was seen by Dr. Kumar Sen.  The patient was afebrile, hypertensive, but normal oxygen saturation.  Blood work revealed normal electrolytes, normal kidney function, normal LFTs.  Lactate is elevated slightly at 2.4, glucose.  Lipase is 165.  White count is elevated at 13.8, hemoglobin 15.8, platelets of 266, with a slight left shift.  INR is 0.93.  She had a CT of the abdomen and pelvis.  This showed a mechanical small bowel obstruction, transition likely in the right lower quadrant.  There is no perforation or abscess.  The patient had an NG tube placed, and about 600 mL had come out.  The patient feels much better.  In addition, the patient received lactated Ringers and is being admitted for a mechanical small-bowel obstruction.    PAST MEDICAL HISTORY:    1.  Anxiety.  2.  Coronary artery disease.  3.  History of microscopic colitis.  4.  Fibromyalgia.  5.  GERD.  6.  Hypothyroidism.  7.  Insomnia.  8.  Hyperlipidemia.  9.  Major depression.    10.  Vitiligo.  11.  Psychophysiologic  insomnia.  12.  Perforated appendicitis.   13.  Hashimoto's disease.  14.  Coronary artery disease.  15.  Pelvic mass noted to be ovarian cancer, status post total abdominal hysterectomy.  16.  Osteoporosis.  17.  History of Port-A-Cath placement.  18.  Chemotherapy-induced neutropenia.  19.  Cataracts.    PAST SURGICAL HISTORY:    1.  Hysterectomy.  2.  Breast biopsy.  3.  Bilateral salpingo-oophorectomy.  4.  Lymph node dissection.  5.  Port placement and subsequent removal.  6.  Cataract surgery.  7.  Gingival flap surgery.  8.  Wound debridement.    FAMILY HISTORY:  Positive for heart disease, stroke, thyroid and breast cancer and coronary artery disease.    SOCIAL HISTORY:  No tobacco or alcohol.    ALLERGIES:  ADHESIVE TAPE, LIQUID ADHESIVE, PENICILLIN.    CURRENT MEDICATION LIST:    1.  Budesonide.  2.  Calcium.  3.  Prolia.  4.  Cymbalta.  5.  Fluoxetine.  6.  Glutathione.  7.  Artificial tears.  8.  Lactobacillus.  9.  Levothyroxine.  10.  Liothyronine.  11.  Loperamide.  12.  Lorazepam.  13.  Mirtazapine.  14.  Multivitamin.  15.  Vitamin D.    REVIEW OF SYSTEMS:  Ten points reviewed and as dictated in the History of Present Illness.  The patient denies any chest pain, shortness of breath, fever or black or bloody stools.  Positive for abdominal pain, back pain, recent constipation for the last couple of weeks, and multiple episodes of emesis.  Otherwise, 10 points reviewed and otherwise negative.    PHYSICAL EXAMINATION:    GENERAL:  The patient is alert and oriented x 3.  She has some mild to moderate discomfort.  HEENT:  Unremarkable.  Mucous membranes are moist.  NECK:  JVP is not elevated.  PULMONARY:  Lungs are clear to auscultation.  CARDIOVASCULAR:  S1, S2, regular rhythm.  GASTROINTESTINAL:  Abdomen is distended.  She has diffuse tenderness to palpation.  There is no guarding or rebound.  MUSCULOSKELETAL:  Shows no clubbing, cyanosis or edema.  NEUROLOGIC:  She is grossly nonfocal.  Cranial nerves  grossly intact.  SKIN:  Warm, dry, well perfused.  PSYCHIATRIC:  Mood and affect normal.    LABORATORY DATA:  As dictated in the History of Present Illness.    ASSESSMENT:  Ernesto Morgan is 76 with prior history of total abdominal hysterectomy, who presents with a mechanical small-bowel obstruction, her first episode, being admitted for further treatment.    PLAN:    1.  Mechanical small-bowel obstruction, suspected due to adhesions from prior surgery:  The patient will be admitted and will undergo continuous nasogastric decompression with low intermittent suction.  The patient received IV fluids.  We will keep her n.p.o. and observe her.  Hopefully, the patient can resolve her bowel obstruction in the next 24-48 hours.  If she does not, we can get General Surgery involved.  For now, we will treat her with pain medications, antiemetics, nasogastric decompression.  2.  History of microscopic colitis:  The patient had a colonoscopy about a month ago and presumably was found to have microscopic colitis.  She was started on budesonide, which will be held due to her n.p.o. status.  3.  History of Hashimoto's hypothyroidism:  The patient liothyronine and levothyroxine will be on hold.  4.  Depression:  The patient's Remeron and SSRI will be on hold.    DEEP VENOUS THROMBOSIS PROPHYLAXIS:  The patient received compression boots.    CODE STATUS:  Full.    Wilfredo Douglas MD        D: 2022   T: 2022   MT: [facility]    Name:     ERNESTO BATISTA  MRN:      [id number]        Account:     [id number]   :      1945           Admitted:    2022       Document: [id number]    cc:  Cecelia Manzo MD

## 2022-01-18 NOTE — CONSULTS
[facility] General Surgery Consultation    Maximino Simeon MRN# [id number]   YOB: 1945 Age: 76 year old      Date of Admission:  1/17/2022  Date of Consult: 1/18/2022         Assessment and Plan:   Patient is a 76 year old female with small bowel obstruction. Patient has hx of ovarian cancer with ENRIKE and open appendectomy in 2019. No prior hx of SBO.    PLAN:  - Irrigate NG and return to LIS due to worsening abdominal pain since NG clamped.  - IV analgesia due to increase in pain.  - Pepcid ordered for GI prophylaxis.  - Encourage ambulate QID to assist in return of bowel function.  - Dr. Irvin to evaluate patient after NG irrigated and return to suction. If improvement, can consider gastrografin challenge in next 1-2 days. If no improvement with conservative management with NG decompression, may need to consider surgical intervention.         Requesting Physician:      Dr. Shamika Verdugo        Chief Complaint:     Chief Complaint   Patient presents with     Abdominal Pain          History of Present Illness:   Maximino Simeon is a 76 year old female who presented to ED last evening with abdominal pain and vomiting. The pain started suddenly two days ago. The pain was primarily in her lower abdomen and radiated to the back. The pain was constant but had intermittent episodes of spasms which were worse. Repositioning did not help the pain. She had 3 episodes of emesis. She had pink in her vomit but no obvious hematemesis. She reports constipation over the last several weeks. She had an unremarkable colonoscopy last month. Brittni denies ever having symptoms like this in the past or hx of SBO. Labs in the ED were remarkable for normal BMP, lactate was elevated slightly at 2.4, and WBC 13.8 with left shift. CT abdomen and pelvis revealed mechanical small bowel obstruction with transition felt likely in the right lower quadrant. NG was placed in the ED and she was admitted.     Unfortunately  "NG clogged overnight and she had large emesis around tube. Since then she's had 860 ml of bilious output from NG and a small loose BM. NG was clamped 2 hours ago and since then she's had worsening abdominal pain. She is passing little flatus. Her small bowel remained dilated and unchanged on XR at 1:00pm today. Leukocytosis resolved this morning. Tmax 99.9, hypertensive, no tachycardia.          Physical Exam:   Blood pressure (!) 163/98, pulse 98, temperature 99.9  F (37.7  C), temperature source Oral, resp. rate 16, height 1.448 m (4' 9\"), weight 44.9 kg (99 lb), SpO2 97 %, not currently breastfeeding.  99 lbs 0 oz  General: Vital signs reviewed, in no apparent distress  Eyes: Anicteric  HENT: Normocephalic, atraumatic, trachea midline   Respiratory: Breathing nonlabored  Cardiovascular: Regular rate and rhythm  GI: Abdomen distended, tender throughout lower abdomen with guarding to deep palpation in RLQ, no rebound, rare bowel sounds  Musculoskeletal: No gross deformities  Neurologic: Grossly nonfocal exam  Psychiatric: Normal mood, affect and insight  Integumentary: Warm and dry         Past Medical History:     Past Medical History:   Diagnosis Date     Anxiety 2014     CAD (coronary artery disease) 2011     Cancer (H)     ovarian     Colitis      Fibromyalgia      Gastroesophageal reflux disease      Hypothyroidism 2011     Insomnia 2017     Mixed hyperlipidemia 2011     PONV (postoperative nausea and vomiting)      Recurrent major depressive disorder (H) 2010     Urinary retention             Past Surgical History:     Past Surgical History:   Procedure Laterality Date     APPENDECTOMY OPEN N/A 8/9/2019    Procedure: Open Appendectomy;  Surgeon: Parvez Abreu MD;  Location: [facility] OR     BREAST SURGERY      biopsy     GYN SURGERY       HYSTERECTOMY TOTAL ABDOMINAL, BILATERAL SALPINGO-OOPHORECTOMY, NODE DISSECTION, COMBINED Bilateral 8/9/2019    Procedure: Exploratory Laparotomy, Total Abdominal " Hysterectomy, Left Salpingo-Oopherectomy, Bilateral Pelvic and Para-Aortic Lymph Node Dissection, Peritoneal Biopsy, Diaphraghm Pap Smears.;  Surgeon: Naz Ascencio MD;  Location: [facility] OR     IR CHEST PORT PLACEMENT > 5 YRS OF AGE  8/30/2019     IR PORT REMOVAL RIGHT  6/18/2021     OOPHORECTOMY  1992            Current Medications:           sodium chloride (PF)  3 mL Intracatheter Q8H       HYDROmorphone, lidocaine 4%, lidocaine (buffered or not buffered), LORazepam, melatonin, naloxone **OR** naloxone **OR** naloxone **OR** naloxone, ondansetron **OR** ondansetron, prochlorperazine **OR** prochlorperazine **OR** prochlorperazine, sodium chloride (PF)         Home Medications:     Prior to Admission medications    Medication Sig Last Dose Taking? Auth Provider   budesonide (ENTOCORT EC) 3 MG EC capsule Take by mouth daily 1/16/2022 - 9 mg daily x 56 days, then 6 mg daily x 14 days, then 3 mg daily. 1/16/2022 at Unknown time Yes Unknown, Entered By History   calcium carbonate (OS-LUCRECIA) 1500 (600 Ca) MG tablet Take 1,200 mg by mouth 2 times daily (with meals) Past Week at Unknown time Yes Unknown, Entered By History   denosumab (PROLIA) 60 MG/ML SOSY injection Inject 60 mg Subcutaneous every 6 months More than a month at Unknown time Yes Unknown, Entered By History   DULoxetine (CYMBALTA) 20 MG capsule Take by mouth daily 1/8/2022 - Take 40 mg daily for 2 weeks, then 20 mg daily x 2 weeks. 1/17/2022 at Unknown time Yes Unknown, Entered By History   FLUoxetine (PROZAC) 10 MG capsule Take 10 mg by mouth daily hasn't started Yes Unknown, Entered By History   Glutathione 50 MG TABS Take 50 mg by mouth daily Past Week at Unknown time Yes Unknown, Entered By History   hypromellose-dextran (ARTIFICAL TEARS) 0.1-0.3 % ophthalmic solution Place 1 drop into both eyes 4 times daily as needed for dry eyes as needed Yes Unknown, Entered By History   lactobacillus rhamnosus, GG, (CULTURELL) capsule Take 1 capsule by mouth  daily Past Week at Unknown time Yes Unknown, Entered By History   levothyroxine (SYNTHROID/LEVOTHROID) 50 MCG tablet Take 50 mcg by mouth every morning  1/17/2022 at Unknown time Yes Reported, Patient   liothyronine (CYTOMEL) 5 MCG tablet Take 5 mcg by mouth every morning  1/17/2022 at Unknown time Yes Reported, Patient   loperamide (IMODIUM) 2 MG capsule Take 2 mg by mouth daily as needed for diarrhea as needed Yes Unknown, Entered By History   LORazepam (ATIVAN) 0.5 MG tablet Take 0.25-0.5 mg by mouth every 8 hours as needed for anxiety as needed Yes Unknown, Entered By History   mirtazapine (REMERON) 7.5 MG tablet Take 7.5 mg by mouth At Bedtime hasn't started Yes Unknown, Entered By History   multivitamin w/minerals (THERA-VIT-M) tablet Take 1 tablet by mouth daily Past Week at Unknown time Yes Unknown, Entered By History   triamcinolone (KENALOG) 0.1 % external cream Apply topically 2 times daily  Yes Unknown, Entered By History   Vitamin D3 (CHOLECALCIFEROL) 125 MCG (5000 UT) tablet Take 125 mcg by mouth daily 1/17/2022 at Unknown time Yes Unknown, Entered By History            Allergies:     Allergies   Allergen Reactions     Adhesive Tape      bandaids     Liquid Adhesive Rash     Penicillins Rash            Family History:     Family History   Problem Relation Age of Onset     Heart Disease Father      Cerebrovascular Disease Father      Cerebrovascular Disease Sister      Thyroid Disease Sister      Breast Cancer Sister            Social History:   Maximino Simeon  reports that she has never smoked. She has never used smokeless tobacco. She reports current alcohol use. She reports that she does not use drugs.          Review of Systems:   The 12 point Review of Systems is negative other than noted in the HPI.         Labs/Imaging   All new lab and imaging data was reviewed.   Recent Labs   Lab 01/18/22  0722 01/17/22  1758   WBC 9.5 13.8*   HGB 14.7 15.8*   HCT 44.1 48.8*   MCV 97 100    266      Recent Labs   Lab 01/18/22  0722 01/17/22  1758    136   POTASSIUM 4.0 4.7   CHLORIDE 102 100   CO2 31 33*   ANIONGAP 3 3   * 165*   BUN 23 23   CR 0.65 0.70   GFRESTIMATED 90 88   LUCRECIA 9.2 10.7*   PROTTOTAL  --  8.3   ALBUMIN  --  4.3   BILITOTAL  --  0.6   ALKPHOS  --  47   AST  --  19   ALT  --  29       I have personally reviewed the imaging studies-     CT ABD PELVIS W CONTRAST  IMPRESSION:   1.  Mechanical small bowel obstruction, transition felt likely in the right lower quadrant.  2.  No perforation or abscess.    XR ABDOMEN  IMPRESSION: Dilated bowel in the left abdomen again evident,  unchanged. NG tube tip in the fundus of the stomach. Small to moderate  amount of stool.    Cheyanne Hopper PA-C    45 minutes spent on date of the encounter doing patient visit, chart review, and documentation.

## 2022-01-19 PROCEDURE — 120N000001 HC R&B MED SURG/OB

## 2022-01-19 PROCEDURE — 250N000009 HC RX 250: Performed by: INTERNAL MEDICINE

## 2022-01-19 PROCEDURE — 99232 SBSQ HOSP IP/OBS MODERATE 35: CPT | Performed by: INTERNAL MEDICINE

## 2022-01-19 PROCEDURE — 250N000011 HC RX IP 250 OP 636: Performed by: INTERNAL MEDICINE

## 2022-01-19 PROCEDURE — 99233 SBSQ HOSP IP/OBS HIGH 50: CPT | Performed by: PHYSICIAN ASSISTANT

## 2022-01-19 PROCEDURE — 250N000009 HC RX 250: Performed by: PHYSICIAN ASSISTANT

## 2022-01-19 PROCEDURE — 258N000003 HC RX IP 258 OP 636: Performed by: INTERNAL MEDICINE

## 2022-01-19 RX ORDER — LEVOTHYROXINE SODIUM ANHYDROUS 100 UG/5ML
37.5 INJECTION, POWDER, LYOPHILIZED, FOR SOLUTION INTRAVENOUS DAILY
Status: DISCONTINUED | OUTPATIENT
Start: 2022-01-19 | End: 2022-01-20

## 2022-01-19 RX ADMIN — FAMOTIDINE 20 MG: 10 INJECTION, SOLUTION INTRAVENOUS at 04:43

## 2022-01-19 RX ADMIN — LEVOTHYROXINE SODIUM ANHYDROUS 37.5 MCG: 100 INJECTION, POWDER, LYOPHILIZED, FOR SOLUTION INTRAVENOUS at 11:47

## 2022-01-19 RX ADMIN — LORAZEPAM 0.5 MG: 2 INJECTION INTRAMUSCULAR; INTRAVENOUS at 23:43

## 2022-01-19 RX ADMIN — HYDROMORPHONE HYDROCHLORIDE 0.2 MG: 0.2 INJECTION, SOLUTION INTRAMUSCULAR; INTRAVENOUS; SUBCUTANEOUS at 13:30

## 2022-01-19 RX ADMIN — HYDROMORPHONE HYDROCHLORIDE 0.2 MG: 0.2 INJECTION, SOLUTION INTRAMUSCULAR; INTRAVENOUS; SUBCUTANEOUS at 20:48

## 2022-01-19 RX ADMIN — SODIUM CHLORIDE, POTASSIUM CHLORIDE, SODIUM LACTATE AND CALCIUM CHLORIDE: 600; 310; 30; 20 INJECTION, SOLUTION INTRAVENOUS at 03:52

## 2022-01-19 RX ADMIN — FAMOTIDINE 20 MG: 10 INJECTION, SOLUTION INTRAVENOUS at 17:39

## 2022-01-19 ASSESSMENT — ACTIVITIES OF DAILY LIVING (ADL)
ADLS_ACUITY_SCORE: 20
ADLS_ACUITY_SCORE: 18
ADLS_ACUITY_SCORE: 20
ADLS_ACUITY_SCORE: 18
ADLS_ACUITY_SCORE: 18
ADLS_ACUITY_SCORE: 20
ADLS_ACUITY_SCORE: 18
ADLS_ACUITY_SCORE: 20
ADLS_ACUITY_SCORE: 18
ADLS_ACUITY_SCORE: 20
ADLS_ACUITY_SCORE: 18
ADLS_ACUITY_SCORE: 18
ADLS_ACUITY_SCORE: 20
ADLS_ACUITY_SCORE: 20
ADLS_ACUITY_SCORE: 18
ADLS_ACUITY_SCORE: 20
ADLS_ACUITY_SCORE: 18
ADLS_ACUITY_SCORE: 20

## 2022-01-19 ASSESSMENT — MIFFLIN-ST. JEOR: SCORE: 808.88

## 2022-01-19 NOTE — PROGRESS NOTES
[facility]    HOSPITALIST PROGRESS NOTE :   --------------------------------------------------    Date of Admission:  1/17/2022    Cumulative Summary: Maximino Simeon is a 76 year old female with history of ovarian cancer status post total abdominal hysterectomy, who also has history of heart disease, hyperlipidemia, who presented to [facility] with intermittent lower abdominal pain and was found to have mechanical small-bowel obstruction, her first episode, being admitted for further treatment    Assessment & Plan     Mechanical small-bowel obstruction, suspected due to adhesions from prior surgery:  Patient with past medical history significant for ovarian cancer and extensive surgeries in the past including total abdominal hysterectomy presented to the hospital with abdominal discomfort with signs and symptoms of mechanical small bowel obstruction associated with abdominal pain and distention.  NG tube was placed and patient was admitted for further evaluation and management    --Patient was seen and examined , son also present at the bedside.  --Patient is now having multiple bowel movements since last night and has been passing flatus and is feeling clinically better.  --Patient has been evaluated by surgery team, they are recommending to continuing to keep patient n.p.o. and doing NG clamping trial return to section if nausea and vomiting returns.  --Surgery team is recommending to try clears tomorrow.  -- If patient continues to have persistent symptoms that they will consider Gastrografin challenge.  -- Ativan 0.5 mg IV every 6 hours for anxiety as she seemed ey anxious on examination   -- continue gentle IV fluids   -- Repeated x-ray is still showing dilated bowel in the left abdomen which is unchanged small to moderate amount of the stool is present as patient is still has dilation on the x-ray, will ask general surgery to evaluate before removing NG tube and  starting patient on diet.  -- Symptomatic pain management.    History of microscopic colitis:  The patient had a colonoscopy about a month ago and presumably was found to have microscopic colitis.  She was started on budesonide, which will be held due to her n.p.o. status.  -- Continue to hold at this point, restart once patient is able to take oral.    History of Hashimoto's hypothyroidism:  The patient liothyronine and levothyroxine will be on hold.  -- Patient is very anxious about her levothyroxine being held, will start on IV synthroid this morning     Depression:    -- Patient's Remeron and SSRI will be on hold.  -- Patient also seemed extremely anxious while I was in the room for quite some time I did have discussion with her regarding her clinical status in detail with his son also.  --We will order Ativan 0.5 mg every 6 hours as needed for anxiety.    Clinically Significant Risk Factors Present on Admission                  Diet: NPO for Medical/Clinical Reasons Except for: Ice Chips    Crews Catheter: Not present  DVT Prophylaxis: Pneumatic Compression Devices  Code Status: Full Code    The patient's care was discussed with the Bedside Nurse, Patient and Patient's Family.    Disposition Plan   Expected Discharge: 01/20/2022     Anticipated discharge location:  Awaiting care coordination huddle, tentative discharge tomorrow if patient is making clinical improvement and once patient is able to tolerate the diet.    Entered: Shamika Verdugo MD 01/19/2022, 8:04 AM       Sh[facility]dugo MD, [facility]  Text Page (7am - 6pm)    ----------------------------------------------------------------------------------------------------------------------    Interval History    Patient was seen and examined, sitting in bed, looks clinically improved only discomfort is from the presence of NG tube, son is also present at the bedside.  Patient was also evaluated by surgery team, patient thinks that she has loose bowel movements since  last night and thinks that she is passing some flatus.  Encouraged her to increase her mobilization also.  Patient is otherwise denying any chest pain, palpitations or shortness of breath.    -Data reviewed today: I reviewed all new labs and imaging results over the last 24 hours.    I personally reviewed no images or EKG's today.    Physical Exam   Temp: 99.8  F (37.7  C) Temp src: Oral BP: 139/78 Pulse: 95   Resp: 18 SpO2: 96 % O2 Device: None (Room air)    Vitals:    01/17/22 1713 01/19/22 0700   Weight: 44.9 kg (99 lb) 45.5 kg (100 lb 5 oz)     Vital Signs with Ranges  Temp:  [98.8  F (37.1  C)-99.9  F (37.7  C)] 99.8  F (37.7  C)  Pulse:  [95-98] 95  Resp:  [16-18] 18  BP: (139-163)/(78-98) 139/78  SpO2:  [94 %-97 %] 96 %  I/O last 3 completed shifts:  In: -   Out: 240 [Emesis/NG output:240]    GENERAL: Alert , awake and oriented. NAD. Conversational, appropriate. NG in place  HEENT: Normocephalic. EOMI. No icterus or injection. Nares normal.   LUNGS: Clear to auscultation. No dyspnea at rest.   HEART: Regular rate. Extremities perfused.   ABDOMEN: Soft, nontender, and slight distended. Hypoactive bowel sounds  EXTREMITIES: No LE edema noted.   NEUROLOGIC: Moves extremities x4 on command. No acute focal neurologic abnormalities noted.     Medications     lactated ringers 50 mL/hr at 01/19/22 0352       famotidine  20 mg Intravenous Q12H     sodium chloride (PF)  3 mL Intracatheter Q8H       Data   Recent Labs   Lab 01/18/22  0722 01/17/22  1813 01/17/22  1758   WBC 9.5  --  13.8*   HGB 14.7  --  15.8*   MCV 97  --  100     --  266   INR  --  0.93  --      --  136   POTASSIUM 4.0  --  4.7   CHLORIDE 102  --  100   CO2 31  --  33*   BUN 23  --  23   CR 0.65  --  0.70   ANIONGAP 3  --  3   LUCRECIA 9.2  --  10.7*   *  --  165*   ALBUMIN  --   --  4.3   PROTTOTAL  --   --  8.3   BILITOTAL  --   --  0.6   ALKPHOS  --   --  47   ALT  --   --  29   AST  --   --  19   LIPASE  --   --  165       Imaging:    Recent Results (from the past 24 hour(s))   XR Abdomen 1 View    Narrative    ABDOMEN ONE VIEW  1/18/2022 1:21 PM     HISTORY: Follow up on small bowel obstruction.    COMPARISON: January 17, 2022       Impression    IMPRESSION: Dilated bowel in the left abdomen again evident,  unchanged. NG tube tip in the fundus of the stomach. Small to moderate  amount of stool.    LIA BAUTISTA MD         SYSTEM ID:  [id number]

## 2022-01-19 NOTE — PLAN OF CARE
A+Ox4. VSS on RA. NG on LIS. NPO, ice chips ok. SBA. Had large loose BM. Hypoactive bowel sounds, audible. PRN ativan x1.   Will continue to monitor.

## 2022-01-19 NOTE — PROGRESS NOTES
"Surgery    No complaints this morning  NG in place. Tolerating ok.   + BM yesterday and this morning.   Sharp abdominal pain has resolved. Still has a \"sensation\" in her abdomen, like things are moving.  No nausea  No CP, dyspnea.    Gen:  Awake, Alert, NAD  Blood pressure 139/78, pulse 95, temperature 99.8  F (37.7  C), temperature source Oral, resp. rate 18, height 1.448 m (4' 9\"), weight 45.5 kg (100 lb 5 oz), SpO2 96 %  Resp - Clear to ascultation.    Cardiac - Regular rate & rhythm without murmur  Abdomen - soft, non tender, nondistended.  Extremities - no lower extremity edema.    No new labs    NGT 240cc out yesterday. Bilious.    A/P Patient is a 76 year old female with small bowel obstruction. Patient has hx of ovarian cancer with ENRIKE and open appendectomy in 2019. No prior hx of SBO.    - Improving.   - Continue NPO except ice chips/meds.  - NG clamping trial. Return to suction if n/v.  - No indication for surgical intervention today.   - Try clears tomorrow if doing well vs gastrografin challenge if not.    Michael French PA-C      "

## 2022-01-20 ENCOUNTER — APPOINTMENT (OUTPATIENT)
Dept: OCCUPATIONAL THERAPY | Facility: CLINIC | Age: 77
DRG: 390 | End: 2022-01-20
Attending: INTERNAL MEDICINE
Payer: MEDICARE

## 2022-01-20 PROCEDURE — 99233 SBSQ HOSP IP/OBS HIGH 50: CPT | Performed by: INTERNAL MEDICINE

## 2022-01-20 PROCEDURE — 250N000011 HC RX IP 250 OP 636: Performed by: INTERNAL MEDICINE

## 2022-01-20 PROCEDURE — 250N000013 HC RX MED GY IP 250 OP 250 PS 637: Performed by: INTERNAL MEDICINE

## 2022-01-20 PROCEDURE — 250N000009 HC RX 250: Performed by: INTERNAL MEDICINE

## 2022-01-20 PROCEDURE — 97165 OT EVAL LOW COMPLEX 30 MIN: CPT | Mod: GO | Performed by: OCCUPATIONAL THERAPIST

## 2022-01-20 PROCEDURE — 97530 THERAPEUTIC ACTIVITIES: CPT | Mod: GO | Performed by: OCCUPATIONAL THERAPIST

## 2022-01-20 PROCEDURE — 258N000003 HC RX IP 258 OP 636: Performed by: INTERNAL MEDICINE

## 2022-01-20 PROCEDURE — 250N000013 HC RX MED GY IP 250 OP 250 PS 637: Performed by: PHYSICIAN ASSISTANT

## 2022-01-20 PROCEDURE — 120N000001 HC R&B MED SURG/OB

## 2022-01-20 RX ORDER — BUDESONIDE 9 MG/1
9 TABLET, FILM COATED, EXTENDED RELEASE ORAL DAILY
Status: DISCONTINUED | OUTPATIENT
Start: 2022-01-20 | End: 2022-01-21 | Stop reason: HOSPADM

## 2022-01-20 RX ORDER — MIRTAZAPINE 7.5 MG/1
7.5 TABLET, FILM COATED ORAL AT BEDTIME
Status: DISCONTINUED | OUTPATIENT
Start: 2022-01-20 | End: 2022-01-21 | Stop reason: HOSPADM

## 2022-01-20 RX ORDER — FLUOXETINE 10 MG/1
10 CAPSULE ORAL DAILY
Status: DISCONTINUED | OUTPATIENT
Start: 2022-01-20 | End: 2022-01-21 | Stop reason: HOSPADM

## 2022-01-20 RX ORDER — POLYETHYLENE GLYCOL 3350 17 G/17G
17 POWDER, FOR SOLUTION ORAL DAILY
Status: DISCONTINUED | OUTPATIENT
Start: 2022-01-20 | End: 2022-01-21 | Stop reason: HOSPADM

## 2022-01-20 RX ORDER — LIOTHYRONINE SODIUM 5 UG/1
5 TABLET ORAL EVERY MORNING
Status: DISCONTINUED | OUTPATIENT
Start: 2022-01-21 | End: 2022-01-21 | Stop reason: HOSPADM

## 2022-01-20 RX ORDER — LEVOTHYROXINE SODIUM 50 UG/1
50 TABLET ORAL
Status: DISCONTINUED | OUTPATIENT
Start: 2022-01-21 | End: 2022-01-21 | Stop reason: HOSPADM

## 2022-01-20 RX ORDER — BISACODYL 10 MG
10 SUPPOSITORY, RECTAL RECTAL DAILY PRN
Status: DISCONTINUED | OUTPATIENT
Start: 2022-01-20 | End: 2022-01-21 | Stop reason: HOSPADM

## 2022-01-20 RX ORDER — AMOXICILLIN 250 MG
1 CAPSULE ORAL 2 TIMES DAILY
Status: DISCONTINUED | OUTPATIENT
Start: 2022-01-20 | End: 2022-01-21 | Stop reason: HOSPADM

## 2022-01-20 RX ADMIN — LEVOTHYROXINE SODIUM ANHYDROUS 37.5 MCG: 100 INJECTION, POWDER, LYOPHILIZED, FOR SOLUTION INTRAVENOUS at 10:57

## 2022-01-20 RX ADMIN — LORAZEPAM 0.5 MG: 2 INJECTION INTRAMUSCULAR; INTRAVENOUS at 13:28

## 2022-01-20 RX ADMIN — SODIUM CHLORIDE, POTASSIUM CHLORIDE, SODIUM LACTATE AND CALCIUM CHLORIDE: 600; 310; 30; 20 INJECTION, SOLUTION INTRAVENOUS at 01:48

## 2022-01-20 RX ADMIN — FAMOTIDINE 20 MG: 10 INJECTION, SOLUTION INTRAVENOUS at 05:48

## 2022-01-20 RX ADMIN — MIRTAZAPINE 7.5 MG: 7.5 TABLET, FILM COATED ORAL at 20:35

## 2022-01-20 RX ADMIN — Medication 1 MG: at 23:23

## 2022-01-20 RX ADMIN — FAMOTIDINE 20 MG: 10 INJECTION, SOLUTION INTRAVENOUS at 16:55

## 2022-01-20 RX ADMIN — FLUOXETINE 10 MG: 10 CAPSULE ORAL at 16:55

## 2022-01-20 RX ADMIN — POLYETHYLENE GLYCOL 3350 17 G: 17 POWDER, FOR SOLUTION ORAL at 10:01

## 2022-01-20 RX ADMIN — BUDESONIDE 9 MG: 9 TABLET, EXTENDED RELEASE ORAL at 18:19

## 2022-01-20 RX ADMIN — LORAZEPAM 0.5 MG: 2 INJECTION INTRAMUSCULAR; INTRAVENOUS at 20:35

## 2022-01-20 ASSESSMENT — ACTIVITIES OF DAILY LIVING (ADL)
ADLS_ACUITY_SCORE: 20
ADLS_ACUITY_SCORE: 23
ADLS_ACUITY_SCORE: 20
ADLS_ACUITY_SCORE: 20
ADLS_ACUITY_SCORE: 23
ADLS_ACUITY_SCORE: 23
ADLS_ACUITY_SCORE: 20
ADLS_ACUITY_SCORE: 23
ADLS_ACUITY_SCORE: 20
ADLS_ACUITY_SCORE: 20
ADLS_ACUITY_SCORE: 23
ADLS_ACUITY_SCORE: 20
ADLS_ACUITY_SCORE: 22
ADLS_ACUITY_SCORE: 20

## 2022-01-20 NOTE — PLAN OF CARE
Occupational Therapy Discharge Summary    Reason for therapy discharge:    All goals and outcomes met, no further needs identified.    Progress towards therapy goal(s). See goals on Care Plan in [facility] electronic health record for goal details.  Goals met    Therapy recommendation(s):    Continue home exercise program.

## 2022-01-20 NOTE — PROGRESS NOTES
Pt A&O x4, Up with limited assist. NPO except Ice chips. Loose BM x1. Dilaudid for pain. NG clamped at 1pm. Denied N/V. No residual when checking at 7pm and returned to LIS.

## 2022-01-20 NOTE — PLAN OF CARE
A+Ox4. NG to LIS. VSS on RA. SBA. Mod pain PRN dilaudid x1. PRN ativan x1.   Pt says IV itchy and she has allergy to adhesive tape and liquid adhesive, got OP-site for pt.     Will continue to monitor.

## 2022-01-20 NOTE — PROGRESS NOTES
"Surgery    Feels tired otherwise no complaints.  No abdominal pain, feels normal.  No nausea  Appetite diminished  + Flatus  Voiding okay  Walking to the bathroom regularly    Gen:  Awake, Alert, NAD  Blood pressure (!) 162/94, pulse 91, temperature 98.8  F (37.1  C), temperature source Oral, resp. rate 16, height 1.448 m (4' 9\"), weight 45.5 kg (100 lb 5 oz), SpO2 96 %  Resp - nonlabored  Cardiac - Regular  Abdomen - soft, non tender, non distended.   Extremities - no lower extremity edema or tenderness with palpation    NGT no residual output following clamping trial yesterday.  10 cc out over 24 hours recorded    No new labs    A/P Patient is a 76 year old female with small bowel obstruction. Patient has hx of ovarian cancer with ENRIKE and open appendectomy in 2019. No prior hx of SBO.  Symptoms are resolving.    -Improving  -Remove NG tube today  -Clear liquid diet  -Bowel regimen  -Diet may be advanced later today if doing well.  -No surgical intervention anticipated  -Discharge per primary service    Michael French PA-C    "

## 2022-01-20 NOTE — PROGRESS NOTES
01/20/22 1500   Quick Adds   Type of Visit Initial Occupational Therapy Evaluation   Living Environment   People in home alone   Current Living Arrangements other (see comments)  ([facility])   Home Accessibility stairs to enter home;stairs within home   Number of Stairs, Main Entrance 10   Stair Railings, Main Entrance railings safe and in good condition   Number of Stairs, Within Home, Primary greater than 10 stairs;other (see comments)  (35)   Stair Railings, Within Home, Primary railings safe and in good condition   Transportation Anticipated car, drives self   Living Environment Comments Pt lives alone in a multi-level (4) [facility]. Tub shower, No AD   Self-Care   Usual Activity Tolerance good   Current Activity Tolerance moderate   Regular Exercise Other (see comments)  (Stretches daily. Not going to gym since start of pandemic)   Equipment Currently Used at Home none   Activity/Exercise/Self-Care Comment Pt is independent in all ADLs/IADLs without device at baseline. Pt reports some decreased activity tolerance d/t reduced gym exercise since start of pandemic, but plans to begin exercise regimen again. Pt reports plan to start outpatient PT next week to address RLE imbalance. Reports her son lives in [place] and is able to provide assist upon intial discharge if needed.   Instrumental Activities of Daily Living (IADL)   Previous Responsibilities driving;shopping;laundry;housekeeping;meal prep;medication management;finances   Disability/Function   Hearing Difficulty or Deaf yes   Fall history within last six months no   General Information   Onset of Illness/Injury or Date of Surgery 01/17/22   Referring Physician Shamika Verdugo MD   Patient/Family Therapy Goal Statement (OT) Return home   Additional Occupational Profile Info/Pertinent History of Current Problem Mechanical small-bowel obstruction, suspected due to adhesions from prior surgery   Performance Patterns (Routines, Roles, Habits) Used to enjoy  exercising at [employer] before pandemic   Existing Precautions/Restrictions no known precautions/restrictions   Limitations/Impairments hearing   Cognitive Status Examination   Orientation Status orientation to person, place and time   Pain Assessment   Patient Currently in Pain No   Range of Motion Comprehensive   General Range of Motion no range of motion deficits identified   Bed Mobility   Bed Mobility No deficits identified   Transfers   Transfers No deficits identified   Balance   Balance Assessment no deficits were identified   Clinical Impression   Criteria for Skilled Therapeutic Interventions Met (OT) meets criteria   OT Diagnosis Decreased activity tolerance for ADLs and IADLs   OT Problem List-Impairments impacting ADL activity tolerance impaired;hearing;muscle tone;strength   Assessment of Occupational Performance 1-3 Performance Deficits   Identified Performance Deficits Activity christy for IADLs   Planned Therapy Interventions (OT) progressive activity/exercise;home program guidelines   Clinical Decision Making Complexity (OT) low complexity   Therapy Frequency (OT) 1x eval and treat   Predicted Duration of Therapy 1 day   Risk & Benefits of therapy have been explained evaluation/treatment results reviewed;care plan/treatment goals reviewed;risks/benefits reviewed;current/potential barriers reviewed;participants voiced agreement with care plan;participants included;patient   OT Discharge Planning    OT Discharge Recommendation (DC Rec) Home with assist   OT Rationale for DC Rec Pt is near baseline of independent completion of ADLs and IADLs. Pt is limited by decreased activity tolerance, but is able to ambulate for 8+ min with SBA. Also reports she is scheldued for outpatient PT starting next week for RLE. Patient's son is able to assist after intial discharge. No further skilled OT needs identified.   Total Evaluation Time (Minutes)   Total Evaluation Time (Minutes) 10

## 2022-01-20 NOTE — PLAN OF CARE
PT: Orders received. Chart reviewed and discussed with care team.  PT not indicated due to patient being independent with mobility and not having any skilled PT needs at this time.  Defer discharge recommendations to OT.  Will complete orders.

## 2022-01-20 NOTE — PROGRESS NOTES
[facility]    HOSPITALIST PROGRESS NOTE :   --------------------------------------------------    Date of Admission:  1/17/2022    Cumulative Summary: Maximino Simeon is a 76 year old female with history of ovarian cancer status post total abdominal hysterectomy, who also has history of heart disease, hyperlipidemia, who presented to [facility] with intermittent lower abdominal pain and was found to have mechanical small-bowel obstruction, her first episode, being admitted for further treatment    Assessment & Plan     Mechanical small-bowel obstruction, suspected due to adhesions from prior surgery:  Patient with past medical history significant for ovarian cancer and extensive surgeries in the past including total abdominal hysterectomy presented to the hospital with abdominal discomfort with signs and symptoms of mechanical small bowel obstruction associated with abdominal pain and distention.  NG tube was placed and patient was admitted for further evaluation and management    --Patient was seen and examined ,feeling clinically well , had a formed bowel movement this morning   -- Has been seen by surgery team and is ok for NG tube removal  -- Discontinue Ng tube   -- Discontinue IV fluids   -- Will consult PT/OT, so they can evaluate patient for safe disposition planning   -- Symptomatic pain management.  -- If patient is tolerating clears well then will advance the diet     History of microscopic colitis:  Patient had a colonoscopy about a month ago and presumably was found to have microscopic colitis.  She was started on budesonide, which will be held due to her n.p.o. status.  -- start patient back on Budesonide 9 mg ER po daily     History of Hashimoto's hypothyroidism:  The patient liothyronine and levothyroxine will be on hold.  -- Patient is very anxious about her levothyroxine being held,  started on IV synthroid   -- We will discontinue IV Synthroid, start  patient on PTA Synthroid 50 mcg daily along with Cytomel 5 mcg in the morning    Depression:    -- Patient's Remeron and SSRI were on hold . Restart Prozac 10 mg po daily   -- Ordered Ativan 0.5 mg every 6 hours as needed for anxiety.  -- We will start patient on PTA Remeron 7.5 mg at the bedtime.    Clinically Significant Risk Factors Present on Admission        Diet: Clear Liquid Diet    Crews Catheter: Not present  DVT Prophylaxis: Pneumatic Compression Devices  Code Status: Full Code    The patient's care was discussed with the Bedside Nurse, Patient and Patient's Family.    Disposition Plan   Expected Discharge: 01/21/2022     Anticipated discharge location:  Awaiting care coordination huddle, hoping patient should be able to return to her home tomorrow if she continues to tolerate the diet.   35 min were spent in direct patient care today including the floor time , more than 50% of the time was spent in patient care coordination and counseling.      Entered: Shamika Verdugo MD 01/20/2022, 2:38 PM       [facility]dugo MD, [facility]  Text Page (7am - 6pm)    ----------------------------------------------------------------------------------------------------------------------    Interval History    Patient was seen and examined, sitting in bed, looking clinically improved, did have 1 formed bowel movement this morning.  She has been evaluated by surgery and was told that it is okay to remove the NG tube and start patient on clear liquid diet.  We discussed about advancing her diet further during the daytime if she is able to tolerate the diet.  I also discussed with her regarding switching her IV Synthroid to oral and is starting her some of her other oral medications to make sure she is able to tolerate.  I also encouraged her to increase her mobilization as she has not been too much mobile and we discussed about consulting physical and Occupational Therapy.    -Data reviewed today: I reviewed all new labs and imaging  results over the last 24 hours.    I personally reviewed no images or EKG's today.    Physical Exam   Temp: 98.8  F (37.1  C) Temp src: Oral BP: (!) 162/94 Pulse: 91   Resp: 16 SpO2: 96 % O2 Device: None (Room air)    Vitals:    01/17/22 1713 01/19/22 0700   Weight: 44.9 kg (99 lb) 45.5 kg (100 lb 5 oz)     Vital Signs with Ranges  Temp:  [98.6  F (37  C)-98.9  F (37.2  C)] 98.8  F (37.1  C)  Pulse:  [81-91] 91  Resp:  [14-18] 16  BP: (149-162)/(83-96) 162/94  SpO2:  [95 %-96 %] 96 %  I/O last 3 completed shifts:  In: 240 [P.O.:240]  Out: 10 [Emesis/NG output:10]    GENERAL: Alert , awake and oriented. NAD. Conversational, appropriate. NG in place  HEENT: Normocephalic. EOMI. No icterus or injection. Nares normal.   LUNGS: Clear to auscultation. No dyspnea at rest.   HEART: Regular rate. Extremities perfused.   ABDOMEN: Soft, nontender, and slight distended. Hypoactive bowel sounds  EXTREMITIES: No LE edema noted.   NEUROLOGIC: Moves extremities x4 on command. No acute focal neurologic abnormalities noted.     Medications       famotidine  20 mg Intravenous Q12H     polyethylene glycol  17 g Oral Daily     senna-docusate  1 tablet Oral BID     sodium chloride (PF)  3 mL Intracatheter Q8H       Data   Recent Labs   Lab 01/18/22  0722 01/17/22  1813 01/17/22  1758   WBC 9.5  --  13.8*   HGB 14.7  --  15.8*   MCV 97  --  100     --  266   INR  --  0.93  --      --  136   POTASSIUM 4.0  --  4.7   CHLORIDE 102  --  100   CO2 31  --  33*   BUN 23  --  23   CR 0.65  --  0.70   ANIONGAP 3  --  3   LUCRECIA 9.2  --  10.7*   *  --  165*   ALBUMIN  --   --  4.3   PROTTOTAL  --   --  8.3   BILITOTAL  --   --  0.6   ALKPHOS  --   --  47   ALT  --   --  29   AST  --   --  19   LIPASE  --   --  165       Imaging:   No results found for this or any previous visit (from the past 24 hour(s)).

## 2022-01-21 VITALS
HEIGHT: 57 IN | HEART RATE: 103 BPM | RESPIRATION RATE: 16 BRPM | BODY MASS INDEX: 21.64 KG/M2 | WEIGHT: 100.31 LBS | SYSTOLIC BLOOD PRESSURE: 146 MMHG | TEMPERATURE: 99.7 F | OXYGEN SATURATION: 97 % | DIASTOLIC BLOOD PRESSURE: 92 MMHG

## 2022-01-21 PROCEDURE — 99231 SBSQ HOSP IP/OBS SF/LOW 25: CPT | Performed by: PHYSICIAN ASSISTANT

## 2022-01-21 PROCEDURE — 250N000013 HC RX MED GY IP 250 OP 250 PS 637: Performed by: INTERNAL MEDICINE

## 2022-01-21 PROCEDURE — 250N000009 HC RX 250: Performed by: INTERNAL MEDICINE

## 2022-01-21 PROCEDURE — 99238 HOSP IP/OBS DSCHRG MGMT 30/<: CPT | Performed by: INTERNAL MEDICINE

## 2022-01-21 RX ADMIN — FAMOTIDINE 20 MG: 10 INJECTION, SOLUTION INTRAVENOUS at 06:51

## 2022-01-21 RX ADMIN — LIOTHYRONINE SODIUM 5 MCG: 5 TABLET ORAL at 09:44

## 2022-01-21 RX ADMIN — BUDESONIDE 9 MG: 9 TABLET, EXTENDED RELEASE ORAL at 09:44

## 2022-01-21 RX ADMIN — FLUOXETINE 10 MG: 10 CAPSULE ORAL at 09:44

## 2022-01-21 RX ADMIN — LEVOTHYROXINE SODIUM 50 MCG: 50 TABLET ORAL at 06:51

## 2022-01-21 ASSESSMENT — ACTIVITIES OF DAILY LIVING (ADL)
ADLS_ACUITY_SCORE: 23

## 2022-01-21 NOTE — PROGRESS NOTES
"Surgery    Patient sitting up in bed  No current complaints.  Has been having BMs, some loose  Tolerating diet  Moving independently, currently searching for her purse and glasses  Denies chest pain or shortness of breath    Vital signs:  Temp: 99.7  F (37.6  C) Temp src: Oral BP: (!) 146/92 Pulse: 103   Resp: 16 SpO2: 97 % O2 Device: None (Room air)   Height: 144.8 cm (4' 9\") Weight: 45.5 kg (100 lb 5 oz)  Estimated body mass index is 21.71 kg/m  as calculated from the following:    Height as of this encounter: 1.448 m (4' 9\").    Weight as of this encounter: 45.5 kg (100 lb 5 oz).    A/P Patient is a 76 year old female with small bowel obstruction. Patient has hx of ovarian cancer with ENRIKE and open appendectomy in 2019. No prior hx of SBO.  Symptoms are resolving.    -Diet advanced to low fiber  -Continue bowel regimen  -No surgical intervention anticipated  -Anticipate discharge after breakfast today  -Discussed possible etiologies, the natural history and future expectations of bowel obstructions.      Michael French PA-C   "

## 2022-01-21 NOTE — PLAN OF CARE
Pt A/Ox4, up with SBA, VSS on RA, IVSL, denies n/v, little bit of pain in her back, PRN Ativan & Melatonin given x1, as well as lavender to help her sleep. Possibly discharge today.

## 2022-01-21 NOTE — PROGRESS NOTES
Discharge instruction w/ teach back provided to patient and son at bedside. Low fiber diet education also given. Pt verbalized understanding, questions answered.

## 2022-01-21 NOTE — DISCHARGE SUMMARY
[facility]  Hospitalist Discharge Summary      Date of Admission:  1/17/2022  Date of Discharge:  1/21/2022  Discharging Provider: Shamika Verdugo MD, FACP  Discharge Service: Hospitalist Service    Discharge Diagnoses   Mechanical small-bowel obstruction, suspected due to adhesions from prior surgery, resolved   History of microscopic colitis  History of Hashimoto's hypothyroidism  Depression    Follow-ups Needed After Discharge   Follow-up Appointments     Follow-up and recommended labs and tests      Follow up with primary care provider, Cecelia Manzo, within 7 days for   hospital follow- up.  No follow up labs or test are needed.             Unresulted Labs Ordered in the Past 30 Days of this Admission     No orders found from 12/18/2021 to 1/18/2022.          Discharge Disposition   Discharged to home  Condition at discharge: Stable    Hospital Course   Cumulative Summary: Maximino Simeon is a 76 year old female with history of ovarian cancer status post total abdominal hysterectomy, who also has history of heart disease, hyperlipidemia, who presented to [facility] with intermittent lower abdominal pain and was found to have mechanical small-bowel obstruction, her first episode, being admitted for further treatment     Here are further details regarding her current hospitalization      Mechanical small-bowel obstruction, suspected due to adhesions from prior surgery:  Patient with past medical history significant for ovarian cancer and extensive surgeries in the past including total abdominal hysterectomy presented to the hospital with abdominal discomfort with signs and symptoms of mechanical small bowel obstruction associated with abdominal pain and distention.  NG tube was placed and patient was admitted for further evaluation and management     -- Patient was seen and examined ,feeling clinically well , tolerating low fiber diet, patient has been evaluated by  surgery team and is okay to be discharged.  -- Nickie with patient regarding continuing low fiber diet after the discharge and then following up with primary care physician.     History of microscopic colitis:  Patient had a colonoscopy about a month ago and presumably was found to have microscopic colitis.  She was started on budesonide, which will be held due to her n.p.o. status.  -- Continue Budesonide 9 mg ER po daily      History of Hashimoto's hypothyroidism:  The patient liothyronine and levothyroxine will be on hold.  -- continue PTA Synthroid 50 mcg daily along with Cytomel 5 mcg in the morning     Depression:    -- Continue PTA Prozac 10 mg po daily and Remeron 7.5 mg at the bedtime.    Patient was seen and examined on the day of discharge ,she is feeling well, does not have any complaints , I did review the discharge medications and instructions with the patient and plan for her to follow up with the PCP after the hospitalization .patient was in agreement , she is discharged in stable condition to her home      Consultations This Hospital Stay   SURGERY GENERAL IP CONSULT  OCCUPATIONAL THERAPY ADULT IP CONSULT  PHYSICAL THERAPY ADULT IP CONSULT    Code Status   Full Code    Time Spent on this Encounter   I, Shamika Verdugo MD, personally saw the patient today and spent less than or equal to 30 minutes discharging this patient.     Shamika Verdugo MD, [facility]  [address] [address]  Phone: [phone]  Fax: [phone]  ______________________________________________________________________    Physical Exam   Vital Signs: Temp: 98  F (36.7  C) Temp src: Oral BP: (!) 157/105 Pulse: 94   Resp: 16 SpO2: 98 % O2 Device: None (Room air)    Weight: 100 lbs 4.95 oz    Physical Exam  Vitals and nursing note reviewed.   Constitutional:       Appearance: She is well-developed.   HENT:      Head: Normocephalic and atraumatic.   Eyes:      Conjunctiva/sclera:  Conjunctivae normal.      Pupils: Pupils are equal, round, and reactive to light.   Neck:      Thyroid: No thyromegaly.   Cardiovascular:      Rate and Rhythm: Normal rate and regular rhythm.      Heart sounds: Normal heart sounds. No murmur heard.      Pulmonary:      Effort: Pulmonary effort is normal. No respiratory distress.      Breath sounds: Normal breath sounds. No wheezing.   Abdominal:      General: Bowel sounds are normal.      Palpations: Abdomen is soft.      Tenderness: There is no abdominal tenderness. There is no guarding or rebound.   Musculoskeletal:         General: No deformity. Normal range of motion.      Cervical back: Normal range of motion and neck supple.   Skin:     General: Skin is warm and dry.   Neurological:      Mental Status: She is alert and oriented to person, place, and time.   Psychiatric:         Behavior: Behavior normal.            Primary Care Physician   Cecelia Manzo    Discharge Orders      Reason for your hospital stay    You were admitted to the hospital secondary to Small Bowel obstruction which is resolved now     Follow-up and recommended labs and tests    Follow up with primary care provider, Cecelia Manzo, within 7 days for hospital follow- up.  No follow up labs or test are needed.     Activity    Your activity upon discharge: activity as tolerated and no driving for today     Diet    Follow this diet upon discharge: Orders Placed This Encounter  Low Fiber Diet for 4 weeks then advance to regular diet       Significant Results and Procedures   Results for orders placed or performed during the hospital encounter of 01/17/22   CT Abdomen Pelvis w Contrast    Narrative    EXAM: CT ABDOMEN PELVIS W CONTRAST  LOCATION: [facility]  DATE/TIME: 1/17/2022 7:11 PM    INDICATION: Abdominal pain, acute, nonlocalized.  COMPARISON: 07/10/2019.  TECHNIQUE: CT scan of the abdomen and pelvis was performed following injection of IV contrast.  Multiplanar reformats were obtained. Dose reduction techniques were used.  CONTRAST: 50 mL Isovue-370.    FINDINGS:   LOWER CHEST: Minimal atelectasis and/or fibrosis.    HEPATOBILIARY: No significant mass or bile duct dilatation. No calcified gallstones.     PANCREAS: No significant mass, duct dilatation, or inflammatory change.    SPLEEN: Normal size.    ADRENAL GLANDS: No significant nodules.    KIDNEYS/BLADDER: No significant mass, stone, or hydronephrosis.    BOWEL: Dilated small bowel with a probable transition in the right lower quadrant concerning for mechanical small bowel obstruction.    LYMPH NODES: No adenopathy.    VASCULATURE: There are moderate atherosclerotic changes of the visualized aorta and its branches. There is no evidence of aortic dissection or aneurysm.    PELVIC ORGANS: No pelvic masses.    MUSCULOSKELETAL: No frankly destructive bony lesions.      Impression    IMPRESSION:   1.  Mechanical small bowel obstruction, transition felt likely in the right lower quadrant.  2.  No perforation or abscess.   XR Abdomen Port 1 View    Narrative    EXAM: XR ABDOMEN PORT 1 VIEWS  LOCATION: [facility]  DATE/TIME: 1/17/2022 8:37 PM    INDICATION: SBO, NGT placement.  COMPARISON: None.      Impression    IMPRESSION: NG tube in the left upper quadrant in expected location of the stomach.    XR Abdomen 1 View    Narrative    ABDOMEN ONE VIEW  1/18/2022 1:21 PM     HISTORY: Follow up on small bowel obstruction.    COMPARISON: January 17, 2022       Impression    IMPRESSION: Dilated bowel in the left abdomen again evident,  unchanged. NG tube tip in the fundus of the stomach. Small to moderate  amount of stool.    LIA BAUTISTA MD         SYSTEM ID:  [id number]       Discharge Medications   Current Discharge Medication List      CONTINUE these medications which have NOT CHANGED    Details   budesonide (ENTOCORT EC) 3 MG EC capsule Take by mouth daily 1/16/2022 - 9 mg daily x 56 days,  then 6 mg daily x 14 days, then 3 mg daily.      calcium carbonate (OS-LUCRECIA) 1500 (600 Ca) MG tablet Take 1,200 mg by mouth 2 times daily (with meals)      denosumab (PROLIA) 60 MG/ML SOSY injection Inject 60 mg Subcutaneous every 6 months      DULoxetine (CYMBALTA) 20 MG capsule Take by mouth daily 1/8/2022 - Take 40 mg daily for 2 weeks, then 20 mg daily x 2 weeks.      FLUoxetine (PROZAC) 10 MG capsule Take 10 mg by mouth daily      Glutathione 50 MG TABS Take 50 mg by mouth daily      hypromellose-dextran (ARTIFICAL TEARS) 0.1-0.3 % ophthalmic solution Place 1 drop into both eyes 4 times daily as needed for dry eyes      lactobacillus rhamnosus, GG, (CULTURELL) capsule Take 1 capsule by mouth daily      levothyroxine (SYNTHROID/LEVOTHROID) 50 MCG tablet Take 50 mcg by mouth every morning       liothyronine (CYTOMEL) 5 MCG tablet Take 5 mcg by mouth every morning       loperamide (IMODIUM) 2 MG capsule Take 2 mg by mouth daily as needed for diarrhea      LORazepam (ATIVAN) 0.5 MG tablet Take 0.25-0.5 mg by mouth every 8 hours as needed for anxiety      mirtazapine (REMERON) 7.5 MG tablet Take 7.5 mg by mouth At Bedtime      multivitamin w/minerals (THERA-VIT-M) tablet Take 1 tablet by mouth daily      triamcinolone (KENALOG) 0.1 % external cream Apply topically 2 times daily      Vitamin D3 (CHOLECALCIFEROL) 125 MCG (5000 UT) tablet Take 125 mcg by mouth daily           Allergies   Allergies   Allergen Reactions     Adhesive Tape      bandaids     Liquid Adhesive Rash     Penicillins Rash

## 2022-01-21 NOTE — PROGRESS NOTES
"Pt A&0 x4, Up with limited assist. NG removed this morning. On full liquid diet. Pt toleranted well. Denied N/V. Denied pain. Had BM. Pt stated feeling depressed, \"I am worried about my future.\" On Prozac and PRN Ativan. Possible discharge home tomorrow.   " Left message for return call

## 2022-01-22 DIAGNOSIS — Z71.89 OTHER SPECIFIED COUNSELING: ICD-10-CM

## 2022-01-23 ENCOUNTER — PATIENT OUTREACH (OUTPATIENT)
Dept: CARE COORDINATION | Facility: CLINIC | Age: 77
End: 2022-01-23
Payer: MEDICARE

## 2022-01-23 NOTE — PROGRESS NOTES
Clinic Care Coordination Contact  [facility]/Voicemail       Clinical Data: Care Coordinator Outreach  Outreach attempted x 1.  Left message on patient's voicemail with call back information and requested return call.  Plan:  Care Coordinator will try to reach patient again in 1-2 business days.      Madhuri Ahn  Community Health Worker  [facility]  Ph:[phone]

## 2022-01-24 NOTE — PROGRESS NOTES
Clinic Care Coordination Contact  [facility]/Voicemail       Clinical Data: Care Coordinator Outreach  Outreach attempted x 2.  Left message on patient's voicemail with call back information and requested return call.  Plan:  Care Coordinator will do no further outreaches at this time.      Madhuri Ahn  Community Health Worker  [facility]  Ph:[phone]

## 2022-04-04 NOTE — PROGRESS NOTES
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 She completed chemotherapy 2020. She is here today for a surveillance visit.     Oncology history:   Originally seen by Dr. Ascencio at [facility] during her call week; was to follow up with outpatient GYN ONC and general surgery due to appendicits and enlarged ovary.       54  CA 19-9 15  CEA 2.8     2019: CT abd pelvis: IMPRESSION:      1. Dilated, fluid-filled appendix with mild increased enhancement and slight stranding at its tip is worrisome for acute appendicitis.  2. Large complex cystic pelvic mass with soft tissue nodularity with adjacent prominent left adnexal vessels is worrisome for neoplasm of ovarian origin. No mesenteric nodularity, lymph node enlargement, or other findings that are suspicious for metastatic disease in the abdomen or pelvis.     2019: CT abd pelvis REPRODUCTIVE ORGANS with IV and oral contrast: Again noted is a large complex cystic mass in the pelvis measuring approximately 13.3 x 8.1 x 9.7 cm in size which has areas of mural nodularity and soft tissue density.    1. Again noted is a thick-walled dilated appendix compatible with appendicitis. There is a questionable new small area of appendiceal wall discontinuity and adjacent 1.2 cm fluid pocket on axial image 57 and sagittal image 23. Findings raise question of a small area of appendiceal wall perforation.    2. Large complex pelvic mass again concerning for ovarian neoplasm.    3. Marked bladder distention. Mild prominence of the urinary collecting systems likely secondary to this.     2019-2019: General surgery consulted for perforated appendicitis.  Not septic as no fever.Treated with Cefuroxime and metronidazole (PCN allergy). Surgery recommending postponing appendectomy  for about 6 weeks or so. Presenting leukocytosis and fever resolved by discharge.  Will finish oral course of ceftin and flagyl as outpatient.      Per general surgery note:  - No indication for surgery at this time  - Recommend admission to medicine service  - Start IV antibiotics  - Will again need to discuss the timing of appendectomy with Gyn/Onc to allow for evaluation of her pelvic mass simultaneously, this will likely have to be in 4-6 weeks following possible appendix perforation  - Surgery will continue to follow     8/9/19: Exploratory Laparotomy, Total Abdominal Hysterectomy, Left Salpingo-Oopherectomy, Bilateral Pelvic and Para-Aortic Lymph Node Dissection, Peritoneal Biopsy, Diaphraghm Pap Smears.     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     5/20/2020: 10.   8/19/2020:  12.  11/10/2020:  10.  2/10/2021:  8  5/10/2021:  9.    6/18/2021: portacath removal     8/10/2021:  8  11/30/2021:  9    1/17-1/21/22 hospital admission for SBO due to adhesions from prior surgery.     4/5/22:  pending         Brittni comes to the clinic feeling well from a gynecological standpoint. Working with GI for colitis issues. She is still on Prolia at [facility] for her osteoporosis. She is not sexually active and denies vulvar/vaginal concerns. She denies any vaginal bleeding, no nausea/emesis, no lower  extremity edema, and no difficulties eating or sleeping. She denies any abdominal discomfort/bloating, no fevers or chills, and no chest pain or shortness of breath. She does have hot flashes a couple times a week that are mild and infrequent.  She started chair yoga for activity and has worked with PT. Peripheral neuropathy ongoing in hands and feet. Working with neurology for this. Currently not on medications for this as she is still undergoing testing.         Health Maintenance  Colonoscopy- 12/2021  Mammogram- 5/7/2021  DEXA- 12/2021, on Prolia   Annual physical- 3/1/22        Review of Systems:    Systemic           no weight changes; no fever; no chills; no night sweats; no appetite changes, +fatigue  Skin           no rashes, or lesions  Eye           no irritation; no changes in vision  Jarred-Laryngeal           no dysphagia; no hoarseness   Pulmonary    no cough; no shortness of breath  Cardiovascular    no chest pain; no palpitations  Gastrointestinal    + diarrhea; no constipation; no abdominal pain; no changes in bowel habits; no blood in stool  Genitourinary   no urinary frequency; no urinary urgency; no dysuria; no pain; no abnormal vaginal discharge; no abnormal vaginal bleeding  Breast    no breast discharge; no breast changes; no breast pain  Musculoskeletal    no myalgias; no arthralgias; no back pain  Psychiatric           no depressed mood; + anxiety    Hematologic           no tender lymph nodes; no noticeable swellings or lumps   Endocrine    no hot flashes; no heat/cold intolerance         Neurological   no tremor; no numbness and tingling; no headaches; no difficulty sleeping      Past Medical History:    Past Medical History:   Diagnosis Date     Anxiety 2014     CAD (coronary artery disease) 2011     Cancer (H)     ovarian     Colitis      Fibromyalgia      Gastroesophageal reflux disease      Hypothyroidism 2011     Insomnia 2017     Mixed hyperlipidemia 2011     PONV (postoperative nausea  and vomiting)      Recurrent major depressive disorder (H) 2010     Urinary retention          Past Surgical History:    Past Surgical History:   Procedure Laterality Date     APPENDECTOMY OPEN N/A 8/9/2019    Procedure: Open Appendectomy;  Surgeon: Parvez Abreu MD;  Location: [facility] OR     BREAST SURGERY      biopsy     GYN SURGERY       HYSTERECTOMY TOTAL ABDOMINAL, BILATERAL SALPINGO-OOPHORECTOMY, NODE DISSECTION, COMBINED Bilateral 8/9/2019    Procedure: Exploratory Laparotomy, Total Abdominal Hysterectomy, Left Salpingo-Oopherectomy, Bilateral Pelvic and Para-Aortic Lymph Node Dissection, Peritoneal Biopsy, Diaphraghm Pap Smears.;  Surgeon: Naz Ascencio MD;  Location: [facility] OR     IR CHEST PORT PLACEMENT > 5 YRS OF AGE  8/30/2019     IR PORT REMOVAL RIGHT  6/18/2021     OOPHORECTOMY  1992         Health Maintenance Due   Topic Date Due     DEXA  Never done     ADVANCE CARE PLANNING  Never done     DEPRESSION ACTION PLAN  Never done     HEPATITIS C SCREENING  Never done     LIPID  Never done     MEDICARE ANNUAL WELLNESS VISIT  Never done     PHQ-9  01/12/2020     FALL RISK ASSESSMENT  07/16/2020     ZOSTER IMMUNIZATION (3 of 3) 01/15/2021     COVID-19 Vaccine (4 - Booster for Pfizer series) 01/05/2022       Current Medications:     Current Outpatient Medications   Medication Sig Dispense Refill     budesonide (ENTOCORT EC) 3 MG EC capsule Take by mouth daily 1/16/2022 - 9 mg daily x 56 days, then 6 mg daily x 14 days, then 3 mg daily.       calcium carbonate (OS-LUCRECIA) 1500 (600 Ca) MG tablet Take 1,200 mg by mouth 2 times daily (with meals)       denosumab (PROLIA) 60 MG/ML SOSY injection Inject 60 mg Subcutaneous every 6 months       FLUoxetine (PROZAC) 10 MG capsule Take 10 mg by mouth daily       Glutathione 50 MG TABS Take 50 mg by mouth daily       hypromellose-dextran (ARTIFICAL TEARS) 0.1-0.3 % ophthalmic solution Place 1 drop into both eyes 4 times daily as needed for dry eyes        lactobacillus rhamnosus, GG, (CULTURELL) capsule Take 1 capsule by mouth daily       levothyroxine (SYNTHROID/LEVOTHROID) 50 MCG tablet Take 50 mcg by mouth every morning        liothyronine (CYTOMEL) 5 MCG tablet Take 5 mcg by mouth every morning        LORazepam (ATIVAN) 0.5 MG tablet Take 0.25-0.5 mg by mouth every 8 hours as needed for anxiety       multivitamin w/minerals (THERA-VIT-M) tablet Take 1 tablet by mouth daily       triamcinolone (KENALOG) 0.1 % external cream Apply topically 2 times daily       Vitamin D3 (CHOLECALCIFEROL) 125 MCG (5000 UT) tablet Take 125 mcg by mouth daily       DULoxetine (CYMBALTA) 20 MG capsule Take by mouth daily 1/8/2022 - Take 40 mg daily for 2 weeks, then 20 mg daily x 2 weeks. (Patient not taking: Reported on 4/5/2022)       loperamide (IMODIUM) 2 MG capsule Take 2 mg by mouth daily as needed for diarrhea (Patient not taking: Reported on 4/5/2022)       mirtazapine (REMERON) 7.5 MG tablet Take 7.5 mg by mouth At Bedtime (Patient not taking: Reported on 4/5/2022)           Allergies:        Allergies   Allergen Reactions     Adhesive Tape      bandaids     Liquid Adhesive Rash     Penicillins Rash        Social History:     Social History     Tobacco Use     Smoking status: Never Smoker     Smokeless tobacco: Never Used   Substance Use Topics     Alcohol use: Yes     Comment: socially       History   Drug Use Unknown         Family History:     The patient's family history is notable for     Family History   Problem Relation Age of Onset     Heart Disease Father      Cerebrovascular Disease Father      Cerebrovascular Disease Sister      Thyroid Disease Sister      Breast Cancer Sister          Physical Exam:     BP (!) 146/89 (BP Location: Right arm, Patient Position: Sitting, Cuff Size: Adult Regular)   Pulse 82   Temp 98.4  F (36.9  C) (Oral)   Resp 20   Wt 44.9 kg (99 lb)   LMP  (LMP Unknown)   SpO2 96%   BMI 21.42 kg/m    Body mass index is 21.42  kg/m .    General Appearance: healthy and alert, no distress     HEENT: no thyromegaly, no palpable nodules or masses        Cardiovascular: regular rate and rhythm, no gallops, rubs or murmurs    Respiratory: lungs clear, no rales, rhonchi or wheezes    Musculoskeletal: extremities non tender and without edema    Skin: no lesions    Neurological: normal gait, no gross defects     Psychiatric: appropriate mood and affect                               Hematological: normal cervical, supraclavicular and inguinal lymph nodes     Gastrointestinal:       abdomen soft, non-tender, non-distended, no organomegaly or masses    Genitourinary: External genitalia and urethral meatus appears normal. Vagina pale and slightly narrowed consistent with postmenopausal changes, smooth without nodularity or masses. Cervix surgically absent.  Bimanual exam reveal no masses, nodularity or fullness.  Recto-vaginal exam confirms these findings. Medium Bill speculum used. TONIO Miller present for exam.       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 She completed chemotherapy 1/2020. She is here today for a surveillance visit.     20 minutes spent on the date of the encounter doing chart review, history and exam, documentation, and further activities as noted above.        Plan:     1.)       AMEE on exam and  pending. Patient will continue to have in person surveillance visits with  lab draws every 6 months for three years (1/2025) and then annual visits thereafter. Reviewed signs and symptoms for when she should contact the clinic or seek additional care. Patient to contact the clinic with any questions or concerns in the interim. Request for  and in person visit in six months sent to scheduling.      2.) Genetic Testing Result: JOSE MIGUEL Elkins is negative for mutations in the APC, MARV, AXIN2, BARD1, BMPR1A, BRCA1, BRCA2, BRIP1, CDH1,  CDK4, CDKN2A, CHEK2, DICER1, EPCAM, GREM1, HOXB13, MLH1, MSH2, MSH3, MSH6, MUTYH, NBN, NF1, NTHL1, PALB2, PMS2, POLD1, POLE, PTEN, RAD51C, RAD51D, RECQL, SMAD4, SMARCA4, STK11, and TP53 genes. No mutations were found in any of the 36 genes analyzed. This test involved sequencing and deletion/duplication analysis of all genes with the exception of EPCAM and GREM1 (deletions only).    3.) Labs and/or tests ordered include:   pending      4.) Health maintenance issues addressed today include annual health maintenance and non-gynecologic issues with PCP. Discussed the need to be in close contact with her PCP and GI specialists to keep her colitis controlled.         LEX Anaya NP-BC  Women's Health Nurse Practitioner  Division of Gynecologic Oncology  [facility]          CC  Patient Care Team:  Cecelia Manzo MD as PCP - General  Naz Ascencio MD as MD (Oncology)  Parvez Abreu MD as MD (Surgery)  Micheline Soler MD as MD (Emergency Medicine)  [facility] ([facility]  Paola Soto MD as Assigned Neuroscience Provider  Connie Martinez APRN CNP as Assigned Cancer Care Provider  Paola Soto MD as Physician (Physical Medicine and Rehabilitation)

## 2022-04-05 ENCOUNTER — TELEPHONE (OUTPATIENT)
Dept: ONCOLOGY | Facility: CLINIC | Age: 77
End: 2022-04-05

## 2022-04-05 ENCOUNTER — ONCOLOGY VISIT (OUTPATIENT)
Dept: ONCOLOGY | Facility: CLINIC | Age: 77
End: 2022-04-05
Attending: OBSTETRICS & GYNECOLOGY
Payer: MEDICARE

## 2022-04-05 ENCOUNTER — LAB (OUTPATIENT)
Dept: INFUSION THERAPY | Facility: CLINIC | Age: 77
End: 2022-04-05
Attending: OBSTETRICS & GYNECOLOGY
Payer: MEDICARE

## 2022-04-05 VITALS
WEIGHT: 99 LBS | RESPIRATION RATE: 20 BRPM | HEART RATE: 82 BPM | SYSTOLIC BLOOD PRESSURE: 146 MMHG | OXYGEN SATURATION: 96 % | BODY MASS INDEX: 21.42 KG/M2 | DIASTOLIC BLOOD PRESSURE: 89 MMHG | TEMPERATURE: 98.4 F

## 2022-04-05 DIAGNOSIS — C56.9 OVARIAN CANCER, UNSPECIFIED LATERALITY (H): Primary | ICD-10-CM

## 2022-04-05 DIAGNOSIS — C56.9 OVARIAN CANCER, UNSPECIFIED LATERALITY (H): ICD-10-CM

## 2022-04-05 LAB — CANCER AG125 SERPL-ACNC: 9 U/ML (ref 0–30)

## 2022-04-05 PROCEDURE — 86304 IMMUNOASSAY TUMOR CA 125: CPT | Performed by: OBSTETRICS & GYNECOLOGY

## 2022-04-05 PROCEDURE — 36591 DRAW BLOOD OFF VENOUS DEVICE: CPT

## 2022-04-05 PROCEDURE — G0463 HOSPITAL OUTPT CLINIC VISIT: HCPCS

## 2022-04-05 PROCEDURE — 99213 OFFICE O/P EST LOW 20 MIN: CPT | Performed by: OBSTETRICS & GYNECOLOGY

## 2022-04-05 ASSESSMENT — PAIN SCALES - GENERAL: PAINLEVEL: NO PAIN (0)

## 2022-04-05 NOTE — TELEPHONE ENCOUNTER
Lvm for son, Pardeep, that supervisor and I were able to assist patient in locating her [place] [address]. Security office was busy on other calls. Patient said she was in a hurry to get to her appointment today with Connie Martinez NP and she forgot where vehicle was parked. Free parking ticket given to assist with exiting ramp.

## 2022-04-05 NOTE — LETTER
2022         RE: Maximino Simeon  [address] [facility] [address]        Dear Colleague,    Thank you for referring your patient, Maximino Simeon, to the [facility]. Please see a copy of my visit note below.                   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 She completed chemotherapy 2020. She is here today for a surveillance visit.     Oncology history:   Originally seen by Dr. Ascencio at [facility] during her call week; was to follow up with outpatient GYN ONC and general surgery due to appendicits and enlarged ovary.       54  CA 19-9 15  CEA 2.8     2019: CT abd pelvis: IMPRESSION:      1. Dilated, fluid-filled appendix with mild increased enhancement and slight stranding at its tip is worrisome for acute appendicitis.  2. Large complex cystic pelvic mass with soft tissue nodularity with adjacent prominent left adnexal vessels is worrisome for neoplasm of ovarian origin. No mesenteric nodularity, lymph node enlargement, or other findings that are suspicious for metastatic disease in the abdomen or pelvis.     2019: CT abd pelvis REPRODUCTIVE ORGANS with IV and oral contrast: Again noted is a large complex cystic mass in the pelvis measuring approximately 13.3 x 8.1 x 9.7 cm in size which has areas of mural nodularity and soft tissue density.    1. Again noted is a thick-walled dilated appendix compatible with appendicitis. There is a questionable new small area of appendiceal wall discontinuity and adjacent 1.2 cm fluid pocket on axial image 57 and sagittal image 23. Findings raise question of a small area of appendiceal wall perforation.    2. Large complex pelvic mass again concerning for ovarian neoplasm.    3. Marked bladder distention. Mild prominence of  the urinary collecting systems likely secondary to this.     7/2/2019-7/7/2019: General surgery consulted for perforated appendicitis.  Not septic as no fever.Treated with Cefuroxime and metronidazole (PCN allergy). Surgery recommending postponing appendectomy for about 6 weeks or so. Presenting leukocytosis and fever resolved by discharge.  Will finish oral course of ceftin and flagyl as outpatient.      Per general surgery note:  - No indication for surgery at this time  - Recommend admission to medicine service  - Start IV antibiotics  - Will again need to discuss the timing of appendectomy with Gyn/Onc to allow for evaluation of her pelvic mass simultaneously, this will likely have to be in 4-6 weeks following possible appendix perforation  - Surgery will continue to follow     8/9/19: Exploratory Laparotomy, Total Abdominal Hysterectomy, Left Salpingo-Oopherectomy, Bilateral Pelvic and Para-Aortic Lymph Node Dissection, Peritoneal Biopsy, Diaphraghm Pap Smears.     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     5/20/2020: 10.   8/19/2020:  12.  11/10/2020:  10.  2/10/2021:  8  5/10/2021:  9.    6/18/2021: portacath removal     8/10/2021:  8  11/30/2021:  9    1/17-1/21/22 hospital admission for SBO due to adhesions from prior surgery.     4/5/22:  pending         Brittni comes to the clinic  feeling well from a gynecological standpoint. Working with GI for colitis issues. She is still on Prolia at [facility] for her osteoporosis. She is not sexually active and denies vulvar/vaginal concerns. She denies any vaginal bleeding, no nausea/emesis, no lower extremity edema, and no difficulties eating or sleeping. She denies any abdominal discomfort/bloating, no fevers or chills, and no chest pain or shortness of breath. She does have hot flashes a couple times a week that are mild and infrequent.  She started chair yoga for activity and has worked with PT. Peripheral neuropathy ongoing in hands and feet. Working with neurology for this. Currently not on medications for this as she is still undergoing testing.         Health Maintenance  Colonoscopy- 12/2021  Mammogram- 5/7/2021  DEXA- 12/2021, on Prolia   Annual physical- 3/1/22        Review of Systems:    Systemic           no weight changes; no fever; no chills; no night sweats; no appetite changes, +fatigue  Skin           no rashes, or lesions  Eye           no irritation; no changes in vision  Jarred-Laryngeal           no dysphagia; no hoarseness   Pulmonary    no cough; no shortness of breath  Cardiovascular    no chest pain; no palpitations  Gastrointestinal    + diarrhea; no constipation; no abdominal pain; no changes in bowel habits; no blood in stool  Genitourinary   no urinary frequency; no urinary urgency; no dysuria; no pain; no abnormal vaginal discharge; no abnormal vaginal bleeding  Breast    no breast discharge; no breast changes; no breast pain  Musculoskeletal    no myalgias; no arthralgias; no back pain  Psychiatric           no depressed mood; + anxiety    Hematologic           no tender lymph nodes; no noticeable swellings or lumps   Endocrine    no hot flashes; no heat/cold intolerance         Neurological   no tremor; no numbness and tingling; no headaches; no difficulty sleeping      Past Medical History:    Past Medical History:   Diagnosis  Date     Anxiety 2014     CAD (coronary artery disease) 2011     Cancer (H)     ovarian     Colitis      Fibromyalgia      Gastroesophageal reflux disease      Hypothyroidism 2011     Insomnia 2017     Mixed hyperlipidemia 2011     PONV (postoperative nausea and vomiting)      Recurrent major depressive disorder (H) 2010     Urinary retention          Past Surgical History:    Past Surgical History:   Procedure Laterality Date     APPENDECTOMY OPEN N/A 8/9/2019    Procedure: Open Appendectomy;  Surgeon: Parvez Abreu MD;  Location: [facility] OR     BREAST SURGERY      biopsy     GYN SURGERY       HYSTERECTOMY TOTAL ABDOMINAL, BILATERAL SALPINGO-OOPHORECTOMY, NODE DISSECTION, COMBINED Bilateral 8/9/2019    Procedure: Exploratory Laparotomy, Total Abdominal Hysterectomy, Left Salpingo-Oopherectomy, Bilateral Pelvic and Para-Aortic Lymph Node Dissection, Peritoneal Biopsy, Diaphraghm Pap Smears.;  Surgeon: Naz Ascencio MD;  Location: [facility] OR     IR CHEST PORT PLACEMENT > 5 YRS OF AGE  8/30/2019     IR PORT REMOVAL RIGHT  6/18/2021     OOPHORECTOMY  1992         Health Maintenance Due   Topic Date Due     DEXA  Never done     ADVANCE CARE PLANNING  Never done     DEPRESSION ACTION PLAN  Never done     HEPATITIS C SCREENING  Never done     LIPID  Never done     MEDICARE ANNUAL WELLNESS VISIT  Never done     PHQ-9  01/12/2020     FALL RISK ASSESSMENT  07/16/2020     ZOSTER IMMUNIZATION (3 of 3) 01/15/2021     COVID-19 Vaccine (4 - Booster for Pfizer series) 01/05/2022       Current Medications:     Current Outpatient Medications   Medication Sig Dispense Refill     budesonide (ENTOCORT EC) 3 MG EC capsule Take by mouth daily 1/16/2022 - 9 mg daily x 56 days, then 6 mg daily x 14 days, then 3 mg daily.       calcium carbonate (OS-LUCRECIA) 1500 (600 Ca) MG tablet Take 1,200 mg by mouth 2 times daily (with meals)       denosumab (PROLIA) 60 MG/ML SOSY injection Inject 60 mg Subcutaneous every 6 months       FLUoxetine  (PROZAC) 10 MG capsule Take 10 mg by mouth daily       Glutathione 50 MG TABS Take 50 mg by mouth daily       hypromellose-dextran (ARTIFICAL TEARS) 0.1-0.3 % ophthalmic solution Place 1 drop into both eyes 4 times daily as needed for dry eyes       lactobacillus rhamnosus, GG, (CULTURELL) capsule Take 1 capsule by mouth daily       levothyroxine (SYNTHROID/LEVOTHROID) 50 MCG tablet Take 50 mcg by mouth every morning        liothyronine (CYTOMEL) 5 MCG tablet Take 5 mcg by mouth every morning        LORazepam (ATIVAN) 0.5 MG tablet Take 0.25-0.5 mg by mouth every 8 hours as needed for anxiety       multivitamin w/minerals (THERA-VIT-M) tablet Take 1 tablet by mouth daily       triamcinolone (KENALOG) 0.1 % external cream Apply topically 2 times daily       Vitamin D3 (CHOLECALCIFEROL) 125 MCG (5000 UT) tablet Take 125 mcg by mouth daily       DULoxetine (CYMBALTA) 20 MG capsule Take by mouth daily 1/8/2022 - Take 40 mg daily for 2 weeks, then 20 mg daily x 2 weeks. (Patient not taking: Reported on 4/5/2022)       loperamide (IMODIUM) 2 MG capsule Take 2 mg by mouth daily as needed for diarrhea (Patient not taking: Reported on 4/5/2022)       mirtazapine (REMERON) 7.5 MG tablet Take 7.5 mg by mouth At Bedtime (Patient not taking: Reported on 4/5/2022)           Allergies:        Allergies   Allergen Reactions     Adhesive Tape      bandaids     Liquid Adhesive Rash     Penicillins Rash        Social History:     Social History     Tobacco Use     Smoking status: Never Smoker     Smokeless tobacco: Never Used   Substance Use Topics     Alcohol use: Yes     Comment: socially       History   Drug Use Unknown         Family History:     The patient's family history is notable for     Family History   Problem Relation Age of Onset     Heart Disease Father      Cerebrovascular Disease Father      Cerebrovascular Disease Sister      Thyroid Disease Sister      Breast Cancer Sister          Physical Exam:     BP (!) 146/89  (BP Location: Right arm, Patient Position: Sitting, Cuff Size: Adult Regular)   Pulse 82   Temp 98.4  F (36.9  C) (Oral)   Resp 20   Wt 44.9 kg (99 lb)   LMP  (LMP Unknown)   SpO2 96%   BMI 21.42 kg/m    Body mass index is 21.42 kg/m .    General Appearance: healthy and alert, no distress     HEENT: no thyromegaly, no palpable nodules or masses        Cardiovascular: regular rate and rhythm, no gallops, rubs or murmurs    Respiratory: lungs clear, no rales, rhonchi or wheezes    Musculoskeletal: extremities non tender and without edema    Skin: no lesions    Neurological: normal gait, no gross defects     Psychiatric: appropriate mood and affect                               Hematological: normal cervical, supraclavicular and inguinal lymph nodes     Gastrointestinal:       abdomen soft, non-tender, non-distended, no organomegaly or masses    Genitourinary: External genitalia and urethral meatus appears normal. Vagina pale and slightly narrowed consistent with postmenopausal changes, smooth without nodularity or masses. Cervix surgically absent.  Bimanual exam reveal no masses, nodularity or fullness.  Recto-vaginal exam confirms these findings. Medium Bill speculum used. TONIO Miller present for exam.       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 She completed chemotherapy 1/2020. She is here today for a surveillance visit.     20 minutes spent on the date of the encounter doing chart review, history and exam, documentation, and further activities as noted above.        Plan:     1.)       AMEE on exam and  pending. Patient will continue to have in person surveillance visits with  lab draws every 6 months for three years (1/2025) and then annual visits thereafter. Reviewed signs and symptoms for when she should contact the clinic or seek additional care. Patient to contact the clinic with any questions or  concerns in the interim. Request for  and in person visit in six months sent to scheduling.      2.) Genetic Testing Result: NEGATIVE  Brittni is negative for mutations in the APC, MARV, AXIN2, BARD1, BMPR1A, BRCA1, BRCA2, BRIP1, CDH1, CDK4, CDKN2A, CHEK2, DICER1, EPCAM, GREM1, HOXB13, MLH1, MSH2, MSH3, MSH6, MUTYH, NBN, NF1, NTHL1, PALB2, PMS2, POLD1, POLE, PTEN, RAD51C, RAD51D, RECQL, SMAD4, SMARCA4, STK11, and TP53 genes. No mutations were found in any of the 36 genes analyzed. This test involved sequencing and deletion/duplication analysis of all genes with the exception of EPCAM and GREM1 (deletions only).    3.) Labs and/or tests ordered include:   pending      4.) Health maintenance issues addressed today include annual health maintenance and non-gynecologic issues with PCP. Discussed the need to be in close contact with her PCP and GI specialists to keep her colitis controlled.         LEX Anaya NP-BC  Women's Health Nurse Practitioner  Division of Gynecologic Oncology  [facility]          CC  Patient Care Team:  Cecelia Manzo MD as PCP - General  Naz Ascencio MD as MD (Oncology)  Parvez Abreu MD as MD (Surgery)  Micheline Soler MD as MD (Emergency Medicine)  [facility]  Paola Soto MD as Assigned Neuroscience Provider  Connie Martinez APRN CNP as Assigned Cancer Care Provider  Paola Soto MD as Physician (Physical Medicine and Rehabilitation)      Oncology Rooming Note    April 5, 2022 12:17 PM   Maximino Simeon is a 77 year old female who presents for:    Chief Complaint   Patient presents with     Oncology Clinic Visit     Ovarian cancer, unspecified laterality (H)     Initial Vitals: BP (!) 156/86 (BP Location: Right arm, Patient Position: Sitting, Cuff Size: Adult Regular)   Pulse 82   Resp 20   Wt 44.9 kg (99 lb)   LMP  (LMP Unknown)   SpO2 96%   BMI  "21.42 kg/m   Estimated body mass index is 21.42 kg/m  as calculated from the following:    Height as of 1/17/22: 1.448 m (4' 9\").    Weight as of this encounter: 44.9 kg (99 lb). Body surface area is 1.34 meters squared.  No Pain (0) Comment: Data Unavailable   No LMP recorded (lmp unknown). Patient has had a hysterectomy.  Allergies reviewed: Yes  Medications reviewed: Yes    Medications: Medication refills not needed today.  Pharmacy name entered into [facility]:    [facility][id number] [address] DR[facility] [address] #[id number] [facility] [address] [place] [address]  [facility] #[id number] [place], [place] - [address] [facility][id number] [place], [place] - [address] [address]    Clinical concerns: no     Martine Abebe [facility]                  Again, thank you for allowing me to participate in the care of your patient.        Sincerely,        LEX Larson CNP    "

## 2022-04-05 NOTE — PROGRESS NOTES
"Oncology Rooming Note    April 5, 2022 12:17 PM   Maximino Simeon is a 77 year old female who presents for:    Chief Complaint   Patient presents with     Oncology Clinic Visit     Ovarian cancer, unspecified laterality (H)     Initial Vitals: BP (!) 156/86 (BP Location: Right arm, Patient Position: Sitting, Cuff Size: Adult Regular)   Pulse 82   Resp 20   Wt 44.9 kg (99 lb)   LMP  (LMP Unknown)   SpO2 96%   BMI 21.42 kg/m   Estimated body mass index is 21.42 kg/m  as calculated from the following:    Height as of 1/17/22: 1.448 m (4' 9\").    Weight as of this encounter: 44.9 kg (99 lb). Body surface area is 1.34 meters squared.  No Pain (0) Comment: Data Unavailable   No LMP recorded (lmp unknown). Patient has had a hysterectomy.  Allergies reviewed: Yes  Medications reviewed: Yes    Medications: Medication refills not needed today.  Pharmacy name entered into [facility]:    [facility] #[id number] - [facility] [address] DRUG STORE #[id number] [facility] [address] [facility] [address]  [facility] #[id number] [place], [place] - [address] [facility][id number] [place], [place] [facility] [address] [address]    Clinical concerns: no     Martine Abebe CMA              "

## 2022-05-14 ENCOUNTER — HEALTH MAINTENANCE LETTER (OUTPATIENT)
Age: 77
End: 2022-05-14

## 2022-09-03 ENCOUNTER — HEALTH MAINTENANCE LETTER (OUTPATIENT)
Age: 77
End: 2022-09-03

## 2022-10-12 NOTE — PATIENT INSTRUCTIONS
Your On-body Neulasta Injector was applied to your right arm at 2:30.  At approximately 5:30 on 10/31, your On-body Injector will beep to let you know your dose delivery will begin in 2 minutes.  Your medication will be delivered over the next 45 minutes.  You can remove your Injector at 6:30.  Please make sure your Injector has a solid green light or has turned off prior to removing the device.  Please contact your provider at [phone] with questions or concerns.    
86.2

## 2022-10-20 NOTE — PROGRESS NOTES
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 She completed chemotherapy 2020. She is here today for a surveillance visit.     Oncology history:   Originally seen by Dr. Ascencio at [facility] during her call week; was to follow up with outpatient GYN ONC and general surgery due to appendicits and enlarged ovary.       54  CA 19-9 15  CEA 2.8     2019: CT abd pelvis: IMPRESSION:      1. Dilated, fluid-filled appendix with mild increased enhancement and slight stranding at its tip is worrisome for acute appendicitis.  2. Large complex cystic pelvic mass with soft tissue nodularity with adjacent prominent left adnexal vessels is worrisome for neoplasm of ovarian origin. No mesenteric nodularity, lymph node enlargement, or other findings that are suspicious for metastatic disease in the abdomen or pelvis.     2019: CT abd pelvis REPRODUCTIVE ORGANS with IV and oral contrast: Again noted is a large complex cystic mass in the pelvis measuring approximately 13.3 x 8.1 x 9.7 cm in size which has areas of mural nodularity and soft tissue density.    1. Again noted is a thick-walled dilated appendix compatible with appendicitis. There is a questionable new small area of appendiceal wall discontinuity and adjacent 1.2 cm fluid pocket on axial image 57 and sagittal image 23. Findings raise question of a small area of appendiceal wall perforation.    2. Large complex pelvic mass again concerning for ovarian neoplasm.    3. Marked bladder distention. Mild prominence of the urinary collecting systems likely secondary to this.     2019-2019: General surgery consulted for perforated appendicitis.  Not septic as no fever.Treated with Cefuroxime and metronidazole (PCN allergy). Surgery recommending postponing  appendectomy for about 6 weeks or so. Presenting leukocytosis and fever resolved by discharge.  Will finish oral course of ceftin and flagyl as outpatient.      Per general surgery note:  - No indication for surgery at this time  - Recommend admission to medicine service  - Start IV antibiotics  - Will again need to discuss the timing of appendectomy with Gyn/Onc to allow for evaluation of her pelvic mass simultaneously, this will likely have to be in 4-6 weeks following possible appendix perforation  - Surgery will continue to follow     8/9/19: Exploratory Laparotomy, Total Abdominal Hysterectomy, Left Salpingo-Oopherectomy, Bilateral Pelvic and Para-Aortic Lymph Node Dissection, Peritoneal Biopsy, Diaphraghm Pap Smears.     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     5/20/2020: 10.   8/19/2020:  12.  11/10/2020:  10.  2/10/2021:  8  5/10/2021:  9.    6/18/2021: portacath removal     8/10/2021:  8  11/30/2021:  9    1/17-1/21/22 hospital admission for SBO due to adhesions from prior surgery.     4/5/22:  9  10/21/22:  pending              Brittni comes to the clinic feeling well from a gynecological standpoint. She is not sexually active and denies vulvar/vaginal concerns. She denies any vaginal bleeding, no nausea/emesis, no lower extremity edema, and no difficulties eating or sleeping.  She denies any abdominal discomfort/bloating, no fevers or chills, and no chest pain or shortness of breath. She started chair yoga for activity and has worked with PT. Peripheral neuropathy ongoing in hands and feet which she was seen in the ER on Sunday for hand weakness rule out stroke, negative work up with negative CT head.         Health Maintenance  Colonoscopy- 12/2021  Mammogram- 5/7/2021  DEXA- 12/2021, on Prolia   Annual physical- 3/1/22        Review of Systems:    Systemic           no weight changes; no fever; no chills; no night sweats; no appetite changes, +fatigue  Skin           no rashes, or lesions  Eye           no irritation; no changes in vision  Jarred-Laryngeal           no dysphagia; no hoarseness   Pulmonary    no cough; no shortness of breath  Cardiovascular    no chest pain; no palpitations  Gastrointestinal    + diarrhea; no constipation; no abdominal pain; no changes in bowel habits; no blood in stool  Genitourinary   no urinary frequency; no urinary urgency; no dysuria; no pain; no abnormal vaginal discharge; no abnormal vaginal bleeding  Breast    no breast discharge; no breast changes; no breast pain  Musculoskeletal    no myalgias; no arthralgias; no back pain  Psychiatric           no depressed mood; + anxiety    Hematologic           no tender lymph nodes; no noticeable swellings or lumps   Endocrine    no hot flashes; no heat/cold intolerance         Neurological   no tremor; no numbness and tingling; no headaches; no difficulty sleeping      Past Medical History:    Past Medical History:   Diagnosis Date     Anxiety 2014     CAD (coronary artery disease) 2011     Cancer (H)     ovarian     Colitis      Fibromyalgia      Gastroesophageal reflux disease      Hypothyroidism 2011     Insomnia 2017     Mixed hyperlipidemia 2011     PONV (postoperative nausea and vomiting)      Recurrent major depressive disorder (H) 2010     Urinary retention          Past Surgical History:    Past  Surgical History:   Procedure Laterality Date     APPENDECTOMY OPEN N/A 8/9/2019    Procedure: Open Appendectomy;  Surgeon: Parvez Abreu MD;  Location: [facility] OR     BREAST SURGERY      biopsy     GYN SURGERY       HYSTERECTOMY TOTAL ABDOMINAL, BILATERAL SALPINGO-OOPHORECTOMY, NODE DISSECTION, COMBINED Bilateral 8/9/2019    Procedure: Exploratory Laparotomy, Total Abdominal Hysterectomy, Left Salpingo-Oopherectomy, Bilateral Pelvic and Para-Aortic Lymph Node Dissection, Peritoneal Biopsy, Diaphraghm Pap Smears.;  Surgeon: Naz Ascencio MD;  Location: [facility] OR     IR CHEST PORT PLACEMENT > 5 YRS OF AGE  8/30/2019     IR PORT REMOVAL RIGHT  6/18/2021     OOPHORECTOMY  1992         Health Maintenance Due   Topic Date Due     DEXA  Never done     ADVANCE CARE PLANNING  Never done     DEPRESSION ACTION PLAN  Never done     HEPATITIS B IMMUNIZATION (1 of 3 - 3-dose series) Never done     HEPATITIS C SCREENING  Never done     LIPID  Never done     PHQ-9  01/12/2020     FALL RISK ASSESSMENT  07/16/2020     ZOSTER IMMUNIZATION (2 of 2) 01/15/2021     MEDICARE ANNUAL WELLNESS VISIT  06/02/2021       Current Medications:     Current Outpatient Medications   Medication Sig Dispense Refill     budesonide (ENTOCORT EC) 3 MG EC capsule Take by mouth daily 1/16/2022 - 9 mg daily x 56 days, then 6 mg daily x 14 days, then 3 mg daily.       calcium carbonate (OS-LUCRECIA) 1500 (600 Ca) MG tablet Take 1,200 mg by mouth 2 times daily (with meals)       denosumab (PROLIA) 60 MG/ML SOSY injection Inject 60 mg Subcutaneous every 6 months       FLUoxetine (PROZAC) 10 MG capsule Take 10 mg by mouth daily       Glutathione 50 MG TABS Take 50 mg by mouth daily       hypromellose-dextran (ARTIFICAL TEARS) 0.1-0.3 % ophthalmic solution Place 1 drop into both eyes 4 times daily as needed for dry eyes       lactobacillus rhamnosus, GG, (CULTURELL) capsule Take 1 capsule by mouth daily       levothyroxine (SYNTHROID/LEVOTHROID) 50 MCG tablet  Take 50 mcg by mouth every morning        liothyronine (CYTOMEL) 5 MCG tablet Take 5 mcg by mouth every morning        loperamide (IMODIUM) 2 MG capsule Take 2 mg by mouth daily as needed for diarrhea       multivitamin w/minerals (THERA-VIT-M) tablet Take 1 tablet by mouth daily       triamcinolone (KENALOG) 0.1 % external cream Apply topically 2 times daily       Vitamin D3 (CHOLECALCIFEROL) 125 MCG (5000 UT) tablet Take 125 mcg by mouth daily       DULoxetine (CYMBALTA) 20 MG capsule Take by mouth daily 1/8/2022 - Take 40 mg daily for 2 weeks, then 20 mg daily x 2 weeks. (Patient not taking: Reported on 4/5/2022)       LORazepam (ATIVAN) 0.5 MG tablet Take 0.25-0.5 mg by mouth every 8 hours as needed for anxiety (Patient not taking: Reported on 10/21/2022)       mirtazapine (REMERON) 7.5 MG tablet Take 7.5 mg by mouth At Bedtime (Patient not taking: Reported on 4/5/2022)           Allergies:        Allergies   Allergen Reactions     Adhesive Tape      bandaids     Liquid Adhesive Rash     Penicillins Rash        Social History:     Social History     Tobacco Use     Smoking status: Never     Smokeless tobacco: Never   Substance Use Topics     Alcohol use: Yes     Comment: socially       History   Drug Use Unknown         Family History:     The patient's family history is notable for     Family History   Problem Relation Age of Onset     Heart Disease Father      Cerebrovascular Disease Father      Cerebrovascular Disease Sister      Thyroid Disease Sister      Breast Cancer Sister          Physical Exam:     /84 (BP Location: Right arm, Patient Position: Sitting, Cuff Size: Adult Regular)   Pulse 86   Temp 98.4  F (36.9  C) (Oral)   Resp 18   Wt 46.3 kg (102 lb)   LMP  (LMP Unknown)   SpO2 94%   BMI 22.07 kg/m    Body mass index is 22.07 kg/m .    General Appearance: healthy and alert, no distress     HEENT: no thyromegaly, no palpable nodules or masses        Cardiovascular: regular rate and  rhythm, no gallops, rubs or murmurs    Respiratory: lungs clear, no rales, rhonchi or wheezes    Musculoskeletal: extremities non tender and without edema    Skin: no lesions    Neurological: normal gait, no gross defects     Psychiatric: appropriate mood and affect                               Hematological: normal cervical, supraclavicular and inguinal lymph nodes     Gastrointestinal:       abdomen soft, non-tender, non-distended, no organomegaly or masses    Genitourinary: External genitalia and urethral meatus appears normal. Vagina pale and slightly narrowed consistent with postmenopausal changes, smooth without nodularity or masses. Cervix surgically absent.  Bimanual exam reveal no masses, nodularity or fullness.  Recto-vaginal exam confirms these findings. Medium Bill speculum used. TONIO Miller present for exam.       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 She completed chemotherapy 1/2020. She is here today for a surveillance visit.     15 minutes spent on the date of the encounter doing chart review, history and exam, documentation, and further activities as noted above.        Plan:     1.)       AMEE on exam and  pending. Patient will continue to have in person surveillance visits with  lab draws every 6 months for three years (1/2025) and then annual visits thereafter. Reviewed signs and symptoms for when she should contact the clinic or seek additional care. Patient to contact the clinic with any questions or concerns in the interim. Request for  and in person visit in six months sent to scheduling.      2.) Genetic Testing Result: NEGATIVE  Brittni is negative for mutations in the APC, MARV, AXIN2, BARD1, BMPR1A, BRCA1, BRCA2, BRIP1, CDH1, CDK4, CDKN2A, CHEK2, DICER1, EPCAM, GREM1, HOXB13, MLH1, MSH2, MSH3, MSH6, MUTYH, NBN, NF1, NTHL1, PALB2, PMS2, POLD1, POLE, PTEN, RAD51C, RAD51D, RECQL,  SMAD4, SMARCA4, STK11, and TP53 genes. No mutations were found in any of the 36 genes analyzed. This test involved sequencing and deletion/duplication analysis of all genes with the exception of EPCAM and GREM1 (deletions only).    3.) Labs and/or tests ordered include:   pending      4.) Health maintenance issues addressed today include annual health maintenance and non-gynecologic issues with PCP. Discussed the need to be in close contact with her PCP and GI specialists to keep her colitis controlled. Pt will follow up with neurology for neuropathy symptoms.             LEX Anaya, NP-BC  Women's Health Nurse Practitioner  Division of Gynecologic Oncology  [facility]          CC  Patient Care Team:  Cecelia Manzo MD as PCP - Naz Peterson MD as MD (Oncology)  Parvez Abreu MD as MD (Surgery)  Micheline Soler MD as MD (Emergency Medicine)  [facility], [facility] ([facility] ([facility] ([place]))  Connie Martinez APRN CNP as Assigned Cancer Care Provider

## 2022-10-21 ENCOUNTER — ONCOLOGY VISIT (OUTPATIENT)
Dept: ONCOLOGY | Facility: CLINIC | Age: 77
End: 2022-10-21
Attending: INTERNAL MEDICINE
Payer: MEDICARE

## 2022-10-21 ENCOUNTER — LAB (OUTPATIENT)
Dept: INFUSION THERAPY | Facility: CLINIC | Age: 77
End: 2022-10-21
Attending: OBSTETRICS & GYNECOLOGY
Payer: MEDICARE

## 2022-10-21 VITALS
DIASTOLIC BLOOD PRESSURE: 84 MMHG | BODY MASS INDEX: 22.07 KG/M2 | TEMPERATURE: 98.4 F | SYSTOLIC BLOOD PRESSURE: 137 MMHG | RESPIRATION RATE: 18 BRPM | HEART RATE: 86 BPM | OXYGEN SATURATION: 94 % | WEIGHT: 102 LBS

## 2022-10-21 DIAGNOSIS — C56.9 OVARIAN CANCER, UNSPECIFIED LATERALITY (H): Primary | ICD-10-CM

## 2022-10-21 DIAGNOSIS — C56.9 OVARIAN CANCER, UNSPECIFIED LATERALITY (H): ICD-10-CM

## 2022-10-21 LAB — CANCER AG125 SERPL-ACNC: 12 U/ML

## 2022-10-21 PROCEDURE — G0463 HOSPITAL OUTPT CLINIC VISIT: HCPCS | Mod: 25

## 2022-10-21 PROCEDURE — 36591 DRAW BLOOD OFF VENOUS DEVICE: CPT

## 2022-10-21 PROCEDURE — 99212 OFFICE O/P EST SF 10 MIN: CPT | Performed by: OBSTETRICS & GYNECOLOGY

## 2022-10-21 PROCEDURE — 86304 IMMUNOASSAY TUMOR CA 125: CPT | Performed by: OBSTETRICS & GYNECOLOGY

## 2022-10-21 ASSESSMENT — PAIN SCALES - GENERAL: PAINLEVEL: NO PAIN (0)

## 2022-10-21 NOTE — PROGRESS NOTES
Medical Assistant Note:  Maximino Simeon presents today for lab draw.    Patient seen by provider today: Yes: Connie MUELLER.   present during visit today: Not Applicable.    Concerns: No Concerns.    Procedure:  Lab draw site: RAC, Needle type: BF, Gauge: 23. Gauze and coban applied    Post Assessment:  Labs drawn without difficulty: Yes.    Discharge Plan:  Departure Mode: Ambulatory.    Face to Face Time: 5.    Martine Abebe CMA

## 2022-10-21 NOTE — PROGRESS NOTES
"Oncology Rooming Note    October 21, 2022 1:30 PM   Maximino Simeon is a 77 year old female who presents for:    Chief Complaint   Patient presents with     Oncology Clinic Visit     Ovarian cancer, unspecified laterality (H)     Initial Vitals: /84 (BP Location: Right arm, Patient Position: Sitting, Cuff Size: Adult Regular)   Pulse 86   Temp 98.4  F (36.9  C) (Oral)   Resp 18   Wt 46.3 kg (102 lb)   LMP  (LMP Unknown)   SpO2 94%   BMI 22.07 kg/m   Estimated body mass index is 22.07 kg/m  as calculated from the following:    Height as of 1/17/22: 1.448 m (4' 9\").    Weight as of this encounter: 46.3 kg (102 lb). Body surface area is 1.36 meters squared.  No Pain (0) Comment: Data Unavailable   No LMP recorded (lmp unknown). Patient has had a hysterectomy.  Allergies reviewed: Yes  Medications reviewed: Yes    Medications: Medication refills not needed today.  Pharmacy name entered into [facility]:    [id number] - [facility] [address] [id number] [facility] [address] [facility] [address]  [id number] [place], [place] - [address] [id number] [address] [facility] [facility]    Clinical concerns: no      Martine Abebe CMA              "

## 2022-10-21 NOTE — LETTER
10/21/2022         RE: Maximino Simeon  [id number] [facility] [id number]        Dear Colleague,    Thank you for referring your patient, Maximino Simeon, to the [facility]. Please see a copy of my visit note below.                   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 She completed chemotherapy 2020. She is here today for a surveillance visit.     Oncology history:   Originally seen by Dr. Ascencio at [facility] during her call week; was to follow up with outpatient GYN ONC and general surgery due to appendicits and enlarged ovary.       54  CA 19-9 15  CEA 2.8     2019: CT abd pelvis: IMPRESSION:      1. Dilated, fluid-filled appendix with mild increased enhancement and slight stranding at its tip is worrisome for acute appendicitis.  2. Large complex cystic pelvic mass with soft tissue nodularity with adjacent prominent left adnexal vessels is worrisome for neoplasm of ovarian origin. No mesenteric nodularity, lymph node enlargement, or other findings that are suspicious for metastatic disease in the abdomen or pelvis.     2019: CT abd pelvis REPRODUCTIVE ORGANS with IV and oral contrast: Again noted is a large complex cystic mass in the pelvis measuring approximately 13.3 x 8.1 x 9.7 cm in size which has areas of mural nodularity and soft tissue density.    1. Again noted is a thick-walled dilated appendix compatible with appendicitis. There is a questionable new small area of appendiceal wall discontinuity and adjacent 1.2 cm fluid pocket on axial image 57 and sagittal image 23. Findings raise question of a small area of appendiceal wall perforation.    2. Large complex pelvic mass again concerning for ovarian neoplasm.    3. Marked bladder distention. Mild  prominence of the urinary collecting systems likely secondary to this.     7/2/2019-7/7/2019: General surgery consulted for perforated appendicitis.  Not septic as no fever.Treated with Cefuroxime and metronidazole (PCN allergy). Surgery recommending postponing appendectomy for about 6 weeks or so. Presenting leukocytosis and fever resolved by discharge.  Will finish oral course of ceftin and flagyl as outpatient.      Per general surgery note:  - No indication for surgery at this time  - Recommend admission to medicine service  - Start IV antibiotics  - Will again need to discuss the timing of appendectomy with Gyn/Onc to allow for evaluation of her pelvic mass simultaneously, this will likely have to be in 4-6 weeks following possible appendix perforation  - Surgery will continue to follow     8/9/19: Exploratory Laparotomy, Total Abdominal Hysterectomy, Left Salpingo-Oopherectomy, Bilateral Pelvic and Para-Aortic Lymph Node Dissection, Peritoneal Biopsy, Diaphraghm Pap Smears.     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.     5/20/2020: 10.   8/19/2020:  12.  11/10/2020:  10.  2/10/2021:  8  5/10/2021:  9.    6/18/2021: portacath removal     8/10/2021:  8  11/30/2021:  9    1/17-1/21/22 hospital admission for SBO due to adhesions from prior surgery.     4/5/22:  9  10/21/22:   paul Elkins comes to the clinic feeling well from a gynecological standpoint. She is not sexually active and denies vulvar/vaginal concerns. She denies any vaginal bleeding, no nausea/emesis, no lower extremity edema, and no difficulties eating or sleeping. She denies any abdominal discomfort/bloating, no fevers or chills, and no chest pain or shortness of breath. She started chair yoga for activity and has worked with PT. Peripheral neuropathy ongoing in hands and feet which she was seen in the ER on Sunday for hand weakness rule out stroke, negative work up with negative CT head.         Health Maintenance  Colonoscopy- 12/2021  Mammogram- 5/7/2021  DEXA- 12/2021, on Prolia   Annual physical- 3/1/22        Review of Systems:    Systemic           no weight changes; no fever; no chills; no night sweats; no appetite changes, +fatigue  Skin           no rashes, or lesions  Eye           no irritation; no changes in vision  Jarred-Laryngeal           no dysphagia; no hoarseness   Pulmonary    no cough; no shortness of breath  Cardiovascular    no chest pain; no palpitations  Gastrointestinal    + diarrhea; no constipation; no abdominal pain; no changes in bowel habits; no blood in stool  Genitourinary   no urinary frequency; no urinary urgency; no dysuria; no pain; no abnormal vaginal discharge; no abnormal vaginal bleeding  Breast    no breast discharge; no breast changes; no breast pain  Musculoskeletal    no myalgias; no arthralgias; no back pain  Psychiatric           no depressed mood; + anxiety    Hematologic           no tender lymph nodes; no noticeable swellings or lumps   Endocrine    no hot flashes; no heat/cold intolerance         Neurological   no tremor; no numbness and tingling; no headaches; no difficulty sleeping      Past Medical History:    Past Medical History:   Diagnosis Date     Anxiety 2014     CAD (coronary artery disease) 2011     Cancer (H)     ovarian     Colitis       Fibromyalgia      Gastroesophageal reflux disease      Hypothyroidism 2011     Insomnia 2017     Mixed hyperlipidemia 2011     PONV (postoperative nausea and vomiting)      Recurrent major depressive disorder (H) 2010     Urinary retention          Past Surgical History:    Past Surgical History:   Procedure Laterality Date     APPENDECTOMY OPEN N/A 8/9/2019    Procedure: Open Appendectomy;  Surgeon: Parvez Abreu MD;  Location: [facility] OR     BREAST SURGERY      biopsy     GYN SURGERY       HYSTERECTOMY TOTAL ABDOMINAL, BILATERAL SALPINGO-OOPHORECTOMY, NODE DISSECTION, COMBINED Bilateral 8/9/2019    Procedure: Exploratory Laparotomy, Total Abdominal Hysterectomy, Left Salpingo-Oopherectomy, Bilateral Pelvic and Para-Aortic Lymph Node Dissection, Peritoneal Biopsy, Diaphraghm Pap Smears.;  Surgeon: Naz Ascencio MD;  Location: [facility] OR     IR CHEST PORT PLACEMENT > 5 YRS OF AGE  8/30/2019     IR PORT REMOVAL RIGHT  6/18/2021     OOPHORECTOMY  1992         Health Maintenance Due   Topic Date Due     DEXA  Never done     ADVANCE CARE PLANNING  Never done     DEPRESSION ACTION PLAN  Never done     HEPATITIS B IMMUNIZATION (1 of 3 - 3-dose series) Never done     HEPATITIS C SCREENING  Never done     LIPID  Never done     PHQ-9  01/12/2020     FALL RISK ASSESSMENT  07/16/2020     ZOSTER IMMUNIZATION (2 of 2) 01/15/2021     MEDICARE ANNUAL WELLNESS VISIT  06/02/2021       Current Medications:     Current Outpatient Medications   Medication Sig Dispense Refill     budesonide (ENTOCORT EC) 3 MG EC capsule Take by mouth daily 1/16/2022 - 9 mg daily x 56 days, then 6 mg daily x 14 days, then 3 mg daily.       calcium carbonate (OS-LUCRECIA) 1500 (600 Ca) MG tablet Take 1,200 mg by mouth 2 times daily (with meals)       denosumab (PROLIA) 60 MG/ML SOSY injection Inject 60 mg Subcutaneous every 6 months       FLUoxetine (PROZAC) 10 MG capsule Take 10 mg by mouth daily       Glutathione 50 MG TABS Take 50 mg by mouth daily        hypromellose-dextran (ARTIFICAL TEARS) 0.1-0.3 % ophthalmic solution Place 1 drop into both eyes 4 times daily as needed for dry eyes       lactobacillus rhamnosus, GG, (CULTURELL) capsule Take 1 capsule by mouth daily       levothyroxine (SYNTHROID/LEVOTHROID) 50 MCG tablet Take 50 mcg by mouth every morning        liothyronine (CYTOMEL) 5 MCG tablet Take 5 mcg by mouth every morning        loperamide (IMODIUM) 2 MG capsule Take 2 mg by mouth daily as needed for diarrhea       multivitamin w/minerals (THERA-VIT-M) tablet Take 1 tablet by mouth daily       triamcinolone (KENALOG) 0.1 % external cream Apply topically 2 times daily       Vitamin D3 (CHOLECALCIFEROL) 125 MCG (5000 UT) tablet Take 125 mcg by mouth daily       DULoxetine (CYMBALTA) 20 MG capsule Take by mouth daily 1/8/2022 - Take 40 mg daily for 2 weeks, then 20 mg daily x 2 weeks. (Patient not taking: Reported on 4/5/2022)       LORazepam (ATIVAN) 0.5 MG tablet Take 0.25-0.5 mg by mouth every 8 hours as needed for anxiety (Patient not taking: Reported on 10/21/2022)       mirtazapine (REMERON) 7.5 MG tablet Take 7.5 mg by mouth At Bedtime (Patient not taking: Reported on 4/5/2022)           Allergies:        Allergies   Allergen Reactions     Adhesive Tape      bandaids     Liquid Adhesive Rash     Penicillins Rash        Social History:     Social History     Tobacco Use     Smoking status: Never     Smokeless tobacco: Never   Substance Use Topics     Alcohol use: Yes     Comment: socially       History   Drug Use Unknown         Family History:     The patient's family history is notable for     Family History   Problem Relation Age of Onset     Heart Disease Father      Cerebrovascular Disease Father      Cerebrovascular Disease Sister      Thyroid Disease Sister      Breast Cancer Sister          Physical Exam:     /84 (BP Location: Right arm, Patient Position: Sitting, Cuff Size: Adult Regular)   Pulse 86   Temp 98.4  F (36.9  C)  (Oral)   Resp 18   Wt 46.3 kg (102 lb)   LMP  (LMP Unknown)   SpO2 94%   BMI 22.07 kg/m    Body mass index is 22.07 kg/m .    General Appearance: healthy and alert, no distress     HEENT: no thyromegaly, no palpable nodules or masses        Cardiovascular: regular rate and rhythm, no gallops, rubs or murmurs    Respiratory: lungs clear, no rales, rhonchi or wheezes    Musculoskeletal: extremities non tender and without edema    Skin: no lesions    Neurological: normal gait, no gross defects     Psychiatric: appropriate mood and affect                               Hematological: normal cervical, supraclavicular and inguinal lymph nodes     Gastrointestinal:       abdomen soft, non-tender, non-distended, no organomegaly or masses    Genitourinary: External genitalia and urethral meatus appears normal. Vagina pale and slightly narrowed consistent with postmenopausal changes, smooth without nodularity or masses. Cervix surgically absent.  Bimanual exam reveal no masses, nodularity or fullness.  Recto-vaginal exam confirms these findings. Medium Bill speculum used. TNOIO Miller present for exam.       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 She completed chemotherapy 1/2020. She is here today for a surveillance visit.     15 minutes spent on the date of the encounter doing chart review, history and exam, documentation, and further activities as noted above.        Plan:     1.)       AMEE on exam and  pending. Patient will continue to have in person surveillance visits with  lab draws every 6 months for three years (1/2025) and then annual visits thereafter. Reviewed signs and symptoms for when she should contact the clinic or seek additional care. Patient to contact the clinic with any questions or concerns in the interim. Request for  and in person visit in six months sent to scheduling.      2.) Genetic  "Testing Result: NEGATIVE  Brittni is negative for mutations in the APC, MARV, AXIN2, BARD1, BMPR1A, BRCA1, BRCA2, BRIP1, CDH1, CDK4, CDKN2A, CHEK2, DICER1, EPCAM, GREM1, HOXB13, MLH1, MSH2, MSH3, MSH6, MUTYH, NBN, NF1, NTHL1, PALB2, PMS2, POLD1, POLE, PTEN, RAD51C, RAD51D, RECQL, SMAD4, SMARCA4, STK11, and TP53 genes. No mutations were found in any of the 36 genes analyzed. This test involved sequencing and deletion/duplication analysis of all genes with the exception of EPCAM and GREM1 (deletions only).    3.) Labs and/or tests ordered include:   pending      4.) Health maintenance issues addressed today include annual health maintenance and non-gynecologic issues with PCP. Discussed the need to be in close contact with her PCP and GI specialists to keep her colitis controlled. Pt will follow up with neurology for neuropathy symptoms.             LEX Anaya, NP-BC  Women's Health Nurse Practitioner  Division of Gynecologic Oncology  [facility]          CC  Patient Care Team:  Cecelia Manzo MD as PCP - General  Naz Ascencio MD as MD (Oncology)  Parvez Abreu MD as MD (Surgery)  Micheline Soler MD as MD (Emergency Medicine)  [facility] ([facility] ([place]  Connie Martinez APRN CNP as Assigned Cancer Care Provider      Oncology Rooming Note    October 21, 2022 1:30 PM   Maximino Simeon is a 77 year old female who presents for:    Chief Complaint   Patient presents with     Oncology Clinic Visit     Ovarian cancer, unspecified laterality (H)     Initial Vitals: /84 (BP Location: Right arm, Patient Position: Sitting, Cuff Size: Adult Regular)   Pulse 86   Temp 98.4  F (36.9  C) (Oral)   Resp 18   Wt 46.3 kg (102 lb)   LMP  (LMP Unknown)   SpO2 94%   BMI 22.07 kg/m   Estimated body mass index is 22.07 kg/m  as calculated from the following:    Height as of 1/17/22: 1.448 m (4' 9\").    Weight as of this " encounter: 46.3 kg (102 lb). Body surface area is 1.36 meters squared.  No Pain (0) Comment: Data Unavailable   No LMP recorded (lmp unknown). Patient has had a hysterectomy.  Allergies reviewed: Yes  Medications reviewed: Yes    Medications: Medication refills not needed today.  Pharmacy name entered into [facility]:    [facility] #[id number] - [place], [place] - [address] [place] [address] [id number] [place] [address] [facility] [address]  [id number] [place], [place] - [address] [id number] [place] [place] [facility] [address] [facility] [facility]    Clinical concerns: no      Martine Abebe, [facility]                  Again, thank you for allowing me to participate in the care of your patient.        Sincerely,        LEX Larson CNP

## 2023-06-03 ENCOUNTER — HEALTH MAINTENANCE LETTER (OUTPATIENT)
Age: 78
End: 2023-06-03

## 2023-12-20 ENCOUNTER — NURSE TRIAGE (OUTPATIENT)
Dept: NURSING | Facility: CLINIC | Age: 78
End: 2023-12-20
Payer: MEDICARE

## 2023-12-20 ENCOUNTER — HOSPITAL ENCOUNTER (EMERGENCY)
Facility: CLINIC | Age: 78
Discharge: HOME | End: 2023-12-21
Attending: EMERGENCY MEDICINE | Admitting: EMERGENCY MEDICINE
Payer: MEDICARE

## 2023-12-20 DIAGNOSIS — R31.29 MICROSCOPIC HEMATURIA: ICD-10-CM

## 2023-12-20 DIAGNOSIS — N20.1 URETEROLITHIASIS: ICD-10-CM

## 2023-12-20 PROCEDURE — 99285 EMERGENCY DEPT VISIT HI MDM: CPT | Mod: 25

## 2023-12-20 PROCEDURE — 96375 TX/PRO/DX INJ NEW DRUG ADDON: CPT

## 2023-12-20 PROCEDURE — 96361 HYDRATE IV INFUSION ADD-ON: CPT

## 2023-12-20 PROCEDURE — 96374 THER/PROPH/DIAG INJ IV PUSH: CPT | Mod: 59

## 2023-12-20 ASSESSMENT — ACTIVITIES OF DAILY LIVING (ADL): ADLS_ACUITY_SCORE: 35

## 2023-12-21 ENCOUNTER — APPOINTMENT (OUTPATIENT)
Dept: CT IMAGING | Facility: CLINIC | Age: 78
End: 2023-12-21
Attending: EMERGENCY MEDICINE
Payer: MEDICARE

## 2023-12-21 VITALS
RESPIRATION RATE: 18 BRPM | TEMPERATURE: 98.6 F | HEIGHT: 58 IN | HEART RATE: 77 BPM | DIASTOLIC BLOOD PRESSURE: 61 MMHG | SYSTOLIC BLOOD PRESSURE: 125 MMHG | OXYGEN SATURATION: 95 % | BODY MASS INDEX: 21.32 KG/M2

## 2023-12-21 LAB
ALBUMIN SERPL BCG-MCNC: 4.6 G/DL (ref 3.5–5.2)
ALBUMIN UR-MCNC: 20 MG/DL
ALP SERPL-CCNC: 58 U/L (ref 40–150)
ALT SERPL W P-5'-P-CCNC: 21 U/L (ref 0–50)
ANION GAP SERPL CALCULATED.3IONS-SCNC: 15 MMOL/L (ref 7–15)
APPEARANCE UR: CLEAR
AST SERPL W P-5'-P-CCNC: 24 U/L (ref 0–45)
BASOPHILS # BLD AUTO: 0 10E3/UL (ref 0–0.2)
BASOPHILS NFR BLD AUTO: 0 %
BILIRUB SERPL-MCNC: 0.5 MG/DL
BILIRUB UR QL STRIP: NEGATIVE
BUN SERPL-MCNC: 18.1 MG/DL (ref 8–23)
CALCIUM SERPL-MCNC: 10.3 MG/DL (ref 8.8–10.2)
CHLORIDE SERPL-SCNC: 97 MMOL/L (ref 98–107)
COLOR UR AUTO: ABNORMAL
CREAT SERPL-MCNC: 0.86 MG/DL (ref 0.51–0.95)
DEPRECATED HCO3 PLAS-SCNC: 25 MMOL/L (ref 22–29)
EGFRCR SERPLBLD CKD-EPI 2021: 68 ML/MIN/1.73M2
EOSINOPHIL # BLD AUTO: 0 10E3/UL (ref 0–0.7)
EOSINOPHIL NFR BLD AUTO: 0 %
ERYTHROCYTE [DISTWIDTH] IN BLOOD BY AUTOMATED COUNT: 13.2 % (ref 10–15)
GLUCOSE SERPL-MCNC: 144 MG/DL (ref 70–99)
GLUCOSE UR STRIP-MCNC: NEGATIVE MG/DL
HCT VFR BLD AUTO: 43.8 % (ref 35–47)
HGB BLD-MCNC: 14.4 G/DL (ref 11.7–15.7)
HGB UR QL STRIP: ABNORMAL
IMM GRANULOCYTES # BLD: 0 10E3/UL
IMM GRANULOCYTES NFR BLD: 0 %
KETONES UR STRIP-MCNC: 10 MG/DL
LEUKOCYTE ESTERASE UR QL STRIP: NEGATIVE
LIPASE SERPL-CCNC: 38 U/L (ref 13–60)
LYMPHOCYTES # BLD AUTO: 0.7 10E3/UL (ref 0.8–5.3)
LYMPHOCYTES NFR BLD AUTO: 7 %
MCH RBC QN AUTO: 32.1 PG (ref 26.5–33)
MCHC RBC AUTO-ENTMCNC: 32.9 G/DL (ref 31.5–36.5)
MCV RBC AUTO: 98 FL (ref 78–100)
MONOCYTES # BLD AUTO: 0.3 10E3/UL (ref 0–1.3)
MONOCYTES NFR BLD AUTO: 3 %
MUCOUS THREADS #/AREA URNS LPF: PRESENT /LPF
NEUTROPHILS # BLD AUTO: 8.4 10E3/UL (ref 1.6–8.3)
NEUTROPHILS NFR BLD AUTO: 90 %
NITRATE UR QL: NEGATIVE
NRBC # BLD AUTO: 0 10E3/UL
NRBC BLD AUTO-RTO: 0 /100
PH UR STRIP: 7.5 [PH] (ref 5–7)
PLATELET # BLD AUTO: 232 10E3/UL (ref 150–450)
POTASSIUM SERPL-SCNC: 4.3 MMOL/L (ref 3.4–5.3)
PROT SERPL-MCNC: 7.6 G/DL (ref 6.4–8.3)
RBC # BLD AUTO: 4.49 10E6/UL (ref 3.8–5.2)
RBC URINE: 149 /HPF
SODIUM SERPL-SCNC: 137 MMOL/L (ref 135–145)
SP GR UR STRIP: 1.01 (ref 1–1.03)
SQUAMOUS EPITHELIAL: <1 /HPF
UROBILINOGEN UR STRIP-MCNC: NORMAL MG/DL
WBC # BLD AUTO: 9.4 10E3/UL (ref 4–11)
WBC URINE: 5 /HPF

## 2023-12-21 PROCEDURE — 36415 COLL VENOUS BLD VENIPUNCTURE: CPT | Performed by: EMERGENCY MEDICINE

## 2023-12-21 PROCEDURE — 74177 CT ABD & PELVIS W/CONTRAST: CPT | Mod: MA

## 2023-12-21 PROCEDURE — 250N000011 HC RX IP 250 OP 636: Performed by: EMERGENCY MEDICINE

## 2023-12-21 PROCEDURE — 250N000009 HC RX 250: Performed by: EMERGENCY MEDICINE

## 2023-12-21 PROCEDURE — 85025 COMPLETE CBC W/AUTO DIFF WBC: CPT | Performed by: EMERGENCY MEDICINE

## 2023-12-21 PROCEDURE — 80053 COMPREHEN METABOLIC PANEL: CPT | Performed by: EMERGENCY MEDICINE

## 2023-12-21 PROCEDURE — 83690 ASSAY OF LIPASE: CPT | Performed by: EMERGENCY MEDICINE

## 2023-12-21 PROCEDURE — 81001 URINALYSIS AUTO W/SCOPE: CPT | Performed by: EMERGENCY MEDICINE

## 2023-12-21 PROCEDURE — 258N000003 HC RX IP 258 OP 636: Performed by: EMERGENCY MEDICINE

## 2023-12-21 RX ORDER — IOPAMIDOL 755 MG/ML
51 INJECTION, SOLUTION INTRAVASCULAR ONCE
Status: COMPLETED | OUTPATIENT
Start: 2023-12-21 | End: 2023-12-21

## 2023-12-21 RX ORDER — IBUPROFEN 600 MG/1
600 TABLET, FILM COATED ORAL EVERY 6 HOURS PRN
Qty: 40 TABLET | Refills: 0 | Status: SHIPPED | OUTPATIENT
Start: 2023-12-21

## 2023-12-21 RX ORDER — ONDANSETRON 4 MG/1
4 TABLET, ORALLY DISINTEGRATING ORAL EVERY 6 HOURS PRN
Qty: 10 TABLET | Refills: 0 | Status: SHIPPED | OUTPATIENT
Start: 2023-12-21

## 2023-12-21 RX ORDER — ONDANSETRON 2 MG/ML
4 INJECTION INTRAMUSCULAR; INTRAVENOUS ONCE
Status: COMPLETED | OUTPATIENT
Start: 2023-12-21 | End: 2023-12-21

## 2023-12-21 RX ORDER — KETOROLAC TROMETHAMINE 15 MG/ML
15 INJECTION, SOLUTION INTRAMUSCULAR; INTRAVENOUS ONCE
Status: COMPLETED | OUTPATIENT
Start: 2023-12-21 | End: 2023-12-21

## 2023-12-21 RX ORDER — TAMSULOSIN HYDROCHLORIDE 0.4 MG/1
0.4 CAPSULE ORAL DAILY
Qty: 7 CAPSULE | Refills: 0 | Status: SHIPPED | OUTPATIENT
Start: 2023-12-21 | End: 2023-12-28

## 2023-12-21 RX ADMIN — ONDANSETRON 4 MG: 2 INJECTION INTRAMUSCULAR; INTRAVENOUS at 02:16

## 2023-12-21 RX ADMIN — SODIUM CHLORIDE 1000 ML: 9 INJECTION, SOLUTION INTRAVENOUS at 02:11

## 2023-12-21 RX ADMIN — IOPAMIDOL 51 ML: 755 INJECTION, SOLUTION INTRAVENOUS at 02:59

## 2023-12-21 RX ADMIN — KETOROLAC TROMETHAMINE 15 MG: 15 INJECTION, SOLUTION INTRAMUSCULAR; INTRAVENOUS at 02:16

## 2023-12-21 RX ADMIN — SODIUM CHLORIDE 60 ML: 9 INJECTION, SOLUTION INTRAVENOUS at 02:59

## 2023-12-21 ASSESSMENT — ACTIVITIES OF DAILY LIVING (ADL)
ADLS_ACUITY_SCORE: 35

## 2023-12-21 NOTE — TELEPHONE ENCOUNTER
Patient calling to report....today was coming down the stairway and twisted left ankle but able to walk downstairs.      Later now reporting pain in her upper hip area to left lower back, gettng worse, 7/10.  Got upset stomach, took 2 seda seltzer, and immediately vomited.    History of bowel obstruction and hospitalized around 2021.  Had BM today.    Disposition is to go to ED.  Patient verbalizes understanding butsays she has nobody to take her, and not sure she could get out to a taxi on her own.  She will call paramedics.  Patient denies need for help calling 911.    Zeenat Ortiz RN  [facility]      Reason for Disposition   [1] Sudden onset of severe flank pain AND [2] age > 60 years    Additional Information   Negative: Serious injury with multiple fractures (broken bones)   Negative: [1] Major bleeding (e.g., actively dripping or spurting) AND [2] can't be stopped   Negative: Amputation   Negative: Looks like a dislocated joint (very crooked or deformed)   Negative: Sounds like a life-threatening emergency to the triager   Negative: Bullet wound, stabbed by knife, or other serious penetrating wound   Negative: Skin is split open or gaping (or length > 1/2 inch or 12 mm)   Negative: [1] Bleeding AND [2] won't stop after 10 minutes of direct pressure (using correct technique)   Negative: [1] Dirt in the wound AND [2] not removed with 15 minutes of scrubbing   Negative: Can't stand (bear weight) or walk   Negative: [1] Numbness (new loss of sensation) of toe(s) AND [2] present now   Negative: Sounds like a serious injury to the triager   Negative: [1] SEVERE pain AND [2] not improved 2 hours after pain medicine/ice packs   Negative: Suspicious history for the injury   Negative: Passed out (i.e., lost consciousness, collapsed and was not responding)   Negative: Shock suspected (e.g., cold/pale/clammy skin, too weak to stand, low BP, rapid pulse)   Negative: Difficult to awaken or acting confused  (e.g., disoriented, slurred speech)   Negative: Sounds like a life-threatening emergency to the triager   Negative: [1] SEVERE pain (e.g., excruciating, scale 8-10) AND [2] present > 1 hour   Negative: [1] SEVERE pain (e.g., excruciating, scale 8-10) AND [2] not improved after pain medicine    Protocols used: Ankle and Foot Injury-A-AH, Flank Pain-A-AH

## 2023-12-21 NOTE — DISCHARGE INSTRUCTIONS
Your kidney stone will likely pass on its own. To help, take Flomax until it has passed. Strain your urine to look for stone.  Use Motrin for pain.  Use Zofran as needed for nausea or vomiting.  Drink lots of water.  Limit caffeine.  Follow up with primary care.  Return to the ER with uncontrolled pain, fever >100.4F, decreased urine output, or any other new or concerning symptoms.

## 2023-12-21 NOTE — ED PROVIDER NOTES
History     Chief Complaint:  Vomiting and Hip Pain       The history is provided by the patient.      Maximino Simeon is an 78 year old female with history of hypothyroidism who presents alone via EMS for evaluation of vomiting and hip pain.  She mentions she was at the mall today and when she was walking down the steps twisted her left foot.  It did not cause her to fall and she did not really have any symptoms.  She relates this to the pain she developed about 2 to 3 hours later which is primarily in her left hip/buttocks but moves towards her left flank and down her lateral thigh. She felt hot then cold and nauseated.   She does not have any pain medications at home so she took 2 Molly-Chester and immediately threw up.  This really concerned her.  She is worried she might have a recurrent small bowel obstruction.  However, she denies any abdominal pain and does remember she had abdominal pain when she had a small bowel obstruction.  She denies fever or dysuria and did have a small nonbloody bowel movement in the emergency department.    Independent Historian:    None - Patient only     Review of External Notes:  Patient called nurse triage yesterday for this pain and was directed to the ED      Medications:    budesonide (ENTOCORT EC) 3 MG EC capsule  calcium carbonate (OS-LUCRECIA) 1500 (600 Ca) MG tablet  FLUoxetine (PROZAC) 10 MG capsule  Glutathione 50 MG TABS  lactobacillus rhamnosus, GG, (CULTURELL) capsule  levothyroxine (SYNTHROID/LEVOTHROID) 50 MCG tablet  liothyronine (CYTOMEL) 5 MCG tablet  loperamide (IMODIUM) 2 MG capsule  LORazepam (ATIVAN) 0.5 MG tablet  mirtazapine (REMERON) 7.5 MG tablet  multivitamin w/minerals (THERA-VIT-M) tablet  Vitamin D3 (CHOLECALCIFEROL) 125 MCG (5000 UT) tablet    Past Medical History:    Anxiety  CAD (coronary artery disease)  Ovarian Cancer   Colitis  Fibromyalgia  GERD  Hypothyroidism  Insomnia  Mixed hyperlipidemia  PONV (postoperative nausea and vomiting)  Recurrent  "major depressive disorder   Urinary retention    Past Surgical History:    Open Appendectomy   Breast surgery - biopsy   GYN surgery  Hysterectomy total abdominal, bilateral salpingo-oophorectomy, node dissection, combined   Oophorectomy  Chest port placement  Port removal right      Physical Exam   Patient Vitals for the past 24 hrs:   BP Temp Temp src Pulse Resp SpO2 Height   12/20/23 2301 -- 98.6  F (37  C) Oral -- 18 94 % 1.473 m (4' 10\")   12/20/23 2259 (!) 181/93 -- -- 87 -- -- --     Vitals:    12/21/23 0025 12/21/23 0425 12/21/23 0500   BP: (!) 166/88 134/69 125/61   Pulse:  81 77   Resp:      Temp:      SpO2: 95% 96% 95%     Physical Exam  General: Well-developed and well-nourished. Well appearing elderly  woman. Cooperative.  Head:  Atraumatic.  Eyes:  Conjunctivae, lids, and sclerae are normal.  ENT:    Normal nose. Moist mucous membranes.  Neck:  Supple. Normal range of motion.  CV:  Regular rate and rhythm. Normal heart sounds with no murmurs, rubs, or gallops detected.  Resp:  No respiratory distress. Clear to auscultation bilaterally without decreased breath sounds, wheezing, rales, or rhonchi.  GI:  Soft. Non-distended. Non-tender.  No CVA tenderness    MS:  Normal ROM. No bilateral lower extremity edema.  No bony tenderness throughout the left hip or femur.  Skin:  Warm. Non-diaphoretic. No pallor.  Neuro:  Awake. A&Ox3. Normal strength.  Psych: Normal mood and affect. Normal speech.  Vitals reviewed.    Emergency Department Course     Imaging:  CT Abdomen Pelvis w Contrast   Final Result   IMPRESSION: 0.2 cm distal left ureteral calculus at the ureterovesicular junction, resulting in moderate obstruction.         Report per radiology    Laboratory:  Labs Ordered and Resulted from Time of ED Arrival to Time of ED Departure   COMPREHENSIVE METABOLIC PANEL - Abnormal       Result Value    Sodium 137      Potassium 4.3      Carbon Dioxide (CO2) 25      Anion Gap 15      Urea Nitrogen 18.1      " Creatinine 0.86      GFR Estimate 68      Calcium 10.3 (*)     Chloride 97 (*)     Glucose 144 (*)     Alkaline Phosphatase 58      AST 24      ALT 21      Protein Total 7.6      Albumin 4.6      Bilirubin Total 0.5     ROUTINE UA WITH MICROSCOPIC REFLEX TO CULTURE - Abnormal    Color Urine Straw      Appearance Urine Clear      Glucose Urine Negative      Bilirubin Urine Negative      Ketones Urine 10 (*)     Specific Gravity Urine 1.012      Blood Urine Large (*)     pH Urine 7.5 (*)     Protein Albumin Urine 20 (*)     Urobilinogen Urine Normal      Nitrite Urine Negative      Leukocyte Esterase Urine Negative      Mucus Urine Present (*)     RBC Urine 149 (*)     WBC Urine 5      Squamous Epithelials Urine <1     CBC WITH PLATELETS AND DIFFERENTIAL - Abnormal    WBC Count 9.4      RBC Count 4.49      Hemoglobin 14.4      Hematocrit 43.8      MCV 98      MCH 32.1      MCHC 32.9      RDW 13.2      Platelet Count 232      % Neutrophils 90      % Lymphocytes 7      % Monocytes 3      % Eosinophils 0      % Basophils 0      % Immature Granulocytes 0      NRBCs per 100 WBC 0      Absolute Neutrophils 8.4 (*)     Absolute Lymphocytes 0.7 (*)     Absolute Monocytes 0.3      Absolute Eosinophils 0.0      Absolute Basophils 0.0      Absolute Immature Granulocytes 0.0      Absolute NRBCs 0.0     LIPASE - Normal    Lipase 38          Emergency Department Course & Assessments:  Interventions:  Medications   sodium chloride 0.9% BOLUS 1,000 mL (1,000 mLs Intravenous Stopped 12/21/23 0426)   ondansetron (ZOFRAN) injection 4 mg (4 mg Intravenous $Given 12/21/23 0216)   ketorolac (TORADOL) injection 15 mg (15 mg Intravenous $Given 12/21/23 0216)     Independent Interpretation (X-rays, CTs, rhythm strip):  I independently reviewed the CT abdomen and pelvis and see left hydronephrosis.     Assessments/Consultations/Discussion of Management or Tests:   ED Course as of 12/26/23 0926   Thu Dec 21, 2023   0124 I obtained history and  examined the patient as noted above.    0458 I updated the patient and answered all her questions.  Her pain is improved.    Social Determinants of Health affecting care:  Active     Disposition:  Discharged.    Impression & Plan    Medical Decision Making:  Brittni is an 78 year old woman presenting with pain that is at her left hip and moves down to her thigh and up into her left flank.  She took 2 Molly-[place] and immediately threw up and she became concerned she may have a recurrent small bowel obstruction.  She denies any abdominal pain and did have a bowel movement today.  She wonders if her pain may be related to twisting her left foot while walking down the stairs earlier in the day although she did not have any fall.  On arrival she is well-appearing.  She is initially hypertensive although this resolved with pain control.  She does not have CVA or abdominal tenderness.  She also does not have any bony tenderness or decreased range of motion of the left hip.    She was sent for CT of the abdomen and pelvis which reveals the cause of her symptoms to be a left distal 2 mm ureterolithiasis with moderate hydronephrosis.  She does have associated microscopic hematuria without UTI, kidney injury, electrolyte derangement, hepatitis, biliary obstruction, pancreatitis, leukocytosis, or anemia.    She felt improved with IV fluids, Zofran, and Toradol and is comfortable with plan for discharge.  We discussed expectant management for trial of passage with Flomax, urinary strainer, Motrin, and Zofran.  She did not require narcotics.  I recommended she follow-up with primary care but indications for return to the emergency department were discussed in detail.  All questions answered.  Amenable to discharge.    Diagnosis:    ICD-10-CM    1. Ureterolithiasis  N20.1       2. Microscopic hematuria  R31.29            Discharge Medications:  Discharge Medication List as of 12/21/2023  5:25 AM        START taking these  medications    Details   ibuprofen (ADVIL/MOTRIN) 600 MG tablet Take 1 tablet (600 mg) by mouth every 6 hours as needed for moderate pain, Disp-40 tablet, R-0, Local Print      ondansetron (ZOFRAN ODT) 4 MG ODT tab Take 1 tablet (4 mg) by mouth every 6 hours as needed for nausea or vomiting, Disp-10 tablet, R-0, Local Print      tamsulosin (FLOMAX) 0.4 MG capsule Take 1 capsule (0.4 mg) by mouth daily for 7 days, Disp-7 capsule, R-0, Local Print            Scribe Disclosure:  I, Amy Day, am serving as a scribe at 1:35 AM on 12/21/2023 to document services personally performed by Shital Ngo MD based on my observations and the provider's statements to me.  12/21/2023   Shital Ngo MD Dixson, Kylie S, MD  12/26/23 0937

## 2024-01-19 NOTE — TELEPHONE ENCOUNTER
Left  with hours and phone. Letter sent for follow up. Referral in [facility].     Patient's insurance has been updated. Please advise if referral to endocrinologist can be updated.

## 2024-06-29 ENCOUNTER — OFFICE VISIT (OUTPATIENT)
Dept: URGENT CARE | Facility: URGENT CARE | Age: 79
End: 2024-06-29
Payer: MEDICARE

## 2024-06-29 VITALS
DIASTOLIC BLOOD PRESSURE: 86 MMHG | HEART RATE: 91 BPM | SYSTOLIC BLOOD PRESSURE: 142 MMHG | TEMPERATURE: 99.4 F | RESPIRATION RATE: 18 BRPM | WEIGHT: 104 LBS | OXYGEN SATURATION: 96 % | BODY MASS INDEX: 21.74 KG/M2

## 2024-06-29 DIAGNOSIS — L03.211 CELLULITIS OF FACE: ICD-10-CM

## 2024-06-29 DIAGNOSIS — H00.014 HORDEOLUM EXTERNUM OF LEFT UPPER EYELID: Primary | ICD-10-CM

## 2024-06-29 PROCEDURE — 99203 OFFICE O/P NEW LOW 30 MIN: CPT | Performed by: FAMILY MEDICINE

## 2024-06-29 RX ORDER — DOXYCYCLINE 100 MG/1
100 TABLET ORAL 2 TIMES DAILY
Qty: 14 TABLET | Refills: 0 | Status: SHIPPED | OUTPATIENT
Start: 2024-06-29 | End: 2024-07-06

## 2024-06-29 RX ORDER — ERYTHROMYCIN 5 MG/G
0.5 OINTMENT OPHTHALMIC 3 TIMES DAILY
Qty: 3.5 G | Refills: 0 | Status: SHIPPED | OUTPATIENT
Start: 2024-06-29 | End: 2024-07-06

## 2024-06-29 RX ORDER — GABAPENTIN 100 MG/1
CAPSULE ORAL
COMMUNITY
Start: 2022-12-12

## 2024-06-29 RX ORDER — MULTIVIT-MIN/IRON/FOLIC ACID/K 18-600-40
CAPSULE ORAL
COMMUNITY

## 2024-06-29 RX ORDER — DIPHENOXYLATE HYDROCHLORIDE AND ATROPINE SULFATE 2.5; .025 MG/1; MG/1
1 TABLET ORAL
COMMUNITY

## 2024-06-29 NOTE — PROGRESS NOTES
EPICSPMNEYESUBJECTIVE:Chief Complaint:   Chief Complaint   Patient presents with    Urgent Care     Left eye infection onset was about  4 days ago       History of Present Illness: Maximino Simeon is a 79 year old female who presents complaining of tender bump on eyelid for few days.  Onset/timing: gradual.   Associated Signs and Symptoms: discomfort   Treatment measures tried include: none   Contact wearer : No    Past Medical History:   Diagnosis Date    Anxiety 2014    CAD (coronary artery disease) 2011    Cancer (H)     ovarian    Colitis     Fibromyalgia     Gastroesophageal reflux disease     Hypothyroidism 2011    Insomnia 2017    Mixed hyperlipidemia 2011    PONV (postoperative nausea and vomiting)     Recurrent major depressive disorder (H) 2010    Urinary retention      Allergies   Allergen Reactions    Adhesive Tape      bandaids    Liquid Adhesive Rash    Penicillins Rash     Social History     Tobacco Use    Smoking status: Never    Smokeless tobacco: Never   Substance Use Topics    Alcohol use: Yes     Comment: socially       ROS:  no fevers  no photophobia, vision change      OBJECTIVE:  BP (!) 142/86   Pulse 91   Temp 99.4  F (37.4  C)   Resp 18   Wt 47.2 kg (104 lb)   LMP  (LMP Unknown)   SpO2 96%   BMI 21.74 kg/m     General: no acute distress  Eye exam: tender enlarged lump eyelid.  PERRLA, no photophobia, conjunctiva clear  Redness around eye      ICD-10-CM    1. Hordeolum externum of left upper eyelid  H00.014 erythromycin (ROMYCIN) 5 MG/GM ophthalmic ointment     doxycycline monohydrate (ADOXA) 100 MG tablet      2. Cellulitis of face  L03.211 doxycycline monohydrate (ADOXA) 100 MG tablet          warms packs, f/u Opthalmology if persists as some sty's need I&D.

## 2024-07-05 ENCOUNTER — ONCOLOGY VISIT (OUTPATIENT)
Dept: ONCOLOGY | Facility: CLINIC | Age: 79
End: 2024-07-05
Attending: FAMILY MEDICINE
Payer: MEDICARE

## 2024-07-05 VITALS
BODY MASS INDEX: 22.24 KG/M2 | SYSTOLIC BLOOD PRESSURE: 158 MMHG | RESPIRATION RATE: 16 BRPM | TEMPERATURE: 97 F | OXYGEN SATURATION: 94 % | WEIGHT: 106.4 LBS | DIASTOLIC BLOOD PRESSURE: 96 MMHG | HEART RATE: 95 BPM

## 2024-07-05 DIAGNOSIS — Z85.43 ENCOUNTER FOR FOLLOW-UP SURVEILLANCE OF OVARIAN CANCER: Primary | ICD-10-CM

## 2024-07-05 DIAGNOSIS — Z08 ENCOUNTER FOR FOLLOW-UP SURVEILLANCE OF OVARIAN CANCER: Primary | ICD-10-CM

## 2024-07-05 LAB — CANCER AG125 SERPL-ACNC: 12 U/ML

## 2024-07-05 PROCEDURE — 36591 DRAW BLOOD OFF VENOUS DEVICE: CPT | Performed by: NURSE PRACTITIONER

## 2024-07-05 PROCEDURE — 86304 IMMUNOASSAY TUMOR CA 125: CPT | Performed by: NURSE PRACTITIONER

## 2024-07-05 PROCEDURE — 99214 OFFICE O/P EST MOD 30 MIN: CPT | Performed by: NURSE PRACTITIONER

## 2024-07-05 PROCEDURE — G0463 HOSPITAL OUTPT CLINIC VISIT: HCPCS | Performed by: NURSE PRACTITIONER

## 2024-07-05 ASSESSMENT — PAIN SCALES - GENERAL: PAINLEVEL: NO PAIN (0)

## 2024-07-05 NOTE — PROGRESS NOTES
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 She was last seen in our clinic 10/2022- states that she did not receive a call to schedule follow up- no other reason behind this.    Her main concern and rationale for being evaluated for recurrent cancer is fatigue- she states she's not sleeping well, having difficulty \"settling my mind.\" She also feels her bowels \"moving around.\" She has colitis and is on budesonide- admits that it has been awhile since GI follow up.    She denies unintentional weight loss, night sweats, adenopathy, difficulty breathing, abdominal pain, bloating, early satiety, urinary concerns, vaginal bleeding, pelvic pain, or new swelling in the legs.     BP is elevated today, states this is due to feeling flustered with traffic.      ONCOLOGY HISTORY:  Initial presentation with enlarged ovary on imaging obtained for appendicitis     54  CA 19-9 15  CEA 2.8     2019: CT abd pelvis: IMPRESSION:      1. Dilated, fluid-filled appendix with mild increased enhancement and slight stranding at its tip is worrisome for acute appendicitis.  2. Large complex cystic pelvic mass with soft tissue nodularity with adjacent prominent left adnexal vessels is worrisome for neoplasm of ovarian origin. No mesenteric nodularity, lymph node enlargement, or other findings that are suspicious for metastatic disease in the abdomen or pelvis.     2019: CT abd pelvis REPRODUCTIVE ORGANS with IV and oral contrast: Again noted is a large complex cystic mass in the pelvis measuring approximately 13.3 x 8.1 x 9.7 cm in size which has areas of mural nodularity and soft tissue density.    1. Again " noted is a thick-walled dilated appendix compatible with appendicitis. There is a questionable new small area of appendiceal wall discontinuity and adjacent 1.2 cm fluid pocket on axial image 57 and sagittal image 23. Findings raise question of a small area of appendiceal wall perforation.    2. Large complex pelvic mass again concerning for ovarian neoplasm.    3. Marked bladder distention. Mild prominence of the urinary collecting systems likely secondary to this.     7/2/2019-7/7/2019: General surgery consulted for perforated appendicitis.  Not septic as no fever.Treated with Cefuroxime and metronidazole (PCN allergy). Surgery recommending postponing appendectomy for about 6 weeks or so. Presenting leukocytosis and fever resolved by discharge.  Will finish oral course of ceftin and flagyl as outpatient.      Per general surgery note:  - No indication for surgery at this time  - Recommend admission to medicine service  - Start IV antibiotics  - Will again need to discuss the timing of appendectomy with Gyn/Onc to allow for evaluation of her pelvic mass simultaneously, this will likely have to be in 4-6 weeks following possible appendix perforation  - Surgery will continue to follow     8/9/19: Exploratory Laparotomy, Total Abdominal Hysterectomy, Left Salpingo-Oopherectomy, Bilateral Pelvic and Para-Aortic Lymph Node Dissection, Peritoneal Biopsy, Diaphraghm Pap Smears.     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     5/20/2020: 10.   8/19/2020:  12.  11/10/2020:  10.  2/10/2021:  8  5/10/2021:  9.     6/18/2021: portacath removal      8/10/2021:  8  11/30/2021:  9     1/17-1/21/22 hospital admission for SBO due to adhesions from prior surgery.      4/5/22:  9  10/21/22:  12        OBJECTIVE:    PHYSICAL EXAM:  BP (!) 158/96 (BP Location: Right arm, Patient Position: Sitting, Cuff Size: Adult Regular)   Pulse 95   Temp 97  F (36.1  C) (Oral)   Resp 16   Wt 48.3 kg (106 lb 6.4 oz)   LMP  (LMP Unknown)   SpO2 94%   BMI 22.24 kg/m       CONSTITUTIONAL: Alert non-toxic appearing female in no acute distress  RESPIRATORY: Respiratory effort unlabored, lungs clear to auscultation  CV/PV: Bilateral lower extremities without edema  GASTROINTESTINAL: Abdomen soft, non-distended, and non-tender to palpation without masses or organomegaly  GENITOURINARY: External genitalia and urethral meatus pink without lesions, masses, or excoriation. Vagina pink and smooth without masses or lesions. Cervix surgically absent. Vaginal cuff without masses or lesions. Bimanual exam reveals no masses or fullness. Rectovaginal exam confirms these findings.  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 If this is within normal limits, would recommend further evaluation with her primary care provider as fatigue is likely multifactorial, and in  her case, I have a low suspicion that it is due to recurrent disease in the absence of any other unexplained symptoms. If  is elevated, would obtain further evaluation with CT CAP.   Continue surveillance every 3 months x2 years (through 1/2022), then every 6 months x3 years (through 1/2025), then annually thereafter with pelvic exam and .    to be drawn with each visit.  Return to clinic in 6 month(s) with Telma Sims CNP for surveillance.   Reviewed signs and symptoms of recurrent disease and when to seek further care.      Genetics:   Performed, panel testing negative for clinically actionable mutations    Health maintenance:   Follow up with PCP for routine cancer screenings, non-gynecologic concerns, and co-morbid conditions    Patient verbalized understanding of and agreement with plan- see AVS for patient instructions      32 minutes spent on date of service on visit, including chart review, face to face visit, documentation, and coordination of care.    LEX Brody, CNP  Division of Gynecologic Oncology

## 2024-07-05 NOTE — PROGRESS NOTES
"Oncology Rooming Note    July 5, 2024 1:55 PM   Maximino Simeon is a 79 year old female who presents for:    Chief Complaint   Patient presents with    Oncology Clinic Visit     Ovarian cancer, unspecified laterality (H)       Initial Vitals: BP (!) 158/96 (BP Location: Right arm, Patient Position: Sitting, Cuff Size: Adult Regular)   Pulse 95   Temp 97  F (36.1  C) (Oral)   Resp 16   Wt 48.3 kg (106 lb 6.4 oz)   LMP  (LMP Unknown)   SpO2 94%   BMI 22.24 kg/m   Estimated body mass index is 22.24 kg/m  as calculated from the following:    Height as of 12/20/23: 1.473 m (4' 10\").    Weight as of this encounter: 48.3 kg (106 lb 6.4 oz). Body surface area is 1.41 meters squared.  No Pain (0) Comment: Data Unavailable   No LMP recorded (lmp unknown). Patient has had a hysterectomy.  Allergies reviewed: Yes  Medications reviewed: Yes    Medications: Medication refills not needed today.  Pharmacy name entered into [facility]:    [id number] [address] [facility] #[id number] [facility] [address] [facility] [address] [place]  [id number] [address] [id number] [address] [facility]    Frailty Screening:   Is the patient here for a new oncology consult visit in cancer care? 2. No      Clinical concerns: no        Martine Abebe CMA              "

## 2024-07-05 NOTE — PATIENT INSTRUCTIONS
Your visit today was with Telma Sims CNP for surveillance.    Plan:  No evidence of disease on today's exam  Getting your tumor marker today  If all is normal, I will recommend you see your primary care provider for further evaluation  Return to clinic in 6 month(s) with Telma Sims CNP for surveillance    SIGNS AND SYMPTOMS OF RECURRENT DISEASE    Symptoms of cancer coming back can vary from person to person and it's important to know your own baseline and health factors that may cause symptoms. The following list includes symptoms that would warrant further investigation with your oncology team.    Vaginal bleeding or spotting  Persistent pelvic, abdominal, or bone pain that is persistent and does not improve over time  New persistent dry cough or shortness of breath at rest without a known cause  Unintentional weight loss  New swelling in the legs  New changes in bowel/bladder patterns  New, persistent bloating/fullness  New, persistent onset of drenching night sweats  New, persistent lumps or bumps in the groin or neck  New severe headaches, tunnel vision, double vision, or seizures  Sudden and persistent fatigue without a known cause, particularly if associated with any of the above symptoms    If you have symptoms that are new, persistent, or concerning to you and have been ongoing for two weeks or longer, please contact your oncology care team.          LEE Grossman [phone]  [facility] [phone]    If you have difficulty reaching triage during off hours, you can call [phone] and ask to speak to the gynecologic oncology resident on call      For urgent questions or concerns, please call rather than send a medical message.

## 2024-07-06 ENCOUNTER — HEALTH MAINTENANCE LETTER (OUTPATIENT)
Age: 79
End: 2024-07-06

## 2024-10-13 ENCOUNTER — APPOINTMENT (OUTPATIENT)
Dept: CT IMAGING | Facility: CLINIC | Age: 79
End: 2024-10-13
Attending: EMERGENCY MEDICINE
Payer: MEDICARE

## 2024-10-13 ENCOUNTER — APPOINTMENT (OUTPATIENT)
Dept: PHYSICAL THERAPY | Facility: CLINIC | Age: 79
End: 2024-10-13
Attending: HOSPITALIST
Payer: MEDICARE

## 2024-10-13 ENCOUNTER — HOSPITAL ENCOUNTER (OUTPATIENT)
Facility: CLINIC | Age: 79
Setting detail: OBSERVATION
Discharge: HOME | End: 2024-10-15
Attending: EMERGENCY MEDICINE | Admitting: HOSPITALIST
Payer: MEDICARE

## 2024-10-13 DIAGNOSIS — H05.232 PERIORBITAL HEMATOMA OF LEFT EYE: ICD-10-CM

## 2024-10-13 DIAGNOSIS — S06.0X9A CONCUSSION WITH LOSS OF CONSCIOUSNESS, INITIAL ENCOUNTER: ICD-10-CM

## 2024-10-13 DIAGNOSIS — R53.1 GENERALIZED WEAKNESS: ICD-10-CM
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
## 2024-10-13 LAB
ANION GAP SERPL CALCULATED.3IONS-SCNC: 16 MMOL/L (ref 7–15)
ATRIAL RATE - MUSE: 88 BPM
BASOPHILS # BLD AUTO: 0 10E3/UL (ref 0–0.2)
BASOPHILS NFR BLD AUTO: 1 %
BUN SERPL-MCNC: 16.8 MG/DL (ref 8–23)
CALCIUM SERPL-MCNC: 10.2 MG/DL (ref 8.8–10.4)
CHLORIDE SERPL-SCNC: 97 MMOL/L (ref 98–107)
CREAT SERPL-MCNC: 0.58 MG/DL (ref 0.51–0.95)
DIASTOLIC BLOOD PRESSURE - MUSE: NORMAL MMHG
EGFRCR SERPLBLD CKD-EPI 2021: 90 ML/MIN/1.73M2
EOSINOPHIL # BLD AUTO: 0.1 10E3/UL (ref 0–0.7)
EOSINOPHIL NFR BLD AUTO: 1 %
ERYTHROCYTE [DISTWIDTH] IN BLOOD BY AUTOMATED COUNT: 13.6 % (ref 10–15)
ETHANOL SERPL-MCNC: <0.01 G/DL
GLUCOSE SERPL-MCNC: 164 MG/DL (ref 70–99)
HCO3 SERPL-SCNC: 27 MMOL/L (ref 22–29)
HCT VFR BLD AUTO: 40.9 % (ref 35–47)
HGB BLD-MCNC: 13.7 G/DL (ref 11.7–15.7)
HOLD SPECIMEN: NORMAL
IMM GRANULOCYTES # BLD: 0.1 10E3/UL
IMM GRANULOCYTES NFR BLD: 1 %
INTERPRETATION ECG - MUSE: NORMAL
LYMPHOCYTES # BLD AUTO: 1.1 10E3/UL (ref 0.8–5.3)
LYMPHOCYTES NFR BLD AUTO: 12 %
MCH RBC QN AUTO: 32.5 PG (ref 26.5–33)
MCHC RBC AUTO-ENTMCNC: 33.5 G/DL (ref 31.5–36.5)
MCV RBC AUTO: 97 FL (ref 78–100)
MONOCYTES # BLD AUTO: 0.5 10E3/UL (ref 0–1.3)
MONOCYTES NFR BLD AUTO: 5 %
NEUTROPHILS # BLD AUTO: 7.2 10E3/UL (ref 1.6–8.3)
NEUTROPHILS NFR BLD AUTO: 81 %
NRBC # BLD AUTO: 0 10E3/UL
NRBC BLD AUTO-RTO: 0 /100
P AXIS - MUSE: 50 DEGREES
PLATELET # BLD AUTO: 243 10E3/UL (ref 150–450)
POTASSIUM SERPL-SCNC: 3.5 MMOL/L (ref 3.4–5.3)
PR INTERVAL - MUSE: 126 MS
QRS DURATION - MUSE: 74 MS
QT - MUSE: 402 MS
QTC - MUSE: 486 MS
R AXIS - MUSE: 17 DEGREES
RBC # BLD AUTO: 4.21 10E6/UL (ref 3.8–5.2)
SODIUM SERPL-SCNC: 140 MMOL/L (ref 135–145)
SYSTOLIC BLOOD PRESSURE - MUSE: NORMAL MMHG
T AXIS - MUSE: 18 DEGREES
TROPONIN T SERPL HS-MCNC: 7 NG/L
TROPONIN T SERPL HS-MCNC: 8 NG/L
VENTRICULAR RATE- MUSE: 88 BPM
WBC # BLD AUTO: 8.9 10E3/UL (ref 4–11)

## 2024-10-13 PROCEDURE — 84484 ASSAY OF TROPONIN QUANT: CPT | Performed by: EMERGENCY MEDICINE

## 2024-10-13 PROCEDURE — 97161 PT EVAL LOW COMPLEX 20 MIN: CPT | Mod: GP

## 2024-10-13 PROCEDURE — G0378 HOSPITAL OBSERVATION PER HR: HCPCS

## 2024-10-13 PROCEDURE — 93005 ELECTROCARDIOGRAM TRACING: CPT

## 2024-10-13 PROCEDURE — G1010 CDSM STANSON: HCPCS

## 2024-10-13 PROCEDURE — 36415 COLL VENOUS BLD VENIPUNCTURE: CPT | Performed by: EMERGENCY MEDICINE

## 2024-10-13 PROCEDURE — 250N000013 HC RX MED GY IP 250 OP 250 PS 637: Performed by: HOSPITALIST

## 2024-10-13 PROCEDURE — 71250 CT THORAX DX C-: CPT | Mod: MG

## 2024-10-13 PROCEDURE — 99223 1ST HOSP IP/OBS HIGH 75: CPT | Mod: AI | Performed by: HOSPITALIST

## 2024-10-13 PROCEDURE — 85025 COMPLETE CBC W/AUTO DIFF WBC: CPT | Performed by: EMERGENCY MEDICINE

## 2024-10-13 PROCEDURE — 80048 BASIC METABOLIC PNL TOTAL CA: CPT | Performed by: EMERGENCY MEDICINE

## 2024-10-13 PROCEDURE — 70486 CT MAXILLOFACIAL W/O DYE: CPT | Mod: MG

## 2024-10-13 PROCEDURE — 97116 GAIT TRAINING THERAPY: CPT | Mod: GP

## 2024-10-13 PROCEDURE — 82077 ASSAY SPEC XCP UR&BREATH IA: CPT | Performed by: EMERGENCY MEDICINE

## 2024-10-13 PROCEDURE — 99285 EMERGENCY DEPT VISIT HI MDM: CPT | Mod: 25

## 2024-10-13 RX ORDER — AMOXICILLIN 250 MG
1 CAPSULE ORAL 2 TIMES DAILY PRN
Status: DISCONTINUED | OUTPATIENT
Start: 2024-10-13 | End: 2024-10-15 | Stop reason: HOSPADM

## 2024-10-13 RX ORDER — ONDANSETRON 4 MG/1
4 TABLET, ORALLY DISINTEGRATING ORAL EVERY 6 HOURS PRN
Status: DISCONTINUED | OUTPATIENT
Start: 2024-10-13 | End: 2024-10-15 | Stop reason: HOSPADM

## 2024-10-13 RX ORDER — FLUOXETINE 40 MG/1
40 CAPSULE ORAL DAILY
COMMUNITY
Start: 2024-08-03

## 2024-10-13 RX ORDER — ONDANSETRON 2 MG/ML
4 INJECTION INTRAMUSCULAR; INTRAVENOUS EVERY 6 HOURS PRN
Status: DISCONTINUED | OUTPATIENT
Start: 2024-10-13 | End: 2024-10-15 | Stop reason: HOSPADM

## 2024-10-13 RX ORDER — NALOXONE HYDROCHLORIDE 0.4 MG/ML
0.4 INJECTION, SOLUTION INTRAMUSCULAR; INTRAVENOUS; SUBCUTANEOUS
Status: DISCONTINUED | OUTPATIENT
Start: 2024-10-13 | End: 2024-10-15 | Stop reason: HOSPADM

## 2024-10-13 RX ORDER — GABAPENTIN 100 MG/1
200 CAPSULE ORAL AT BEDTIME
Status: DISCONTINUED | OUTPATIENT
Start: 2024-10-13 | End: 2024-10-15 | Stop reason: HOSPADM

## 2024-10-13 RX ORDER — NALOXONE HYDROCHLORIDE 0.4 MG/ML
0.2 INJECTION, SOLUTION INTRAMUSCULAR; INTRAVENOUS; SUBCUTANEOUS
Status: DISCONTINUED | OUTPATIENT
Start: 2024-10-13 | End: 2024-10-15 | Stop reason: HOSPADM

## 2024-10-13 RX ORDER — OXYCODONE HYDROCHLORIDE 5 MG/1
5 TABLET ORAL EVERY 4 HOURS PRN
Status: DISCONTINUED | OUTPATIENT
Start: 2024-10-13 | End: 2024-10-15 | Stop reason: HOSPADM

## 2024-10-13 RX ORDER — BUDESONIDE 3 MG/1
3 CAPSULE, COATED PELLETS ORAL EVERY MORNING
Status: DISCONTINUED | OUTPATIENT
Start: 2024-10-13 | End: 2024-10-15 | Stop reason: HOSPADM

## 2024-10-13 RX ORDER — AMOXICILLIN 250 MG
2 CAPSULE ORAL 2 TIMES DAILY PRN
Status: DISCONTINUED | OUTPATIENT
Start: 2024-10-13 | End: 2024-10-15 | Stop reason: HOSPADM

## 2024-10-13 RX ORDER — ACETAMINOPHEN 325 MG/1
975 TABLET ORAL EVERY 8 HOURS PRN
Status: DISCONTINUED | OUTPATIENT
Start: 2024-10-13 | End: 2024-10-13

## 2024-10-13 RX ORDER — LIOTHYRONINE SODIUM 5 UG/1
5 TABLET ORAL EVERY MORNING
Status: DISCONTINUED | OUTPATIENT
Start: 2024-10-13 | End: 2024-10-15 | Stop reason: HOSPADM

## 2024-10-13 RX ORDER — LACTOBACILLUS RHAMNOSUS GG 10B CELL
1 CAPSULE ORAL DAILY
COMMUNITY

## 2024-10-13 RX ORDER — LEVOTHYROXINE SODIUM 50 UG/1
50 TABLET ORAL EVERY MORNING
Status: DISCONTINUED | OUTPATIENT
Start: 2024-10-13 | End: 2024-10-15 | Stop reason: HOSPADM

## 2024-10-13 RX ORDER — ACETAMINOPHEN 325 MG/1
975 TABLET ORAL EVERY 8 HOURS
Status: DISCONTINUED | OUTPATIENT
Start: 2024-10-13 | End: 2024-10-15 | Stop reason: HOSPADM

## 2024-10-13 RX ADMIN — GABAPENTIN 200 MG: 100 CAPSULE ORAL at 21:36

## 2024-10-13 RX ADMIN — BUDESONIDE 3 MG: 3 CAPSULE ORAL at 13:34

## 2024-10-13 RX ADMIN — LEVOTHYROXINE SODIUM 50 MCG: 50 TABLET ORAL at 11:30

## 2024-10-13 RX ADMIN — ACETAMINOPHEN 975 MG: 325 TABLET ORAL at 16:32

## 2024-10-13 RX ADMIN — ACETAMINOPHEN 975 MG: 325 TABLET ORAL at 23:31

## 2024-10-13 RX ADMIN — FLUOXETINE HYDROCHLORIDE 40 MG: 20 CAPSULE ORAL at 11:30

## 2024-10-13 RX ADMIN — ACETAMINOPHEN 975 MG: 325 TABLET ORAL at 08:02

## 2024-10-13 RX ADMIN — LIOTHYRONINE SODIUM 5 MCG: 5 TABLET ORAL at 11:30

## 2024-10-13 ASSESSMENT — ACTIVITIES OF DAILY LIVING (ADL)
ADLS_ACUITY_SCORE: 38
ADLS_ACUITY_SCORE: 38
HEARING_DIFFICULTY_OR_DEAF: YES
ADLS_ACUITY_SCORE: 38
ADLS_ACUITY_SCORE: 37
ADLS_ACUITY_SCORE: 37
USE_OF_HEARING_ASSISTIVE_DEVICES: RIGHT HEARING AID;LEFT HEARING AID
DIFFICULTY_EATING/SWALLOWING: NO
DRESSING/BATHING: BATHING DIFFICULTY, REQUIRES EQUIPMENT;BATHING DIFFICULTY, ASSISTANCE 1 PERSON;DRESSING DIFFICULTY, ASSISTANCE 1 PERSON
FALL_HISTORY_WITHIN_LAST_SIX_MONTHS: YES
VISION_MANAGEMENT: READING
ADLS_ACUITY_SCORE: 38
CONCENTRATING,_REMEMBERING_OR_MAKING_DECISIONS_DIFFICULTY: NO
ADLS_ACUITY_SCORE: 38
EQUIPMENT_CURRENTLY_USED_AT_HOME: WALKER, ROLLING
TOILETING_ISSUES: NO
ADLS_ACUITY_SCORE: 38
WERE_AUXILIARY_AIDS_OFFERED?: NO
ADLS_ACUITY_SCORE: 38
NUMBER_OF_TIMES_PATIENT_HAS_FALLEN_WITHIN_LAST_SIX_MONTHS: 1
ADLS_ACUITY_SCORE: 37
DIFFICULTY_COMMUNICATING: NO
ADLS_ACUITY_SCORE: 37
ADLS_ACUITY_SCORE: 38
CHANGE_IN_FUNCTIONAL_STATUS_SINCE_ONSET_OF_CURRENT_ILLNESS/INJURY: NO
WALKING_OR_CLIMBING_STAIRS_DIFFICULTY: OTHER (SEE COMMENTS)
WEAR_GLASSES_OR_BLIND: YES
ADLS_ACUITY_SCORE: 38
DRESSING/BATHING_DIFFICULTY: YES
ADLS_ACUITY_SCORE: 38
DOING_ERRANDS_INDEPENDENTLY_DIFFICULTY: YES
ADLS_ACUITY_SCORE: 38
ADLS_ACUITY_SCORE: 38
ADLS_ACUITY_SCORE: 37
ADLS_ACUITY_SCORE: 38

## 2024-10-13 ASSESSMENT — COLUMBIA-SUICIDE SEVERITY RATING SCALE - C-SSRS
2. HAVE YOU ACTUALLY HAD ANY THOUGHTS OF KILLING YOURSELF IN THE PAST MONTH?: NO
1. IN THE PAST MONTH, HAVE YOU WISHED YOU WERE DEAD OR WISHED YOU COULD GO TO SLEEP AND NOT WAKE UP?: NO
6. HAVE YOU EVER DONE ANYTHING, STARTED TO DO ANYTHING, OR PREPARED TO DO ANYTHING TO END YOUR LIFE?: NO

## 2024-10-13 NOTE — PHARMACY-ADMISSION MEDICATION HISTORY
Pharmacist Admission Medication History    Admission medication history is complete. The information provided in this note is only as accurate as the sources available at the time of the update.    Information Source(s): Patient and CareEverywhere/SureScripts via in-person    Pertinent Information: Pt is hard of hearing, but was able to visually read list and review with me.    Changes made to PTA medication list:  Added: None  Deleted: remeron, lorazepam, imodium, cymbalta, prolia  Changed: vit c, calcium, tylenol pm, prozac, tac    Allergies reviewed with patient and updates made in EHR: yes    Medication History Completed By: Shira Laird RPH 10/13/2024 10:27 AM    PTA Med List   Medication Sig Last Dose    Ascorbic Acid (VITAMIN C) 500 MG CAPS Take 500 mg by mouth daily. 10/12/2024    budesonide (ENTOCORT EC) 3 MG EC capsule Take 3 mg by mouth every morning. 10/12/2024    calcium carbonate (OS-LUCRECIA) 1500 (600 Ca) MG tablet Take 1,200 mg by mouth 3 times daily (with meals). 10/12/2024    COLLAGEN PO Take by mouth daily. Collagen peptide 10/12/2024    diphenhydrAMINE-acetaminophen (TYLENOL PM)  MG tablet Take 2 tablets by mouth at bedtime. 10/11/2024    FLUoxetine (PROZAC) 40 MG capsule Take 40 mg by mouth daily. 10/12/2024    gabapentin (NEURONTIN) 100 MG capsule Take 200 mg by mouth at bedtime. 10/11/2024    Glutathione 50 MG TABS Take 50 mg by mouth daily 10/12/2024    hypromellose-dextran (ARTIFICAL TEARS) 0.1-0.3 % ophthalmic solution Place 1 drop into both eyes 4 times daily as needed for dry eyes. Unknown    lactobacillus rhamnosus, GG, (CULTURELL) capsule Take 1 capsule by mouth daily. 10/12/2024    levothyroxine (SYNTHROID/LEVOTHROID) 50 MCG tablet Take 50 mcg by mouth every morning  10/12/2024    liothyronine (CYTOMEL) 5 MCG tablet Take 5 mcg by mouth every morning  10/12/2024    multivitamin w/minerals (THERA-VIT-M) tablet Take 1 tablet by mouth daily 10/12/2024    triamcinolone (KENALOG) 0.1 %  external cream Apply topically daily. Past Week    Vitamin D3 (CHOLECALCIFEROL) 125 MCG (5000 UT) tablet Take 125 mcg by mouth daily 10/12/2024

## 2024-10-13 NOTE — ED TRIAGE NOTES
Triage Assessment (Adult)       Row Name 10/13/24 0156          Triage Assessment    Airway WDL WDL        Respiratory WDL    Respiratory WDL WDL        Skin Circulation/Temperature WDL    Skin Circulation/Temperature WDL X  Muliple bruises on HA and R hand. Skin tear on L elbow        Cardiac WDL    Cardiac WDL WDL        Peripheral/Neurovascular WDL    Peripheral Neurovascular WDL WDL        Cognitive/Neuro/Behavioral WDL    Cognitive/Neuro/Behavioral WDL WDL

## 2024-10-13 NOTE — ED TRIAGE NOTES
Pt BIBA from home. Pt c/o fall down the stairs around 1800. Pt admits to LOC. Denies blood thinners. Pt has Large hematoma on L eye, skin tear on L arm and bruise on R hand. Pt endorses HA. Per EMS pt HTN,  and 4mg zofran given.

## 2024-10-13 NOTE — PROGRESS NOTES
PRIMARY Concern: Fall, loss of consciousness.   SAFETY RISK Concerns (fall risk, behaviors, etc.): Fall risk.       Tests/Procedures for NEXT shift: AM labs.   Consults? (Pending/following, signed-off?) PT/OT consulted.   Where is patient from? (Home, TCU, etc.): Home.   Other Important info for NEXT shift:   Anticipated DC date & active delays: TBD, PT recommends home with assist.   _____________________________________________________________________________  SUMMARY NOTE:            Orientation/Cognitive: A/O X4.  Observation Goals (Met/ Not Met): Not met.   Mobility Level/Assist Equipment: Up to BR with AX1, saw PT.   Antibiotics & Plan (IV/po, length of tx left): None.   Pain Management: Headache managed with Tylenol.   Tele/VS/O2: SR  ABNL Lab/BG: See chart.   Diet:  Regular.   Bowel/Bladder: Continent.   Skin Concerns: Multiple bruises, facial one started around left eye but spreading across face.    Drains/Devices:  PIV, SL.   Patient Stated Goal for Today: feel better.

## 2024-10-13 NOTE — H&P
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 who presented to the hospital after fall and diagnosed with the left facial hematoma and black eye and admitted to the hospital on 10/13/2024    Fall  Loss of consciousness  -Patient presented after a fall with loss of consciousness and unclear etiology whether any cardiac event happened or not  -Alcohol negative  -Patient denies any recent fever, chills, palpitation or any chest discomfort but does have chest discomfort over the left chest after the fall  -EKG showed sinus rhythm  -Troponin x 2 is negative  -CT head-no acute intracranial process and large left frontal/periorbital hematoma with no fracture  -Cervical spine CT-no evidence of any acute fracture or posttraumatic subluxation  -CT chest-trace scarring in the bases, no pneumothorax, pleural effusion, rib fracture, pericardial effusion  -CT facial-no facial bone fracture or malalignment  -Physical therapy, Occupational Therapy and case management consult  -Cardiac monitor  -Echocardiogram  -Check orthostatic  -Fall precaution  -Local icing around the left black eye 4 times daily  -Patient is able to eat and drink and encouraged oral hydration and IV fluids are in shortage  -Continue pain control with scheduled Tylenol 975 mg Q8 and as needed oxycodone is also available    Hypothyroidism  -Continue with PTA home medications including levothyroxine and Cytomel when verified by the pharmacy team    History of ovarian cancer in remission  -Noted    History of 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  He said that he will be willing to take mom home if indicated.          Diet:  Regular diet  DVT Prophylaxis: Pneumatic Compression Devices  Crews Catheter: Not present  Lines: None     Cardiac Monitoring: None  Code Status: No CPR- Do NOT Intubate.  I did talk to the patient and these are her wishes    Clinically Significant Risk Factors Present on Admission          # Hypochloremia: Lowest Cl = 97 mmol/L in last 2 days, will monitor as appropriate                             Disposition Plan     Medically Ready for Discharge: Anticipated Tomorrow, depending on pain control, physical therapy and Occupational Therapy evaluation and likely syncopal evaluation           Susan Baeza MD  Hospitalist Service  [facility]  Securely message with [employer] (more info)  Text page via [employer] Paging/Directory     This note was completed in part using dictation via the Dragon voice recognition software. Some word and grammatical errors may occur and must be interpreted in the appropriate clinical context. If there are any questions pertaining to this issue, please contact me for further clarification.      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    Patient did mention that she had 1 alcoholic beverage and she was going down the stairs and was at the bottom and she fell and hit her head and on the left side of the body and she does not recollect how she fell.  She did think that she might have lost consciousness.  Patient mentioned that after the fall she had pain over her arm and also she noticed that her left eye was black.  Patient did mention that she has not had any recent fever or chills or headache.  She denies any neck pain.  She denied any abdominal discomfort.  She denied any chest discomfort or shortness of breath.    Lab work done in the ED showed sodium of 140, potassium of 3.5, chloride of 97, bicarb of 27, BUN of 16.8 with creatinine of 0.58 and anion gap of 16, glucose of 164, initial high sensitive troponin of 7.  WBC count of 8.9, hemoglobin of 13.7 with platelet count of 243.  EKG was done which showed normal sinus rhythm.  Alcohol level was negative    CT head-no acute intracranial process and large left frontal/periorbital hematoma with no fracture  Cervical spine CT-no evidence of any acute fracture or posttraumatic subluxation  CT chest-trace scarring in the bases, no pneumothorax, pleural effusion, rib fracture, pericardial effusion  CT facial-no facial bone fracture or malalignment    ED physician mentioned that patient hematoma can be treated with icing but the reason they admitted the patient is unclear circumstances surrounding the fall and given loss of consciousness .patient also feels pretty weak.  Patient will be admitted to the hospital for observation          Past Medical History    Past Medical History:   Diagnosis Date    Anxiety 2014    CAD (coronary artery disease) 2011    Cancer (H)     ovarian    Colitis     Fibromyalgia      Gastroesophageal reflux disease     Hypothyroidism 2011    Insomnia 2017    Mixed hyperlipidemia 2011    PONV (postoperative nausea and vomiting)     Recurrent major depressive disorder (H) 2010    Urinary retention        Past Surgical History   Past Surgical History:   Procedure Laterality Date    APPENDECTOMY OPEN N/A 8/9/2019    Procedure: Open Appendectomy;  Surgeon: Parvez Abreu MD;  Location: [facility] OR    BREAST SURGERY      biopsy    GYN SURGERY      HYSTERECTOMY TOTAL ABDOMINAL, BILATERAL SALPINGO-OOPHORECTOMY, NODE DISSECTION, COMBINED Bilateral 8/9/2019    Procedure: Exploratory Laparotomy, Total Abdominal Hysterectomy, Left Salpingo-Oopherectomy, Bilateral Pelvic and Para-Aortic Lymph Node Dissection, Peritoneal Biopsy, Diaphraghm Pap Smears.;  Surgeon: Naz Ascencio MD;  Location: [facility] OR    IR CHEST PORT PLACEMENT > 5 YRS OF AGE  8/30/2019    IR PORT REMOVAL RIGHT  6/18/2021    OOPHORECTOMY  1992       Prior to Admission Medications   Prior to Admission Medications   Prescriptions Last Dose Informant Patient Reported? Taking?   Ascorbic Acid (VITAMIN C) 500 MG CAPS   Yes No   COLLAGEN PO   Yes No   Sig: Take by mouth daily. Collagen peptide   DULoxetine (CYMBALTA) 20 MG capsule  Self Yes No   Sig: Take by mouth daily 1/8/2022 - Take 40 mg daily for 2 weeks, then 20 mg daily x 2 weeks.   FLUoxetine (PROZAC) 10 MG capsule  Self Yes No   Sig: Take 10 mg by mouth daily   Glutathione 50 MG TABS  Self Yes No   Sig: Take 50 mg by mouth daily   Glutathione 500 MG CAPS   Yes No   Sig: Take 50 mg by mouth daily   Patient not taking: Reported on 7/5/2024   LORazepam (ATIVAN) 0.5 MG tablet  Self Yes No   Sig: Take 0.25-0.5 mg by mouth every 8 hours as needed for anxiety   Patient not taking: Reported on 7/5/2024   Multiple Vitamin (MULTI-VITAMINS) TABS   Yes No   Sig: Take 1 tablet by mouth   Vitamin D3 (CHOLECALCIFEROL) 125 MCG (5000 UT) tablet  Self Yes No   Sig: Take 125 mcg by mouth daily    budesonide (ENTOCORT EC) 3 MG EC capsule  Self Yes No   Sig: Take by mouth daily 2022 - 9 mg daily x 56 days, then 6 mg daily x 14 days, then 3 mg daily.   calcium carbonate (OS-LUCRECIA) 1500 (600 Ca) MG tablet  Self Yes No   Sig: Take 1,200 mg by mouth 2 times daily (with meals)   denosumab (PROLIA) 60 MG/ML SOSY injection  Self Yes No   Sig: Inject 60 mg Subcutaneous every 6 months   diphenhydrAMINE-APAP, sleep,  MG/30ML LIQD   Yes No   Si,000 mg   gabapentin (NEURONTIN) 100 MG capsule   Yes No   Sig: Take 1 tab nightly and if pain is not improving after 1 week then take 2 tabs nightly.   hypromellose-dextran (ARTIFICAL TEARS) 0.1-0.3 % ophthalmic solution  Self Yes No   Sig: Place 1 drop into both eyes 4 times daily as needed for dry eyes   Patient not taking: Reported on 2024   ibuprofen (ADVIL/MOTRIN) 600 MG tablet   No No   Sig: Take 1 tablet (600 mg) by mouth every 6 hours as needed for moderate pain   Patient not taking: Reported on 2024   lactobacillus rhamnosus, GG, (CULTURELL) capsule  Self Yes No   Sig: Take 1 capsule by mouth daily   Patient not taking: Reported on 2024   levothyroxine (SYNTHROID/LEVOTHROID) 50 MCG tablet  Self Yes No   Sig: Take 50 mcg by mouth every morning    liothyronine (CYTOMEL) 5 MCG tablet  Self Yes No   Sig: Take 5 mcg by mouth every morning    loperamide (IMODIUM) 2 MG capsule  Self Yes No   Sig: Take 2 mg by mouth daily as needed for diarrhea   Patient not taking: Reported on 2024   mirtazapine (REMERON) 7.5 MG tablet  Self Yes No   Sig: Take 7.5 mg by mouth at bedtime   multivitamin w/minerals (THERA-VIT-M) tablet  Self Yes No   Sig: Take 1 tablet by mouth daily   ondansetron (ZOFRAN ODT) 4 MG ODT tab   No No   Sig: Take 1 tablet (4 mg) by mouth every 6 hours as needed for nausea or vomiting   Patient not taking: Reported on 2024   triamcinolone (KENALOG) 0.1 % external cream  Self Yes No   Sig: Apply topically 2 times daily       Facility-Administered Medications: None           Physical Exam   Vital Signs: Temp: 97.7  F (36.5  C) Temp src: Oral BP: (!) 144/74 Pulse: 94   Resp: 16 SpO2: 95 % O2 Device: None (Room air)    Weight: 0 lbs 0 oz        General: AO x 2 to 3  HEENT: Head is atraumatic, normocephalic.  Pupils are equal, round and reactive to light.  No scleral icterus. Oral mucosa is moist and there is left eye hematoma and hard of hearing  Neck: Neck is supple  Respiratory: Lungs are clear to auscultation bilaterally with no wheeze or crackles   Cardiovascular: Regular rate , S1 and S2 normal with no murmer or rubs or gallops  Abdomen:   soft , non tender , non distended and bowel sound present   Skin: Skin breakdown over the arm  Neurologic: No apparent focal deficit  Musculoskeletal: Normal Range of motion over upper and lower extremities bilaterally   Psychiatric: cooperative      Medical Decision Making       Time spent in care of patient is 75  minutes and I reviewed labs including CBC, basic metabolic panel, CT scan of the head, CT facial, CT cervical spine, CT chest and EKG and discussed plan of care in detail with the patient, nursing staff, Dr. Brown from ED and patient was in agreement with treatment plan      Data     I have personally reviewed the following data over the past 24 hrs:    8.9  \   13.7   / 243     140 97 (L) 16.8 /  164 (H)   3.5 27 0.58 \       Imaging results reviewed over the past 24 hrs:   Recent Results (from the past 24 hour(s))   Head CT w/o contrast    Narrative    EXAM: CT HEAD W/O CONTRAST, CT CERVICAL SPINE W/O CONTRAST  LOCATION: [facility]  DATE: 10/13/2024    INDICATION: Fall, head trauma  COMPARISON: None.  TECHNIQUE:   1) Routine CT Head without IV contrast. Multiplanar reformats. Dose reduction techniques were used.  2) Routine CT Cervical Spine without IV contrast. Multiplanar reformats. Dose reduction techniques were used.    FINDINGS:   HEAD CT:    INTRACRANIAL CONTENTS: No intracranial hemorrhage, extraaxial collection, or mass effect.  No CT evidence of acute infarct. Mild to moderate presumed chronic small vessel ischemic changes. Mild to moderate generalized volume loss. No hydrocephalus.     VISUALIZED ORBITS/SINUSES/MASTOIDS: No intraorbital abnormality. No paranasal sinus mucosal disease. No middle ear or mastoid effusion.    BONES/SOFT TISSUES: Large left frontal/periorbital hematoma. No fracture.    CERVICAL SPINE CT:   VERTEBRA: Normal vertebral body heights and alignment. No fracture or posttraumatic subluxation.     CANAL/FORAMINA: No canal or neural foraminal stenosis.    PARASPINAL: No extraspinal abnormality. Visualized lung fields are clear.      Impression    IMPRESSION:  HEAD CT:  1.  No acute intracranial process.  2.  Large left frontal/periorbital hematoma. No fracture.    CERVICAL SPINE CT:  No CT evidence for acute fracture or post traumatic subluxation.     CT Cervical Spine w/o Contrast    Narrative    EXAM: CT HEAD W/O CONTRAST, CT CERVICAL SPINE W/O CONTRAST  LOCATION: [facility]  DATE: 10/13/2024    INDICATION: Fall, head trauma  COMPARISON: None.  TECHNIQUE:   1) Routine CT Head without IV contrast. Multiplanar reformats. Dose reduction techniques were used.  2) Routine CT Cervical Spine without IV contrast. Multiplanar reformats. Dose reduction techniques were used.    FINDINGS:   HEAD CT:   INTRACRANIAL CONTENTS: No intracranial hemorrhage, extraaxial collection, or mass effect.  No CT evidence of acute infarct. Mild to moderate presumed chronic small vessel ischemic changes. Mild to moderate generalized volume loss. No hydrocephalus.     VISUALIZED ORBITS/SINUSES/MASTOIDS: No intraorbital abnormality. No paranasal sinus mucosal disease. No middle ear or mastoid effusion.    BONES/SOFT TISSUES: Large left frontal/periorbital hematoma. No fracture.    CERVICAL SPINE CT:   VERTEBRA: Normal vertebral body  heights and alignment. No fracture or posttraumatic subluxation.     CANAL/FORAMINA: No canal or neural foraminal stenosis.    PARASPINAL: No extraspinal abnormality. Visualized lung fields are clear.      Impression    IMPRESSION:  HEAD CT:  1.  No acute intracranial process.  2.  Large left frontal/periorbital hematoma. No fracture.    CERVICAL SPINE CT:  No CT evidence for acute fracture or post traumatic subluxation.     Chest CT w/o contrast    Narrative    EXAM: CT CHEST WITHOUT CONTRAST  LOCATION: [facility]  DATE: 10/13/2024    INDICATION: Fall, chest wall pain, rule out fracture.  COMPARISON: CT chest, abdomen and pelvis with IV contrast 01/20/2020.  TECHNIQUE: CT chest without IV contrast. Multiplanar reformats were obtained. Dose reduction techniques were used.  CONTRAST: None.    FINDINGS:   LUNGS AND PLEURA: Trace scarring in the apices. Slight platelike atelectasis in the lower lungs. Borderline tubular bronchiectasis in the lower lungs. No pneumothorax or pleural effusion on either side.    MEDIASTINUM/AXILLAE: Normal cardiac size. Mild coronary artery calcifications. No pericardial effusion. Normal caliber thoracic aorta. No aneurysm. No suspicious adenopathy. Trachea is midline.    CORONARY ARTERY CALCIFICATION: Mild.    UPPER ABDOMEN: No significant finding.    MUSCULOSKELETAL: No acute rib fractures. Degenerative changes both shoulders and the thoracic spine. Sebaceous cyst ventral chest wall fat measuring 2.0 x 1.6 cm (image 42, series 3), slightly more apparent on current study. Tiny benign calcification in   the right subscapularis muscle, unchanged.      Impression    IMPRESSION:   1.  Trace scarring in the apices. Slight platelike atelectasis in the lower lungs. Borderline tubular bronchiectasis in the lower lungs. No pneumothorax or pleural effusion on either side.    2.  No acute rib fractures. Degenerative changes both shoulders and the spine. Sebaceous cyst  ventral chest wall fat, slightly more apparent on current study.    3.  Normal cardiac size. Mild coronary artery calcification. No pericardial effusion. Normal caliber thoracic aorta. No mediastinal hematoma.       CT Facial Bones without Contrast    Narrative    EXAM: CT FACIAL BONES WITHOUT CONTRAST  LOCATION: [facility]  DATE: 10/13/2024    INDICATION: Fall, facial trauma, rule out fracture  COMPARISON: None.  TECHNIQUE: Routine CT Maxillofacial without IV contrast. Multiplanar reformats. Dose reduction techniques were used.     FINDINGS:  OSSEOUS STRUCTURES/SOFT TISSUES: Prominent left frontal scalp, left periorbital and left facial soft tissue contusion. No facial bone fracture or malalignment.     ORBITAL CONTENTS: No acute abnormality.    SINUSES: Mild mucosal thickening scattered about the paranasal sinuses.    VISUALIZED INTRACRANIAL CONTENTS: No acute abnormality.       Impression    IMPRESSION:   1.  No facial bone fracture or malalignment.

## 2024-10-13 NOTE — PROGRESS NOTES
Observation goals  PRIOR TO DISCHARGE        Comments: -diagnostic tests and consults completed and resulted-Not met, PT/OT, CM consults in.   -vital signs normal or at patient baseline-Met.   -tolerating oral intake to maintain hydration-Met.   -adequate pain control on oral analgesics-Met.   -returns to baseline functional status-Not met.   -safe disposition plan has been identified-Not met.   Echo, pt, ot  Nurse to notify provider when observation goals have been met and patient is ready for discharge.

## 2024-10-13 NOTE — PROGRESS NOTES
Admission/Transfer from: ED  2 RN skin assessment completed. Yes  Significant findings include: Scattered bruises, abrasion to left elbow.   WOC Nurse Consult Ordered? No

## 2024-10-13 NOTE — ED PROVIDER NOTES
Emergency Department Note      History of Present Illness     Chief Complaint   Fall      HPI   Maximino Simeon is a 79 year old female with a history of hypertension, SBO, hyperlipidemia, and CAD who presents for an evaluation of a fall. The patient reports being at the bottom of the stairs and is unsure of how she fell.  Patient woke up on the floor. She indicates that she hit her head during the fall, landed primarily on the left side of her body. Due to the fall, the patient developed a large hematoma to the left eye, and sustained a small skin tear on the left arm and a bruise on the right arm. She indicates that she lives alone and independently, and was able to call 911 following the fall. Currently endorses a minor headache. Denies neck pain, back pain, belly pain, difficulty breathing, and prior history of heart problems. Per the triage note, the EMS measured the patient as hypertensive, with a blood sugar level of 148. The EMS administered 4 mg Zofran en route. Notably, the patient endorses having 1 alcoholic beverage last evening.      Independent Historian   None    Review of External Notes   N/A    Past Medical History     Medical History and Problem List   Hypertension  Osteoporosis  SBO  Polyneuropathy associated with underlying disease  Acute appendicitis with localized peritonitis without abscess  Chemotherapy-induced neutropenia  Coronary Atherosclerosis  Fibromyalgia  Hashimoto's disease  MDD  Hyperlipidemia  Vitiligo  Ovarian Cancer, Unspecified Laterality  CAD    Medications   Budesonide  Fluoxetine HCI  Levothyroxine  Liothyronine Sodium  Glutathione  Estazolam    Surgical History   Oophorectomy  Breast Biopsy  Hysterectomy total abdominal, bilateral salpingo-oophorectomy, node dissection, combined  Appendectomy open  IR Chest Port Placement  IR Port Removal Right  Cataract extraction by phacoemulsification, intraocular lens implant  Gingival Flap Surgery  Wound Debridement  Physical Exam      Patient Vitals for the past 24 hrs:   BP Temp Temp src Pulse Resp SpO2   10/13/24 0540 (!) 144/74 -- -- -- -- 95 %   10/13/24 0300 -- -- -- -- -- 93 %   10/13/24 0150 (!) 175/93 97.7  F (36.5  C) Oral 94 16 92 %     Physical Exam  General: Alert and cooperative with exam. Patient in mild distress. Normal mentation.  Hard of hearing  Head:  Large left eye periorbital hematoma  Eyes:  No scleral icterus, PERRL, EOMI  ENT:  The external nose and ears are normal. The oropharynx is normal and without erythema; mucus membranes are moist. Uvula midline, no evidence of deep space infection.  Neck:  Normal range of motion without rigidity.  CV:  Regular rate and rhythm  Resp:  Breath sounds are clear bilaterally    Non-labored, no retractions or accessory muscle use  GI:  Abdomen is soft, no distension, no tenderness. No peritoneal signs  MS:  No lower extremity edema   Skin:  Warm and dry, small skin tear to the left elbow and small hematoma to the dorsal right hand.  Neuro: Oriented x 3. No gross motor deficits.  CN II through XII intact      Diagnostics     Lab Results   Labs Ordered and Resulted from Time of ED Arrival to Time of ED Departure   BASIC METABOLIC PANEL - Abnormal       Result Value    Sodium 140      Potassium 3.5      Chloride 97 (*)     Carbon Dioxide (CO2) 27      Anion Gap 16 (*)     Urea Nitrogen 16.8      Creatinine 0.58      GFR Estimate 90      Calcium 10.2      Glucose 164 (*)    ETHYL ALCOHOL LEVEL - Normal    Alcohol ethyl <0.01     CBC WITH PLATELETS AND DIFFERENTIAL    WBC Count 8.9      RBC Count 4.21      Hemoglobin 13.7      Hematocrit 40.9      MCV 97      MCH 32.5      MCHC 33.5      RDW 13.6      Platelet Count 243      % Neutrophils 81      % Lymphocytes 12      % Monocytes 5      % Eosinophils 1      % Basophils 1      % Immature Granulocytes 1      NRBCs per 100 WBC 0      Absolute Neutrophils 7.2      Absolute Lymphocytes 1.1      Absolute Monocytes 0.5      Absolute Eosinophils  0.1      Absolute Basophils 0.0      Absolute Immature Granulocytes 0.1      Absolute NRBCs 0.0     TROPONIN T, HIGH SENSITIVITY       Imaging   CT Facial Bones without Contrast   Final Result   IMPRESSION:    1.  No facial bone fracture or malalignment.         Chest CT w/o contrast   Final Result   IMPRESSION:    1.  Trace scarring in the apices. Slight platelike atelectasis in the lower lungs. Borderline tubular bronchiectasis in the lower lungs. No pneumothorax or pleural effusion on either side.      2.  No acute rib fractures. Degenerative changes both shoulders and the spine. Sebaceous cyst ventral chest wall fat, slightly more apparent on current study.      3.  Normal cardiac size. Mild coronary artery calcification. No pericardial effusion. Normal caliber thoracic aorta. No mediastinal hematoma.            Head CT w/o contrast   Final Result   IMPRESSION:   HEAD CT:   1.  No acute intracranial process.   2.  Large left frontal/periorbital hematoma. No fracture.      CERVICAL SPINE CT:   No CT evidence for acute fracture or post traumatic subluxation.         CT Cervical Spine w/o Contrast   Final Result   IMPRESSION:   HEAD CT:   1.  No acute intracranial process.   2.  Large left frontal/periorbital hematoma. No fracture.      CERVICAL SPINE CT:   No CT evidence for acute fracture or post traumatic subluxation.             EKG   ECG taken at 0231, ECG read at 0236  Normal Sinus Rhythm  Possible Left atrial enlargement  Low Voltage QRS  Nonspecific T wave abnormality  Prolonged QT  Abnormal ECG   No Significant Change as compared to prior, dated 11/16/2019.  Rate 88 bpm. LA interval 126 ms. QRS duration 74 ms. QT/QTc 402/486 ms. P-R-T axes 50 17 18.    Independent Interpretation   CT Head: No intracranial hemorrhage.    ED Course      Medications Administered   Medications   acetaminophen (TYLENOL) tablet 975 mg (has no administration in time range)       Procedures   Procedures     Discussion of  Management   Admitting Hospitalist, Dr. Baeza    ED Course   ED Course as of 10/13/24 0725   Sun Oct 13, 2024   0153 I obtained the history and evaluated the patient as noted above.    0643 I rechecked and updated the patient.        Additional Documentation  None    Medical Decision Making / Diagnosis     CMS Diagnoses: None    MIPS       None    Maximino Simeon is a 79 year old female who presents with a history and clinical exam consistent with fall and associated head injury.  The cause of patient's fall is undetermined; differential includes syncope, seizure, mechanical fall, concussion, arrhythmia.labs, EKG, and imaging was determined.  On exam patient is noted to have large left eye periorbital hematoma.  CT imaging of the face, head, cervical spine, and chest were without significant acute pathology as noted above.  No additional intervention needed for periorbital hematoma; recommend continued icing and Tylenol for pain.  Patient likely sustained concussion.  No other significant trauma on head to toe exam.  Patient able to stand and ambulate in the emergency department.  Patient has no signs at this point of an acute stroke, PE, dissection, acute coronary syndrome.  Labs notable for undetectable alcohol level and mildly elevated anion gap (16).  EKG without evidence of acute ischemia, infarction, significant arrhythmia or significant change from previous.  Given lack of a clear etiology, patients age, generalized weakness, and concern for ability to safely discharge the patient from the emergency department this morning, will admit patient to observation for further evaluation on telemetry to the medicine team.        Disposition   The patient was admitted to the hospital.     Diagnosis     ICD-10-CM    1. Periorbital hematoma of left eye  H05.232       2. Generalized weakness  R53.1       3. Concussion with loss of consciousness, initial encounter  S06.0X9A       4. Fall, initial encounter  W19.XXXA             Scribe Disclosure:  I, Munir Paranpedro, am serving as a scribe at 1:53 AM on 10/13/2024 to document services personally performed by Richi Brown DO based on my observations and the provider's statements to me.       Richi Brown,   10/13/24 0731

## 2024-10-13 NOTE — ED NOTES
..  [facility]  ED Nurse Handoff Report    ED Chief complaint: Fall      ED Diagnosis:   Final diagnoses:   Periorbital hematoma of left eye   Generalized weakness   Concussion with loss of consciousness, initial encounter   Fall, initial encounter       Code Status: MD to discuss    Allergies:   Allergies   Allergen Reactions    Adhesive Tape      bandaids    Liquid Adhesive Rash    Penicillins Rash       Patient Story:   Pt BIBA from home. Pt c/o fall down the stairs around 1800. Pt admits to LOC. Denies blood thinners. Pt has Large hematoma on L eye, skin tear on L arm and bruise on R hand. Pt endorses HA. Per EMS pt HTN,  and 4mg zofran given.      Focused Assessment:  See above    Treatments and/or interventions provided: labs, CT  Patient's response to treatments and/or interventions: WNL    To be done/followed up on inpatient unit:  NA    Does this patient have any cognitive concerns?: Forgetful    Activity level - Baseline/Home:  Independent  Activity Level - Current:   Stand with Assist and Walker    Patient's Preferred language: English   Needed?: No    Isolation: None  Infection: Not Applicable  Patient tested for COVID 19 prior to admission: NO  Bariatric?: No    Vital Signs:   Vitals:    10/13/24 0150 10/13/24 0300 10/13/24 0540   BP: (!) 175/93  (!) 144/74   Pulse: 94     Resp: 16     Temp: 97.7  F (36.5  C)     TempSrc: Oral     SpO2: 92% 93% 95%       Cardiac Rhythm:     Was the PSS-3 completed:   Yes  What interventions are required if any?    ]    For the majority of the shift this patient's behavior was Green.   Behavioral interventions performed were None.    ED NURSE PHONE NUMBER: [phone]

## 2024-10-13 NOTE — ED NOTES
Bed: ED26  Expected date: 10/13/24  Expected time: 1:44 AM  Means of arrival: Ambulance  Comments:  [address] 79F fall down stairs 1900; face and arm pain

## 2024-10-14 ENCOUNTER — APPOINTMENT (OUTPATIENT)
Dept: PHYSICAL THERAPY | Facility: CLINIC | Age: 79
End: 2024-10-14
Payer: MEDICARE

## 2024-10-14 ENCOUNTER — APPOINTMENT (OUTPATIENT)
Dept: CARDIOLOGY | Facility: CLINIC | Age: 79
End: 2024-10-14
Attending: HOSPITALIST
Payer: MEDICARE

## 2024-10-14 ENCOUNTER — APPOINTMENT (OUTPATIENT)
Dept: OCCUPATIONAL THERAPY | Facility: CLINIC | Age: 79
End: 2024-10-14
Attending: HOSPITALIST
Payer: MEDICARE

## 2024-10-14 LAB
ERYTHROCYTE [DISTWIDTH] IN BLOOD BY AUTOMATED COUNT: 13.8 % (ref 10–15)
HCT VFR BLD AUTO: 42.5 % (ref 35–47)
HGB BLD-MCNC: 13.7 G/DL (ref 11.7–15.7)
HOLD SPECIMEN: 0
LVEF ECHO: NORMAL
MCH RBC QN AUTO: 31.8 PG (ref 26.5–33)
MCHC RBC AUTO-ENTMCNC: 32.2 G/DL (ref 31.5–36.5)
MCV RBC AUTO: 99 FL (ref 78–100)
PLATELET # BLD AUTO: 235 10E3/UL (ref 150–450)
RBC # BLD AUTO: 4.31 10E6/UL (ref 3.8–5.2)
WBC # BLD AUTO: 6 10E3/UL (ref 4–11)

## 2024-10-14 PROCEDURE — 85027 COMPLETE CBC AUTOMATED: CPT | Performed by: HOSPITALIST

## 2024-10-14 PROCEDURE — 97165 OT EVAL LOW COMPLEX 30 MIN: CPT | Mod: GO

## 2024-10-14 PROCEDURE — 84443 ASSAY THYROID STIM HORMONE: CPT | Performed by: PHYSICIAN ASSISTANT

## 2024-10-14 PROCEDURE — 97116 GAIT TRAINING THERAPY: CPT | Mod: GP

## 2024-10-14 PROCEDURE — 250N000013 HC RX MED GY IP 250 OP 250 PS 637: Performed by: HOSPITALIST

## 2024-10-14 PROCEDURE — 97535 SELF CARE MNGMENT TRAINING: CPT | Mod: GO

## 2024-10-14 PROCEDURE — 93306 TTE W/DOPPLER COMPLETE: CPT

## 2024-10-14 PROCEDURE — 36415 COLL VENOUS BLD VENIPUNCTURE: CPT | Performed by: HOSPITALIST

## 2024-10-14 PROCEDURE — G0378 HOSPITAL OBSERVATION PER HR: HCPCS

## 2024-10-14 PROCEDURE — 93306 TTE W/DOPPLER COMPLETE: CPT | Mod: 26 | Performed by: INTERNAL MEDICINE

## 2024-10-14 PROCEDURE — 97530 THERAPEUTIC ACTIVITIES: CPT | Mod: GP

## 2024-10-14 PROCEDURE — 99232 SBSQ HOSP IP/OBS MODERATE 35: CPT | Performed by: PHYSICIAN ASSISTANT

## 2024-10-14 RX ADMIN — ACETAMINOPHEN 975 MG: 325 TABLET ORAL at 08:34

## 2024-10-14 RX ADMIN — FLUOXETINE HYDROCHLORIDE 40 MG: 20 CAPSULE ORAL at 08:35

## 2024-10-14 RX ADMIN — ACETAMINOPHEN 975 MG: 325 TABLET ORAL at 20:58

## 2024-10-14 RX ADMIN — LEVOTHYROXINE SODIUM 50 MCG: 50 TABLET ORAL at 08:35

## 2024-10-14 RX ADMIN — LIOTHYRONINE SODIUM 5 MCG: 5 TABLET ORAL at 08:35

## 2024-10-14 RX ADMIN — GABAPENTIN 200 MG: 100 CAPSULE ORAL at 20:57

## 2024-10-14 RX ADMIN — BUDESONIDE 3 MG: 3 CAPSULE ORAL at 08:39

## 2024-10-14 ASSESSMENT — ACTIVITIES OF DAILY LIVING (ADL)
ADLS_ACUITY_SCORE: 37
ADLS_ACUITY_SCORE: 37
ADLS_ACUITY_SCORE: 41
ADLS_ACUITY_SCORE: 42
ADLS_ACUITY_SCORE: 42
ADLS_ACUITY_SCORE: 41
ADLS_ACUITY_SCORE: 42
ADLS_ACUITY_SCORE: 37
ADLS_ACUITY_SCORE: 41
DEPENDENT_IADLS:: INDEPENDENT
ADLS_ACUITY_SCORE: 41
ADLS_ACUITY_SCORE: 42
ADLS_ACUITY_SCORE: 37
ADLS_ACUITY_SCORE: 42
ADLS_ACUITY_SCORE: 39
ADLS_ACUITY_SCORE: 37
ADLS_ACUITY_SCORE: 42
ADLS_ACUITY_SCORE: 41
ADLS_ACUITY_SCORE: 39
ADLS_ACUITY_SCORE: 42
ADLS_ACUITY_SCORE: 42
ADLS_ACUITY_SCORE: 41
ADLS_ACUITY_SCORE: 37
ADLS_ACUITY_SCORE: 42

## 2024-10-14 NOTE — PROGRESS NOTES
10/14/24 0900   Appointment Info   Signing Clinician's Name / Credentials (OT) Inessa Merritt OTR/L   Rehab Comments (OT) Initial OT Eval   Living Environment   People in Home alone   Current Living Arrangements house   Home Accessibility stairs to enter home;stairs within home   Number of Stairs, Main Entrance 3   Stair Railings, Main Entrance railings safe and in good condition   Number of Stairs, Within Home, Primary greater than 10 stairs   Stair Railings, Within Home, Primary railings safe and in good condition   Transportation Anticipated car, drives self;family or friend will provide   Living Environment Comments will be discharging to [facility] with stairs to enter and all needs on main level. walk in shower with seat   Self-Care   Usual Activity Tolerance good   Current Activity Tolerance moderate   Regular Exercise Yes   Activity/Exercise Type walking  (chair yoga)   Equipment Currently Used at Home walker, rolling   Fall history within last six months yes   Number of times patient has fallen within last six months 1   Activity/Exercise/Self-Care Comment indp baseline with self care and ADL   Instrumental Activities of Daily Living (IADL)   IADL Comments drives short distances. 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 followed by Lonnie Ronquillo, history of CAD and polyneuropathy secondary due to chemo who presented to the hospital after fall and diagnosed with the left facial hematoma and black eyes and admitted to the hospital on 10/13/2024   Existing Precautions/Restrictions fall    General Observations and Info agreeable to OT   Cognitive Status Examination   Orientation Status orientation to person, place and time   Visual Perception   Visual Impairment/Limitations WFL   Sensory   Sensory Quick Adds sensation intact   Pain Assessment   Patient Currently in Pain Yes, see Vital Sign flowsheet   Posture   Posture forward head position;kyphosis;protracted shoulders   Range of Motion Comprehensive   General Range of Motion no range of motion deficits identified   Strength Comprehensive (MMT)   Comment, General Manual Muscle Testing (MMT) Assessment grosss deconditioning and weakness B UE 4+/5   Muscle Tone Assessment   Muscle Tone Quick Adds No deficits were identified   Coordination   Upper Extremity Coordination No deficits were identified   Bed Mobility   Bed Mobility No deficits identified   Activities of Daily Living   BADL Assessment/Intervention bathing;upper body dressing;lower body dressing;toileting   Bathing Assessment/Intervention   [place] Level (Bathing) contact guard assist   Upper Body Dressing Assessment/Training   [place] Level (Upper Body Dressing) contact guard assist   Lower Body Dressing Assessment/Training   [place] Level (Lower Body Dressing) minimum assist (75% patient effort)   Toileting   [place] Level (Toileting) minimum assist (75% patient effort)   Clinical Impression   Criteria for Skilled Therapeutic Interventions Met (OT) Yes, treatment indicated   OT Diagnosis decreased function in ADL   OT Problem List-Impairments impacting ADL problems related to;activity tolerance impaired;mobility;range of motion (ROM);strength;pain;balance   Assessment of Occupational Performance 5 or more Performance Deficits   Identified Performance Deficits all ADL and IADL   Planned Therapy Interventions (OT) ADL retraining;IADL retraining;cognition;transfer training;progressive activity/exercise   Clinical Decision Making Complexity (OT) problem focused  assessment/low complexity   Risk & Benefits of therapy have been explained evaluation/treatment results reviewed;care plan/treatment goals reviewed;current/potential barriers reviewed;risks/benefits reviewed;participants voiced agreement with care plan;participants included;patient;son   Clinical Impression Comments decreased function in ADL Warrants skilled therapy   OT Total Evaluation Time   OT Eval, Low Complexity Minutes (82145) 10   OT Goals   Therapy Frequency (OT) Daily   OT Predicted Duration/Target Date for Goal Attainment 10/31/24   OT Goals Hygiene/Grooming;Upper Body Dressing;Lower Body Dressing;Home Management;Toilet Transfer/Toileting;Cognition;OT Goal 1   OT: Hygiene/Grooming modified independent   OT: Upper Body Dressing Modified independent   OT: Lower Body Dressing Modified independent   OT: Toilet Transfer/Toileting Modified independent   OT: Home Management Modified independent   OT: Cognitive Patient/caregiver will verbalize understanding of cognitive assessment results/recommendations as needed for safe discharge planning   OT: Goal 1 Pt will verbalize 3 home safety strategies   Interventions   Interventions Quick Adds Self-Care/Home Management   Self-Care/Home Management   Self-Care/Home Mgmt/ADL, Compensatory, Meal Prep Minutes (32516) 29   Treatment Detail/Skilled Intervention seen for OT tx session this date. completed standing g/h oral cares in bathroom with cues for sequeqncing. step in shower tx with shower seat and CGA 3x. pt reports she will need a FWW for home. educated on fall risk reduction and modification for ADL/IADL for safety at discharge to son's house. in bed all needs in reach ,a larm on   OT Discharge Planning   OT Plan light IADL, home safety Q,   OT Discharge Recommendation (DC Rec) home with assist;home with home care occupational therapy   OT Rationale for DC Rec below baseline function; pt will be discharging to son's house. rec supervision for shower and A for all  IADL,  OT eval for home safety/modifications and IADL training. after she finishes HH OT would rec OP OT concussion screening/re training given LOC and fall   OT Brief overview of current status Goals of therapy will be to address safe mobility and make recs for d/c to next level of care. Pt and RN will continue to follow all falls risk precautions as documented by RN staff while hospitalized   Total Session Time   Timed Code Treatment Minutes 29   Total Session Time (sum of timed and untimed services) 39

## 2024-10-14 NOTE — PROGRESS NOTES
Observation goals  PRIOR TO DISCHARGE       Comments:   -diagnostic tests and consults completed and resulted- Not met   -vital signs normal or at patient baseline- Partially met   -tolerating oral intake to maintain hydration- Met   -adequate pain control on oral analgesics- Met   -returns to baseline functional status- Partially met   -safe disposition plan has been identified- Not met   Echo= Not met   PT= Met  OT= Not met

## 2024-10-14 NOTE — PLAN OF CARE
PRIMARY Concern: after fall; left facial hematoma   Tests/Procedures for NEXT shift: none   Consults? (Pending/following, signed-off?): PT/OT/SW following   Safety Risk CONCERNS (fall risk, behaviors, etc.): fall risk      Where is patient from? (Home, TCU, etc.): home alone  Isolation/Type: none   Other Important info for next shift:   Anticipated DC date & active delays: pending    SUMMARY NOTE:  Orientation/Cognitive: A/Ox4, forgetful and [place]  Observation Goals (Met/ Not Met): not met   Mobility Level/Assist Equipment: A1 GB/walker   Antibiotics & Plan (IV/po, length of tx left): none   Pain Management: scheduled tylenol and ice pack to face for pain   Tele/VS/O2: VSS on room air, tele SR  ABNL Lab/BG: WNL  Diet: regular   Bowel/Bladder: up to bathroom   Skin Concerns: periorbital, swelling and bruises bilaterally   Drains/Devices: IVSL  Patient Stated Goal for Today: to get better

## 2024-10-14 NOTE — PLAN OF CARE
Goal Outcome Evaluation:      Plan of Care Reviewed With: patient, child          Outcome Evaluation: Discharge to son's home with home health PT/OT.

## 2024-10-14 NOTE — PLAN OF CARE
Goal Outcome Evaluation:    PRIMARY Concern: Fall and loss of consciousness   SAFETY RISK Concerns (fall risk, behaviors, etc.): Fall risk   Aggression Tool Color: Green   Isolation/Type: None   Tests/Procedures for NEXT shift: ECHO   Consults? (Pending/following, signed-off?) PT following. OT consult pending   Where is patient from? (Home, TCU, etc.): Home   Other Important info for NEXT shift: local icing around eye hematoma , four times per day   Anticipated DC date & active delays: TBD, PT recommends home with assist.     SUMMARY NOTE:    Orientation/Cognitive: A&O X4   Observation Goals (Met/ Not Met): Not met   Mobility Level/Assist Equipment: Ax1 GB/Walker   Antibiotics & Plan (IV/po, length of tx left): None   Pain Management: Scheduled tylenol   Tele/VS/O2: VSS on RA except HTN. Tele- SR  ABNL Lab/BG: None this shift   Diet: Regular   Bowel/Bladder: Continent   Skin Concerns: left facial hematoma and black eye   Drains/Devices: PIV SL   Patient Stated Goal for Today: Rest         Observation goals  PRIOR TO DISCHARGE       Comments:   -diagnostic tests and consults completed and resulted- Not met   -vital signs normal or at patient baseline- Partially met   -tolerating oral intake to maintain hydration- Met   -adequate pain control on oral analgesics- Met   -returns to baseline functional status- Partially met   -safe disposition plan has been identified- Not met   Echo= Not met   PT= Met  OT= Not met

## 2024-10-14 NOTE — PROGRESS NOTES
Observation goals  PRIOR TO DISCHARGE        Comments: -diagnostic tests and consults completed and resulted: not met   -vital signs normal or at patient baseline: partially met  -tolerating oral intake to maintain hydration: met   -adequate pain control on oral analgesics: met   -returns to baseline functional status: partially met   -safe disposition plan has been identified: not met   Echo, pt, ot: not met, OT/ECHO pending   Nurse to notify provider when observation goals have been met and patient is ready for discharge.

## 2024-10-14 NOTE — PROGRESS NOTES
Observation goals  PRIOR TO DISCHARGE        Comments: -diagnostic tests and consults completed and resulted: not met   -vital signs normal or at patient baseline: partially met  -tolerating oral intake to maintain hydration: met   -adequate pain control on oral analgesics: met   -returns to baseline functional status: partially met   -safe disposition plan has been identified: not met   Echo, pt, ot: not met, ECHO pending   Nurse to notify provider when observation goals have been met and patient is ready for discharge.

## 2024-10-14 NOTE — CONSULTS
Care Management Initial Consult    General Information  Assessment completed with: Patient,    Type of CM/SW Visit: Initial Assessment    Primary Care Provider verified and updated as needed: Yes (Cecelia Manzo; [facility])   Readmission within the last 30 days: no previous admission in last 30 days (Simultaneous filing. User may not have seen previous data.)      Reason for Consult: discharge planning (Simultaneous filing. User may not have seen previous data.)  Advance Care Planning: Advance Care Planning Reviewed: no concerns identified (Simultaneous filing. User may not have seen previous data.)          Communication Assessment  Patient's communication style: spoken language (English or Bilingual) (Simultaneous filing. User may not have seen previous data.)    Hearing Difficulty or Deaf: yes   Wear Glasses or Blind: yes    Cognitive  Cognitive/Neuro/Behavioral: WDL                      Living Environment:   People in home: alone (Simultaneous filing. User may not have seen previous data.)     Current living Arrangements: house (Simultaneous filing. User may not have seen previous data.)      Able to return to prior arrangements: yes (Simultaneous filing. User may not have seen previous data.)       Family/Social Support:  Care provided by: self (Simultaneous filing. User may not have seen previous data.)  Provides care for: no one (Simultaneous filing. User may not have seen previous data.)  Marital Status:   Support system: Children          Description of Support System: Supportive, Involved    Support Assessment: Adequate family and caregiver support    Current Resources:   Patient receiving home care services: No (Simultaneous filing. User may not have seen previous data.)        Community Resources: None (Simultaneous filing. User may not have seen previous data.)  Equipment currently used at home: shower chair, walker, rolling (Simultaneous filing. User may not have seen previous  data.)  Supplies currently used at home: None    Employment/Financial:  Employment Status: retired (Simultaneous filing. 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  No    Lifestyle & Psychosocial Needs:  Social Determinants of Health     Food Insecurity: No Food Insecurity (5/22/2021)    Received from [facility]    Hunger Vital Sign     Worried About Running Out of Food in the Last Year: Never true     Ran Out of Food in the Last Year: Never true   Depression: Not at risk (7/15/2024)    Received from [facility]    PHQ-2     PHQ-2 Score: 2   Housing Stability: Low Risk  (5/22/2021)    Received from [facility]    Housing Stability Vital Sign     Unable to Pay for Housing in the Last Year: No     Number of Places Lived in the Last Year: 1     Unstable Housing in the Last Year: No   Tobacco Use: Low Risk  (8/12/2024)    Received from [facility]    Patient History     Smoking Tobacco Use: Never     Smokeless Tobacco Use: Never     Passive Exposure: Not on file   Financial Resource Strain: Low Risk  (5/22/2021)    Received from [facility]    Overall Financial Resource Strain (CARDIA)     Difficulty of Paying Living Expenses: Not hard at all   Alcohol Use: Not At Risk (5/22/2021)    Received from [facility]    AUDIT-C     Frequency of Alcohol Consumption: 4 or more times a week     Average Number of Drinks: 1 or 2     Frequency of Binge Drinking: Never   Transportation Needs: No Transportation Needs (5/22/2021)    Received from [facility]    PRAPARE - Transportation     Lack of Transportation (Medical): No     Lack of Transportation (Non-Medical): No   Physical Activity: Insufficiently Active (5/22/2021)    Received from [facility]    Exercise Vital Sign     Days of Exercise per Week: 2 days     Minutes of Exercise per Session: 40 min   Interpersonal Safety: Not on file   Stress: Stress Concern Present (5/22/2021)    Received from [facility]    Citizen of the [place] of Occupational Health - Occupational Stress Questionnaire     Feeling of Stress : Very much   Social Connections: Moderately Isolated (5/22/2021)    Received from [facility]    Social Connection and Isolation Panel [NHANES]     Frequency of Communication with Friends and Family: More than three times a week     Frequency of Social Gatherings with Friends and Family: Patient declined     Attends Hoahaoism Services: Never     Active Member of Clubs or Organizations: Yes     Attends Club or Organization Meetings: 1 to 4 times per year     Marital Status:    Health Literacy: Not on file       Functional Status:  Prior to admission patient needed assistance:   Dependent ADLs:: Independent (Simultaneous filing. User may not have seen previous data.)  Dependent IADLs:: Independent (Simultaneous filing. User may not have seen previous data.)  Assesssment of Functional Status: Not at  functional baseline    Mental Health Status:          Chemical Dependency Status:                Values/Beliefs:  Spiritual, Cultural Beliefs, Hoahaoism Practices, Values that affect care: no (Simultaneous filing. User may not have seen previous data.)               Discussed  Partnership in Safe Discharge Planning  document with patient/family: No    Additional Information:  Met patient at bedside; explained role in discharge planning and also provided patient with the Medicare Observation Notice.    Patient admitted after having a fall and sustained facial hematoma.  She is anticipated to be ready for discharge tomorrow.    Patient lives independently in her multilevel home.  She has a few stairs from the garage to enter.  She enters on the level of the  laundry/mud room.  She has a lower level from there which is her piano studio.  She has to go up 1 flight of stairs to her kitchen, living room and dining room.  She has another flight of stairs up to her bedroom.  She states there are about 30 steps total.  Brittni explains she uses no assistive device at home, no shower chair nor grab bars.  She states her son is looking to purchase a shower chair and put grab bars in.  She manages her cooking, cleaning and laundry on her own.  She drives but states most of what she needs is close in the neighborhood.      She intends to discharge to her son's house in [place].  Home care for PT/OT is recommended.  Not knowing if our [facility] could assist for the [place] area, writer called:  [facility] - no PT available;  [facility] [phone] - left message at 2:21 pm with intake; [facility] [phone], fax [phone] - send referrals.  Writer did discover [facility] can assist in securing home care in [place], so a referral was sent to [facility] hub for home care PT/OT.      Next Steps:  pending accepting home care agency.        Bibi Naidu RN  Inpatient Float Care Coordinator  [facility]  [email or web address]

## 2024-10-14 NOTE — PROGRESS NOTES
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 She endorses LOC. She then called her son and laid down. She then realized she needed to go to hospital and called EMS.  - EKG showed sinus rhythm  - Troponin x 2 negative  - CT head-no acute intracranial process and large left frontal/periorbital hematoma with no fracture  - Cervical spine CT-no evidence of any acute fracture or posttraumatic subluxation  - CT chest-trace scarring in the bases, no pneumothorax, pleural effusion, rib fracture, pericardial effusion  - CT facial-no facial bone fracture or malalignment  - Physical therapy, Occupational Therapy and case management consult  - Cardiac monitor  - Echocardiogram  - Fall precaution  - Local icing around the left black eye 4 times daily  - Continue pain control with scheduled Tylenol 975 mg Q8 and as needed oxycodone is also available    Hypothyroidism  - Continue with PTA home medications including levothyroxine and Cytomel     History of ovarian cancer in remission  - Noted    History of 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 He plans to  patient and take her to spend a week with him in his home following hospitalization. We discussed this could be this evening vs tomorrow morning pending therapies evaluations/recommendations. I anticipate patient would benefit from one more night in hospital and discharge to home with son tomorrow, he was in agreement. All questions were answered to the best of my ability. No concerns at that time.     Observation Goals: -diagnostic tests and consults completed and resulted, -vital signs normal or at patient baseline, -tolerating oral intake to maintain hydration, -adequate pain control on oral analgesics, -returns to baseline functional status, -safe disposition plan has been identified, Echo, pt, ot, Nurse to notify provider when observation goals have been met and patient is ready for discharge.  Diet: Regular Diet Adult    DVT Prophylaxis: Pneumatic Compression Devices  Crews Catheter: Not present  Lines: None     Cardiac Monitoring: ACTIVE order. Indication: Syncope- low cardiac risk (24 hours)  Code Status: No CPR- Do NOT Intubate      Clinically Significant Risk Factors Present on Admission          # Hypochloremia: Lowest Cl = 97 mmol/L in last 2 days, will monitor as appropriate                        Disposition Plan   Medically Ready for Discharge: Anticipated Tomorrow morning - son updated of this     The patient's care was discussed with the Attending Physician, Dr. Ventura .    Naomie De La O PA-C  Hospitalist Service  [facility]  Securely message with [employer] (more info)  Text page via [employer] Paging/Directory   ______________________________________________________________________    Interval History   Patient seen and examined. No acute events overnight. Pain controlled. She denies fevers, chills. No N/V/D/C/abd pain. No CP,  palpitations, SOB, lightheadedness, or dizziness. Questions welcomed and answered. POC discussed.     Physical Exam   Vital Signs: Temp: 98.9  F (37.2  C) Temp src: Oral BP: 136/84 Pulse: 78   Resp: 18 SpO2: 96 % O2 Device: None (Room air)    Weight: 91 lbs 12.8 oz   General: Awake, alert, pleasant frail 78 yo female sitting upright in bed who appears stated age. Looks comfortable. No acute distress.  HEENT: Normocephalic, large periorbital ecchymosis and hematoma bilaterally L>R, she endorses some mild blurry vision to L eye, normal vision R  Respiratory: Clear to auscultation bilaterally, stable on room air  Cardiovascular: Regular rate and rhythm  Gastrointestinal: Soft, non-tender, non-distended.   Skin: Warm, dry. Frail and thin, Dorsalis pedis pulses palpable bilaterally.  Musculoskeletal:  Moves all extremities equally.  Neurologic: AAO x3, normal strength and sensation.  Psychiatric: Appropriate mood and affect.     Medical Decision Making   45 MINUTES SPENT BY ME on the date of service doing chart review, history, exam, documentation & further activities per the note.      Data   ------------------------- PAST 24 HR DATA REVIEWED -----------------------------------------------    I have personally reviewed the following data over the past 24 hrs:    6.0  \   13.7   / 235     N/A N/A N/A /  N/A   N/A N/A N/A \       Imaging results reviewed over the past 24 hrs:   No results found for this or any previous visit (from the past 24 hour(s)).

## 2024-10-15 ENCOUNTER — APPOINTMENT (OUTPATIENT)
Dept: OCCUPATIONAL THERAPY | Facility: CLINIC | Age: 79
End: 2024-10-15
Payer: MEDICARE

## 2024-10-15 VITALS
WEIGHT: 91.8 LBS | HEART RATE: 81 BPM | TEMPERATURE: 98.6 F | SYSTOLIC BLOOD PRESSURE: 154 MMHG | DIASTOLIC BLOOD PRESSURE: 89 MMHG | RESPIRATION RATE: 18 BRPM | OXYGEN SATURATION: 95 % | BODY MASS INDEX: 19.19 KG/M2
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
## 2024-10-15 LAB — TSH SERPL DL<=0.005 MIU/L-ACNC: 4.02 UIU/ML (ref 0.3–4.2)

## 2024-10-15 PROCEDURE — 97535 SELF CARE MNGMENT TRAINING: CPT | Mod: GO

## 2024-10-15 PROCEDURE — 99239 HOSP IP/OBS DSCHRG MGMT >30: CPT | Performed by: PHYSICIAN ASSISTANT

## 2024-10-15 PROCEDURE — G0378 HOSPITAL OBSERVATION PER HR: HCPCS

## 2024-10-15 PROCEDURE — 250N000013 HC RX MED GY IP 250 OP 250 PS 637: Performed by: HOSPITALIST

## 2024-10-15 RX ADMIN — FLUOXETINE HYDROCHLORIDE 40 MG: 20 CAPSULE ORAL at 08:03

## 2024-10-15 RX ADMIN — BUDESONIDE 3 MG: 3 CAPSULE ORAL at 08:05

## 2024-10-15 RX ADMIN — LIOTHYRONINE SODIUM 5 MCG: 5 TABLET ORAL at 08:04

## 2024-10-15 RX ADMIN — LEVOTHYROXINE SODIUM 50 MCG: 50 TABLET ORAL at 08:05

## 2024-10-15 ASSESSMENT — ACTIVITIES OF DAILY LIVING (ADL)
ADLS_ACUITY_SCORE: 43
ADLS_ACUITY_SCORE: 42
ADLS_ACUITY_SCORE: 43
ADLS_ACUITY_SCORE: 43
ADLS_ACUITY_SCORE: 42
ADLS_ACUITY_SCORE: 43
ADLS_ACUITY_SCORE: 42

## 2024-10-15 NOTE — DISCHARGE SUMMARY
Discharge instructions given to pt, son at bedside. AVS reviewed. Pt is A/Ox4, VSS on room air, hearing aids in place. Walker provided at discharge by PT. Son provided ride for pt, pt discharged from door 6.

## 2024-10-15 NOTE — PROGRESS NOTES
Observation goals  PRIOR TO DISCHARGE        Comments: -diagnostic tests and consults completed and resulted: met   -vital signs normal or at patient baseline: partially met  -tolerating oral intake to maintain hydration: met   -adequate pain control on oral analgesics: met   -returns to baseline functional status: met   -safe disposition plan has been identified:  met   Echo, pt, ot: met  Nurse to notify provider when observation goals have been met and patient is ready for discharge.

## 2024-10-15 NOTE — PLAN OF CARE
PRIMARY Concern: Fall and LOC  SAFETY RISK Concerns (fall risk, behaviors, etc.): Fall risk   Aggression Tool Color: Green   Isolation/Type: None   Tests/Procedures for NEXT shift: None  Consults? (Pending/following, signed-off?) SW following for discharge planning  Where is patient from? (Home, TCU, etc.): Home   Other Important info for NEXT shift: local icing around eye hematoma QID  Anticipated DC date & active delays: 10/16 w/ son to [place]     SUMMARY NOTE:    Orientation/Cognitive: A&O X4   Observation Goals (Met/ Not Met): Not met   Mobility Level/Assist Equipment: SBA walker  Antibiotics & Plan (IV/po, length of tx left): None   Pain Management: Scheduled tylenol   Tele/VS/O2: VSS on RA except HTN. Tele- SR  ABNL Lab/BG: None this shift   Diet: Regular   Bowel/Bladder: Continent   Skin Concerns: bilateral periorbital hematoma  Drains/Devices: PIV SL   Patient Stated Goal for Today: Rest            Observation goals  PRIOR TO DISCHARGE        Comments:   -diagnostic tests and consults completed and resulted not met  -vital signs normal or at patient baseline partially met  -tolerating oral intake to maintain hydration met  -adequate pain control on oral analgesics met  -returns to baseline functional status partially met  -safe disposition plan has been identified not met  Echo, pt, ot: met  Nurse to notify provider when observation goals have been met and patient is ready for discharge.

## 2024-10-15 NOTE — PROGRESS NOTES
Observation goals  PRIOR TO DISCHARGE        Comments:   -diagnostic tests and consults completed and resulted not met  -vital signs normal or at patient baseline partially met  -tolerating oral intake to maintain hydration met  -adequate pain control on oral analgesics met  -returns to baseline functional status partially met  -safe disposition plan has been identified not met  Echo, pt, ot: met  Nurse to notify provider when observation goals have been met and patient is ready for discharge.

## 2024-10-15 NOTE — PLAN OF CARE
PRIMARY Concern: after fall; left facial hematoma   Tests/Procedures for NEXT shift: none   Consults? (Pending/following, signed-off?): PT/OT/SW following   Safety Risk CONCERNS (fall risk, behaviors, etc.): fall risk      Where is patient from? (Home, TCU, etc.): home alone  Isolation/Type: none   Other Important info for next shift:   Anticipated DC date & active delays: today  SUMMARY NOTE:  Orientation/Cognitive: A/Ox4, forgetful and [place]  Observation Goals (Met/ Not Met): not met   Mobility Level/Assist Equipment: A1 GB/walker   Antibiotics & Plan (IV/po, length of tx left): none   Pain Management: scheduled tylenol and ice pack to face for pain   Tele/VS/O2: VSS on room air, tele SR  ABNL Lab/BG: WNL  Diet: regular   Bowel/Bladder: up to bathroom   Skin Concerns: periorbital, swelling and bruises bilaterally   Drains/Devices: IVSL  Patient Stated Goal for Today: to get better

## 2024-10-15 NOTE — PROGRESS NOTES
Occupational Therapy Discharge Summary    Reason for therapy discharge:    All goals and outcomes met, no further needs identified.    Progress towards therapy goal(s). See goals on Care Plan in [facility] electronic health record for goal details.  Goals met    Therapy recommendation(s):    Continued therapy is recommended.  Rationale/Recommendations:  pt has met basic ADL goals for safe discharge. Rec shower chair and supervision for bathing, assist with IADL, HH OT for safety eval/IADL re training. .  When finished with Hh OT recommend pt attend OP OT concussion screening/re training given LOC and fall

## 2024-10-15 NOTE — PLAN OF CARE
Physical Therapy Discharge Summary    Reason for therapy discharge:    Discharged to home with home therapy.    Progress towards therapy goal(s). See goals on Care Plan in [facility] electronic health record for goal details.  Goals partially met.  Barriers to achieving goals:   discharge from facility.    Therapy recommendation(s):    Continued therapy is recommended.  Rationale/Recommendations:  Patient lives alone and is independent with mobility at baseline. She is currently requiring support of a FWW and SBA during mobility. She will be discharging to her son's house where she will only have 3 stairs to enter, all needs met on one level. She would benefit from HHPT for home safety assessment, strengthening and balance training to progress independence with mobility as her own home is multi-level and she will not be able to use a walker there.

## 2024-10-15 NOTE — DISCHARGE SUMMARY
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 who presented to the hospital after fall and diagnosed with the left facial hematoma and black eyes and admitted to the hospital on 10/13/2024      DISCHARGE DIAGNOSES:  Principal Problem:    Fall from slip, trip, or stumble, initial encounter  Active Problems:    Generalized weakness    Periorbital hematoma of left eye    Concussion with loss of consciousness, initial encounter    Consultations This Hospital Stay   CARE MANAGEMENT / SOCIAL WORK IP CONSULT  PHYSICAL THERAPY ADULT IP CONSULT  OCCUPATIONAL THERAPY ADULT IP CONSULT    PENDING RESULTS:    Unresulted Labs Ordered in the Past 30 Days of this Admission       No orders found from 9/13/2024 to 10/14/2024.             HOSPITAL COURSE:   Fall from slip, trip  TBI w Loss of consciousness  Large Frontal/Periorbital Hematoma   Patient states she had a stiff Gim[place] beverage and went to do laundry and she has a lot of stairs at home and when she reached bottom of stairs she tripped and fell on a carpet. She endorses LOC. She then called her son and laid down. She then realized she needed to go to hospital and called EMS.  Admited, secondary w/u unremarkable. She states was  trip/fall. None here/ Weakness improved. Traum w/u neg. Clearly had a TBI w LOC, but not reporting concussion sx at times of d/c. Eccymosis maturing. Nontender. Stable for d/c to home 10.15 w son, home PT OT  Workup  - Admitted for  observation  - EKG showed sinus rhythm  - Troponin x 2 negative  - CT head-no acute intracranial process ,  large left frontal/periorbital hematoma with no fracture  - Cervical spine CT-no evidence of any acute fracture or posttraumatic subluxation  - CT chest-trace scarring in the bases, no pneumothorax, pleural effusion, rib fracture, pericardial effusion  - CT facial-no facial bone fracture or malalignment  - Physical therapy, Occupational Therapy and case management consulted  - Cardiac monitor- no tele events  - Echocardiogram WNL- EF 60-65%  - Fall precautions here and at home  - Local icing around the left black eye 4 times daily  - Continue pain control with home prn Tylenol 975 mg Q8   - stable for d/c to son/home on 10/15 w pt reporting improved weakness, no sign TBI sx  - given trip fall, HOME PT and OT ordered  = PCP f/u in 7 days  =- going to son for now, he will assist. She will defer driving for a while until reeval  - Indications to rtn/new sx/etc d/w both, they felt comfortable w plan      Hypothyroidism  - Continue with PTA home medications including levothyroxine and Cytomel    - noted no TSH since 4/2024, I added it on as she discharged. PCP to follow    History of ovarian cancer in remission  History of polyneuropathy secondary due to chemotherapy  - PTA gabapentin resumed     Chronic fatigue- per pt  - PCP workup if not done previously  - consider gabapentin as potential contributor      Anxiety  Depression  - stable  with PTA home medications including Prozac     History of osteoporosis  - Noted    Clinically Significant Risk Factors Present on Admission                               # Financial/Environmental Concerns: none           DISCHARGE MEDICATIONS:       Review of your medicines        CONTINUE these medicines which have NOT CHANGED        Dose / Directions   budesonide 3 MG EC capsule  Commonly known as: ENTOCORT EC      Dose: 3 mg  Take 3 mg by mouth every morning.  Refills: 0      calcium carbonate 1500 (600 Ca) MG tablet  Commonly known as: OS-LUCRECIA      Dose: 1,200 mg  Take 1,200 mg by mouth 3 times daily (with meals).  Refills: 0     COLLAGEN PO      Take by mouth daily. Collagen peptide  Refills: 0     diphenhydrAMINE-acetaminophen  MG tablet  Commonly known as: TYLENOL PM      Dose: 2 tablet  Take 2 tablets by mouth at bedtime.  Refills: 0     FLUoxetine 40 MG capsule  Commonly known as: PROzac      Dose: 40 mg  Take 40 mg by mouth daily.  Refills: 0     gabapentin 100 MG capsule  Commonly known as: NEURONTIN      Dose: 200 mg  Take 200 mg by mouth at bedtime.  Refills: 0     Glutathione 50 MG Tabs      Dose: 50 mg  Take 50 mg by mouth daily  Refills: 0     hypromellose-dextran 0.1-0.3 % ophthalmic solution  Commonly known as: ARTIFICAL TEARS      Dose: 1 drop  Place 1 drop into both eyes 4 times daily as needed for dry eyes.  Refills: 0     lactobacillus rhamnosus (GG) capsule      Dose: 1 capsule  Take 1 capsule by mouth daily.  Refills: 0     levothyroxine 50 MCG tablet  Commonly known as: SYNTHROID/LEVOTHROID      Dose: 50 mcg  Take 50 mcg by mouth every morning  Refills: 0     liothyronine 5 MCG tablet  Commonly known as: CYTOMEL      Dose: 5 mcg  Take 5 mcg by mouth every morning  Refills: 0     multivitamin w/minerals tablet      Dose: 1 tablet  Take 1 tablet by mouth daily  Refills: 0     triamcinolone 0.1 % external cream  Commonly known as: KENALOG      Apply topically daily.  Refills: 0     Vitamin C 500 MG Caps      Dose: 500 mg  Take 500 mg by mouth daily.  Refills: 0     Vitamin D3 125 MCG (5000 UT) tablet  Commonly known as: CHOLECALCIFEROL      Dose: 125 mcg  Take 125 mcg by mouth daily  Refills: 0            STOP taking      ibuprofen 600 MG tablet  Commonly known as: ADVIL/MOTRIN        ondansetron 4 MG ODT tab  Commonly known as: ZOFRAN ODT                ALLERGIES:    Allergies   Allergen Reactions    Adhesive Tape      bandaids    Liquid Adhesive Rash     "Penicillins Rash          Home Care Referral      Reason for your hospital stay    Fall w facial trauma     Follow-up and recommended labs and tests     Follow up with primary care provider, Cecelia Manzo, within 7 days for hospital follow- up and to follow up on results.  No follow up labs or test are needed.     Activity    Your activity upon discharge: activity as tolerated and no driving for today     When to contact your care team    Call your primary doctor if you have any of the following: increased swelling, increased pain, or recurrent falls, or other new symptoms.     Discharge Instructions    If any new neurologic symptons, or conerns, call primary care or return tpo ED immedaitely    Symptoms may include: headache, confusion, nausea, vomiting, memory or concentration issues, and problems with sleep. You may feel dizzy, irritable, and tired.      Follow-up  It is very important you have follow up .  Please see your primary doctor within the next 7 days for re-evaluation.     Warning signs  Call your doctor or come back to the Emergency Department if you suddenly have any   of these symptoms:  Headaches that get worse  Feeling more and more drowsy  You keep repeating yourself  Strange behavior  Seizures  Repeat vomiting (throwing up)  Trouble walking  Growing confusion  Feeling more irritable  Neck pain that gets worse  Slurred speech  Weakness or numbness  Loss of consciousness  Fluid or blood coming from ears/nose          Self-care  Get lots of rest. Be sure to get enough sleep at night.  Take daytime naps or rest if you feel tired.  Limit physical activity and \"thinking\" activities. These can make symptoms worse.  Maintain a healthy diet and drink lots of fluids.    As symptoms improve, you may slowly return to your daily activities. If symptoms get worse   or return, reduce your activities.   Know that it is normal to feel sad and frustrated when you do not feel right and are less active. "     Andre Order for DME - ONLY FOR DME     Diet    Follow this diet upon discharge: Current Diet:Orders Placed This Encounter      Regular Diet Adult       DISPOSITION:    Discharged to home  Condition at discharge: Stable     LABORATORY , IMAGING AND PROCEDURES:   Recent Labs   Lab Test 10/14/24  0614 10/13/24  0158 23  0157   WBC 6.0 8.9 9.4   HGB 13.7 13.7 14.4   MCV 99 97 98    243 232   NA  --  140 137   POTASSIUM  --  3.5 4.3   CHLORIDE  --  97* 97*   CO2  --  27 25   ANIONGAP  --  16* 15   GLC  --  164* 144*   BUN  --  16.8 18.1   CR  --  0.58 0.86   GFRESTIMATED  --  90 68   GFRESTBLACK  --   --   --    LUCRECIA  --  10.2 10.3*   MAG  --   --   --    AST  --   --  24   ALT  --   --  21   ALKPHOS  --   --  58   PROTTOTAL  --   --  7.6   ALBUMIN  --   --  4.6   BILITOTAL  --   --  0.5    < > = values in this interval not displayed.        @LABRCNTR (inr:2,phos:2,LACT:2)@     Imaging:  Echocardiogram Complete    Result Date: 10/14/2024  [id number]^SAMANTHA^MALIK  [facility] [address] [place] [address]  Name: ERNESTO BATISTA MRN: [id number] : 1945 Study Date: 10/14/2024 01:40 PM Age: 79 yrs Gender: Female Patient Location: [facility] Reason For Study: Syncope Ordering Physician: MALIK SINGH Performed By: Eliel Manzo  BSA: 1.3 m2 Height: 58 in Weight: 91 lb BP: 124/69 mmHg ______________________________________________________________________________ Procedure Complete Portable Echo Adult. Complete Echo Adult. ______________________________________________________________________________ Interpretation Summary  The left ventricle is normal in size. The visual ejection fraction is 60-65%. Diastolic Doppler findings (E/E' ratio and/or other parameters) suggest left ventricular filling pressures are indeterminate. No regional wall motion abnormalities noted. No significant valvular heart disease.  ______________________________________________________________________________ Left Ventricle The left ventricle is normal in size. There is normal left ventricular wall thickness. Diastolic Doppler findings (E/E' ratio and/or other parameters) suggest left ventricular filling pressures are indeterminate. The visual ejection fraction is 60-65%. No regional wall motion abnormalities noted.  Right Ventricle The right ventricle is normal in size and function.  Atria Normal left atrial size. Right atrial size is normal. There is no color Doppler evidence of an atrial shunt.  Mitral Valve The mitral valve leaflets are mildly thickened. There is trace to mild mitral regurgitation.  Tricuspid Valve There is trace tricuspid regurgitation. IVC diameter <2.1 cm collapsing >50% with sniff suggests a normal RA pressure of 3 mmHg.  Aortic Valve There is trivial trileaflet aortic sclerosis. No aortic regurgitation is present.  Pulmonic Valve There is trace pulmonic valvular regurgitation.  Vessels Normal size aorta. The aortic root is normal size.  Pericardium There is no pericardial effusion.  Rhythm Sinus rhythm was noted. ______________________________________________________________________________ MMode/2D Measurements & Calculations IVSd: 1.1 cm LVIDd: 3.1 cm LVIDs: 2.2 cm LVPWd: 0.86 cm FS: 28.7 % LV mass(C)d: 85.6 grams LV mass(C)dI: 65.6 grams/m2 Ao root diam: 2.6 cm LA dimension: 2.1 cm asc Aorta Diam: 3.1 cm LA/Ao: 0.79 LVOT diam: 2.0 cm LVOT area: 3.0 cm2 Ao root diam index Ht(cm/m): 1.8 Ao root diam index BSA (cm/m2): 2.0 Asc Ao diam index BSA (cm/m2): 2.4 Asc Ao diam index Ht(cm/m): 2.1 LA Volume (BP): 31.3 ml  LA Volume Index (BP): 24.1 ml/m2 RV Base: 3.0 cm RWT: 0.55 TAPSE: 2.0 cm  Doppler Measurements & Calculations MV E max bradley: 80.4 cm/sec MV A max bradley: 129.0 cm/sec MV E/A: 0.62 MV dec slope: 393.6 cm/sec2 MV dec time: 0.20 sec PA acc time: 0.10 sec E/E' av.4 Lateral E/e': 11.7 Medial E/e': 11.0 RV S Bradley:  14.3 cm/sec  ______________________________________________________________________________ Report approved by: Guicho Pino 10/14/2024 03:08 PM       CT Facial Bones without Contrast    Result Date: 10/13/2024  EXAM: CT FACIAL BONES WITHOUT CONTRAST LOCATION: [facility] DATE: 10/13/2024 INDICATION: Fall, facial trauma, rule out fracture COMPARISON: None. TECHNIQUE: Routine CT Maxillofacial without IV contrast. Multiplanar reformats. Dose reduction techniques were used. FINDINGS: OSSEOUS STRUCTURES/SOFT TISSUES: Prominent left frontal scalp, left periorbital and left facial soft tissue contusion. No facial bone fracture or malalignment. ORBITAL CONTENTS: No acute abnormality. SINUSES: Mild mucosal thickening scattered about the paranasal sinuses. VISUALIZED INTRACRANIAL CONTENTS: No acute abnormality.     IMPRESSION: 1.  No facial bone fracture or malalignment.     Chest CT w/o contrast    Result Date: 10/13/2024  EXAM: CT CHEST WITHOUT CONTRAST LOCATION: [facility] DATE: 10/13/2024 INDICATION: Fall, chest wall pain, rule out fracture. COMPARISON: CT chest, abdomen and pelvis with IV contrast 01/20/2020. TECHNIQUE: CT chest without IV contrast. Multiplanar reformats were obtained. Dose reduction techniques were used. CONTRAST: None. FINDINGS: LUNGS AND PLEURA: Trace scarring in the apices. Slight platelike atelectasis in the lower lungs. Borderline tubular bronchiectasis in the lower lungs. No pneumothorax or pleural effusion on either side. MEDIASTINUM/AXILLAE: Normal cardiac size. Mild coronary artery calcifications. No pericardial effusion. Normal caliber thoracic aorta. No aneurysm. No suspicious adenopathy. Trachea is midline. CORONARY ARTERY CALCIFICATION: Mild. UPPER ABDOMEN: No significant finding. MUSCULOSKELETAL: No acute rib fractures. Degenerative changes both shoulders and the thoracic spine. Sebaceous cyst ventral chest wall fat measuring 2.0 x  1.6 cm (image 42, series 3), slightly more apparent on current study. Tiny benign calcification in the right subscapularis muscle, unchanged.     IMPRESSION: 1.  Trace scarring in the apices. Slight platelike atelectasis in the lower lungs. Borderline tubular bronchiectasis in the lower lungs. No pneumothorax or pleural effusion on either side. 2.  No acute rib fractures. Degenerative changes both shoulders and the spine. Sebaceous cyst ventral chest wall fat, slightly more apparent on current study. 3.  Normal cardiac size. Mild coronary artery calcification. No pericardial effusion. Normal caliber thoracic aorta. No mediastinal hematoma.     Head CT w/o contrast    Result Date: 10/13/2024  EXAM: CT HEAD W/O CONTRAST, CT CERVICAL SPINE W/O CONTRAST LOCATION: [facility] DATE: 10/13/2024 INDICATION: Fall, head trauma COMPARISON: None. TECHNIQUE: 1) Routine CT Head without IV contrast. Multiplanar reformats. Dose reduction techniques were used. 2) Routine CT Cervical Spine without IV contrast. Multiplanar reformats. Dose reduction techniques were used. FINDINGS: HEAD CT: INTRACRANIAL CONTENTS: No intracranial hemorrhage, extraaxial collection, or mass effect.  No CT evidence of acute infarct. Mild to moderate presumed chronic small vessel ischemic changes. Mild to moderate generalized volume loss. No hydrocephalus. VISUALIZED ORBITS/SINUSES/MASTOIDS: No intraorbital abnormality. No paranasal sinus mucosal disease. No middle ear or mastoid effusion. BONES/SOFT TISSUES: Large left frontal/periorbital hematoma. No fracture. CERVICAL SPINE CT: VERTEBRA: Normal vertebral body heights and alignment. No fracture or posttraumatic subluxation. CANAL/FORAMINA: No canal or neural foraminal stenosis. PARASPINAL: No extraspinal abnormality. Visualized lung fields are clear.     IMPRESSION: HEAD CT: 1.  No acute intracranial process. 2.  Large left frontal/periorbital hematoma. No fracture. CERVICAL SPINE  CT: No CT evidence for acute fracture or post traumatic subluxation.     CT Cervical Spine w/o Contrast    Result Date: 10/13/2024  EXAM: CT HEAD W/O CONTRAST, CT CERVICAL SPINE W/O CONTRAST LOCATION: [facility] DATE: 10/13/2024 INDICATION: Fall, head trauma COMPARISON: None. TECHNIQUE: 1) Routine CT Head without IV contrast. Multiplanar reformats. Dose reduction techniques were used. 2) Routine CT Cervical Spine without IV contrast. Multiplanar reformats. Dose reduction techniques were used. FINDINGS: HEAD CT: INTRACRANIAL CONTENTS: No intracranial hemorrhage, extraaxial collection, or mass effect.  No CT evidence of acute infarct. Mild to moderate presumed chronic small vessel ischemic changes. Mild to moderate generalized volume loss. No hydrocephalus. VISUALIZED ORBITS/SINUSES/MASTOIDS: No intraorbital abnormality. No paranasal sinus mucosal disease. No middle ear or mastoid effusion. BONES/SOFT TISSUES: Large left frontal/periorbital hematoma. No fracture. CERVICAL SPINE CT: VERTEBRA: Normal vertebral body heights and alignment. No fracture or posttraumatic subluxation. CANAL/FORAMINA: No canal or neural foraminal stenosis. PARASPINAL: No extraspinal abnormality. Visualized lung fields are clear.     IMPRESSION: HEAD CT: 1.  No acute intracranial process. 2.  Large left frontal/periorbital hematoma. No fracture. CERVICAL SPINE CT: No CT evidence for acute fracture or post traumatic subluxation.        PHYSICAL EXAMINATION ON DAY OF DISCHARGE   BP (!) 154/89 (BP Location: Left arm)   Pulse 81   Temp 98.6  F (37  C) (Oral)   Resp 18   Wt 41.6 kg (91 lb 12.8 oz)   LMP  (LMP Unknown)   SpO2 95%   BMI 19.19 kg/m    Vitals:    10/14/24 1513 10/1945 10/14/24 2339 10/15/24 0757   BP: (!) 156/91 (!) 166/101 (!) 128/94 (!) 154/89   BP Location: Right arm Right arm Right arm Left arm   Pulse: 86 72 83 81   Resp: 18 18 16 18   Temp: 98.5  F (36.9  C) 98.5  F (36.9  C) 98.5  F (36.9   C) 98.6  F (37  C)   TempSrc: Oral Oral Oral Oral   SpO2: 95% 97% 98% 95%   Weight:          Wt Readings from Last 2 Encounters:   10/13/24 41.6 kg (91 lb 12.8 oz)   07/05/24 48.3 kg (106 lb 6.4 oz)       Constitutional: Awake, alert, cooperative, no apparent distress.    ENT: Normocephalic w bruising below, suprisingly nontender. Nose midline.   Neck: Supple, symmetrical, nonender to midline palp.  Pulmonary: On RA, No increased work of breathing, good air exchange, clear to auscultation bilaterally, no crackles or wheezing.  Cardiovascular:RRR  GI:  soft, non-distended, non-tender.  Obese.  Skin/Integumen: confluent periorbital and frontal hematomas/ecchymosis maturing, Visualized skin appeared clear.  Neuro: Nonfocal    Psych:  Alert and oriented x 3. Normal affect.  Extremities:   moves all weel, not walker,  No lower extremity edema noted,     Discussed dx, tx, plan/course w pt and son in room in detail. And f/u plan    The patient was discussed with Dr. Ventura who agrees with the above plan and discharge at this time.    It was a pleasure taking care of this patient.  If you have any questions, please contact me for clarification.     Please note: This was partially completed using Dragon voice dictation software.  It was proofread, but may nonetheless have unintended substitutions, incorrect words or phrases. Should any part of the documentation be unclear, or otherwise contradictory, reader encouraged to please contact me for clarification. Also this is intended as a direct peer to peer communication with abbreviations and verbiage that may appear to be 'direct' as to succinctly relay medical opinions.  If there are any questions regarding content, please contact me for clarification.    TOTAL DISCHARGE TIME:  Junior WYLIE PA-C, personally saw the patient today and spent greater than 30 minutes discharging this patient.    Junior Menon PA-C  Primary Children's Hospitalist   [facility]

## 2024-10-17 ENCOUNTER — PATIENT OUTREACH (OUTPATIENT)
Dept: CARE COORDINATION | Facility: CLINIC | Age: 79
End: 2024-10-17
Payer: MEDICARE

## 2024-10-17 NOTE — PROGRESS NOTES
Connected Care Resource Center:   [facility] Resource Center Contact  [facility]/Voicemail     Clinical Data: Post-Discharge Outreach     Outreach attempted x 2.  Left message on patient's voicemail, providing [facility]'s central phone number of [phone] ([phone]) for questions/concerns and/or to schedule an appt with an [facility] provider, if they do not have a PCP.      Plan:  [facility] will do no further outreaches at this time.       Shanice Osullivan RN  [facility] Care Resource [facility], [facility]    *Connected Care Resource Team does NOT follow patient ongoing. Referrals are identified based on internal discharge reports and the outreach is to ensure patient has an understanding of their discharge instructions.

## 2024-10-23 NOTE — LETTER
8/10/2021         RE: Maximino Simeon  [address] [address] [id number]        Dear Colleague,    Thank you for referring your patient, Maximino Simeon, to the [facility]. Please see a copy of my visit note below.                   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 She completed chemotherapy 2020. She is here today for a surveillance visit.     Oncology history:   Originally seen by Dr. Ascencio at [facility] during her call week; was to follow up with outpatient GYN ONC and general surgery due to appendicits and enlarged ovary.       54  CA 19-9 15  CEA 2.8     2019: CT abd pelvis: IMPRESSION:      1. Dilated, fluid-filled appendix with mild increased enhancement and slight stranding at its tip is worrisome for acute appendicitis.  2. Large complex cystic pelvic mass with soft tissue nodularity with adjacent prominent left adnexal vessels is worrisome for neoplasm of ovarian origin. No mesenteric nodularity, lymph node enlargement, or other findings that are suspicious for metastatic disease in the abdomen or pelvis.     2019: CT abd pelvis REPRODUCTIVE ORGANS with IV and oral contrast: Again noted is a large complex cystic mass in the pelvis measuring approximately 13.3 x 8.1 x 9.7 cm in size which has areas of mural nodularity and soft tissue density.    1. Again noted is a thick-walled dilated appendix compatible with appendicitis. There is a questionable new small area of appendiceal wall discontinuity and adjacent 1.2 cm fluid pocket on axial image 57 and sagittal image 23. Findings raise question of a small area of appendiceal wall perforation.    2. Large complex pelvic mass again concerning for ovarian neoplasm.    3. Marked bladder distention. Mild prominence of  the urinary collecting systems likely secondary to this.     7/2/2019-7/7/2019: General surgery consulted for perforated appendicitis.  Not septic as no fever.Treated with Cefuroxime and metronidazole (PCN allergy). Surgery recommending postponing appendectomy for about 6 weeks or so. Presenting leukocytosis and fever resolved by discharge.  Will finish oral course of ceftin and flagyl as outpatient.      Per general surgery note:  - No indication for surgery at this time  - Recommend admission to medicine service  - Start IV antibiotics  - Will again need to discuss the timing of appendectomy with Gyn/Onc to allow for evaluation of her pelvic mass simultaneously, this will likely have to be in 4-6 weeks following possible appendix perforation  - Surgery will continue to follow     8/9/19: Exploratory Laparotomy, Total Abdominal Hysterectomy, Left Salpingo-Oopherectomy, Bilateral Pelvic and Para-Aortic Lymph Node Dissection, Peritoneal Biopsy, Diaphraghm Pap Smears.     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     5/20/2020: 10.   8/19/2020:  12.  11/10/2020:  10.  2/10/2021:  8  5/10/2021:  9.    6/18/2021: portacath removal     8/10/2021:  pending         Brittni comes to the clinic feeling well from a gynecological standpoint. She did see GI at [place] on 5/24/2021. She will see her PCP in August and will  discuss her diarrhea concerns. She states that she wants a second opinion regarding her colitis. She reports a small amount of diarrhea a couple times at night. Diarrhea is ongoing over the last four months despite trial of multiple medications. She has been working with mental health therapy. She denies SI/HI. She is still on Prolia at [place] for her osteoporosis. She is not sexually active and denies vulvar/vaginal concerns. She denies any vaginal bleeding, no nausea/emesis, no lower extremity edema, and no difficulties eating or sleeping. She denies any abdominal discomfort/bloating, no fevers or chills, and no chest pain or shortness of breath. She does have hot flashes a couple times a week that are mild and infrequent. She denies any issues with neuropathy.       Health Maintenance  Colonoscopy- due for this  Mammogram- 5/7/2021  DEXA- 9/1/2020, on Prolia   Annual physical- 4/23/2021        Review of Systems:    Systemic           no weight changes; no fever; no chills; no night sweats; no appetite changes, +fatigue  Skin           no rashes, or lesions  Eye           no irritation; no changes in vision  Jarred-Laryngeal           no dysphagia; no hoarseness   Pulmonary    no cough; no shortness of breath  Cardiovascular    no chest pain; no palpitations  Gastrointestinal    + diarrhea; no constipation; no abdominal pain; no changes in bowel habits; no blood in stool  Genitourinary   no urinary frequency; no urinary urgency; no dysuria; no pain; no abnormal vaginal discharge; no abnormal vaginal bleeding  Breast    no breast discharge; no breast changes; no breast pain  Musculoskeletal    no myalgias; no arthralgias; no back pain  Psychiatric           no depressed mood; + anxiety    Hematologic           no tender lymph nodes; no noticeable swellings or lumps   Endocrine    no hot flashes; no heat/cold intolerance         Neurological   no tremor; no numbness and tingling; no headaches; no difficulty  sleeping      Past Medical History:    Past Medical History:   Diagnosis Date     Anxiety 2014     CAD (coronary artery disease) 2011     Cancer (H)     ovarian     Colitis      Fibromyalgia      Gastroesophageal reflux disease      Hypothyroidism 2011     Insomnia 2017     Mixed hyperlipidemia 2011     PONV (postoperative nausea and vomiting)      Recurrent major depressive disorder (H) 2010     Urinary retention          Past Surgical History:    Past Surgical History:   Procedure Laterality Date     APPENDECTOMY OPEN N/A 8/9/2019    Procedure: Open Appendectomy;  Surgeon: Parvez Abreu MD;  Location: [facility] OR     BREAST SURGERY      biopsy     GYN SURGERY       HYSTERECTOMY TOTAL ABDOMINAL, BILATERAL SALPINGO-OOPHORECTOMY, NODE DISSECTION, COMBINED Bilateral 8/9/2019    Procedure: Exploratory Laparotomy, Total Abdominal Hysterectomy, Left Salpingo-Oopherectomy, Bilateral Pelvic and Para-Aortic Lymph Node Dissection, Peritoneal Biopsy, Diaphraghm Pap Smears.;  Surgeon: Naz Ascencio MD;  Location: [facility] OR     IR CHEST PORT PLACEMENT > 5 YRS OF AGE  8/30/2019     IR PORT REMOVAL RIGHT  6/18/2021     OOPHORECTOMY  1992         Health Maintenance Due   Topic Date Due     DEXA  Never done     ADVANCE CARE PLANNING  Never done     DEPRESSION ACTION PLAN  Never done     HEPATITIS C SCREENING  Never done     LIPID  Never done     MEDICARE ANNUAL WELLNESS VISIT  Never done     PHQ-9  01/12/2020     FALL RISK ASSESSMENT  07/16/2020     ZOSTER IMMUNIZATION (3 of 3) 01/15/2021       Current Medications:     Current Outpatient Medications   Medication Sig Dispense Refill     B Complex Vitamins (VITAMIN B-COMPLEX) TABS Take 1 tablet by mouth       Calcium Carbonate-Vitamin D (CALCIUM + D PO) Take 6 tablets by mouth daily.       cholestyramine (QUESTRAN) 4 GM/DOSE powder Take 4 g by mouth daily as needed (diarrhea).       Coenzyme Q10 (COQ10 PO) Take 200 mg by mouth 2 times daily       denosumab (PROLIA) 60 MG/ML  SOSY injection Inject 60 mg Subcutaneous       DULoxetine (CYMBALTA) 20 MG capsule Take 1 capsule (20 mg) by mouth daily Follow up with your PCP for refills (Patient taking differently: Take 30 mg by mouth daily Follow up with your PCP for refills) 30 capsule 0     levothyroxine (SYNTHROID/LEVOTHROID) 50 MCG tablet Take 50 mcg by mouth every morning        liothyronine (CYTOMEL) 5 MCG tablet Take 5 mcg by mouth every morning        LORazepam (ATIVAN) 0.5 MG tablet Take 0.5-1 Tablets by mouth every 8 hours as needed for Anxiety.       Multiple Vitamins-Minerals (MULTIVITAL PO) Take 1 tablet by mouth daily (with lunch)        Probiotic Product (PROBIOTIC DAILY PO) 1 tablet daily at lunchtime by mouth       triamcinolone (KENALOG) 0.1 % external cream        loperamide (IMODIUM) 1 MG/5ML liquid Take 1 mg by mouth 6 times daily  (Patient not taking: Reported on 8/10/2021)           Allergies:        Allergies   Allergen Reactions     Adhesive Tape      bandaids     Liquid Adhesive Rash     Penicillins Rash        Social History:     Social History     Tobacco Use     Smoking status: Never Smoker     Smokeless tobacco: Never Used   Substance Use Topics     Alcohol use: Yes     Comment: socially       History   Drug Use Unknown         Family History:     The patient's family history is notable for     Family History   Problem Relation Age of Onset     Heart Disease Father      Cerebrovascular Disease Father      Cerebrovascular Disease Sister      Thyroid Disease Sister      Breast Cancer Sister          Physical Exam:     BP (!) 145/95   Pulse 93   Resp 16   Wt 44.4 kg (97 lb 12.8 oz)   LMP  (LMP Unknown)   SpO2 97%   BMI 20.44 kg/m    Body mass index is 20.44 kg/m .    General Appearance: healthy and alert, no distress     HEENT: no thyromegaly, no palpable nodules or masses        Cardiovascular: regular rate and rhythm, no gallops, rubs or murmurs    Respiratory: lungs clear, no rales, rhonchi or  wheezes    Musculoskeletal: extremities non tender and without edema    Skin: no lesions; portacath removed and site well healed with mild scarring, no edema or erythema     Neurological: normal gait, no gross defects     Psychiatric: appropriate mood and affect                               Hematological: normal cervical, supraclavicular and inguinal lymph nodes     Gastrointestinal:       abdomen soft, non-tender, non-distended, no organomegaly or masses    Genitourinary: External genitalia and urethral meatus appears normal. Vagina pale and slightly narrowed consistent with postmenopausal changes, smooth without nodularity or masses. Cervix surgically absent.  Bimanual exam reveal no masses, nodularity or fullness.  Recto-vaginal exam confirms these findings. Medium Bill speculum used.       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 She completed chemotherapy 1/2020. She is here today for a surveillance visit.     40 minutes spent on the date of the encounter doing chart review, history and exam, documentation, and further activities as noted above.        Plan:     1.)       AEME on exam and  pending. Patient will continue to have in person surveillance visits with  lab draws every 3 months for two years (1/2022), and then every six months for three years (1/2025) and then annual visits thereafter. Encouraged mental health therapy visits to continue to process and grieve her sister's death along with process other life stressors. Offered time and emotional support for patient. Offered additional referral for mental health therapy through [facility] given recent issues with insurance but patient declined. Will refer to cancer rehab for knee pain and deconditioning, chemo brain. Reviewed signs and symptoms for when she should contact the clinic or seek additional care. Patient to contact the clinic with any questions or  "concerns in the interim. Request for  and in person visit in three months sent to scheduling.      2.) Genetic Testing Result: NEGATIVE  Brittni is negative for mutations in the APC, MARV, AXIN2, BARD1, BMPR1A, BRCA1, BRCA2, BRIP1, CDH1, CDK4, CDKN2A, CHEK2, DICER1, EPCAM, GREM1, HOXB13, MLH1, MSH2, MSH3, MSH6, MUTYH, NBN, NF1, NTHL1, PALB2, PMS2, POLD1, POLE, PTEN, RAD51C, RAD51D, RECQL, SMAD4, SMARCA4, STK11, and TP53 genes. No mutations were found in any of the 36 genes analyzed. This test involved sequencing and deletion/duplication analysis of all genes with the exception of EPCAM and GREM1 (deletions only).    3.) Labs and/or tests ordered include:        4.) Health maintenance issues addressed today include annual health maintenance and non-gynecologic issues with PCP. Discussed the need to be in close contact with her PCP to keep her colitis controlled. Her vitals and weight are both stable today. Has lost a few pounds since seen in May. Encouraged high protein frequent meals throughout the day. Encouraged Pedialyte or Gatorade with loose stools. Discussed barrier creams such as Desitin or Zinc Oxide for any rectal irritation- none on exam today. She has seen GI at [place]. Encouraged follow up with them regarding colonoscopy that was recommended. Continue working with mental health therapy for ongoing depression - no SI/HI.     5.)        Cancer Rehab for ongoing fatigue and deconditioning/ \"chemo brain\". Referral placed for Dr. Soto in [place].       LEX Anaya, NP-BC  Women's Health Nurse Practitioner  Division of Gynecologic Oncology  [facility]          CC  Patient Care Team:  Cecelia Manzo MD as PCP - Naz Peterson MD as MD (Oncology)  Parvez Abreu MD as MD (Surgery)  Micheline Soler MD as MD (Emergency Medicine)  [facility] ([place]  Paola Soto MD as Assigned Neuroscience " "Provider  Connie Martinez APRN CNP as Assigned Cancer Care Provider      Oncology Rooming Note    August 10, 2021 10:39 AM   Maximino Simeon is a 76 year old female who presents for:    No chief complaint on file.    Initial Vitals: BP (!) 145/95   Pulse 93   Resp 16   Wt 44.4 kg (97 lb 12.8 oz)   LMP  (LMP Unknown)   SpO2 97%   BMI 20.44 kg/m   Estimated body mass index is 20.44 kg/m  as calculated from the following:    Height as of 11/13/20: 1.473 m (4' 10\").    Weight as of this encounter: 44.4 kg (97 lb 12.8 oz). Body surface area is 1.35 meters squared.  Severe Pain (6) Comment: Data Unavailable   No LMP recorded (lmp unknown). Patient has had a hysterectomy.  Allergies reviewed: Yes  Medications reviewed: Yes    Medications: Medication refills not needed today.  Pharmacy name entered into Let:    [facility] #[id number] [facility] [address] [facility] #[id number] [facility] [address] [facility] [place]  [address] [facility] [place]  [id number] [address] [facility] [address][id number] [address] [address] [facility]    Clinical concerns: no      Soraya Beltrán, [facility]                  Again, thank you for allowing me to participate in the care of your patient.        Sincerely,        LEX Larson CNP    " normal/clear to auscultation bilaterally/no wheezes/no rales/no rhonchi/breath sounds equal/good air movement

## 2025-01-03 PROCEDURE — 86304 IMMUNOASSAY TUMOR CA 125: CPT | Performed by: NURSE PRACTITIONER

## 2025-03-08 ENCOUNTER — HEALTH MAINTENANCE LETTER (OUTPATIENT)
Age: 80
End: 2025-03-08

## 2025-06-10 ENCOUNTER — HOSPITAL ENCOUNTER (EMERGENCY)
Facility: CLINIC | Age: 80
Discharge: HOME | End: 2025-06-10
Attending: PHYSICIAN ASSISTANT | Admitting: PHYSICIAN ASSISTANT
Payer: MEDICARE

## 2025-06-10 ENCOUNTER — APPOINTMENT (OUTPATIENT)
Dept: CT IMAGING | Facility: CLINIC | Age: 80
End: 2025-06-10
Attending: PHYSICIAN ASSISTANT
Payer: MEDICARE

## 2025-06-10 VITALS
DIASTOLIC BLOOD PRESSURE: 89 MMHG | RESPIRATION RATE: 18 BRPM | OXYGEN SATURATION: 97 % | HEART RATE: 100 BPM | BODY MASS INDEX: 19.65 KG/M2 | SYSTOLIC BLOOD PRESSURE: 154 MMHG | TEMPERATURE: 98.3 F | WEIGHT: 94 LBS

## 2025-06-10 DIAGNOSIS — R19.7 DIARRHEA, UNSPECIFIED TYPE: ICD-10-CM

## 2025-06-10 DIAGNOSIS — K52.9 COLITIS: ICD-10-CM

## 2025-06-10 LAB
ALBUMIN SERPL BCG-MCNC: 4.2 G/DL (ref 3.5–5.2)
ALBUMIN UR-MCNC: NEGATIVE MG/DL
ALP SERPL-CCNC: 67 U/L (ref 40–150)
ALT SERPL W P-5'-P-CCNC: 8 U/L (ref 0–50)
ANION GAP SERPL CALCULATED.3IONS-SCNC: 14 MMOL/L (ref 7–15)
APPEARANCE UR: CLEAR
AST SERPL W P-5'-P-CCNC: 15 U/L (ref 0–45)
BASOPHILS # BLD AUTO: 0 10E3/UL (ref 0–0.2)
BASOPHILS NFR BLD AUTO: 1 %
BILIRUB SERPL-MCNC: 0.2 MG/DL
BILIRUB UR QL STRIP: NEGATIVE
BUN SERPL-MCNC: 12.8 MG/DL (ref 8–23)
CALCIUM SERPL-MCNC: 10 MG/DL (ref 8.8–10.4)
CHLORIDE SERPL-SCNC: 99 MMOL/L (ref 98–107)
COLOR UR AUTO: ABNORMAL
CREAT SERPL-MCNC: 0.59 MG/DL (ref 0.51–0.95)
EGFRCR SERPLBLD CKD-EPI 2021: 89 ML/MIN/1.73M2
EOSINOPHIL # BLD AUTO: 0 10E3/UL (ref 0–0.7)
EOSINOPHIL NFR BLD AUTO: 1 %
ERYTHROCYTE [DISTWIDTH] IN BLOOD BY AUTOMATED COUNT: 13.3 % (ref 10–15)
GLUCOSE SERPL-MCNC: 103 MG/DL (ref 70–99)
GLUCOSE UR STRIP-MCNC: NEGATIVE MG/DL
HCO3 SERPL-SCNC: 25 MMOL/L (ref 22–29)
HCT VFR BLD AUTO: 41.1 % (ref 35–47)
HGB BLD-MCNC: 13.8 G/DL (ref 11.7–15.7)
HGB UR QL STRIP: ABNORMAL
HOLD SPECIMEN: NORMAL
IMM GRANULOCYTES # BLD: 0 10E3/UL
IMM GRANULOCYTES NFR BLD: 1 %
KETONES UR STRIP-MCNC: ABNORMAL MG/DL
LEUKOCYTE ESTERASE UR QL STRIP: NEGATIVE
LIPASE SERPL-CCNC: 47 U/L (ref 13–60)
LYMPHOCYTES # BLD AUTO: 0.8 10E3/UL (ref 0.8–5.3)
LYMPHOCYTES NFR BLD AUTO: 18 %
MCH RBC QN AUTO: 32.5 PG (ref 26.5–33)
MCHC RBC AUTO-ENTMCNC: 33.6 G/DL (ref 31.5–36.5)
MCV RBC AUTO: 97 FL (ref 78–100)
MONOCYTES # BLD AUTO: 0.6 10E3/UL (ref 0–1.3)
MONOCYTES NFR BLD AUTO: 15 %
MUCOUS THREADS #/AREA URNS LPF: PRESENT /LPF
NEUTROPHILS # BLD AUTO: 2.8 10E3/UL (ref 1.6–8.3)
NEUTROPHILS NFR BLD AUTO: 65 %
NITRATE UR QL: NEGATIVE
NRBC # BLD AUTO: 0 10E3/UL
NRBC BLD AUTO-RTO: 0 /100
PH UR STRIP: 5.5 [PH] (ref 5–7)
PLATELET # BLD AUTO: 258 10E3/UL (ref 150–450)
POTASSIUM SERPL-SCNC: 3.9 MMOL/L (ref 3.4–5.3)
PROT SERPL-MCNC: 7.6 G/DL (ref 6.4–8.3)
RBC # BLD AUTO: 4.25 10E6/UL (ref 3.8–5.2)
RBC URINE: 7 /HPF
SODIUM SERPL-SCNC: 138 MMOL/L (ref 135–145)
SP GR UR STRIP: 1.01 (ref 1–1.03)
UROBILINOGEN UR STRIP-MCNC: NORMAL MG/DL
WBC # BLD AUTO: 4.3 10E3/UL (ref 4–11)
WBC URINE: 1 /HPF

## 2025-06-10 PROCEDURE — 85025 COMPLETE CBC W/AUTO DIFF WBC: CPT | Performed by: EMERGENCY MEDICINE

## 2025-06-10 PROCEDURE — 250N000011 HC RX IP 250 OP 636: Performed by: PHYSICIAN ASSISTANT

## 2025-06-10 PROCEDURE — 85025 COMPLETE CBC W/AUTO DIFF WBC: CPT | Performed by: PHYSICIAN ASSISTANT

## 2025-06-10 PROCEDURE — 99285 EMERGENCY DEPT VISIT HI MDM: CPT | Mod: 25

## 2025-06-10 PROCEDURE — 258N000003 HC RX IP 258 OP 636: Performed by: PHYSICIAN ASSISTANT

## 2025-06-10 PROCEDURE — 96360 HYDRATION IV INFUSION INIT: CPT | Mod: 59

## 2025-06-10 PROCEDURE — 81001 URINALYSIS AUTO W/SCOPE: CPT | Performed by: PHYSICIAN ASSISTANT

## 2025-06-10 PROCEDURE — 83690 ASSAY OF LIPASE: CPT | Performed by: PHYSICIAN ASSISTANT

## 2025-06-10 PROCEDURE — 250N000009 HC RX 250: Performed by: PHYSICIAN ASSISTANT

## 2025-06-10 PROCEDURE — 74177 CT ABD & PELVIS W/CONTRAST: CPT

## 2025-06-10 PROCEDURE — 80053 COMPREHEN METABOLIC PANEL: CPT | Performed by: PHYSICIAN ASSISTANT

## 2025-06-10 PROCEDURE — 36415 COLL VENOUS BLD VENIPUNCTURE: CPT | Performed by: EMERGENCY MEDICINE

## 2025-06-10 PROCEDURE — 82040 ASSAY OF SERUM ALBUMIN: CPT | Performed by: EMERGENCY MEDICINE

## 2025-06-10 RX ORDER — IOPAMIDOL 755 MG/ML
48 INJECTION, SOLUTION INTRAVASCULAR ONCE
Status: COMPLETED | OUTPATIENT
Start: 2025-06-10 | End: 2025-06-10

## 2025-06-10 RX ADMIN — SODIUM CHLORIDE 500 ML: 9 INJECTION, SOLUTION INTRAVENOUS at 20:28

## 2025-06-10 RX ADMIN — SODIUM CHLORIDE 60 ML: 9 INJECTION, SOLUTION INTRAVENOUS at 20:41

## 2025-06-10 RX ADMIN — IOPAMIDOL 48 ML: 755 INJECTION, SOLUTION INTRAVENOUS at 20:41

## 2025-06-10 ASSESSMENT — ACTIVITIES OF DAILY LIVING (ADL)
ADLS_ACUITY_SCORE: 57

## 2025-06-10 ASSESSMENT — COLUMBIA-SUICIDE SEVERITY RATING SCALE - C-SSRS
6. HAVE YOU EVER DONE ANYTHING, STARTED TO DO ANYTHING, OR PREPARED TO DO ANYTHING TO END YOUR LIFE?: NO
1. IN THE PAST MONTH, HAVE YOU WISHED YOU WERE DEAD OR WISHED YOU COULD GO TO SLEEP AND NOT WAKE UP?: NO
2. HAVE YOU ACTUALLY HAD ANY THOUGHTS OF KILLING YOURSELF IN THE PAST MONTH?: NO

## 2025-06-10 NOTE — ED TRIAGE NOTES
Instructed to come to ER by PCP, diarrhea for 6-7 wks. Every time she eats or drinks she has diarrhea. Low energy, not sleeping last couple of days.

## 2025-06-10 NOTE — ED TRIAGE NOTES
Patient presents to the ER via EMS. Per report, patient comes from home where she lives alone. Has had 6-7 weeks of increased weakness and diarrhea. BP was elevated for EMS, 180/110. Blood sugar WDL

## 2025-06-11 NOTE — DISCHARGE INSTRUCTIONS
Your labs are reassuring today. Your scan shows evidence of colitis which may be a flare of your underlying collagenous colitis. Go to scheduled GI follow-up in one week and continue supportive cares at home including rehydration.  Return to the ED with any new or worsening symptoms such as fevers, vomiting, or pain.

## 2025-06-11 NOTE — ED PROVIDER NOTES
"  Emergency Department Note      History of Present Illness     Chief Complaint   Generalized Weakness and Diarrhea      HPI   Maximino Simeon is a 80 year old female with history of colitis, small bowel obstruction, and urinary retention who presents for evaluation of diarrhea. Patient reports 6 weeks of non-bloody diarrhea and fatigue. She contacted her primary care today due to increased fatigue and was instructed to ED. She has already completed stool testing including C difficile and enteric pathogen panel that was negative. Reports no fevers, nausea, vomiting, chest pain, shortness of breath, dysuria, hematuria, or dark or bloody stools. She is tolerating oral intake but reports significant diarrhea after eating or drinking. Patient reports she \"feels her intestines\" but denies pain. Symptoms similar to prior bowel obstruction. Denies recent antibiotic use. Denies recent travel.     Independent Historian   None    Review of External Notes   1/17/2022 prior small bowel obstruction.   06/02/2025 C difficile and enteric pathogen stool testing is negative.     Past Medical History     Medical History and Problem List   Past Medical History:   Diagnosis Date    Anxiety 2014    CAD (coronary artery disease) 2011    Cancer (H)     Colitis     Fibromyalgia     Gastroesophageal reflux disease     Hypothyroidism 2011    Insomnia 2017    Mixed hyperlipidemia 2011    PONV (postoperative nausea and vomiting)     Recurrent major depressive disorder 2010    Urinary retention        Medications   Ascorbic Acid (VITAMIN C) 500 MG CAPS  budesonide (ENTOCORT EC) 3 MG EC capsule  calcium carbonate (OS-LUCRECIA) 1500 (600 Ca) MG tablet  COLLAGEN PO  diphenhydrAMINE-acetaminophen (TYLENOL PM)  MG tablet  FLUoxetine (PROZAC) 40 MG capsule  gabapentin (NEURONTIN) 100 MG capsule  Glutathione 50 MG TABS  hypromellose-dextran (ARTIFICAL TEARS) 0.1-0.3 % ophthalmic solution  lactobacillus rhamnosus, GG, (CULTURELL) " capsule  levothyroxine (SYNTHROID/LEVOTHROID) 50 MCG tablet  liothyronine (CYTOMEL) 5 MCG tablet  multivitamin w/minerals (THERA-VIT-M) tablet  triamcinolone (KENALOG) 0.1 % external cream  Vitamin D3 (CHOLECALCIFEROL) 125 MCG (5000 UT) tablet        Surgical History   Past Surgical History:   Procedure Laterality Date    APPENDECTOMY OPEN N/A 8/9/2019    Procedure: Open Appendectomy;  Surgeon: Parvez Abreu MD;  Location: [facility] OR    BREAST SURGERY      biopsy    GYN SURGERY      HYSTERECTOMY TOTAL ABDOMINAL, BILATERAL SALPINGO-OOPHORECTOMY, NODE DISSECTION, COMBINED Bilateral 8/9/2019    Procedure: Exploratory Laparotomy, Total Abdominal Hysterectomy, Left Salpingo-Oopherectomy, Bilateral Pelvic and Para-Aortic Lymph Node Dissection, Peritoneal Biopsy, Diaphraghm Pap Smears.;  Surgeon: Naz Ascencio MD;  Location: [facility] OR    IR CHEST PORT PLACEMENT > 5 YRS OF AGE  8/30/2019    IR PORT REMOVAL RIGHT  6/18/2021    OOPHORECTOMY  1992       Physical Exam     Patient Vitals for the past 24 hrs:   BP Temp Temp src Pulse Resp SpO2 Weight   06/10/25 1742 (!) 154/89 98.3  F (36.8  C) Tympanic 100 18 97 % 42.6 kg (94 lb)     Physical Exam  Constitutional: Alert, attentive, GCS 15  HENT:    Nose: Nose normal.    Mouth/Throat: Oropharynx is clear, mucous membranes are moist   Eyes: EOM are normal. Pupils equal and reactive.  Neck: Normal range of motion. No rigidity.  CV: regular rate and rhythm; no murmurs, rubs or gallups  Chest: Effort normal and breath sounds normal.   GI:  There is no tenderness. No distension. Normal bowel sounds  MSK: Normal range of motion.   Neurological: Alert, attentive  Skin: Skin is warm and dry.      Diagnostics     Lab Results   Labs Ordered and Resulted from Time of ED Arrival to Time of ED Departure   COMPREHENSIVE METABOLIC PANEL - Abnormal       Result Value    Sodium 138      Potassium 3.9      Carbon Dioxide (CO2) 25      Anion Gap 14      Urea Nitrogen 12.8      Creatinine  0.59      GFR Estimate 89      Calcium 10.0      Chloride 99      Glucose 103 (*)     Alkaline Phosphatase 67      AST 15      ALT 8      Protein Total 7.6      Albumin 4.2      Bilirubin Total 0.2     UA MACROSCOPIC WITH REFLEX TO MICRO AND CULTURE - Abnormal    Color Urine Light Yellow      Appearance Urine Clear      Glucose Urine Negative      Bilirubin Urine Negative      Ketones Urine Trace (*)     Specific Gravity Urine 1.015      Blood Urine Small (*)     pH Urine 5.5      Protein Albumin Urine Negative      Urobilinogen Urine Normal      Nitrite Urine Negative      Leukocyte Esterase Urine Negative      Mucus Urine Present (*)     RBC Urine 7 (*)     WBC Urine 1     LIPASE - Normal    Lipase 47     CBC WITH PLATELETS AND DIFFERENTIAL    WBC Count 4.3      RBC Count 4.25      Hemoglobin 13.8      Hematocrit 41.1      MCV 97      MCH 32.5      MCHC 33.6      RDW 13.3      Platelet Count 258      % Neutrophils 65      % Lymphocytes 18      % Monocytes 15      % Eosinophils 1      % Basophils 1      % Immature Granulocytes 1      NRBCs per 100 WBC 0      Absolute Neutrophils 2.8      Absolute Lymphocytes 0.8      Absolute Monocytes 0.6      Absolute Eosinophils 0.0      Absolute Basophils 0.0      Absolute Immature Granulocytes 0.0      Absolute NRBCs 0.0         Imaging   CT Abdomen Pelvis w Contrast   Final Result   IMPRESSION:       1.  Mild colitis extending from the proximal transverse colon through the mid sigmoid colon, likely infectious or inflammatory.      2.  Fluid throughout the colon, as can be seen with a diarrheal illness.          EKG   None    Independent Interpretation   None    ED Course      Medications Administered   Medications   sodium chloride 0.9% BOLUS 500 mL (0 mLs Intravenous Stopped 6/10/25 2137)   iopamidol (ISOVUE-370) solution 48 mL (48 mLs Intravenous $Given 6/10/25 2041)   sodium chloride 0.9 % bag for CT scan flush (60 mLs Intravenous $Given 6/10/25 2041)       Procedures    Procedures     Discussion of Management   None    ED Course        Additional Documentation  None    Medical Decision Making / Diagnosis     CMS Diagnoses: None    MIPS   None           MDM   Maximino Simeon is a 80 year old female with history of collagenous colitis, small bowel obstruction, and urinary retention who presents with nonbloody diarrhea for the past 6 weeks.  She is afebrile, hypertensive at 154/89, not hypoxic.  Physical examination reveals no focal abdominal tenderness or peritoneal signs.  Patient does report that symptoms are similar to prior small bowel obstruction.  Labs reveal no leukocytosis or anemia.  LFTs are normal.  UA is negative for infection.  CT scan of the abdomen reveals evidence of mild colitis. With history of collagenous colitis, suspect she likely has a mild flare of this. Infectious stool testing last week with her primary care provider that was all negative including C. Difficile test.  No evidence of ischemic colitis.  Results reviewed with patient at bedside. She felt improved after IV fluids.  She is otherwise well-appearing, ambulatory, and tolerating oral intake.  No indication for hospitalization at this time and she has follow-up with GI in one week which is reasonable.  Recommend close follow-up with primary care for recheck and then return with any new or worsening symptoms.  Patient comfortable with plan.  Questions answered at discharge.    Disposition   The patient was discharged.     Diagnosis     ICD-10-CM    1. Colitis  K52.9       2. Diarrhea, unspecified type  R19.7            Discharge Medications   New Prescriptions    No medications on file         7:59 PM  Ayah 10, 2025  ABE OH Emily, PA-C  06/10/25 8150     Severe protein-calorie malnutrition

## (undated) DEVICE — PANTIES MESH LG/XLG 2PK 706M2

## (undated) DEVICE — SU PDS II 0 TP-1 60" Z991G

## (undated) DEVICE — SUCTION SLEEVE NEPTUNE 2 165MM 0703-005-165

## (undated) DEVICE — SUCTION MANIFOLD DORNOCH ULTRA CART UL-CL500

## (undated) DEVICE — WIPE PREMOIST CLEANSING WASHCLOTHS 7988

## (undated) DEVICE — PAD PERI INDIV WRAP 11" 2022A

## (undated) DEVICE — PACK AB HYST II

## (undated) DEVICE — ESU GROUND PAD ADULT W/CORD E7507

## (undated) DEVICE — PREP CHLORAPREP 26ML TINTED ORANGE  260815

## (undated) DEVICE — WIPES FOLEY CARE SURESTEP PROVON DFC100

## (undated) DEVICE — SU VICRYL 2-0 TIE 54" J615H

## (undated) DEVICE — DRAPE MAYO STAND 23X54 8337

## (undated) DEVICE — LINEN TOWEL PACK X6 WHITE 5487

## (undated) DEVICE — SU SILK 2-0 TIE 12X30" A305H

## (undated) DEVICE — ESU PENCIL SMOKE EVAC W/ROCKER SWITCH 0703-047-000

## (undated) DEVICE — SU VICRYL 2-0 CT-2 27" J333H

## (undated) DEVICE — SU SILK 3-0 TIE 12X30" A304H

## (undated) DEVICE — CLIP HORIZON LG ORANGE 004200

## (undated) DEVICE — SU VICRYL 2-0 CT-1 27" UND J259H

## (undated) DEVICE — SU VICRYL 3-0 SH 27" UND J416H

## (undated) DEVICE — LINEN TOWEL PACK X30 5481

## (undated) DEVICE — SU PDS II 3-0 SH 27" Z316H

## (undated) DEVICE — SOL WATER IRRIG 1000ML BOTTLE 2F7114

## (undated) DEVICE — DRAPE LEGGINGS CLEAR 8430

## (undated) DEVICE — PREP TECHNI-CARE CHLOROXYLENOL 3% 4OZ BOTTLE C222-4ZWO

## (undated) DEVICE — SOL NACL 0.9% IRRIG 1000ML BOTTLE 2F7124

## (undated) RX ORDER — LIDOCAINE HYDROCHLORIDE 20 MG/ML
INJECTION, SOLUTION EPIDURAL; INFILTRATION; INTRACAUDAL; PERINEURAL
Status: DISPENSED
Start: 2019-08-09

## (undated) RX ORDER — HYDRALAZINE HYDROCHLORIDE 20 MG/ML
INJECTION INTRAMUSCULAR; INTRAVENOUS
Status: DISPENSED
Start: 2019-08-09

## (undated) RX ORDER — HYDROMORPHONE HYDROCHLORIDE 1 MG/ML
INJECTION, SOLUTION INTRAMUSCULAR; INTRAVENOUS; SUBCUTANEOUS
Status: DISPENSED
Start: 2019-08-09

## (undated) RX ORDER — PHENYLEPHRINE HCL IN 0.9% NACL 1 MG/10 ML
SYRINGE (ML) INTRAVENOUS
Status: DISPENSED
Start: 2019-08-09

## (undated) RX ORDER — ONDANSETRON 2 MG/ML
INJECTION INTRAMUSCULAR; INTRAVENOUS
Status: DISPENSED
Start: 2019-08-09

## (undated) RX ORDER — FENTANYL CITRATE 50 UG/ML
INJECTION, SOLUTION INTRAMUSCULAR; INTRAVENOUS
Status: DISPENSED
Start: 2019-08-09

## (undated) RX ORDER — HEPARIN SODIUM 1000 [USP'U]/ML
INJECTION, SOLUTION INTRAVENOUS; SUBCUTANEOUS
Status: DISPENSED
Start: 2019-08-09

## (undated) RX ORDER — HEPARIN SODIUM (PORCINE) LOCK FLUSH IV SOLN 100 UNIT/ML 100 UNIT/ML
SOLUTION INTRAVENOUS
Status: DISPENSED
Start: 2020-01-20

## (undated) RX ORDER — HEPARIN SODIUM 5000 [USP'U]/.5ML
INJECTION, SOLUTION INTRAVENOUS; SUBCUTANEOUS
Status: DISPENSED
Start: 2019-08-09

## (undated) RX ORDER — FENTANYL CITRATE 50 UG/ML
INJECTION, SOLUTION INTRAMUSCULAR; INTRAVENOUS
Status: DISPENSED
Start: 2021-06-18

## (undated) RX ORDER — CIPROFLOXACIN 2 MG/ML
INJECTION, SOLUTION INTRAVENOUS
Status: DISPENSED
Start: 2019-08-09

## (undated) RX ORDER — GABAPENTIN 300 MG/1
CAPSULE ORAL
Status: DISPENSED
Start: 2019-08-09

## (undated) RX ORDER — LIDOCAINE HYDROCHLORIDE 10 MG/ML
INJECTION, SOLUTION INFILTRATION; PERINEURAL
Status: DISPENSED
Start: 2021-06-18

## (undated) RX ORDER — DEXAMETHASONE SODIUM PHOSPHATE 4 MG/ML
INJECTION, SOLUTION INTRA-ARTICULAR; INTRALESIONAL; INTRAMUSCULAR; INTRAVENOUS; SOFT TISSUE
Status: DISPENSED
Start: 2019-08-09

## (undated) RX ORDER — SCOLOPAMINE TRANSDERMAL SYSTEM 1 MG/1
PATCH, EXTENDED RELEASE TRANSDERMAL
Status: DISPENSED
Start: 2019-08-09

## (undated) RX ORDER — METOPROLOL TARTRATE 1 MG/ML
INJECTION, SOLUTION INTRAVENOUS
Status: DISPENSED
Start: 2019-08-09

## (undated) RX ORDER — SODIUM CHLORIDE, SODIUM LACTATE, POTASSIUM CHLORIDE, CALCIUM CHLORIDE 600; 310; 30; 20 MG/100ML; MG/100ML; MG/100ML; MG/100ML
INJECTION, SOLUTION INTRAVENOUS
Status: DISPENSED
Start: 2019-08-09

## (undated) RX ORDER — EPHEDRINE SULFATE 50 MG/ML
INJECTION, SOLUTION INTRAMUSCULAR; INTRAVENOUS; SUBCUTANEOUS
Status: DISPENSED
Start: 2019-08-09

## (undated) RX ORDER — PROPOFOL 10 MG/ML
INJECTION, EMULSION INTRAVENOUS
Status: DISPENSED
Start: 2019-08-09

## (undated) RX ORDER — ESMOLOL HYDROCHLORIDE 10 MG/ML
INJECTION INTRAVENOUS
Status: DISPENSED
Start: 2019-08-09